# Patient Record
Sex: FEMALE | Race: ASIAN | NOT HISPANIC OR LATINO | Employment: UNEMPLOYED | URBAN - METROPOLITAN AREA
[De-identification: names, ages, dates, MRNs, and addresses within clinical notes are randomized per-mention and may not be internally consistent; named-entity substitution may affect disease eponyms.]

---

## 2017-03-13 ENCOUNTER — TELEPHONE (OUTPATIENT)
Dept: FAMILY MEDICINE | Facility: CLINIC | Age: 52
End: 2017-03-13

## 2017-03-13 DIAGNOSIS — M54.2 CERVICALGIA: Primary | ICD-10-CM

## 2017-03-13 NOTE — TELEPHONE ENCOUNTER
I received a staff Message from Susan Abbott that patient calls requesting a referral for acupuncture. Can we call patient and see why this referral is wanted? I have no diagnosis to pair with the referral.     Thanks,    Mike Carroll, PAC

## 2017-03-15 NOTE — TELEPHONE ENCOUNTER
"Referral for \"Discomfort in head, feeling very tight\".  Not a HA, but tight and stiff.  Pt would like to go get acupuncture today if possible.  Shari Schoenberger, [facility]    "

## 2017-03-15 NOTE — TELEPHONE ENCOUNTER
Referral placed, but will likely not be able to get in today. Patient can call herself to see. They are located in [place]     [phone]    Thanks,    Mike Carroll, PAC

## 2017-03-16 NOTE — TELEPHONE ENCOUNTER
Spoke with patient, she would like the referral to be placed for [facility] in [place]. Please advise

## 2017-04-08 ENCOUNTER — RADIANT APPOINTMENT (OUTPATIENT)
Dept: GENERAL RADIOLOGY | Facility: CLINIC | Age: 52
End: 2017-04-08
Attending: FAMILY MEDICINE
Payer: COMMERCIAL

## 2017-04-08 ENCOUNTER — OFFICE VISIT (OUTPATIENT)
Dept: FAMILY MEDICINE | Facility: CLINIC | Age: 52
End: 2017-04-08
Payer: COMMERCIAL

## 2017-04-08 VITALS
HEIGHT: 65 IN | TEMPERATURE: 98.2 F | HEART RATE: 81 BPM | OXYGEN SATURATION: 97 % | SYSTOLIC BLOOD PRESSURE: 130 MMHG | WEIGHT: 135 LBS | BODY MASS INDEX: 22.49 KG/M2 | RESPIRATION RATE: 16 BRPM | DIASTOLIC BLOOD PRESSURE: 82 MMHG

## 2017-04-08 DIAGNOSIS — M79.671 RIGHT FOOT PAIN: Primary | ICD-10-CM

## 2017-04-08 DIAGNOSIS — M79.671 RIGHT FOOT PAIN: ICD-10-CM

## 2017-04-08 PROCEDURE — 99213 OFFICE O/P EST LOW 20 MIN: CPT | Performed by: FAMILY MEDICINE

## 2017-04-08 PROCEDURE — 73630 X-RAY EXAM OF FOOT: CPT | Mod: RT

## 2017-04-08 RX ORDER — NAPROXEN 500 MG/1
500 TABLET ORAL 2 TIMES DAILY WITH MEALS
Qty: 60 TABLET | Refills: 1 | Status: SHIPPED | OUTPATIENT
Start: 2017-04-08 | End: 2017-04-08

## 2017-04-08 NOTE — PROGRESS NOTES
SUBJECTIVE:                                                    Naa Paredes is a 51 year old female who presents to clinic today for the following health issues:      Musculoskeletal problem/pain      Duration: 3 weeks ago    Description  Location: top foot, middle toe on right foot    Intensity:  moderate    Accompanying signs and symptoms: none    History  Previous similar problem: yes, last year   Previous evaluation:  Last year, fractured toe    Precipitating or alleviating factors:  Trauma or overuse: YES- dances alot  Aggravating factors include: sitting, walking, climbing stairs, exercise and overuse    Therapies tried and outcome: rubbed some oil           Problem list and histories reviewed & adjusted, as indicated.  Additional history: as documented    Patient Active Problem List   Diagnosis     H/O: alcohol abuse     CARDIOVASCULAR SCREENING; LDL GOAL LESS THAN 160     Cervicalgia     Nickel allergy     Anxiety     Perimenopause     Colon adenoma     Seasonal allergic rhinitis     Major depressive disorder, single episode, in partial remission (H)     Major depressive disorder, recurrent, severe without psychotic features (H)     Conversion disorder with mixed symptoms     Migraine with status migrainosus, not intractable, unspecified migraine type     Past Surgical History:   Procedure Laterality Date     COLONOSCOPY N/A 6/9/2015    Procedure: COLONOSCOPY;  Surgeon: Steve Choi MD;  Location:  GI     LITHOTRIPSY         Social History   Substance Use Topics     Smoking status: Never Smoker     Smokeless tobacco: Never Used     Alcohol use Yes      Comment: occ wine     Family History   Problem Relation Age of Onset     HEART DISEASE Mother      Cancer - colorectal Father      at age 65     MENTAL ILLNESS Paternal Uncle      Breast Cancer No family hx of      Ovarian Cancer No family hx of          Current Outpatient Prescriptions   Medication Sig Dispense Refill     naproxen (NAPROSYN) 500 MG  "tablet Take 1 tablet (500 mg) by mouth 2 times daily (with meals) 60 tablet 1     No Known Allergies    Reviewed and updated as needed this visit by clinical staff  Allergies  Meds       Reviewed and updated as needed this visit by Provider         ROS:      OBJECTIVE:                                                    /82 (BP Location: Right arm, Patient Position: Chair, Cuff Size: Adult Regular)  Pulse 81  Temp 98.2  F (36.8  C) (Oral)  Resp 16  Ht 5' 5\" (1.651 m)  Wt 135 lb (61.2 kg)  LMP 08/20/2015  SpO2 97%  BMI 22.47 kg/m2  Body mass index is 22.47 kg/(m^2).  Foot exam : tenderness over the MC joint of the 3rd toe, no swelling, painful ROM of the 3rd toe.          ASSESSMENT/PLAN:                                                            1. Right foot pain  Mostly strain of the MC joint of the 3rd toe, rest, ice  - XR Foot Right G/E 3 Views; Future  - diclofenac (VOLTAREN) 1 % GEL topical gel; Apply  2 grams to toes four times daily using enclosed dosing card.  Dispense: 100 g; Refill: 1        Pearl Charles MD  [facility]    "

## 2017-04-08 NOTE — MR AVS SNAPSHOT
"              After Visit Summary   2017    Naa Paredes    MRN: [id number]           Patient Information     Date Of Birth          1965        Visit Information        Provider Department      2017 9:45 AM Pearl Charles MD; [facility]        Today's Diagnoses     Right foot pain    -  1       Follow-ups after your visit        Who to contact     If you have questions or need follow up information about today's clinic visit or your schedule please contact [facility] directly at [phone].  Normal or non-critical lab and imaging results will be communicated to you by Edxacthart, letter or phone within 4 business days after the clinic has received the results. If you do not hear from us within 7 days, please contact the clinic through Edxacthart or phone. If you have a critical or abnormal lab result, we will notify you by phone as soon as possible.  Submit refill requests through Busportal or call your pharmacy and they will forward the refill request to us. Please allow 3 business days for your refill to be completed.          Additional Information About Your Visit        MyChart Information     Busportal lets you send messages to your doctor, view your test results, renew your prescriptions, schedule appointments and more. To sign up, go to [email or web address] . Click on \"Log in\" on the left side of the screen, which will take you to the Welcome page. Then click on \"Sign up Now\" on the right side of the page.     You will be asked to enter the access code listed below, as well as some personal information. Please follow the directions to create your username and password.     Your access code is: [email or web address]  Expires: 2017 10:18 AM     Your access code will  in 90 days. If you need help or a new code, please call your [facility] or [phone].        Care EveryWhere ID     This is your Care EveryWhere ID. This could be " "used by other organizations to access your [facility] medical records  [id number]        Your Vitals Were     Pulse Temperature Respirations Height Last Period Pulse Oximetry    81 98.2  F (36.8  C) (Oral) 16 5' 5\" (1.651 m) 08/20/2015 97%    BMI (Body Mass Index)                   22.47 kg/m2            Blood Pressure from Last 3 Encounters:   04/08/17 130/82   11/17/16 150/80   11/01/16 136/82    Weight from Last 3 Encounters:   04/08/17 135 lb (61.2 kg)   11/17/16 138 lb (62.6 kg)   11/01/16 139 lb (63 kg)                 Today's Medication Changes          These changes are accurate as of: 4/8/17 10:18 AM.  If you have any questions, ask your nurse or doctor.               Start taking these medicines.        Dose/Directions    diclofenac 1 % Gel topical gel   Commonly known as:  VOLTAREN   Used for:  Right foot pain   Started by:  Pearl Charles MD        Apply  2 grams to toes four times daily using enclosed dosing card.   Quantity:  100 g   Refills:  1            Where to get your medicines      These medications were sent to [facility] [address] [address]     Phone:  [phone]     diclofenac 1 % Gel topical gel                Primary Care Provider Office Phone # Fax #    Sher Duran [phone]       [address] [address]        Thank you!     Thank you for choosing [facility]  for your care. Our goal is always to provide you with excellent care. Hearing back from our patients is one way we can continue to improve our services. Please take a few minutes to complete the written survey that you may receive in the mail after your visit with us. Thank you!             Your Updated Medication List - Protect others around you: Learn how to safely use, store and throw away your medicines at www.disposemymeds.org.          This list is accurate as of: " 4/8/17 10:18 AM.  Always use your most recent med list.                   Brand Name Dispense Instructions for use    diclofenac 1 % Gel topical gel    VOLTAREN    100 g    Apply  2 grams to toes four times daily using enclosed dosing card.

## 2017-04-08 NOTE — NURSING NOTE
"Chief Complaint   Patient presents with     Finger       Initial /82 (BP Location: Right arm, Patient Position: Chair, Cuff Size: Adult Regular)  Pulse 81  Temp 98.2  F (36.8  C) (Oral)  Resp 16  Ht 5' 5\" (1.651 m)  Wt 135 lb (61.2 kg)  LMP 08/20/2015  SpO2 97%  BMI 22.47 kg/m2 Estimated body mass index is 22.47 kg/(m^2) as calculated from the following:    Height as of this encounter: 5' 5\" (1.651 m).    Weight as of this encounter: 135 lb (61.2 kg).  Medication Reconciliation: complete     Albert Reveles CMA      "

## 2017-04-09 ASSESSMENT — PATIENT HEALTH QUESTIONNAIRE - PHQ9: SUM OF ALL RESPONSES TO PHQ QUESTIONS 1-9: 0

## 2017-05-02 ENCOUNTER — OFFICE VISIT (OUTPATIENT)
Dept: FAMILY MEDICINE | Facility: CLINIC | Age: 52
End: 2017-05-02
Payer: COMMERCIAL

## 2017-05-02 VITALS
HEART RATE: 94 BPM | WEIGHT: 136 LBS | SYSTOLIC BLOOD PRESSURE: 136 MMHG | DIASTOLIC BLOOD PRESSURE: 90 MMHG | TEMPERATURE: 98 F | RESPIRATION RATE: 16 BRPM | BODY MASS INDEX: 22.63 KG/M2

## 2017-05-02 DIAGNOSIS — L91.8 SKIN TAG: ICD-10-CM

## 2017-05-02 DIAGNOSIS — L25.9 CONTACT DERMATITIS, UNSPECIFIED CONTACT DERMATITIS TYPE, UNSPECIFIED TRIGGER: Primary | ICD-10-CM

## 2017-05-02 PROCEDURE — 99214 OFFICE O/P EST MOD 30 MIN: CPT | Performed by: FAMILY MEDICINE

## 2017-05-02 RX ORDER — PREDNISONE 20 MG/1
TABLET ORAL
Qty: 20 TABLET | Refills: 0 | Status: SHIPPED | OUTPATIENT
Start: 2017-05-02 | End: 2017-06-05

## 2017-05-02 NOTE — PROGRESS NOTES
SUBJECTIVE:                                                    Naa Paredes is a 51 year old female who presents to clinic today for the following health issues:      Rash     Onset: x2-3 weeks    Description:   Location: from the waist up  Character: blotchy  Itching (Pruritis): YES    Progression of Symptoms:  worsening    Accompanying Signs & Symptoms:  Fever: no   Body aches or joint pain: no   Sore throat symptoms: no   Recent cold symptoms: no    History:   Previous similar rash: no     Precipitating factors:   Exposure to similar rash: no   New exposures: changed lotions but believes they are all natural products   Recent travel: no     Alleviating factors:       Therapies Tried and outcome:     Patient does not really remember what laundry detergent she uses. Is trying some new lotions but believes they are organic - asked about names but did not answer. Tried a chinese cream to help itching last night.     Also has a lot of growths on her neck that she wants a dermatologist to look at.         Problem list and histories reviewed & adjusted, as indicated.  Additional history: as documented    Patient Active Problem List   Diagnosis     H/O: alcohol abuse     CARDIOVASCULAR SCREENING; LDL GOAL LESS THAN 160     Cervicalgia     Nickel allergy     Anxiety     Perimenopause     Colon adenoma     Seasonal allergic rhinitis     Major depressive disorder, single episode, in partial remission (H)     Major depressive disorder, recurrent, severe without psychotic features (H)     Conversion disorder with mixed symptoms     Migraine with status migrainosus, not intractable, unspecified migraine type     Past Surgical History:   Procedure Laterality Date     COLONOSCOPY N/A 6/9/2015    Procedure: COLONOSCOPY;  Surgeon: Steve Choi MD;  Location:  GI     LITHOTRIPSY         Social History   Substance Use Topics     Smoking status: Never Smoker     Smokeless tobacco: Never Used     Alcohol use Yes       Comment: occ wine     Family History   Problem Relation Age of Onset     HEART DISEASE Mother      Cancer - colorectal Father      at age 65     MENTAL ILLNESS Paternal Uncle      Breast Cancer No family hx of      Ovarian Cancer No family hx of            Reviewed and updated as needed this visit by clinical staff  Tobacco  Allergies       Reviewed and updated as needed this visit by Provider         ROS:  Constitutional, skin  systems are negative, except as otherwise noted.    OBJECTIVE:                                                    /90 (BP Location: Right arm, Patient Position: Chair, Cuff Size: Adult Regular)  Pulse 94  Temp 98  F (36.7  C) (Oral)  Resp 16  Wt 136 lb (61.7 kg)  LMP 08/20/2015  Breastfeeding? No  BMI 22.63 kg/m2  Body mass index is 22.63 kg/(m^2).  GENERAL: healthy, alert and no distress  SKIN: pruritic papules on erythematous patchy background bilateral arms, chest; smal skin tags ~0.2 cm diffusely on neck- no irritation noted     Diagnostic Test Results:  none      ASSESSMENT/PLAN:                                                        1. Contact dermatitis, unspecified contact dermatitis type, unspecified trigger  - due to extensive nature will start on oral steroids. Also recommend used of benadryl or calamine lotion as needed for itching   - predniSONE (DELTASONE) 20 MG tablet; Take 3 tabs (60 mg) by mouth daily x 3 days, 2 tabs (40 mg) daily x 3 days, 1 tab (20 mg) daily x 3 days, then 1/2 tab (10 mg) x 3 days.  Dispense: 20 tablet; Refill: 0    2. Skin tag  - discussed with patient these are simple skin tags. She is adamant about a referral to dermatology.   - DERMATOLOGY REFERRAL    See Patient Instructions    Treasure Nelson MD  [facility]

## 2017-05-02 NOTE — MR AVS SNAPSHOT
After Visit Summary   5/2/2017    Naa Paredes    MRN: [id number]           Patient Information     Date Of Birth          1965        Visit Information        Provider Department      5/2/2017 10:45 AM Treasure Nelson MD; [facility]        Today's Diagnoses     Contact dermatitis, unspecified contact dermatitis type, unspecified trigger    -  1    Skin tag          Care Instructions    Recommend benadryl as needed for itching.  Calamine lotion can also help   Advise emollients such as cetaphil, cerave, aveeno, eucerin for lotion  Avoid scented soaps/lotions    Contact Dermatitis  Contact dermatitis is a skin rash caused by something that touches the skin and makes it irritated and inflamed.  Your skin may be red, swollen, dry, and may be cracked. Blisters may form and ooze. The rash will itch.  Contact dermatitis can form on the face and neck, backs of hands, forearms, genitals, and lower legs.  People can get contact dermatitis from lots of sources. These include:    Plants such as poison ivy, oak, or sumac    Chemicals in hair dyes and rinses, soaps, solvents, waxes, fingernail polish, and deodorants     Jewelry or watchbands made of nickel  Contact dermatitis is not passed from person to person.  Talk with your health care provider about what may have caused the rash. You will need to avoid the source of your rash in the future to prevent it from coming back.  Treatment is done to relieve itching and prevent the rash from coming back. The rash should go away in a few days to a few weeks.  Home care  The health care provider may prescribe medications to relieve swelling and itching. Follow all instructions when using these medications.  General care:    Avoid anything that heats up your skin, such as hot showers or baths, or direct sunlight. This can make itching worse.    Apply cold compresses to soothe your sores to help relieve  your symptoms. Do this for 30 minutes 3 to 4 times a day. You can make a cold compress by soaking a cloth in cold water. Squeeze out excess water. You can add colloidal oatmeal to the water to help reduce itching. For severe itching in a small area, apply an ice pack wrapped in a thin towel. Do this for 20 minutes 3 to 4 times a day.    You can also try wet dressings. One way to do this is to wear a wet piece of clothing under a dry one. Wear a damp shirt under a dry shirt if your upper body is affected. This can relieve itching and prevent you from scratching the affected area.    You can also help relieve large areas of itching by taking a lukewarm bath with colloidal oatmeal added to the water.    Use hydrocortisone cream for redness and irritation, unless another medicine was prescribed. You can also use benzocaine anesthetic cream or spray. Calamine lotion can also relieve mild symptoms.    Use oral diphenhydramine to help reduce itching. This is an antihistamine you can buy at drug and grocery stores. It can make you sleepy, so use lower doses during the daytime. Or you can use loratadine. This is an antihistamine that will not make you sleepy. Do not use diphenhydramine if you have glaucoma or have trouble urinating due to an enlarged prostate.    If your rash is caused by a plant, make sure to wash your skin and the clothes you were wearing when you came into contact with the plant. This is to wash away the plant oils that gave you the rash and prevent more or worse symptoms.    Stay away from the substance or object that causes your symptoms. If you can t avoid it, wear gloves or some other type of protection.  Follow-up care  Follow up with your health care provider.  When to seek medical advice  Call your health care provider right away if any of these occur:    Spreading of the rash to other parts of your body    Severe swelling of your face, eyelids, mouth, throat or tongue    Trouble urinating due to  swelling in the genital area    Fever of 100.4 F (38 C) or higher    Redness or swelling that gets worse    Pain that gets worse    Foul-smelling fluid leaking from the skin    Yellow-brown crusts on the open blisters       5937-1950 [employer]. [address] [place] [address]. All rights reserved. This information is not intended as a substitute for professional medical care. Always follow your healthcare professional's instructions.              Follow-ups after your visit        Additional Services     DERMATOLOGY REFERRAL       Your provider has referred you to: [facility] [phone]   [email or web address]    Please be aware that coverage of these services is subject to the terms and limitations of your health insurance plan.  Call member services at your health plan with any benefit or coverage questions.      Please bring the following with you to your appointment:    (1) Any X-Rays, CTs or MRIs which have been performed.  Contact the facility where they were done to arrange for  prior to your scheduled appointment.    (2) List of current medications  (3) This referral request   (4) Any documents/labs given to you for this referral                  Who to contact     If you have questions or need follow up information about today's clinic visit or your schedule please contact [facility] directly at [phone].  Normal or non-critical lab and imaging results will be communicated to you by MyChart, letter or phone within 4 business days after the clinic has received the results. If you do not hear from us within 7 days, please contact the clinic through MyChart or phone. If you have a critical or abnormal lab result, we will notify you by phone as soon as possible.  Submit refill requests through TunePatrol or call your pharmacy and they will forward the refill request to us. Please allow 3 business days for your  "refill to be completed.          Additional Information About Your Visit        [employer] Information     [employer] lets you send messages to your doctor, view your test results, renew your prescriptions, schedule appointments and more. To sign up, go to [email or web address] . Click on \"Log in\" on the left side of the screen, which will take you to the Welcome page. Then click on \"Sign up Now\" on the right side of the page.     You will be asked to enter the access code listed below, as well as some personal information. Please follow the directions to create your username and password.     Your access code is: [email or web address]  Expires: 2017 10:18 AM     Your access code will  in 90 days. If you need help or a new code, please call your [place] clinic or [phone].        Care EveryWhere ID     This is your Care EveryWhere ID. This could be used by other organizations to access your [facility] medical records  [id number]        Your Vitals Were     Pulse Temperature Respirations Last Period Breastfeeding? BMI (Body Mass Index)    94 98  F (36.7  C) (Oral) 16 2015 No 22.63 kg/m2       Blood Pressure from Last 3 Encounters:   17 136/90   17 130/82   16 150/80    Weight from Last 3 Encounters:   17 136 lb (61.7 kg)   17 135 lb (61.2 kg)   16 138 lb (62.6 kg)              We Performed the Following     DERMATOLOGY REFERRAL          Today's Medication Changes          These changes are accurate as of: 17 11:41 AM.  If you have any questions, ask your nurse or doctor.               Start taking these medicines.        Dose/Directions    predniSONE 20 MG tablet   Commonly known as:  DELTASONE   Used for:  Contact dermatitis, unspecified contact dermatitis type, unspecified trigger   Started by:  Treasure Nelson MD        Take 3 tabs (60 mg) by mouth daily x 3 days, 2 tabs (40 mg) daily x 3 days, 1 tab (20 mg) daily x 3 days, then 1/2 tab (10 mg) x 3 days. "   Quantity:  20 tablet   Refills:  0            Where to get your medicines      These medications [place] [facility] [address]  [address] [facility] [address]     Phone:  [phone]     predniSONE 20 MG tablet                Primary Care Provider Office Phone # Fax #    Sher Duran [phone]       [facility] [address] [place] [address]        Thank you!     Thank you for choosing [facility]  for your care. Our goal is always to provide you with excellent care. Hearing back from our patients is one way we can continue to improve our services. Please take a few minutes to complete the written survey that you may receive in the mail after your visit with us. Thank you!             Your Updated Medication List - Protect others around you: Learn how to safely use, store and throw away your medicines at www.disposemymeds.org.          This list is accurate as of: 5/2/17 11:41 AM.  Always use your most recent med list.                   Brand Name Dispense Instructions for use    predniSONE 20 MG tablet    DELTASONE    20 tablet    Take 3 tabs (60 mg) by mouth daily x 3 days, 2 tabs (40 mg) daily x 3 days, 1 tab (20 mg) daily x 3 days, then 1/2 tab (10 mg) x 3 days.

## 2017-05-02 NOTE — PATIENT INSTRUCTIONS
Recommend benadryl as needed for itching.  Calamine lotion can also help   Advise emollients such as cetaphil, cerave, aveeno, eucerin for lotion  Avoid scented soaps/lotions    Contact Dermatitis  Contact dermatitis is a skin rash caused by something that touches the skin and makes it irritated and inflamed.  Your skin may be red, swollen, dry, and may be cracked. Blisters may form and ooze. The rash will itch.  Contact dermatitis can form on the face and neck, backs of hands, forearms, genitals, and lower legs.  People can get contact dermatitis from lots of sources. These include:    Plants such as poison ivy, oak, or sumac    Chemicals in hair dyes and rinses, soaps, solvents, waxes, fingernail polish, and deodorants     Jewelry or watchbands made of nickel  Contact dermatitis is not passed from person to person.  Talk with your health care provider about what may have caused the rash. You will need to avoid the source of your rash in the future to prevent it from coming back.  Treatment is done to relieve itching and prevent the rash from coming back. The rash should go away in a few days to a few weeks.  Home care  The health care provider may prescribe medications to relieve swelling and itching. Follow all instructions when using these medications.  General care:    Avoid anything that heats up your skin, such as hot showers or baths, or direct sunlight. This can make itching worse.    Apply cold compresses to soothe your sores to help relieve your symptoms. Do this for 30 minutes 3 to 4 times a day. You can make a cold compress by soaking a cloth in cold water. Squeeze out excess water. You can add colloidal oatmeal to the water to help reduce itching. For severe itching in a small area, apply an ice pack wrapped in a thin towel. Do this for 20 minutes 3 to 4 times a day.    You can also try wet dressings. One way to do this is to wear a wet piece of clothing under a dry one. Wear a damp shirt under a dry  shirt if your upper body is affected. This can relieve itching and prevent you from scratching the affected area.    You can also help relieve large areas of itching by taking a lukewarm bath with colloidal oatmeal added to the water.    Use hydrocortisone cream for redness and irritation, unless another medicine was prescribed. You can also use benzocaine anesthetic cream or spray. Calamine lotion can also relieve mild symptoms.    Use oral diphenhydramine to help reduce itching. This is an antihistamine you can buy at drug and grocery stores. It can make you sleepy, so use lower doses during the daytime. Or you can use loratadine. This is an antihistamine that will not make you sleepy. Do not use diphenhydramine if you have glaucoma or have trouble urinating due to an enlarged prostate.    If your rash is caused by a plant, make sure to wash your skin and the clothes you were wearing when you came into contact with the plant. This is to wash away the plant oils that gave you the rash and prevent more or worse symptoms.    Stay away from the substance or object that causes your symptoms. If you can t avoid it, wear gloves or some other type of protection.  Follow-up care  Follow up with your health care provider.  When to seek medical advice  Call your health care provider right away if any of these occur:    Spreading of the rash to other parts of your body    Severe swelling of your face, eyelids, mouth, throat or tongue    Trouble urinating due to swelling in the genital area    Fever of 100.4 F (38 C) or higher    Redness or swelling that gets worse    Pain that gets worse    Foul-smelling fluid leaking from the skin    Yellow-brown crusts on the open blisters       7264-0381 [employer]. [address] [place] [address], [place] [address]. All rights reserved. This information is not intended as a substitute for professional medical care. Always follow your healthcare professional's instructions.

## 2017-05-02 NOTE — NURSING NOTE
"Chief Complaint   Patient presents with     Derm Problem     rash and itchy skin x2-3 weeks       Initial /90 (BP Location: Right arm, Patient Position: Chair, Cuff Size: Adult Regular)  Pulse 94  Temp 98  F (36.7  C) (Oral)  Resp 16  Wt 136 lb (61.7 kg)  LMP 08/20/2015  Breastfeeding? No  BMI 22.63 kg/m2 Estimated body mass index is 22.63 kg/(m^2) as calculated from the following:    Height as of 4/8/17: 5' 5\" (1.651 m).    Weight as of this encounter: 136 lb (61.7 kg).  Medication Reconciliation: complete     Raquel Llanos/BART  [place]---[facility]      "

## 2017-05-26 ENCOUNTER — TELEPHONE (OUTPATIENT)
Dept: FAMILY MEDICINE | Facility: CLINIC | Age: 52
End: 2017-05-26

## 2017-05-26 NOTE — TELEPHONE ENCOUNTER
Chart shows patient discontinued 5/2/17 (therapy completed) .  We called pt. Pop. Pharmacy staff informed.   Vinayak Delong RN

## 2017-05-26 NOTE — TELEPHONE ENCOUNTER
Please do not close this encounter until this has been addressed.  (prior auth approved/denied, prescriber refusal to complete prior auth or medication changed/discontinued)    Prior Authorization needed on: Voltaren gel  Drug NDC: [address]-47     Insurance: blue plus  Member ID: [id number]   Insurance phone #: [phone]    Bin [id number], [facility]  Pharmacy NPI: [id number]  Pharmacy Phone #: [phone]  Pharmacy Fax #: [phone]    Please let us know if the PA gets approved or denied or if medication is changed  Thanks!  Kaylie Ramirez CPhT  [facility]  [phone]

## 2017-05-26 NOTE — TELEPHONE ENCOUNTER
I really don't know what to do with these, I recommend voltaren gel because it works!  Pearl Charles MD  [facility]  [phone]

## 2017-05-30 ENCOUNTER — OFFICE VISIT (OUTPATIENT)
Dept: FAMILY MEDICINE | Facility: CLINIC | Age: 52
End: 2017-05-30
Payer: COMMERCIAL

## 2017-05-30 VITALS
WEIGHT: 137.3 LBS | TEMPERATURE: 97.6 F | RESPIRATION RATE: 14 BRPM | OXYGEN SATURATION: 98 % | HEART RATE: 84 BPM | HEIGHT: 65 IN | DIASTOLIC BLOOD PRESSURE: 79 MMHG | SYSTOLIC BLOOD PRESSURE: 136 MMHG | BODY MASS INDEX: 22.88 KG/M2

## 2017-05-30 DIAGNOSIS — S20.222A BACK CONTUSION, LEFT, INITIAL ENCOUNTER: Primary | ICD-10-CM

## 2017-05-30 PROCEDURE — 99213 OFFICE O/P EST LOW 20 MIN: CPT | Performed by: FAMILY MEDICINE

## 2017-05-30 RX ORDER — LIDOCAINE 50 MG/G
PATCH TOPICAL
Qty: 30 PATCH | Refills: 0 | Status: SHIPPED | OUTPATIENT
Start: 2017-05-30 | End: 2017-06-05

## 2017-05-30 RX ORDER — CHOLECALCIFEROL (VITAMIN D3) 25 MCG
TABLET ORAL
Refills: 1 | COMMUNITY
Start: 2017-05-26 | End: 2017-10-10

## 2017-05-30 NOTE — NURSING NOTE
"Chief Complaint   Patient presents with     Fall       Initial /79 (BP Location: Left arm, Patient Position: Chair, Cuff Size: Adult Regular)  Pulse 84  Temp 97.6  F (36.4  C) (Oral)  Resp 14  Ht 5' 5\" (1.651 m)  Wt 137 lb 4.8 oz (62.3 kg)  LMP 08/20/2015  SpO2 98%  BMI 22.85 kg/m2 Estimated body mass index is 22.85 kg/(m^2) as calculated from the following:    Height as of this encounter: 5' 5\" (1.651 m).    Weight as of this encounter: 137 lb 4.8 oz (62.3 kg).  Medication Reconciliation: complete   Murray Núñez CMA       "

## 2017-05-30 NOTE — TELEPHONE ENCOUNTER
Pt calls, after lengthy discussion informs lidoderm patch not covered, needs PA, see message below that insurance does NOT cover voltaren gel, routed to , please advise if PA needed, no fax yet from [facility]    Mali Nicolas RN, BSN  Message handled by Nurse Triage.

## 2017-05-30 NOTE — MR AVS SNAPSHOT
"              After Visit Summary   2017    Naa Paredes    MRN: [id number]           Patient Information     Date Of Birth          1965        Visit Information        Provider Department      2017 10:15 AM Liam, Assignments; Yoni Kebede MD [facility]        Today's Diagnoses     Back contusion, left, initial encounter    -  1       Follow-ups after your visit        Follow-up notes from your care team     Return in about 4 weeks (around 2017).      Who to contact     If you have questions or need follow up information about today's clinic visit or your schedule please contact [place] [facility] directly at [phone].  Normal or non-critical lab and imaging results will be communicated to you by MyChart, letter or phone within 4 business days after the clinic has received the results. If you do not hear from us within 7 days, please contact the clinic through MyChart or phone. If you have a critical or abnormal lab result, we will notify you by phone as soon as possible.  Submit refill requests through [employer] or call your pharmacy and they will forward the refill request to us. Please allow 3 business days for your refill to be completed.          Additional Information About Your Visit        MyChart Information     [employer] lets you send messages to your doctor, view your test results, renew your prescriptions, schedule appointments and more. To sign up, go to [email or web address] . Click on \"Log in\" on the left side of the screen, which will take you to the Welcome page. Then click on \"Sign up Now\" on the right side of the page.     You will be asked to enter the access code listed below, as well as some personal information. Please follow the directions to create your username and password.     Your access code is: [email or web address]  Expires: 2017 10:18 AM     Your access code will  in 90 days. If you need help or a new code, please " "call your [facility] or [phone].        Care EveryWhere ID     This is your Care EveryWhere ID. This could be used by other organizations to access your [place] medical records  [id number]        Your Vitals Were     Pulse Temperature Respirations Height Last Period Pulse Oximetry    84 97.6  F (36.4  C) (Oral) 14 5' 5\" (1.651 m) 08/20/2015 98%    BMI (Body Mass Index)                   22.85 kg/m2            Blood Pressure from Last 3 Encounters:   05/30/17 136/79   05/02/17 136/90   04/08/17 130/82    Weight from Last 3 Encounters:   05/30/17 137 lb 4.8 oz (62.3 kg)   05/02/17 136 lb (61.7 kg)   04/08/17 135 lb (61.2 kg)              Today, you had the following     No orders found for display         Today's Medication Changes          These changes are accurate as of: 5/30/17 11:06 AM.  If you have any questions, ask your nurse or doctor.               Start taking these medicines.        Dose/Directions    lidocaine 5 % Patch   Commonly known as:  LIDODERM   Used for:  Back contusion, left, initial encounter   Started by:  Yoni Kebede MD        Apply up to 3 patches to painful area at once for up to 12 h within a 24 h period.  Remove after 12 hours.   Quantity:  30 patch   Refills:  0            Where to get your medicines      These medications were sent to [facility] #[id number] - [place], [place] - [address]  [address] [facility] [address]     Phone:  [phone]     lidocaine 5 % Patch                Primary Care Provider Office Phone # Fax #    Sher Duran [phone]       [facility] [address] [facility] [address]        Thank you!     Thank you for choosing [facility]  for your care. Our goal is always to provide you with excellent care. Hearing back from our patients is one way we can continue to improve our services. Please take a few minutes to complete the written survey that you may receive in " the mail after your visit with us. Thank you!             Your Updated Medication List - Protect others around you: Learn how to safely use, store and throw away your medicines at www.disposemymeds.org.          This list is accurate as of: 5/30/17 11:06 AM.  Always use your most recent med list.                   Brand Name Dispense Instructions for use    cholecalciferol 1000 UNIT tablet    vitamin D         lidocaine 5 % Patch    LIDODERM    30 patch    Apply up to 3 patches to painful area at once for up to 12 h within a 24 h period.  Remove after 12 hours.       predniSONE 20 MG tablet    DELTASONE    20 tablet    Take 3 tabs (60 mg) by mouth daily x 3 days, 2 tabs (40 mg) daily x 3 days, 1 tab (20 mg) daily x 3 days, then 1/2 tab (10 mg) x 3 days.

## 2017-05-30 NOTE — PROGRESS NOTES
SUBJECTIVE:                                                    Naa Paredes is a 51 year old female who presents to clinic today for the following health issues:        Patient fell on her left butt/hip area last night in a hot tub.  The pain she is experiencing is sharp.It is localized to the gluteus muscles, no bony tenderness or limitation of low back mobility     No dysuria, hematuria or brown urine. She is able to bear weight and walk regularly            REVIEW OF SYSTEMS    Generally has been had been  feeling well until this episode. No problems with vision, hearing, dental or neck pain.Has hph airborne or ingestion allergy  No chest pain, palpitations, dyspnea, change in bowel habits, blood  in stool or dyspepsia.  No rashes, changing moles, weakness, lassitude or back problems.  No chronic issues . No dysuria  Patient not  a smoker. No problems with significant headaches.    On exam the vital signs are stable  Weight is Body mass index is 22.85 kg/(m^2).   Eyes show lula  No neck masses or thyromegaly.Ear nose and throat shows normal   No bruits, murmers, rubs or extrasounds. No cardiomegaly or chest wall tenderness. Lungs clear, no abdominal masses or organomegaly. No CVA tenderness.  Skin eval no rash or bruising     Past Medical History:   Diagnosis Date     Ankle sprain and strain 5/2/2013     Anxiety 3/26/2015     Black stools 11/25/2015     CARDIOVASCULAR SCREENING; LDL GOAL LESS THAN 160 10/31/2010     Cervicalgia 12/7/2011     Colon adenoma 4/23/2015    Patient denies it. Renee Tiwari RN, 6/9/15.     Conversion disorder with mixed symptoms 6/14/2016     Depression with suicidal ideation 6/19/2015     Depressive disorder      Elevated blood pressure reading without diagnosis of hypertension 11/25/2015     Elevated LFT's 6/4/2010     Fatigue due to depression 11/24/2015     H/O: alcohol abuse 6/4/2010     IBS (irritable bowel syndrome)      Knee pain 10/19/2010     Low back pain 10/19/2010      Lumbago 12/7/2011     Major depressive disorder, recurrent, severe without psychotic features (H) 5/13/2016     Major depressive disorder, single episode, in partial remission (H) 11/24/2015     Migraine      Neck pain 10/19/2010     Nephrolithiasis     calcium oxalate     Pain in thoracic spine 1/2/2013     Perimenopause 3/26/2015     Seasonal allergic rhinitis 10/20/2015     Vitamin D deficiency disease 6/8/2010       Past Surgical History:   Procedure Laterality Date     COLONOSCOPY N/A 6/9/2015    Procedure: COLONOSCOPY;  Surgeon: Steve Choi MD;  Location: [facility]     LITHOTRIPSY         Family History   Problem Relation Age of Onset     HEART DISEASE Mother      Cancer - colorectal Father      at age 65     MENTAL ILLNESS Paternal Uncle      Breast Cancer No family hx of      Ovarian Cancer No family hx of        Social History   Substance Use Topics     Smoking status: Never Smoker     Smokeless tobacco: Never Used     Alcohol use Yes      Comment: occ wine       (S20.222A) Back contusion, left, initial encounter  (primary encounter diagnosis)  Comment:   Plan: lidocaine (LIDODERM) 5 % Patch

## 2017-06-05 ENCOUNTER — OFFICE VISIT (OUTPATIENT)
Dept: FAMILY MEDICINE | Facility: CLINIC | Age: 52
End: 2017-06-05
Payer: COMMERCIAL

## 2017-06-05 VITALS
RESPIRATION RATE: 16 BRPM | WEIGHT: 139 LBS | TEMPERATURE: 98.1 F | HEART RATE: 80 BPM | SYSTOLIC BLOOD PRESSURE: 130 MMHG | DIASTOLIC BLOOD PRESSURE: 78 MMHG | BODY MASS INDEX: 23.13 KG/M2

## 2017-06-05 DIAGNOSIS — M79.671 RIGHT FOOT PAIN: ICD-10-CM

## 2017-06-05 DIAGNOSIS — S20.229D BACK CONTUSION, UNSPECIFIED LATERALITY, SUBSEQUENT ENCOUNTER: Primary | ICD-10-CM

## 2017-06-05 PROCEDURE — 99214 OFFICE O/P EST MOD 30 MIN: CPT | Performed by: FAMILY MEDICINE

## 2017-06-05 NOTE — PROGRESS NOTES
SUBJECTIVE:                                                    Naa Paredes is a 51 year old female who presents to clinic today for the following health issues:      Follow up Fall   Patient was seen a week ago for back pain that started after falling in a hot tub. Her pain has been slowly improving since visit. States she had a difficult time flipping around in bed especially yesterday. Denies any pain today. Her pain is usually localized to her left lower back, buttock.  Denies any numbness or tingling.     Patient also reports right foot pain- admits its not really a sharp pain but a stretching kind of feeling.   Worse when she wears tight shoes which she wears often for dancing.   Denies any numbness or tingling. Was seen for similar issue in April with negative foot xray.       Problem list and histories reviewed & adjusted, as indicated.  Additional history: as documented    Patient Active Problem List   Diagnosis     H/O: alcohol abuse     CARDIOVASCULAR SCREENING; LDL GOAL LESS THAN 160     Cervicalgia     Nickel allergy     Anxiety     Perimenopause     Colon adenoma     Seasonal allergic rhinitis     Major depressive disorder, single episode, in partial remission (H)     Major depressive disorder, recurrent, severe without psychotic features (H)     Conversion disorder with mixed symptoms     Migraine with status migrainosus, not intractable, unspecified migraine type     Past Surgical History:   Procedure Laterality Date     COLONOSCOPY N/A 6/9/2015    Procedure: COLONOSCOPY;  Surgeon: Steve Choi MD;  Location:  GI     LITHOTRIPSY         Social History   Substance Use Topics     Smoking status: Never Smoker     Smokeless tobacco: Never Used     Alcohol use Yes      Comment: occ wine     Family History   Problem Relation Age of Onset     HEART DISEASE Mother      Cancer - colorectal Father      at age 65     MENTAL ILLNESS Paternal Uncle      Breast Cancer No family hx of      Ovarian Cancer  No family hx of            Reviewed and updated as needed this visit by clinical staff       Reviewed and updated as needed this visit by Provider         ROS:  Constitutional, MSK, NEURO systems are negative, except as otherwise noted.    OBJECTIVE:                                                    BP (!) 152/95 (BP Location: Right arm, Patient Position: Chair, Cuff Size: Adult Regular)  Pulse 80  Temp 98.1  F (36.7  C) (Oral)  Resp 16  Wt 139 lb (63 kg)  LMP 08/20/2015  BMI 23.13 kg/m2  Body mass index is 23.13 kg/(m^2).  GENERAL: healthy, alert and no distress  MS: left low back- mild TTP buttock otherwise unremarkable. Right foot- no obvious swelling or deformity. Normal gait   NEURO: Normal strength and tone and sensory exam grossly normal    Diagnostic Test Results:  none      ASSESSMENT/PLAN:                                                        1. Back contusion, unspecified laterality, subsequent encounter  - improving. Discussed alternating ice/heat. Also reviewed stretching exercises.   Patient wants lidocaine patches- this was declined by insurance at recent visit. Recommend trying salon pas patches OTC.     2. Right foot pain  - exam unremarkable. Discussed proper orthotics.     See Patient Instructions    Treasure Nelson MD  [facility]

## 2017-06-05 NOTE — PATIENT INSTRUCTIONS
Recommend Salonpas patch over the counter  Follow up as needed  Back Contusion    You have a contusion to your back. A contusion is also called a bruise. There is swelling and some bleeding under the skin. The skin may be purplish. You may have muscle aching and stiffness in the area of the bruise. There are no broken bones.  Contusions heal on their own, without further treatment. However, pain and skin discoloration may take weeks to months to go away.   Home care    Rest. Avoid heavy lifting, strenuous exertion, or any activity that causes pain.    Ice the area to reduce pain and swelling. Put ice cubes in a plastic bag or use a cold pack. (Wrap the cold source in a thin towel. Do not place it directly on your skin.) Ice the injured area for 20 minutes every 1-2 hours the first day. Continue with ice packs 3-4 times a day for the next two days, then as needed for the relief of pain and swelling.    Take any prescribed pain medication. If none was prescribed, take acetaminophen, ibuprofen, or naproxen to control pain.  Follow-up care  Follow up with your healthcare provider, or as directed. Call if you are not better in 1-2 weeks.  When to seek medical advice  Call your healthcare provider for any of the following:    New or worsening pain    Increased swelling around the bruise    Pain spreads to one or both legs    Weakness or numbness in one or both legs     Loss of bowel or bladder control    Numbness in the groin or genital area    Fever of 100.4 F (38 C) or higher, or as directed by your healthcare provider    [id number] [employer]. [address] [place] [address], [place] [address]. All rights reserved. This information is not intended as a substitute for professional medical care. Always follow your healthcare professional's instructions.

## 2017-06-05 NOTE — MR AVS SNAPSHOT
After Visit Summary   6/5/2017    Naa Paredes    MRN: [id number]           Patient Information     Date Of Birth          1965        Visit Information        Provider Department      6/5/2017 11:15 AM Liam, Assignments; Treasure Nelson MD [facility]        Today's Diagnoses     Back contusion, unspecified laterality, subsequent encounter    -  1    Back contusion, left, initial encounter          Care Instructions    Recommend Salonpas patch over the counter  Follow up as needed  Back Contusion    You have a contusion to your back. A contusion is also called a bruise. There is swelling and some bleeding under the skin. The skin may be purplish. You may have muscle aching and stiffness in the area of the bruise. There are no broken bones.  Contusions heal on their own, without further treatment. However, pain and skin discoloration may take weeks to months to go away.   Home care    Rest. Avoid heavy lifting, strenuous exertion, or any activity that causes pain.    Ice the area to reduce pain and swelling. Put ice cubes in a plastic bag or use a cold pack. (Wrap the cold source in a thin towel. Do not place it directly on your skin.) Ice the injured area for 20 minutes every 1-2 hours the first day. Continue with ice packs 3-4 times a day for the next two days, then as needed for the relief of pain and swelling.    Take any prescribed pain medication. If none was prescribed, take acetaminophen, ibuprofen, or naproxen to control pain.  Follow-up care  Follow up with your healthcare provider, or as directed. Call if you are not better in 1-2 weeks.  When to seek medical advice  Call your healthcare provider for any of the following:    New or worsening pain    Increased swelling around the bruise    Pain spreads to one or both legs    Weakness or numbness in one or both legs     Loss of bowel or bladder control    Numbness in the groin or genital area    Fever of  "100.4 F (38 C) or higher, or as directed by your healthcare provider    4946-6338 [employer]. [address] [place] [address], [place] [address]. All rights reserved. This information is not intended as a substitute for professional medical care. Always follow your healthcare professional's instructions.                Follow-ups after your visit        Who to contact     If you have questions or need follow up information about today's clinic visit or your schedule please contact [facility] directly at [phone].  Normal or non-critical lab and imaging results will be communicated to you by Snocaphart, letter or phone within 4 business days after the clinic has received the results. If you do not hear from us within 7 days, please contact the clinic through Synaffixt or phone. If you have a critical or abnormal lab result, we will notify you by phone as soon as possible.  Submit refill requests through ZoomSafer or call your pharmacy and they will forward the refill request to us. Please allow 3 business days for your refill to be completed.          Additional Information About Your Visit        SnocapharTravel [employer] Information     ZoomSafer lets you send messages to your doctor, view your test results, renew your prescriptions, schedule appointments and more. To sign up, go to [email or web address] . Click on \"Log in\" on the left side of the screen, which will take you to the Welcome page. Then click on \"Sign up Now\" on the right side of the page.     You will be asked to enter the access code listed below, as well as some personal information. Please follow the directions to create your username and password.     Your access code is: [email or web address]  Expires: 2017 10:18 AM     Your access code will  in 90 days. If you need help or a new code, please call your [facility] or [phone].        Care EveryWhere ID     This is your Care EveryWhere ID. This could be used by other organizations to " access your [facility] medical records  [id number]        Your Vitals Were     Pulse Temperature Respirations Last Period BMI (Body Mass Index)       80 98.1  F (36.7  C) (Oral) 16 08/20/2015 23.13 kg/m2        Blood Pressure from Last 3 Encounters:   06/05/17 (!) 152/95   05/30/17 136/79   05/02/17 136/90    Weight from Last 3 Encounters:   06/05/17 139 lb (63 kg)   05/30/17 137 lb 4.8 oz (62.3 kg)   05/02/17 136 lb (61.7 kg)              Today, you had the following     No orders found for display       Primary Care Provider Office Phone # Fax #    Sher Duran [phone]       [address] [address]        Thank you!     Thank you for choosing [facility]  for your care. Our goal is always to provide you with excellent care. Hearing back from our patients is one way we can continue to improve our services. Please take a few minutes to complete the written survey that you may receive in the mail after your visit with us. Thank you!             Your Updated Medication List - Protect others around you: Learn how to safely use, store and throw away your medicines at www.disposemymeds.org.          This list is accurate as of: 6/5/17 12:16 PM.  Always use your most recent med list.                   Brand Name Dispense Instructions for use    cholecalciferol 1000 UNIT tablet    vitamin D

## 2017-06-05 NOTE — NURSING NOTE
"Chief Complaint   Patient presents with     Fall       Initial BP (!) 152/95 (BP Location: Right arm, Patient Position: Chair, Cuff Size: Adult Regular)  Pulse 80  Temp 98.1  F (36.7  C) (Oral)  Resp 16  Wt 139 lb (63 kg)  LMP 08/20/2015  BMI 23.13 kg/m2 Estimated body mass index is 23.13 kg/(m^2) as calculated from the following:    Height as of 5/30/17: 5' 5\" (1.651 m).    Weight as of this encounter: 139 lb (63 kg).  Medication Reconciliation: complete    "

## 2017-06-19 ENCOUNTER — OFFICE VISIT (OUTPATIENT)
Dept: FAMILY MEDICINE | Facility: CLINIC | Age: 52
End: 2017-06-19
Payer: COMMERCIAL

## 2017-06-19 VITALS
BODY MASS INDEX: 23.47 KG/M2 | RESPIRATION RATE: 18 BRPM | SYSTOLIC BLOOD PRESSURE: 119 MMHG | TEMPERATURE: 98.3 F | DIASTOLIC BLOOD PRESSURE: 82 MMHG | HEART RATE: 73 BPM | HEIGHT: 65 IN | WEIGHT: 140.9 LBS

## 2017-06-19 DIAGNOSIS — M79.671 RIGHT FOOT PAIN: Primary | ICD-10-CM

## 2017-06-19 DIAGNOSIS — L91.8 ACROCHORDON: ICD-10-CM

## 2017-06-19 DIAGNOSIS — S20.212D CONTUSION OF CHEST WALL, LEFT, SUBSEQUENT ENCOUNTER: ICD-10-CM

## 2017-06-19 PROCEDURE — 11200 RMVL SKIN TAGS UP TO&INC 15: CPT | Performed by: FAMILY MEDICINE

## 2017-06-19 PROCEDURE — 99214 OFFICE O/P EST MOD 30 MIN: CPT | Mod: 25 | Performed by: FAMILY MEDICINE

## 2017-06-19 NOTE — NURSING NOTE
"Chief Complaint   Patient presents with     RECHECK     pt fell 3 weeks ago- pt fell walking from hot tub to pool at hotel and she slipped and fell, and injured left side mid back. Patient has been seen 2 times since then for the fall.        Initial /82 (BP Location: Left arm, Patient Position: Chair, Cuff Size: Adult Regular)  Pulse 73  Temp 98.3  F (36.8  C) (Oral)  Resp 18  Ht 5' 5\" (1.651 m)  Wt 140 lb 14.4 oz (63.9 kg)  LMP 08/20/2015  BMI 23.45 kg/m2 Estimated body mass index is 23.45 kg/(m^2) as calculated from the following:    Height as of this encounter: 5' 5\" (1.651 m).    Weight as of this encounter: 140 lb 14.4 oz (63.9 kg).  Medication Reconciliation: complete     Jasmeet Ness CMA      "

## 2017-06-19 NOTE — MR AVS SNAPSHOT
"              After Visit Summary   6/19/2017    Naa Paredes    MRN: [id number]           Patient Information     Date Of Birth          1965        Visit Information        Provider Department      6/19/2017 2:00 PM Sher Duran MD; [place] [employer]        Today's Diagnoses     Right foot pain    -  1    Contusion of chest wall, left, subsequent encounter        Acrochordon          Care Instructions     3 weeks            Follow-ups after your visit        Future tests that were ordered for you today     Open Future Orders        Priority Expected Expires Ordered    MR Foot Right w/o Contrast Routine  6/19/2018 6/19/2017            Who to contact     If you have questions or need follow up information about today's clinic visit or your schedule please contact [facility] directly at [phone].  Normal or non-critical lab and imaging results will be communicated to you by MyChart, letter or phone within 4 business days after the clinic has received the results. If you do not hear from us within 7 days, please contact the clinic through MyChart or phone. If you have a critical or abnormal lab result, we will notify you by phone as soon as possible.  Submit refill requests through Workana or call your pharmacy and they will forward the refill request to us. Please allow 3 business days for your refill to be completed.          Additional Information About Your Visit        MyChart Information     Workana lets you send messages to your doctor, view your test results, renew your prescriptions, schedule appointments and more. To sign up, go to [email or web address] . Click on \"Log in\" on the left side of the screen, which will take you to the Welcome page. Then click on \"Sign up Now\" on the right side of the page.     You will be asked to enter the access code listed below, as well as some personal information. Please follow the directions to " "create your username and password.     Your access code is: [email or web address]  Expires: 2017 10:18 AM     Your access code will  in 90 days. If you need help or a new code, please call your [place] clinic or [phone].        Care EveryWhere ID     This is your Care EveryWhere ID. This could be used by other organizations to access your [facility] medical records  [id number]        Your Vitals Were     Pulse Temperature Respirations Height Last Period BMI (Body Mass Index)    73 98.3  F (36.8  C) (Oral) 18 5' 5\" (1.651 m) 2015 23.45 kg/m2       Blood Pressure from Last 3 Encounters:   17 119/82   17 130/78   17 136/79    Weight from Last 3 Encounters:   17 140 lb 14.4 oz (63.9 kg)   17 139 lb (63 kg)   17 137 lb 4.8 oz (62.3 kg)              We Performed the Following     DESTRUCT BENIGN LESION, UP TO 14        Primary Care Provider Office Phone # Fax #    Sher Duran [phone]       [address] [address]        Thank you!     Thank you for choosing [facility]  for your care. Our goal is always to provide you with excellent care. Hearing back from our patients is one way we can continue to improve our services. Please take a few minutes to complete the written survey that you may receive in the mail after your visit with us. Thank you!             Your Updated Medication List - Protect others around you: Learn how to safely use, store and throw away your medicines at www.disposemymeds.org.          This list is accurate as of: 17  9:16 PM.  Always use your most recent med list.                   Brand Name Dispense Instructions for use    cholecalciferol 1000 UNIT tablet    vitamin D         lidocaine 2 % topical gel    XYLOCAINE           "

## 2017-06-19 NOTE — PROGRESS NOTES
"  SUBJECTIVE:                                                    Naa Paredes is a 51 year old female who presents to clinic today for the following health issues:      Back Pain      Duration: 3 weeks        Specific cause: fell    Description:   Location of pain: middle of back left ribs  Character of pain: \"hard to say\"  Pain radiation:none  New numbness or weakness in legs, not attributed to pain:  no     Intensity: Currently 5/10    History:   Pain interferes with job: Yes, with certain movements  History of back problems: recurrent self limited episodes of low back pain in the past  Any previous MRI or X-rays: None  Sees a specialist for back pain:  No  Therapies tried without relief: Lidocaine gel, Chinese herbal muscle pain relief    Alleviating factors:   Improved by: none      Precipitating factors:  Worsened by: Lifting, Bending, Lying Flat     Functional and Psychosocial Screen (Yahaira STarT Back):      Not performed today       Accompanying Signs & Symptoms:  Risk of Fracture:  None  Risk of Cauda Equina:  None  Risk of Infection:  None  Risk of Cancer:  None  Risk of Ankylosing Spondylitis:  Onset at age <35, male, AND morning back stiffness. no                  Contusion of chest wall, left, subsequent encounter: Patient describes she fell in a swimming pool area 6/5/17. She was walking on stairs leading into the pool and slipped. She is unsure where she exactly hit, but describes her pain to be present in her left thoracic area.  Axillary lines  Right foot pain  (primary encounter diagnosis) now 2 mos. Blamed on bellydancing. XR a few mos ago neg    Acrochordon itxh, bilat neck      Problem list and histories reviewed & adjusted, as indicated.  Additional history: as documented    Patient Active Problem List   Diagnosis     H/O: alcohol abuse     CARDIOVASCULAR SCREENING; LDL GOAL LESS THAN 160     Cervicalgia     Nickel allergy     Anxiety     Perimenopause     Colon adenoma     Seasonal allergic " rhinitis     Major depressive disorder, single episode, in partial remission (H)     Major depressive disorder, recurrent, severe without psychotic features (H)     Conversion disorder with mixed symptoms     Migraine with status migrainosus, not intractable, unspecified migraine type     Past Surgical History:   Procedure Laterality Date     COLONOSCOPY N/A 6/9/2015    Procedure: COLONOSCOPY;  Surgeon: Steve Choi MD;  Location:  GI     LITHOTRIPSY         Social History   Substance Use Topics     Smoking status: Never Smoker     Smokeless tobacco: Never Used     Alcohol use Yes      Comment: occ wine     Family History   Problem Relation Age of Onset     HEART DISEASE Mother      Cancer - colorectal Father      at age 65     MENTAL ILLNESS Paternal Uncle      Breast Cancer No family hx of      Ovarian Cancer No family hx of          Current Outpatient Prescriptions   Medication Sig Dispense Refill     VITAMIN D3 1000 UNITS tablet   1     lidocaine (XYLOCAINE) 2 % topical gel   1     No Known Allergies  Recent Labs   Lab Test  11/01/16   1219  07/26/16   0946  07/19/16   0954  07/14/16   1056  06/14/16   1520   11/24/15   1636  07/09/15   0835   06/15/15   1713   LDL  158*   --    --   181*   --    --    --   140*   --    --    HDL  62   --    --   64   --    --    --   56   --    --    TRIG  79   --    --   181*   --    --    --   79   --    --    ALT   --   58*   --   79*   --    --    --    --    --   40   CR   --    --    --   0.62  0.71   < >   --    --    < >  0.65   GFRESTIMATED   --    --    --   >90  Non  GFR Calc    87   < >   --    --    < >  >90  Non  GFR Calc     GFRESTBLACK   --    --    --   >90   GFR Calc    >90   GFR Calc     < >   --    --    < >  >90   GFR Calc     POTASSIUM   --    --   3.7  3.3*  3.9   < >   --    --    < >  4.0   TSH   --    --    --    --   1.46   --   2.07   --    < >   --     < > =  values in this interval not displayed.      BP Readings from Last 3 Encounters:   06/19/17 119/82   06/05/17 130/78   05/30/17 136/79    Wt Readings from Last 3 Encounters:   06/19/17 63.9 kg (140 lb 14.4 oz)   06/05/17 63 kg (139 lb)   05/30/17 62.3 kg (137 lb 4.8 oz)                 Labs reviewed in EPIC    Reviewed and updated as needed this visit by clinical staff  Tobacco  Allergies  Med Hx  Surg Hx  Fam Hx  Soc Hx         PAST MEDICAL HISTORY:   Past Medical History:   Diagnosis Date     Ankle sprain and strain 5/2/2013     Anxiety 3/26/2015     Black stools 11/25/2015     CARDIOVASCULAR SCREENING; LDL GOAL LESS THAN 160 10/31/2010     Cervicalgia 12/7/2011     Colon adenoma 4/23/2015    Patient denies it. Renee Tiwari RN, 6/9/15.     Conversion disorder with mixed symptoms 6/14/2016     Depression with suicidal ideation 6/19/2015     Depressive disorder      Elevated blood pressure reading without diagnosis of hypertension 11/25/2015     Elevated LFT's 6/4/2010     Fatigue due to depression 11/24/2015     H/O: alcohol abuse 6/4/2010     IBS (irritable bowel syndrome)      Knee pain 10/19/2010     Low back pain 10/19/2010     Lumbago 12/7/2011     Major depressive disorder, recurrent, severe without psychotic features (H) 5/13/2016     Major depressive disorder, single episode, in partial remission (H) 11/24/2015     Migraine      Neck pain 10/19/2010     Nephrolithiasis     calcium oxalate     Pain in thoracic spine 1/2/2013     Perimenopause 3/26/2015     Seasonal allergic rhinitis 10/20/2015     Vitamin D deficiency disease 6/8/2010       PAST SURGICAL HISTORY:   Past Surgical History:   Procedure Laterality Date     COLONOSCOPY N/A 6/9/2015    Procedure: COLONOSCOPY;  Surgeon: Steve Choi MD;  Location:  GI     LITHOTRIPSY         FAMILY HISTORY:   Family History   Problem Relation Age of Onset     HEART DISEASE Mother      Cancer - colorectal Father      at age 65     MENTAL ILLNESS  "Paternal Uncle      Breast Cancer No family hx of      Ovarian Cancer No family hx of        SOCIAL HISTORY:   Social History   Substance Use Topics     Smoking status: Never Smoker     Smokeless tobacco: Never Used     Alcohol use Yes      Comment: occ wine     Reviewed and updated as needed this visit by Provider         ROS: francesco serna    This document serves as a record of the services and decisions personally performed and made by Sher Duran MD. It was created on his/her behalf by Gaye Starkey, a trained medical scribe. The creation of this document is based the provider's statements to the medical scribe.  Scribarturo Starkey 2:45 PM, June 19, 2017    EXAM: /82 (BP Location: Left arm, Patient Position: Chair, Cuff Size: Adult Regular)  Pulse 73  Temp 98.3  F (36.8  C) (Oral)  Resp 18  Ht 1.651 m (5' 5\")  Wt 63.9 kg (140 lb 14.4 oz)  LMP 08/20/2015  BMI 23.45 kg/m2  Musculoskeletal: ctender ant axillary line inf ribs. Palpation away from here neg.   CHEST: Clear to auscultation, respirations unlabored  Foot non tender, non swollen, no bruise deformity  Integumentary: multiple acrochordons on neck, one left axilla, typical    Cryo applied    ASSESSMENT / PLAN:  (M79.671) Right foot pain  (primary encounter diagnosis)  Comment: stress fx possible  Plan: MR Foot Right w/o Contrast            (S20.212D) Contusion of chest wall, left, subsequent encounter  Comment: fell in pool area 6/5/17  Plan: Discussed anticipated time of resolution, approx 1 mo. Imaging is not definitive    (L91.8) Acrochordon  Comment: itchy, treated with cryotherapy   Plan: DESTRUCT BENIGN LESION, UP TO 14            RTC 3 weeks if skin tags remain    The information in this document, created by the medical scribe for me, accurately reflects the services I personally performed and the decisions made by me. I have reviewed and approved this document for accuracy prior to leaving the patient care area.  Sher Duran, " MD  2:45 PM, 06/19/17  Sher Duran MD

## 2017-06-27 ENCOUNTER — TELEPHONE (OUTPATIENT)
Dept: FAMILY MEDICINE | Facility: CLINIC | Age: 52
End: 2017-06-27

## 2017-06-27 ENCOUNTER — HOSPITAL ENCOUNTER (OUTPATIENT)
Dept: MRI IMAGING | Facility: CLINIC | Age: 52
Discharge: HOME | End: 2017-06-27
Attending: FAMILY MEDICINE | Admitting: FAMILY MEDICINE
Payer: COMMERCIAL

## 2017-06-27 DIAGNOSIS — S92.911G: Primary | ICD-10-CM

## 2017-06-27 DIAGNOSIS — M79.671 RIGHT FOOT PAIN: ICD-10-CM

## 2017-06-27 PROCEDURE — 73718 MRI LOWER EXTREMITY W/O DYE: CPT | Mod: RT

## 2017-06-27 NOTE — TELEPHONE ENCOUNTER
Many fractures in the foot  I need Ms Paredes to see the podiatrists  Gold: Please call her with    Susan:Podiatry ordered  Sher Duran

## 2017-07-03 ENCOUNTER — OFFICE VISIT (OUTPATIENT)
Dept: FAMILY MEDICINE | Facility: CLINIC | Age: 52
End: 2017-07-03
Payer: COMMERCIAL

## 2017-07-03 VITALS
BODY MASS INDEX: 23.46 KG/M2 | RESPIRATION RATE: 16 BRPM | OXYGEN SATURATION: 99 % | SYSTOLIC BLOOD PRESSURE: 118 MMHG | WEIGHT: 141 LBS | TEMPERATURE: 97.8 F | DIASTOLIC BLOOD PRESSURE: 83 MMHG | HEART RATE: 78 BPM

## 2017-07-03 DIAGNOSIS — S20.222D BACK CONTUSION, LEFT, SUBSEQUENT ENCOUNTER: ICD-10-CM

## 2017-07-03 DIAGNOSIS — M79.645 THUMB PAIN, LEFT: Primary | ICD-10-CM

## 2017-07-03 DIAGNOSIS — R07.81 RIB PAIN ON LEFT SIDE: ICD-10-CM

## 2017-07-03 PROCEDURE — 99213 OFFICE O/P EST LOW 20 MIN: CPT | Performed by: FAMILY MEDICINE

## 2017-07-03 RX ORDER — LIDOCAINE 50 MG/G
PATCH TOPICAL
Qty: 30 PATCH | Refills: 0 | Status: SHIPPED | OUTPATIENT
Start: 2017-07-03 | End: 2017-08-02

## 2017-07-03 NOTE — NURSING NOTE
"Chief Complaint   Patient presents with     Musculoskeletal Problem     left arm pain x 2 days, left hip pain x 1 month - fell on it 1 month ago       Initial /83 (BP Location: Left arm, Patient Position: Chair, Cuff Size: Adult Regular)  Pulse 78  Temp 97.8  F (36.6  C) (Oral)  Resp 16  Wt 141 lb (64 kg)  LMP 08/20/2015  SpO2 99%  BMI 23.46 kg/m2 Estimated body mass index is 23.46 kg/(m^2) as calculated from the following:    Height as of 6/23/17: 5' 5\" (1.651 m).    Weight as of this encounter: 141 lb (64 kg).  Medication Reconciliation: complete  Sonam Cobb M.A.  "

## 2017-07-03 NOTE — PATIENT INSTRUCTIONS
Recommend trying salonpas patches for back pain.   We can consider xray if you change your mind  Recommend ibuprofen, aleve, or advil  Follow up as needed

## 2017-07-03 NOTE — MR AVS SNAPSHOT
"              After Visit Summary   7/3/2017    Naa Paredes    MRN: [id number]           Patient Information     Date Of Birth          1965        Visit Information        Provider Department      7/3/2017 8:45 AM Treasure Nelson MD [facility]        Today's Diagnoses     Thumb pain, left    -  1    Rib pain on left side        Back contusion, left, subsequent encounter          Care Instructions    Recommend trying salonpas patches for back pain.   We can consider xray if you change your mind  Recommend ibuprofen, aleve, or advil  Follow up as needed          Follow-ups after your visit        Your next 10 appointments already scheduled     Jul 11, 2017  9:45 AM CDT   New Visit with Casi Marquez DPM, Podiatry/Foot and Ankle Surgery   [facility]    [address] [place] [address] [id number] [phone]              Who to contact     If you have questions or need follow up information about today's clinic visit or your schedule please contact [facility] directly at [phone].  Normal or non-critical lab and imaging results will be communicated to you by JamHubhart, letter or phone within 4 business days after the clinic has received the results. If you do not hear from us within 7 days, please contact the clinic through JamHubhart or phone. If you have a critical or abnormal lab result, we will notify you by phone as soon as possible.  Submit refill requests through TraderTools or call your pharmacy and they will forward the refill request to us. Please allow 3 business days for your refill to be completed.          Additional Information About Your Visit        JamHubhart Information     TraderTools lets you send messages to your doctor, view your test results, renew your prescriptions, schedule appointments and more. To sign up, go to [email or web address] . Click on \"Log in\" on the left side of the " "screen, which will take you to the Welcome page. Then click on \"Sign up Now\" on the right side of the page.     You will be asked to enter the access code listed below, as well as some personal information. Please follow the directions to create your username and password.     Your access code is: [email or web address]  Expires: 2017 10:18 AM     Your access code will  in 90 days. If you need help or a new code, please call your [place] clinic or [phone].        Care EveryWhere ID     This is your Care EveryWhere ID. This could be used by other organizations to access your [place] medical records  [id number]        Your Vitals Were     Pulse Temperature Respirations Last Period Pulse Oximetry BMI (Body Mass Index)    78 97.8  F (36.6  C) (Oral) 16 2015 99% 23.46 kg/m2       Blood Pressure from Last 3 Encounters:   17 118/83   17 138/89   17 119/82    Weight from Last 3 Encounters:   17 141 lb (64 kg)   17 142 lb 12.8 oz (64.8 kg)   17 140 lb 14.4 oz (63.9 kg)              Today, you had the following     No orders found for display       Primary Care Provider Office Phone # Fax #    Sher Duran [phone]       [address] [address]        Equal Access to Services     [facility] AH: Hadii aad ku hadasho Soomaali, waaxda luqadaha, qaybta kaalmada adeegyada, camelia lorenzo . So [facility] [phone].    ATENCIÓN: Si habla español, tiene a choi disposición servicios gratuitos de asistencia lingüística. Llame al [phone].    We comply with applicable federal civil rights laws and [place] laws. We do not discriminate on the basis of race, color, national origin, age, disability sex, sexual orientation or gender identity.            Thank you!     Thank you for choosing [facility]  for your care. Our goal is always to provide you with excellent care. Hearing " back from our patients is one way we can continue to improve our services. Please take a few minutes to complete the written survey that you may receive in the mail after your visit with us. Thank you!             Your Updated Medication List - Protect others around you: Learn how to safely use, store and throw away your medicines at www.disposemymeds.org.          This list is accurate as of: 7/3/17  9:32 AM.  Always use your most recent med list.                   Brand Name Dispense Instructions for use Diagnosis    cholecalciferol 1000 UNIT tablet    vitamin D          lidocaine 2 % topical gel    XYLOCAINE

## 2017-07-03 NOTE — PROGRESS NOTES
"  SUBJECTIVE:                                                    Naa Paredes is a 51 year old female who presents to clinic today for the following health issues:      Joint Pain    Onset: 2 weeks ago    Description:   Location: Left hand/arm pain x 2 weeks, fell 1 month ago and still has left hip pain off and on  Character: constant ache    Intensity: moderate    Progression of Symptoms: same    Accompanying Signs & Symptoms:  Other symptoms: radiation of pain from hand and spreads up the arm    History:   Previous similar pain: no       Precipitating factors:   Trauma or overuse: no trauma to left arm and hand.  Had a fall 1 month ago and landed on left hip.    Alleviating factors:  Improved by: nothing    Therapies Tried and outcome: \"muscle/joint\" cream - no help    Left arm pain- couple of days. Pain goes down into base of thumb.   Denies any swelling, numbness or tingling.     Fell down by pool about a month ago injuring her left hip and back.   Pain today is much better than before.   Left lower rib pain worse with coughing.           Problem list and histories reviewed & adjusted, as indicated.  Additional history: as documented    Patient Active Problem List   Diagnosis     H/O: alcohol abuse     CARDIOVASCULAR SCREENING; LDL GOAL LESS THAN 160     Cervicalgia     Nickel allergy     Anxiety     Perimenopause     Colon adenoma     Seasonal allergic rhinitis     Major depressive disorder, single episode, in partial remission (H)     Major depressive disorder, recurrent, severe without psychotic features (H)     Conversion disorder with mixed symptoms     Migraine with status migrainosus, not intractable, unspecified migraine type     Past Surgical History:   Procedure Laterality Date     COLONOSCOPY N/A 6/9/2015    Procedure: COLONOSCOPY;  Surgeon: Steve Choi MD;  Location:  GI     LITHOTRIPSY         Social History   Substance Use Topics     Smoking status: Never Smoker     Smokeless tobacco: " Never Used     Alcohol use Yes      Comment: occ wine     Family History   Problem Relation Age of Onset     HEART DISEASE Mother      Cancer - colorectal Father      at age 65     MENTAL ILLNESS Paternal Uncle      Breast Cancer No family hx of      Ovarian Cancer No family hx of            Reviewed and updated as needed this visit by clinical staff  Tobacco  Allergies  Meds  Med Hx  Surg Hx  Fam Hx  Soc Hx      Reviewed and updated as needed this visit by Provider         ROS:  Constitutional, msk, neuro, skin  systems are negative, except as otherwise noted.    OBJECTIVE:     /83 (BP Location: Left arm, Patient Position: Chair, Cuff Size: Adult Regular)  Pulse 78  Temp 97.8  F (36.6  C) (Oral)  Resp 16  Wt 141 lb (64 kg)  LMP 08/20/2015  SpO2 99%  BMI 23.46 kg/m2  Body mass index is 23.46 kg/(m^2).  GENERAL: healthy, alert and no distress  MS: mild TTP base of thumb, FROM, low back- left side- mild TTP otherwise unremarkable, left side ribs- mild TTP ribs 10-12                                                                                                                                                                              SKIN: no suspicious lesions or rashes    Diagnostic Test Results:  none     ASSESSMENT/PLAN:         1. Thumb pain, left  - likely a sprain vs. OA. Patient refuses imaging at this time.     2. Rib pain on left side  - s/p fall. Likely rib contusion. Supportive care.   - lidocaine (LIDODERM) 5 % Patch; Apply up to 3 patches to painful area at once for up to 12 h within a 24 h period.  Remove after 12 hours.  Dispense: 30 patch; Refill: 0    3. Back contusion, left, subsequent encounter  - patient wants lidocaine patches as she does not want to take oral meds. Lidocaine Rx was attempted at her previous visit but is not covered by insurance. She wants to try again. If not covered will try lidocaine gel.   - lidocaine (LIDODERM) 5 % Patch; Apply up to 3 patches to painful  area at once for up to 12 h within a 24 h period.  Remove after 12 hours.  Dispense: 30 patch; Refill: 0    See Patient Instructions    Treasure Nelson MD  [facility]

## 2017-07-06 ENCOUNTER — OFFICE VISIT (OUTPATIENT)
Dept: FAMILY MEDICINE | Facility: CLINIC | Age: 52
End: 2017-07-06
Payer: COMMERCIAL

## 2017-07-06 VITALS
SYSTOLIC BLOOD PRESSURE: 128 MMHG | RESPIRATION RATE: 12 BRPM | OXYGEN SATURATION: 94 % | TEMPERATURE: 97.8 F | HEIGHT: 65 IN | DIASTOLIC BLOOD PRESSURE: 85 MMHG | WEIGHT: 140.6 LBS | HEART RATE: 76 BPM | BODY MASS INDEX: 23.43 KG/M2

## 2017-07-06 DIAGNOSIS — S92.911G: Primary | ICD-10-CM

## 2017-07-06 DIAGNOSIS — M79.622 PAIN OF LEFT UPPER ARM: ICD-10-CM

## 2017-07-06 PROCEDURE — 99214 OFFICE O/P EST MOD 30 MIN: CPT | Performed by: FAMILY MEDICINE

## 2017-07-06 NOTE — NURSING NOTE
"Chief Complaint   Patient presents with     Pain     follow up on pain in arm       Initial /85 (BP Location: Right arm, Patient Position: Chair, Cuff Size: Adult Regular)  Pulse 76  Temp 97.8  F (36.6  C) (Oral)  Resp 12  Ht 5' 5\" (1.651 m)  Wt 140 lb 9.6 oz (63.8 kg)  LMP 08/20/2015  SpO2 94%  BMI 23.4 kg/m2 Estimated body mass index is 23.4 kg/(m^2) as calculated from the following:    Height as of this encounter: 5' 5\" (1.651 m).    Weight as of this encounter: 140 lb 9.6 oz (63.8 kg).  Medication Reconciliation: complete   Murray Núñez CMA       "

## 2017-07-06 NOTE — MR AVS SNAPSHOT
After Visit Summary   7/6/2017    Naa Paredes    MRN: [id number]           Patient Information     Date Of Birth          1965        Visit Information        Provider Department      7/6/2017 11:00 AM Sher Duran MD; [place] [facility]        Today's Diagnoses     Closed nondisplaced fracture of phalanx of toe of right foot with delayed healing, unspecified toe, subsequent encounter    -  1    Pain of left upper arm           Follow-ups after your visit        Your next 10 appointments already scheduled     Jul 10, 2017  5:15 PM CDT   MR CERVICAL SPINE W/O CONTRAST with [id number]   [facility] ([facility])    [address]  [facility] [address] [phone]           Take your medicines as usual, unless your doctor tells you not to. Bring a list of your current medicines to your exam (including vitamins, minerals and over-the-counter drugs). Also bring the results of similar scans you may have had.  Please remove any body piercings and hair extensions before you arrive.  Follow your doctor s orders. If you do not, we may have to postpone your exam.  You will not have contrast for this exam. You do not need to do anything special to prepare.  The MRI machine uses a strong magnet. Please wear clothes without metal (snaps, zippers). A sweatsuit works well, or we may give you a hospital gown.   **IMPORTANT** THE INSTRUCTIONS BELOW ARE ONLY FOR THOSE PATIENTS WHO HAVE BEEN TOLD THEY WILL RECEIVE SEDATION OR GENERAL ANESTHESIA DURING THEIR MRI PROCEDURE:  IF YOU WILL RECEIVE SEDATION (take medicine to help you relax during your exam):   You must get the medicine from your doctor before you arrive. Bring the medicine to the exam. Do not take it at home.   Arrive one hour early. Bring someone who can take you home after the test. Your medicine will make you sleepy. After the exam, you may not  drive, take a bus or take a taxi by yourself.   No eating 8 hours before your exam. You may have clear liquids up until 4 hours before your exam. (Clear liquids include water, clear tea, black coffee and fruit juice without pulp.)  IF YOU WILL RECEIVE ANESTHESIA (be asleep for your exam):   Arrive 1 1/2 hours early. Bring someone who can take you home after the test. You may not drive, take a bus or take a taxi by yourself.   No eating 8 hours before your exam. You may have clear liquids up until 4 hours before your exam. (Clear liquids include water, clear tea, black coffee and fruit juice without pulp.)   You will spend four to five hours in the recovery room.  Please call the Imaging Department at your exam site with any questions.            Jul 11, 2017  9:45 AM CDT   New Visit with Casi Marquez DPM, Podiatry/Foot and Ankle Surgery   [facility]    [address] [place] [id number] [phone]              Future tests that were ordered for you today     Open Future Orders        Priority Expected Expires Ordered    MR Cervical Spine w/o Contrast Routine  7/6/2018 7/6/2017            Who to contact     If you have questions or need follow up information about today's clinic visit or your schedule please contact [facility] directly at [phone].  Normal or non-critical lab and imaging results will be communicated to you by MyChart, letter or phone within 4 business days after the clinic has received the results. If you do not hear from us within 7 days, please contact the clinic through MyChart or phone. If you have a critical or abnormal lab result, we will notify you by phone as soon as possible.  Submit refill requests through [employer] or call your pharmacy and they will forward the refill request to us. Please allow 3 business days for your refill to be completed.          Additional Information About Your Visit       "  MyChart Information     [employer] lets you send messages to your doctor, view your test results, renew your prescriptions, schedule appointments and more. To sign up, go to [email or web address] . Click on \"Log in\" on the left side of the screen, which will take you to the Welcome page. Then click on \"Sign up Now\" on the right side of the page.     You will be asked to enter the access code listed below, as well as some personal information. Please follow the directions to create your username and password.     Your access code is: [email or web address]  Expires: 2017 10:18 AM     Your access code will  in 90 days. If you need help or a new code, please call your [facility] clinic or [phone].        Care EveryWhere ID     This is your Care EveryWhere ID. This could be used by other organizations to access your [facility] medical records  [id number]        Your Vitals Were     Pulse Temperature Respirations Height Last Period Pulse Oximetry    76 97.8  F (36.6  C) (Oral) 12 5' 5\" (1.651 m) 2015 94%    BMI (Body Mass Index)                   23.4 kg/m2            Blood Pressure from Last 3 Encounters:   17 128/85   17 118/83   17 138/89    Weight from Last 3 Encounters:   17 140 lb 9.6 oz (63.8 kg)   17 141 lb (64 kg)   17 142 lb 12.8 oz (64.8 kg)                 Today's Medication Changes          These changes are accurate as of: 17  8:04 PM.  If you have any questions, ask your nurse or doctor.               Start taking these medicines.        Dose/Directions    nabumetone 750 MG tablet   Commonly known as:  RELAFEN   Used for:  Closed nondisplaced fracture of phalanx of toe of right foot with delayed healing, unspecified toe, subsequent encounter, Pain of left upper arm   Started by:  Sher Duran MD        Dose:  750 mg   Take 1 tablet (750 mg) by mouth 2 times daily   Quantity:  60 tablet   Refills:  1       order for DME   Used for:  Closed nondisplaced " fracture of phalanx of toe of right foot with delayed healing, unspecified toe, subsequent encounter   Started by:  Sher Duran MD FP walker med   Quantity:  1 Device   Refills:  0            Where to get your medicines      These medications were sent to [facility] [address]  [address] [facility] [address]     Phone:  [phone]     nabumetone 750 MG tablet         Some of these will need a paper prescription and others can be bought over the counter.  Ask your nurse if you have questions.     Bring a paper prescription for each of these medications     order for DME                Primary Care Provider Office Phone # Fax #    Sher Duran [phone]       [facility] [id number] [place] [address]        Equal Access to Services     [facility] : Hadii aad jacqui hadasho Soomaali, waaxda luqadaha, qaybta kaalmada adeegyada, waxay harlanin hayaan arina lorenzo . So [facility] [phone].    ATENCIÓN: Si habla español, tiene a choi disposición servicios gratuitos de asistencia lingüística. Ll[facility] [phone].    We comply with applicable federal civil rights laws and [place] laws. We do not discriminate on the basis of race, color, national origin, age, disability sex, sexual orientation or gender identity.            Thank you!     Thank you for choosing [facility]  for your care. Our goal is always to provide you with excellent care. Hearing back from our patients is one way we can continue to improve our services. Please take a few minutes to complete the written survey that you may receive in the mail after your visit with us. Thank you!             Your Updated Medication List - Protect others around you: Learn how to safely use, store and throw away your medicines at www.disposemymeds.org.          This list is accurate as of: 7/6/17  8:04 PM.  Always use your most recent med  list.                   Brand Name Dispense Instructions for use Diagnosis    cholecalciferol 1000 UNIT tablet    vitamin D          lidocaine 2 % topical gel    XYLOCAINE          lidocaine 5 % Patch    LIDODERM    30 patch    Apply up to 3 patches to painful area at once for up to 12 h within a 24 h period.  Remove after 12 hours.    Rib pain on left side, Back contusion, left, subsequent encounter       nabumetone 750 MG tablet    RELAFEN    60 tablet    Take 1 tablet (750 mg) by mouth 2 times daily    Closed nondisplaced fracture of phalanx of toe of right foot with delayed healing, unspecified toe, subsequent encounter, Pain of left upper arm       order for DME     1 Device    FP walker med    Closed nondisplaced fracture of phalanx of toe of right foot with delayed healing, unspecified toe, subsequent encounter

## 2017-07-06 NOTE — PROGRESS NOTES
"  SUBJECTIVE:                                                    Naa Paredes is a 51 year old female who presents to clinic today for the following health issues:      Musculoskeletal problem/pain      Duration: 5-6 days    Description  Location: left elbow down to the thumb/forefinger    Intensity:  8/10    Accompanying signs and symptoms: numbness and weakness of the left arm    History  Previous similar problem: no   Previous evaluation:  none    Precipitating or alleviating factors:  Trauma or overuse: YES- slipped in hot tub  Aggravating factors include: none    Therapies tried and outcome: otc cream and Advil    Closed nondisplaced fracture of phalanx of toe of right foot with delayed healing, unspecified toe, subsequent encounter  (primary encounter diagnosis):The patient states that she was told that the \"ultrasound\" was fine and was not aware that she had three broken toes. She was hesitant to put on the boot, wondering if she could dance or run with it on. The patient has an appointment with the podiatrist this Tuesday.   1. First metatarsal stress fracture.  2. Fracture of the third metatarsal neck with mild displacement and  impaction.  3. Freiberg's infraction versus subchondral fracture of the second  metatarsal head.  4. Additional bone marrow edema involving the fourth metatarsal, third  metatarsal, first metatarsal, cuboid, lateral and medial cuneiforms.  This could represent stress reaction or contusion.       Pain of left upper arm:The patient complains of numbness and pain in her left forearm down to thumb and forefinger. Palliatives and provocatives not noted      Problem list and histories reviewed & adjusted, as indicated.  Additional history: as documented    She wonders if her skin tags can be treated today, 2 weeks since Rx    Reviewed and updated as needed this visit by clinical staff        Past Medical History:   Diagnosis Date     Ankle sprain and strain 5/2/2013     Anxiety 3/26/2015 "     Black stools 11/25/2015     CARDIOVASCULAR SCREENING; LDL GOAL LESS THAN 160 10/31/2010     Cervicalgia 12/7/2011     Colon adenoma 4/23/2015    Patient denies it. Renee Tiwari RN, 6/9/15.     Conversion disorder with mixed symptoms 6/14/2016     Depression with suicidal ideation 6/19/2015     Depressive disorder      Elevated blood pressure reading without diagnosis of hypertension 11/25/2015     Elevated LFT's 6/4/2010     Fatigue due to depression 11/24/2015     H/O: alcohol abuse 6/4/2010     IBS (irritable bowel syndrome)      Knee pain 10/19/2010     Low back pain 10/19/2010     Lumbago 12/7/2011     Major depressive disorder, recurrent, severe without psychotic features (H) 5/13/2016     Major depressive disorder, single episode, in partial remission (H) 11/24/2015     Migraine      Neck pain 10/19/2010     Nephrolithiasis     calcium oxalate     Pain in thoracic spine 1/2/2013     Perimenopause 3/26/2015     Seasonal allergic rhinitis 10/20/2015     Vitamin D deficiency disease 6/8/2010       Past Surgical History:   Procedure Laterality Date     COLONOSCOPY N/A 6/9/2015    Procedure: COLONOSCOPY;  Surgeon: Steve Choi MD;  Location:  GI     LITHOTRIPSY         Family History   Problem Relation Age of Onset     HEART DISEASE Mother      Cancer - colorectal Father      at age 65     MENTAL ILLNESS Paternal Uncle      Breast Cancer No family hx of      Ovarian Cancer No family hx of        Social History   Substance Use Topics     Smoking status: Never Smoker     Smokeless tobacco: Never Used     Alcohol use Yes      Comment: occ wine       Reviewed and updated as needed this visit by Provider         ROS:  No rash, bruise. Dances 3-4 hours at a time    This document serves as a record of the services and decisions personally performed and made by Sher Duran MD. It was created on his behalf by Nimo Waite, a trained medical scribe.  The creation of this document is based on the scribe's  "personal observations and the provider's statements to the medical scribe.  Nimo Waite, July 6, 2017 11:29 AM      OBJECTIVE:     /85 (BP Location: Right arm, Patient Position: Chair, Cuff Size: Adult Regular)  Pulse 76  Temp 97.8  F (36.6  C) (Oral)  Resp 12  Ht 1.651 m (5' 5\")  Wt 63.8 kg (140 lb 9.6 oz)  LMP 08/20/2015  SpO2 94%  BMI 23.4 kg/m2  Body mass index is 23.4 kg/(m^2).    EXAM:  INTEGUMENT: bruising left shoulder from weekly cupping therapy on back. Some acrochordomata have fallen out, numerous still inflammatory  MS: no tenderness, deformity, synovitis of Left arm  Neg Spurlings    Placed in walking cam boot      ASSESSMENT/PLAN:     ASSESSMENT / PLAN:  (S92.911G) Closed nondisplaced fracture of phalanx of toe of right foot with delayed healing, unspecified toe, subsequent encounter  (primary encounter diagnosis)  Comment: Cam boot. Podiatry. No dancing  Plan: order for DME           (M79.622) Pain of left upper arm Symptoms suggest radiculopathy  Comment: [facility] database. Too early for EMG. Trauma 5 weeks ago. Even so,   Plan: MR Cervical Spine w/o Contrast           RTC 1 week may retreat skin tags then    The information in this document, created by the medical scribe for me, accurately reflects the services I personally performed and the decisions made by me. I have reviewed and approved this document for accuracy prior to leaving the patient care area.  Sher Duran MD July 6, 2017 11:37 AM    Sher Duran MD  [facility]"

## 2017-07-10 ENCOUNTER — HOSPITAL ENCOUNTER (OUTPATIENT)
Dept: MRI IMAGING | Facility: CLINIC | Age: 52
Discharge: HOME | End: 2017-07-10
Attending: FAMILY MEDICINE | Admitting: FAMILY MEDICINE
Payer: COMMERCIAL

## 2017-07-10 DIAGNOSIS — M79.622 PAIN OF LEFT UPPER ARM: ICD-10-CM

## 2017-07-10 PROCEDURE — 72141 MRI NECK SPINE W/O DYE: CPT

## 2017-07-10 NOTE — LETTER
[facility]  [address] [place], [address]  [phone]      July 11, 2017    Naa Paredes  [address] [address]   [facility] [address]          Dear Naa,    The results of your recent tests are back. This is a very good scan! No problems here   Enclosed is a copy of the results.    Results for orders placed or performed during the hospital encounter of 07/10/17   MR Cervical Spine w/o Contrast    Narrative    MRI OF THE CERVICAL SPINE WITHOUT CONTRAST 7/10/2017 6:11 PM    COMPARISON: None.    HISTORY: Evaluate radicular symptoms. Trauma 5 weeks ago. Pain in left  upper arm.    TECHNIQUE: Multiplanar, multisequence MRI images of the cervical spine  were acquired without intravenous contrast.    FINDINGS: There is normal alignment of the cervical vertebrae.  Vertebral body heights of the cervical spine are normal. Marrow signal  throughout the cervical vertebrae is within normal limits. There is no  evidence for fracture or pathologic bony lesion of the cervical spine.      The cervical intervertebral discs are within normal limits in height,  contour and signal intensity.    The cervical spinal cord is normal in contour. There is no abnormal  signal in the cervical spinal cord. There is no evidence for  intrathecal abnormality.    Level by level:    C2-C3: There is no spinal canal or neural foraminal stenosis at this  level.    C3-C4: There is no spinal canal or neural foraminal stenosis at this  level.    C4-C5: There is no spinal canal or neural foraminal stenosis at this  level.    C5-C6: There is mild facet arthropathy bilaterally and mild uncinate  arthropathy on the right. These findings result in minimal right  foraminal narrowing but no spinal canal or left foraminal stenosis.    C6-C7: There is no spinal canal or neural foraminal stenosis at this  level.    C7-T1: There is no spinal canal or neural foraminal stenosis at this  level.      Impression    IMPRESSION:  Essentially normal cervical spine MRI demonstrating only  minimal degenerative changes at the C5-C6 level.     JAKE SANZ MD       If you have any questions or concerns, please call myself or my nurse at [phone].          Sincerely,    Sher Duran MD  [id number]

## 2017-07-11 ENCOUNTER — OFFICE VISIT (OUTPATIENT)
Dept: PODIATRY | Facility: CLINIC | Age: 52
End: 2017-07-11
Payer: COMMERCIAL

## 2017-07-11 VITALS
DIASTOLIC BLOOD PRESSURE: 76 MMHG | WEIGHT: 140 LBS | SYSTOLIC BLOOD PRESSURE: 110 MMHG | HEIGHT: 65 IN | BODY MASS INDEX: 23.32 KG/M2

## 2017-07-11 DIAGNOSIS — M79.671 RIGHT FOOT PAIN: Primary | ICD-10-CM

## 2017-07-11 DIAGNOSIS — M20.42 HAMMER TOES OF BOTH FEET: ICD-10-CM

## 2017-07-11 DIAGNOSIS — M20.41 HAMMER TOES OF BOTH FEET: ICD-10-CM

## 2017-07-11 DIAGNOSIS — M19.071 PRIMARY LOCALIZED OSTEOARTHROSIS, ANKLE AND FOOT, RIGHT: ICD-10-CM

## 2017-07-11 DIAGNOSIS — M92.71 FREIBERG'S INFRACTION, RIGHT: ICD-10-CM

## 2017-07-11 PROCEDURE — 99203 OFFICE O/P NEW LOW 30 MIN: CPT | Performed by: PODIATRIST

## 2017-07-11 NOTE — LETTER
7/11/2017         RE: Naa Paredes  [address]   [facility] [address]        Dear Colleague,    Thank you for referring your patient, Naa Paredes, to the [facility]. Please see a copy of my visit note below.    PATIENT HISTORY:  Naa Paredes is a 51 year old female who presents to clinic for pan to right foot. Notes it used to be at the right 2nd and 3rd toes. Has had xrays and MRI. Here to follow up on the MRI. Notes she used to dance a lot and wear high heels. Since she has stopped, her feet have been improving. Here with . Pain currently is 3/10. She was given a boot but does not wear it. Denies specific injury.     Review of Systems:  Patient denies fever, chills, rash, wound, stiffness, limping, numbness, weakness, heart burn, blood in stool, chest pain with activity, calf pain when walking, shortness of breath with activity, chronic cough, easy bleeding/bruising, swelling of ankles, excessive thirst, fatigue, depression, anxiety.  Patient admits to limping at times. .     PAST MEDICAL HISTORY:   Past Medical History:   Diagnosis Date     Ankle sprain and strain 5/2/2013     Anxiety 3/26/2015     Black stools 11/25/2015     CARDIOVASCULAR SCREENING; LDL GOAL LESS THAN 160 10/31/2010     Cervicalgia 12/7/2011     Colon adenoma 4/23/2015    Patient denies it. Renee Tiwari RN, 6/9/15.     Conversion disorder with mixed symptoms 6/14/2016     Depression with suicidal ideation 6/19/2015     Depressive disorder      Elevated blood pressure reading without diagnosis of hypertension 11/25/2015     Elevated LFT's 6/4/2010     Fatigue due to depression 11/24/2015     H/O: alcohol abuse 6/4/2010     IBS (irritable bowel syndrome)      Knee pain 10/19/2010     Low back pain 10/19/2010     Lumbago 12/7/2011     Major depressive disorder, recurrent, severe without psychotic features (H) 5/13/2016     Major depressive disorder, single episode, in partial  "remission (H) 11/24/2015     Migraine      Neck pain 10/19/2010     Nephrolithiasis     calcium oxalate     Pain in thoracic spine 1/2/2013     Perimenopause 3/26/2015     Seasonal allergic rhinitis 10/20/2015     Vitamin D deficiency disease 6/8/2010        PAST SURGICAL HISTORY:   Past Surgical History:   Procedure Laterality Date     COLONOSCOPY N/A 6/9/2015    Procedure: COLONOSCOPY;  Surgeon: Steve Choi MD;  Location:  GI     LITHOTRIPSY          MEDICATIONS:   Current Outpatient Prescriptions:      nabumetone (RELAFEN) 750 MG tablet, Take 1 tablet (750 mg) by mouth 2 times daily, Disp: 60 tablet, Rfl: 1     lidocaine (XYLOCAINE) 2 % topical gel, , Disp: , Rfl: 2     VITAMIN D3 1000 UNITS tablet, , Disp: , Rfl: 1     order for DME, FP walker med (Patient not taking: Reported on 7/11/2017), Disp: 1 Device, Rfl: 0     lidocaine (LIDODERM) 5 % Patch, Apply up to 3 patches to painful area at once for up to 12 h within a 24 h period.  Remove after 12 hours. (Patient not taking: Reported on 7/11/2017), Disp: 30 patch, Rfl: 0     ALLERGIES:  No Known Allergies     SOCIAL HISTORY:   Social History     Social History     Marital status: Legally      Spouse name: N/A     Number of children: N/A     Years of education: N/A     Occupational History     Not on file.     Social History Main Topics     Smoking status: Never Smoker     Smokeless tobacco: Never Used     Alcohol use Yes      Comment: occ wine     Drug use: No     Sexual activity: Not Currently     Partners: Male     Other Topics Concern     Not on file     Social History Narrative        FAMILY HISTORY:   Family History   Problem Relation Age of Onset     HEART DISEASE Mother      Cancer - colorectal Father      at age 65     MENTAL ILLNESS Paternal Uncle      Breast Cancer No family hx of      Ovarian Cancer No family hx of         EXAM:Vitals: /76  Ht 5' 5\" (1.651 m)  Wt 140 lb (63.5 kg)  LMP 08/20/2015  BMI 23.3 kg/m2  BMI= Body " mass index is 23.3 kg/(m^2).    General appearance: Patient is alert and fully cooperative with history & exam.  No sign of distress is noted during the visit.     Psychiatric: Affect is pleasant & appropriate.  Patient appears motivated to improve health.     Respiratory: Breathing is regular & unlabored while sitting.     HEENT: Hearing is intact to spoken word.  Speech is clear.  No gross evidence of visual impairment that would impact ambulation.     Dermatologic: Skin is intact to both lower extremities without significant lesions, rash or abrasion.  No paronychia or evidence of soft tissue infection is noted.     Vascular: DP & PT pulses are intact & regular bilaterally.  No significant edema or varicosities noted.  CFT and skin temperature is normal to both lower extremities.     Neurologic: Lower extremity sensation is intact to light touch.  No evidence of weakness or contracture in the lower extremities.  No evidence of neuropathy.     Musculoskeletal: Patient is ambulatory without assistive device or brace.  No gross ankle deformity noted.  No foot or ankle joint effusion is noted.    RAdiographs: No fracture or osseous lesion is seen. The previously seen  fracture of the proximal phalanx of the fifth toe has completely  healed. The joint spaces are well preserved. No adjacent soft tissue  pathology is seen. Elongated 2nd metatarsal.    MRI: First metatarsal stress fracture.  2. Fracture of the third metatarsal neck with mild displacement and  impaction.  3. Freiberg's infraction versus subchondral fracture of the second  metatarsal head.  4. Additional bone marrow edema involving the fourth metatarsal, third  metatarsal, first metatarsal, cuboid, lateral and medial cuneiforms.  This could represent stress reaction or contusion.     ASSESSMENT:    Right foot pain  Freiberg's infraction, right  Primary localized osteoarthrosis, ankle and foot, right  Hammer toes of both feet       PLAN:  Reviewed patient's  chart in epic. Reviewed MRI and xrays. Talked about arthritis and treatments including orthotics, injections, icing, NSAIDS, bracing, physical therapy, compounding pain cream, or surgical intervention.    Because patients pain is improving, recommend more supportive shoes and inserts. Discussed that the issues with her feet are most likely due to repetitive stress. This can also be associated with her Vitamin D Deficiency.  Discussed possible injection or PT for feet. She is not interested. If pain continues, recommend this.     Wants paperwork filled out to do seated work. Discussed I can do that for the next month but since pain is getting better can't really do it longer.       Casi Marquez DPM, Podiatry/Foot and Ankle Surgery          Again, thank you for allowing me to participate in the care of your patient.        Sincerely,        Casi Marquez DPM, Podiatry/Foot and Ankle Surgery

## 2017-07-11 NOTE — PATIENT INSTRUCTIONS
DR. FORMAN'S CLINIC LOCATIONS:   MONDAY AM - [place] TUESDAY - [place]   [address] [address] [address] [address] [place] [address]   [phone] / [phone] / [phone]       WEDNESDAY - [place] FRIDAY AM - [facility]   [address]   [place] [address] [place] [address]   [phone] / [phone]       FRIDAY PM - [place] SCHEDULE SURGERY: [phone]   [address] BILLING QUESTIONS: [phone]   [place] [address] AFTER HOURS: [phone]   [phone] / [phone] APPOINTMENTS: [phone]         Body Mass Index (BMI)  Many things can cause foot and ankle problems. Foot structure, activity level, foot mechanics and injuries are common causes of pain.  One very important issue that often goes unmentioned, is body weight. Extra weight can cause increased stress on muscles, ligaments, bones and tendons.  Sometimes just a few extra pounds is all it takes to put one over her/his threshold. Without reducing that stress, it can be difficult to alleviate pain. Some people are uncomfortable addressing this issue, but we feel it is important for you to think about it. As Foot &  Ankle specialists, our job is addressing the lower extremity problem and possible causes. Regarding extra body weight, we encourage patients to discuss diet and weight management plans with their primary care doctors. It is this team approach that gives you the best opportunity for pain relief and getting you back on your feet.        OSTEOARTHRITIS OF THE FOOT & ANKLE  Osteoarthritis is a condition characterized by the breakdown and eventual loss of cartilage in one or more joints. Cartilage (the connective tissue found at the end of the bones in the joints) protects and cushions the bones during movement. When cartilage deteriorates or is lost, symptoms develop that can restrict one s ability to easily perform daily  activities.  Osteoarthritis is also known as degenerative arthritis, reflecting its nature to develop as part of the aging process. As the most common form of arthritis, osteoarthritis affects millions of Americans. Some people refer to osteoarthritis simply as arthritis, even though there are many different types of arthritis.  Osteoarthritis appears at various joints throughout the body, including the hands, feet, spine, hips, and knees. In the foot, the disease most frequently occurs in the big toe, although it is also often found in the midfoot and ankle.  CAUSES  Osteoarthritis is considered a  wear and tear  disease because the cartilage in the joint wears down with repeated stress and use over time. As the cartilage deteriorates and gets thinner, the bones lose their protective covering and eventually may rub together, causing pain and inflammation of the joint.  An injury may also lead to osteoarthritis, although it may take months or years after the injury for the condition to develop. For example, osteoarthritis in the big toe is often caused by kicking or jamming the toe, or by dropping something on the toe. Osteoarthritis in the midfoot is often caused by dropping something on it, or by a sprain or fracture. In the ankle, osteoarthritis is usually caused by a fracture and occasionally by a severe sprain.  Sometimes osteoarthritis develops as a result of abnormal foot mechanics such as flat feet or high arches. A flat foot causes less stability in the ligaments (bands of tissue that connect bones), resulting in excessive strain on the joints, which can cause arthritis. A high arch is rigid and lacks mobility, causing a jamming of joints that creates an increased risk of arthritis.  SYMPTOMS  People with osteoarthritis in the foot or ankle experience, in varying degrees, one or more of the following: Pain and stiffness in the joint, swelling in or near the joint, or difficulty walking or bending the joint.    Some patients with osteoarthritis also develop a bone spur (a bony protrusion) at the affected joint. Shoe pressure may cause pain at the site of a bone spur, and in some cases blisters or calluses may form over its surface. Bone spurs can also limit the movement of the joint.    DIAGNOSIS  In diagnosing osteoarthritis, the foot and ankle surgeon will examine the foot thoroughly, looking for swelling in the joint, limited mobility, and pain with movement. In some cases, deformity and/or enlargement (spur) of the joint may be noted. X-rays may be ordered to evaluate the extent of the disease.  NON-SURGICAL TREATMENT  To help relieve symptoms, the surgeon may begin treating osteoarthritis with one or more of the following non-surgical approaches:  Oral medications. Nonsteroidal anti-inflammatory drugs (NSAIDs), such as ibuprofen, are often helpful in reducing the inflammation and pain. Occasionally a prescription for a steroid medication is needed to adequately reduce symptoms.   Orthotic devices. Custom orthotic devices (shoe inserts) are often prescribed to provide support to improve the foot s mechanics or cushioning to help minimize pain.   Bracing. Bracing, which restricts motion and supports the joint, can reduce pain during walking and help prevent further deformity.   Immobilization. Protecting the foot from movement by wearing a cast or removable cast-boot may be necessary to allow the inflammation to resolve.   Steroid injections. In some cases, steroid injections are applied to the affected joint to deliver anti-inflammatory medication.   Physical therapy. Exercises to strengthen the muscles, especially when the osteoarthritis occurs in the ankle, may give the patient greater stability and help avoid injury that might worsen the condition.   DO I NEED SURGERY?  When osteoarthritis has progressed substantially or failed to improve with non-surgical treatment, surgery may be recommended. In advanced cases,  surgery may be the only option. The goal of surgery is to decrease pain and improve function. The foot and ankle surgeon will consider a number of factors when selecting the procedure best suited to the patient s condition and lifestyle.

## 2017-07-11 NOTE — MR AVS SNAPSHOT
After Visit Summary   7/11/2017    Naa Paredes    MRN: [id number]           Patient Information     Date Of Birth          1965        Visit Information        Provider Department      7/11/2017 9:45 AM Casi Marquez DPM, Podiatry/Foot and Ankle Surgery; [facility]        Care Instructions      DR. MARQUEZ'S CLINIC LOCATIONS:   MONDAY AM - [place] TUESDAY - [place]   [address] [address] [place] [address] [place] [address]   [phone] / [phone] / [phone]       WEDNESDAY - [place] FRIDAY AM - [facility]   [address]   [place] [address] [place] [address]   [phone] / [phone]       FRIDAY PM - [place] SCHEDULE SURGERY: [phone]   [address] BILLING QUESTIONS: [phone]   [place] [address] AFTER HOURS: [phone] / [phone] APPOINTMENTS: [phone]         Body Mass Index (BMI)  Many things can cause foot and ankle problems. Foot structure, activity level, foot mechanics and injuries are common causes of pain.  One very important issue that often goes unmentioned, is body weight. Extra weight can cause increased stress on muscles, ligaments, bones and tendons.  Sometimes just a few extra pounds is all it takes to put one over her/his threshold. Without reducing that stress, it can be difficult to alleviate pain. Some people are uncomfortable addressing this issue, but we feel it is important for you to think about it. As Foot &  Ankle specialists, our job is addressing the lower extremity problem and possible causes. Regarding extra body weight, we encourage patients to discuss diet and weight management plans with their primary care doctors. It is this team approach that gives you the best opportunity for pain relief and getting you back on your feet.        OSTEOARTHRITIS OF THE  FOOT & ANKLE  Osteoarthritis is a condition characterized by the breakdown and eventual loss of cartilage in one or more joints. Cartilage (the connective tissue found at the end of the bones in the joints) protects and cushions the bones during movement. When cartilage deteriorates or is lost, symptoms develop that can restrict one s ability to easily perform daily activities.  Osteoarthritis is also known as degenerative arthritis, reflecting its nature to develop as part of the aging process. As the most common form of arthritis, osteoarthritis affects millions of Americans. Some people refer to osteoarthritis simply as arthritis, even though there are many different types of arthritis.  Osteoarthritis appears at various joints throughout the body, including the hands, feet, spine, hips, and knees. In the foot, the disease most frequently occurs in the big toe, although it is also often found in the midfoot and ankle.  CAUSES  Osteoarthritis is considered a  wear and tear  disease because the cartilage in the joint wears down with repeated stress and use over time. As the cartilage deteriorates and gets thinner, the bones lose their protective covering and eventually may rub together, causing pain and inflammation of the joint.  An injury may also lead to osteoarthritis, although it may take months or years after the injury for the condition to develop. For example, osteoarthritis in the big toe is often caused by kicking or jamming the toe, or by dropping something on the toe. Osteoarthritis in the midfoot is often caused by dropping something on it, or by a sprain or fracture. In the ankle, osteoarthritis is usually caused by a fracture and occasionally by a severe sprain.  Sometimes osteoarthritis develops as a result of abnormal foot mechanics such as flat feet or high arches. A flat foot causes less stability in the ligaments (bands of tissue that connect bones), resulting in excessive strain on the joints,  which can cause arthritis. A high arch is rigid and lacks mobility, causing a jamming of joints that creates an increased risk of arthritis.  SYMPTOMS  People with osteoarthritis in the foot or ankle experience, in varying degrees, one or more of the following: Pain and stiffness in the joint, swelling in or near the joint, or difficulty walking or bending the joint.   Some patients with osteoarthritis also develop a bone spur (a bony protrusion) at the affected joint. Shoe pressure may cause pain at the site of a bone spur, and in some cases blisters or calluses may form over its surface. Bone spurs can also limit the movement of the joint.    DIAGNOSIS  In diagnosing osteoarthritis, the foot and ankle surgeon will examine the foot thoroughly, looking for swelling in the joint, limited mobility, and pain with movement. In some cases, deformity and/or enlargement (spur) of the joint may be noted. X-rays may be ordered to evaluate the extent of the disease.  NON-SURGICAL TREATMENT  To help relieve symptoms, the surgeon may begin treating osteoarthritis with one or more of the following non-surgical approaches:  Oral medications. Nonsteroidal anti-inflammatory drugs (NSAIDs), such as ibuprofen, are often helpful in reducing the inflammation and pain. Occasionally a prescription for a steroid medication is needed to adequately reduce symptoms.   Orthotic devices. Custom orthotic devices (shoe inserts) are often prescribed to provide support to improve the foot s mechanics or cushioning to help minimize pain.   Bracing. Bracing, which restricts motion and supports the joint, can reduce pain during walking and help prevent further deformity.   Immobilization. Protecting the foot from movement by wearing a cast or removable cast-boot may be necessary to allow the inflammation to resolve.   Steroid injections. In some cases, steroid injections are applied to the affected joint to deliver anti-inflammatory medication.  "  Physical therapy. Exercises to strengthen the muscles, especially when the osteoarthritis occurs in the ankle, may give the patient greater stability and help avoid injury that might worsen the condition.   DO I NEED SURGERY?  When osteoarthritis has progressed substantially or failed to improve with non-surgical treatment, surgery may be recommended. In advanced cases, surgery may be the only option. The goal of surgery is to decrease pain and improve function. The foot and ankle surgeon will consider a number of factors when selecting the procedure best suited to the patient s condition and lifestyle.            Follow-ups after your visit        Who to contact     If you have questions or need follow up information about today's clinic visit or your schedule please contact [facility] directly at [phone].  Normal or non-critical lab and imaging results will be communicated to you by [email or web address], letter or phone within 4 business days after the clinic has received the results. If you do not hear from us within 7 days, please contact the clinic through [email or web address] or phone. If you have a critical or abnormal lab result, we will notify you by phone as soon as possible.  Submit refill requests through [employer] or call your pharmacy and they will forward the refill request to us. Please allow 3 business days for your refill to be completed.          Additional Information About Your Visit        [employer] Information     [employer] lets you send messages to your doctor, view your test results, renew your prescriptions, schedule appointments and more. To sign up, go to [email or web address] . Click on \"Log in\" on the left side of the screen, which will take you to the Welcome page. Then click on \"Sign up Now\" on the right side of the page.     You will be asked to enter the access code listed below, as well as some personal information. Please follow the directions to create your username and password.   " "  Your access code is: [email or web address]  Expires: 10/9/2017 10:15 AM     Your access code will  in 90 days. If you need help or a new code, please call your [facility] or [phone].        Care EveryWhere ID     This is your Care EveryWhere ID. This could be used by other organizations to access your [facility] medical records  [id number]        Your Vitals Were     Height Last Period BMI (Body Mass Index)             5' 5\" (1.651 m) 2015 23.3 kg/m2          Blood Pressure from Last 3 Encounters:   17 110/76   17 128/85   17 118/83    Weight from Last 3 Encounters:   17 140 lb (63.5 kg)   17 140 lb 9.6 oz (63.8 kg)   17 141 lb (64 kg)              Today, you had the following     No orders found for display       Primary Care Provider Office Phone # Fax #    Sher Duran [phone]       [facility] [id number] [place] [address]        Equal Access to Services     [facility] : Hadii aad ku hadasho Soomaali, waaxda luqadaha, qaybta kaalmada adeegyada, camelia clarosin hayleslyn arina eagle laenedina ah. So [facility] [phone].    ATENCIÓN: Si habla español, tiene a choi disposición servicios gratuitos de asistencia lingüística. Llame al [phone].    We comply with applicable federal civil rights laws and [place] laws. We do not discriminate on the basis of race, color, national origin, age, disability sex, sexual orientation or gender identity.            Thank you!     Thank you for choosing [facility]  for your care. Our goal is always to provide you with excellent care. Hearing back from our patients is one way we can continue to improve our services. Please take a few minutes to complete the written survey that you may receive in the mail after your visit with us. Thank you!             Your Updated Medication List - Protect others around you: Learn how to safely use, store and throw away your medicines " at www.disposemymeds.org.          This list is accurate as of: 7/11/17 10:15 AM.  Always use your most recent med list.                   Brand Name Dispense Instructions for use Diagnosis    cholecalciferol 1000 UNIT tablet    vitamin D          lidocaine 2 % topical gel    XYLOCAINE          lidocaine 5 % Patch    LIDODERM    30 patch    Apply up to 3 patches to painful area at once for up to 12 h within a 24 h period.  Remove after 12 hours.    Rib pain on left side, Back contusion, left, subsequent encounter       nabumetone 750 MG tablet    RELAFEN    60 tablet    Take 1 tablet (750 mg) by mouth 2 times daily    Closed nondisplaced fracture of phalanx of toe of right foot with delayed healing, unspecified toe, subsequent encounter, Pain of left upper arm       order for DME     1 Device    FP walker med    Closed nondisplaced fracture of phalanx of toe of right foot with delayed healing, unspecified toe, subsequent encounter

## 2017-07-11 NOTE — PROGRESS NOTES
PATIENT HISTORY:  Naa Paredes is a 51 year old female who presents to clinic for pan to right foot. Notes it used to be at the right 2nd and 3rd toes. Has had xrays and MRI. Here to follow up on the MRI. Notes she used to dance a lot and wear high heels. Since she has stopped, her feet have been improving. Here with . Pain currently is 3/10. She was given a boot but does not wear it. Denies specific injury.     Review of Systems:  Patient denies fever, chills, rash, wound, stiffness, limping, numbness, weakness, heart burn, blood in stool, chest pain with activity, calf pain when walking, shortness of breath with activity, chronic cough, easy bleeding/bruising, swelling of ankles, excessive thirst, fatigue, depression, anxiety.  Patient admits to limping at times. .     PAST MEDICAL HISTORY:   Past Medical History:   Diagnosis Date     Ankle sprain and strain 5/2/2013     Anxiety 3/26/2015     Black stools 11/25/2015     CARDIOVASCULAR SCREENING; LDL GOAL LESS THAN 160 10/31/2010     Cervicalgia 12/7/2011     Colon adenoma 4/23/2015    Patient denies it. Renee Tiwari RN, 6/9/15.     Conversion disorder with mixed symptoms 6/14/2016     Depression with suicidal ideation 6/19/2015     Depressive disorder      Elevated blood pressure reading without diagnosis of hypertension 11/25/2015     Elevated LFT's 6/4/2010     Fatigue due to depression 11/24/2015     H/O: alcohol abuse 6/4/2010     IBS (irritable bowel syndrome)      Knee pain 10/19/2010     Low back pain 10/19/2010     Lumbago 12/7/2011     Major depressive disorder, recurrent, severe without psychotic features (H) 5/13/2016     Major depressive disorder, single episode, in partial remission (H) 11/24/2015     Migraine      Neck pain 10/19/2010     Nephrolithiasis     calcium oxalate     Pain in thoracic spine 1/2/2013     Perimenopause 3/26/2015     Seasonal allergic rhinitis 10/20/2015     Vitamin D deficiency disease 6/8/2010        PAST  "SURGICAL HISTORY:   Past Surgical History:   Procedure Laterality Date     COLONOSCOPY N/A 6/9/2015    Procedure: COLONOSCOPY;  Surgeon: Steve Choi MD;  Location:  GI     LITHOTRIPSY          MEDICATIONS:   Current Outpatient Prescriptions:      nabumetone (RELAFEN) 750 MG tablet, Take 1 tablet (750 mg) by mouth 2 times daily, Disp: 60 tablet, Rfl: 1     lidocaine (XYLOCAINE) 2 % topical gel, , Disp: , Rfl: 2     VITAMIN D3 1000 UNITS tablet, , Disp: , Rfl: 1     order for DME, FP walker med (Patient not taking: Reported on 7/11/2017), Disp: 1 Device, Rfl: 0     lidocaine (LIDODERM) 5 % Patch, Apply up to 3 patches to painful area at once for up to 12 h within a 24 h period.  Remove after 12 hours. (Patient not taking: Reported on 7/11/2017), Disp: 30 patch, Rfl: 0     ALLERGIES:  No Known Allergies     SOCIAL HISTORY:   Social History     Social History     Marital status: Legally      Spouse name: N/A     Number of children: N/A     Years of education: N/A     Occupational History     Not on file.     Social History Main Topics     Smoking status: Never Smoker     Smokeless tobacco: Never Used     Alcohol use Yes      Comment: occ wine     Drug use: No     Sexual activity: Not Currently     Partners: Male     Other Topics Concern     Not on file     Social History Narrative        FAMILY HISTORY:   Family History   Problem Relation Age of Onset     HEART DISEASE Mother      Cancer - colorectal Father      at age 65     MENTAL ILLNESS Paternal Uncle      Breast Cancer No family hx of      Ovarian Cancer No family hx of         EXAM:Vitals: /76  Ht 5' 5\" (1.651 m)  Wt 140 lb (63.5 kg)  LMP 08/20/2015  BMI 23.3 kg/m2  BMI= Body mass index is 23.3 kg/(m^2).    General appearance: Patient is alert and fully cooperative with history & exam.  No sign of distress is noted during the visit.     Psychiatric: Affect is pleasant & appropriate.  Patient appears motivated to improve health.   "   Respiratory: Breathing is regular & unlabored while sitting.     HEENT: Hearing is intact to spoken word.  Speech is clear.  No gross evidence of visual impairment that would impact ambulation.     Dermatologic: Skin is intact to both lower extremities without significant lesions, rash or abrasion.  No paronychia or evidence of soft tissue infection is noted.     Vascular: DP & PT pulses are intact & regular bilaterally.  No significant edema or varicosities noted.  CFT and skin temperature is normal to both lower extremities.     Neurologic: Lower extremity sensation is intact to light touch.  No evidence of weakness or contracture in the lower extremities.  No evidence of neuropathy.     Musculoskeletal: Patient is ambulatory without assistive device or brace.  No gross ankle deformity noted.  No foot or ankle joint effusion is noted.    RAdiographs: No fracture or osseous lesion is seen. The previously seen  fracture of the proximal phalanx of the fifth toe has completely  healed. The joint spaces are well preserved. No adjacent soft tissue  pathology is seen. Elongated 2nd metatarsal.    MRI: First metatarsal stress fracture.  2. Fracture of the third metatarsal neck with mild displacement and  impaction.  3. Freiberg's infraction versus subchondral fracture of the second  metatarsal head.  4. Additional bone marrow edema involving the fourth metatarsal, third  metatarsal, first metatarsal, cuboid, lateral and medial cuneiforms.  This could represent stress reaction or contusion.     ASSESSMENT:    Right foot pain  Freiberg's infraction, right  Primary localized osteoarthrosis, ankle and foot, right  Hammer toes of both feet       PLAN:  Reviewed patient[facility] chart in [facility]. Reviewed MRI and xrays. Talked about arthritis and treatments including orthotics, injections, icing, NSAIDS, bracing, physical therapy, compounding pain cream, or surgical intervention.    Because patients pain is improving, recommend more  supportive shoes and inserts. Discussed that the issues with her feet are most likely due to repetitive stress. This can also be associated with her Vitamin D Deficiency.  Discussed possible injection or PT for feet. She is not interested. If pain continues, recommend this.     Wants paperwork filled out to do seated work. Discussed I can do that for the next month but since pain is getting better can't really do it longer.       Casi Marquez DPM, Podiatry/Foot and Ankle Surgery

## 2017-07-14 ENCOUNTER — OFFICE VISIT (OUTPATIENT)
Dept: FAMILY MEDICINE | Facility: CLINIC | Age: 52
End: 2017-07-14
Payer: COMMERCIAL

## 2017-07-14 VITALS
HEIGHT: 65 IN | DIASTOLIC BLOOD PRESSURE: 78 MMHG | TEMPERATURE: 97.9 F | HEART RATE: 68 BPM | BODY MASS INDEX: 23.28 KG/M2 | RESPIRATION RATE: 18 BRPM | SYSTOLIC BLOOD PRESSURE: 124 MMHG | WEIGHT: 139.7 LBS

## 2017-07-14 DIAGNOSIS — G54.0 THORACIC OUTLET SYNDROME: ICD-10-CM

## 2017-07-14 DIAGNOSIS — L91.8 ACROCHORDON: ICD-10-CM

## 2017-07-14 DIAGNOSIS — M54.2 CERVICALGIA: Primary | ICD-10-CM

## 2017-07-14 PROCEDURE — 11200 RMVL SKIN TAGS UP TO&INC 15: CPT | Performed by: FAMILY MEDICINE

## 2017-07-14 PROCEDURE — 99213 OFFICE O/P EST LOW 20 MIN: CPT | Mod: 25 | Performed by: FAMILY MEDICINE

## 2017-07-14 NOTE — NURSING NOTE
"Chief Complaint   Patient presents with     RECHECK     neck  and left hand        Initial /78 (BP Location: Right arm, Patient Position: Chair, Cuff Size: Adult Regular)  Pulse 68  Temp 97.9  F (36.6  C) (Oral)  Resp 18  Ht 5' 5\" (1.651 m)  Wt 139 lb 11.2 oz (63.4 kg)  LMP 08/20/2015  Breastfeeding? No  BMI 23.25 kg/m2 Estimated body mass index is 23.25 kg/(m^2) as calculated from the following:    Height as of this encounter: 5' 5\" (1.651 m).    Weight as of this encounter: 139 lb 11.2 oz (63.4 kg).  Medication Reconciliation: complete rt arm Sandrine Guo MAm      "

## 2017-07-14 NOTE — PROGRESS NOTES
SUBJECTIVE:                                                    Naa Paredes is a 51 year old female who presents to clinic today for the following health issues:      Follow up on neck pain and left hand pain and numbness     Cervicalgia  (primary encounter diagnosis): The patient complains of neck pain that has been going on for awhile and says she has been to a chiropractor. She expressed interest in physical therapy, bringing in a business card from a PT clinic.     Thoracic outlet syndrome: The patient says her left arm is better but she still experiences numbness in her left fingers.  II and II. Scan showed no cervical impingement, trivial facet OA    Acrochordon: The patient asked to have additional skin tags frozen off.     The patient went to podiatry a few days ago (7/11/17) and seems to better understand that her right foot is broken In walking boot    Problem list and histories reviewed & adjusted, as indicated.  Additional history: none      Reviewed and updated as needed this visit by clinical staff  Tobacco  Allergies  Meds  Med Hx  Surg Hx  Fam Hx  Soc Hx       Past Medical History:   Diagnosis Date     Ankle sprain and strain 5/2/2013     Anxiety 3/26/2015     Black stools 11/25/2015     CARDIOVASCULAR SCREENING; LDL GOAL LESS THAN 160 10/31/2010     Cervicalgia 12/7/2011     Colon adenoma 4/23/2015    Patient denies it. Renee Tiwari RN, 6/9/15.     Conversion disorder with mixed symptoms 6/14/2016     Depression with suicidal ideation 6/19/2015     Depressive disorder      Elevated blood pressure reading without diagnosis of hypertension 11/25/2015     Elevated LFT's 6/4/2010     Fatigue due to depression 11/24/2015     H/O: alcohol abuse 6/4/2010     IBS (irritable bowel syndrome)      Knee pain 10/19/2010     Low back pain 10/19/2010     Lumbago 12/7/2011     Major depressive disorder, recurrent, severe without psychotic features (H) 5/13/2016     Major depressive disorder, single  "episode, in partial remission (H) 11/24/2015     Migraine      Neck pain 10/19/2010     Nephrolithiasis     calcium oxalate     Pain in thoracic spine 1/2/2013     Perimenopause 3/26/2015     Seasonal allergic rhinitis 10/20/2015     Vitamin D deficiency disease 6/8/2010       Past Surgical History:   Procedure Laterality Date     COLONOSCOPY N/A 6/9/2015    Procedure: COLONOSCOPY;  Surgeon: Steve Choi MD;  Location: [facility]     LITHOTRIPSY         Family History   Problem Relation Age of Onset     HEART DISEASE Mother      Cancer - colorectal Father      at age 65     MENTAL ILLNESS Paternal Uncle      Breast Cancer No family hx of      Ovarian Cancer No family hx of        Social History   Substance Use Topics     Smoking status: Never Smoker     Smokeless tobacco: Never Used     Alcohol use Yes      Comment: occ wine       Reviewed and updated as needed this visit by Provider         ROS:  fot pain down    This document serves as a record of the services and decisions personally performed and made by Sher Duran MD. It was created on his behalf by Nimo Waite, a trained medical scribe.  The creation of this document is based on the scribe's personal observations and the provider's statements to the medical scribe.  Nimo Waite, July 14, 2017 10:24 AM      OBJECTIVE:     /78 (BP Location: Right arm, Patient Position: Chair, Cuff Size: Adult Regular)  Pulse 68  Temp 97.9  F (36.6  C) (Oral)  Resp 18  Ht 1.651 m (5' 5\")  Wt 63.4 kg (139 lb 11.2 oz)  LMP 08/20/2015  Breastfeeding? No  BMI 23.25 kg/m2  Body mass index is 23.25 kg/(m^2).      EXAM:  Integument: Skin tags on neck All those previously treated have not returned. Post inflammatory papules remain  Foot:w/o bruise      PROCEDURE  Cryotherapy applied to acrochordomata  - Neck.  Care discussed      ASSESSMENT/PLAN:       ASSESSMENT / PLAN:  (M54.2) Cervicalgia  (primary encounter diagnosis)  Comment: Pt interested in PT  Plan: [facility] PT, " HAND, AND CHIROPRACTIC REFERRAL            (G54.0) Thoracic outlet syndrome  Comment: Discussed. Current distribution c/w carpal tunnel: still too early for EMG  Plan: [facility] PT, HAND, AND CHIROPRACTIC REFERRAL            (L91.8) Acrochordon  Comment: Cryotherapy applied  Plan: DESTRUCT BENIGN LESION, UP TO 14              RTC 3 weeks if acrochordon returns, after PT for neck      The information in this document, created by the medical scribe for me, accurately reflects the services I personally performed and the decisions made by me. I have reviewed and approved this document for accuracy prior to leaving the patient care area.  Sher Duran MD July 14, 2017 10:24 AM      Sher Duran MD  [facility]

## 2017-07-14 NOTE — MR AVS SNAPSHOT
After Visit Summary   7/14/2017    Naa Paredes    MRN: [id number]           Patient Information     Date Of Birth          1965        Visit Information        Provider Department      7/14/2017 9:15 AM Sher Duran MD; [place] [facility]        Today's Diagnoses     Cervicalgia    -  1    Thoracic outlet syndrome        Acrochordon           Follow-ups after your visit        Additional Services     [facility], HAND, AND CHIROPRACTIC REFERRAL       **This order will print in the [facility] Scheduling Office**    Physical Therapy, Hand Therapy and Chiropractic Care are available through:    *[facility]  *[facility]  *[facility]    Call one number to schedule at any of the above locations: [phone].    Your provider has referred you to: As Indicated:     Indication/Reason for Referral: Neck Pain  Onset of Illness: weeks  Therapy Orders: Evaluate and Treat  Special Programs: None  Special Request: None    Yahaira Syed      Additional Comments for the Therapist or Chiropractor:     Please be aware that coverage of these services is subject to the terms and limitations of your health insurance plan.  Call member services at your health plan with any benefit or coverage questions.      Please bring the following to your appointment:    *Your personal calendar for scheduling future appointments  *Comfortable clothing                  Your next 10 appointments already scheduled     Jul 21, 2017  9:00 AM CDT   [facility] with Joshua Wilkerson PT   [place] [facility] ([facility])    [address]  [facility] [id number] [phone]              Who to contact     If you have questions or need follow up information about today's clinic visit or your schedule please contact [facility] directly at [phone].  Normal or  "non-critical lab and imaging results will be communicated to you by MyChart, letter or phone within 4 business days after the clinic has received the results. If you do not hear from us within 7 days, please contact the clinic through [employer] or phone. If you have a critical or abnormal lab result, we will notify you by phone as soon as possible.  Submit refill requests through [employer] or call your pharmacy and they will forward the refill request to us. Please allow 3 business days for your refill to be completed.          Additional Information About Your Visit        Gigzolo Information     [employer] lets you send messages to your doctor, view your test results, renew your prescriptions, schedule appointments and more. To sign up, go to [email or web address] . Click on \"Log in\" on the left side of the screen, which will take you to the Welcome page. Then click on \"Sign up Now\" on the right side of the page.     You will be asked to enter the access code listed below, as well as some personal information. Please follow the directions to create your username and password.     Your access code is: [email or web address]  Expires: 10/9/2017 10:15 AM     Your access code will  in 90 days. If you need help or a new code, please call your [facility] clinic or [phone].        Care EveryWhere ID     This is your Care EveryWhere ID. This could be used by other organizations to access your [facility] medical records  [id number]        Your Vitals Were     Pulse Temperature Respirations Height Last Period Breastfeeding?    68 97.9  F (36.6  C) (Oral) 18 5' 5\" (1.651 m) 2015 No    BMI (Body Mass Index)                   23.25 kg/m2            Blood Pressure from Last 3 Encounters:   17 124/78   17 110/76   17 128/85    Weight from Last 3 Encounters:   17 139 lb 11.2 oz (63.4 kg)   17 140 lb (63.5 kg)   17 140 lb 9.6 oz (63.8 kg)              We Performed the Following     DESTRUCT " BENIGN LESION, UP TO 14     [facility], HAND, AND CHIROPRACTIC REFERRAL        Primary Care Provider Office Phone # Fax #    Sher Duran [phone]       [facility] [address]  [facility] [address]        Equal Access to Services     GARIMA MCCORD : Hadii aad ku hadasho Soomaali, waaxda luqadaha, qaybta kaalmada adeegyada, waxay harlanin hayleslyn arina lawlerkellycristobal laenedina mcmahan. So [facility] [phone].    ATENCIÓN: Si habla español, tiene a choi disposición servicios gratuitos de asistencia lingüística. Rupertoame al [phone].    We comply with applicable federal civil rights laws and [place] laws. We do not discriminate on the basis of race, color, national origin, age, disability sex, sexual orientation or gender identity.            Thank you!     Thank you for choosing [facility]  for your care. Our goal is always to provide you with excellent care. Hearing back from our patients is one way we can continue to improve our services. Please take a few minutes to complete the written survey that you may receive in the mail after your visit with us. Thank you!             Your Updated Medication List - Protect others around you: Learn how to safely use, store and throw away your medicines at www.disposemymeds.org.          This list is accurate as of: 7/14/17  1:15 PM.  Always use your most recent med list.                   Brand Name Dispense Instructions for use Diagnosis    cholecalciferol 1000 UNIT tablet    vitamin D          lidocaine 2 % topical gel    XYLOCAINE          lidocaine 5 % Patch    LIDODERM    30 patch    Apply up to 3 patches to painful area at once for up to 12 h within a 24 h period.  Remove after 12 hours.    Rib pain on left side, Back contusion, left, subsequent encounter       nabumetone 750 MG tablet    RELAFEN    60 tablet    Take 1 tablet (750 mg) by mouth 2 times daily    Closed nondisplaced fracture of phalanx of toe of right foot with delayed  healing, unspecified toe, subsequent encounter, Pain of left upper arm       order for DME     1 Device    FP walker med    Closed nondisplaced fracture of phalanx of toe of right foot with delayed healing, unspecified toe, subsequent encounter

## 2017-07-20 ENCOUNTER — OFFICE VISIT (OUTPATIENT)
Dept: FAMILY MEDICINE | Facility: CLINIC | Age: 52
End: 2017-07-20
Payer: COMMERCIAL

## 2017-07-20 VITALS
HEIGHT: 65 IN | SYSTOLIC BLOOD PRESSURE: 117 MMHG | BODY MASS INDEX: 23.41 KG/M2 | DIASTOLIC BLOOD PRESSURE: 78 MMHG | TEMPERATURE: 98.2 F | RESPIRATION RATE: 14 BRPM | HEART RATE: 74 BPM | WEIGHT: 140.5 LBS

## 2017-07-20 DIAGNOSIS — G54.0 THORACIC OUTLET SYNDROME: ICD-10-CM

## 2017-07-20 DIAGNOSIS — R21 RASH: Primary | ICD-10-CM

## 2017-07-20 DIAGNOSIS — Z91.199 NON-COMPLIANCE WITH TREATMENT: ICD-10-CM

## 2017-07-20 LAB
KOH PREP SPEC: NORMAL
MICRO REPORT STATUS: NORMAL
SPECIMEN SOURCE: NORMAL

## 2017-07-20 PROCEDURE — 87220 TISSUE EXAM FOR FUNGI: CPT | Performed by: FAMILY MEDICINE

## 2017-07-20 PROCEDURE — 99214 OFFICE O/P EST MOD 30 MIN: CPT | Performed by: FAMILY MEDICINE

## 2017-07-20 RX ORDER — TRIAMCINOLONE ACETONIDE 1 MG/G
CREAM TOPICAL
Qty: 15 G | Refills: 0 | Status: SHIPPED | OUTPATIENT
Start: 2017-07-20 | End: 2018-10-02

## 2017-07-20 NOTE — MR AVS SNAPSHOT
"              After Visit Summary   7/20/2017    Naa Paredes    MRN: [id number]           Patient Information     Date Of Birth          1965        Visit Information        Provider Department      7/20/2017 2:30 PM Sher Duran MD; [place] [facility]        Today's Diagnoses     Rash    -  1    Thoracic outlet syndrome        Non-compliance with treatment           Follow-ups after your visit        Your next 10 appointments already scheduled     Jul 21, 2017  9:00 AM CDT   [facility] Spine with Joshua Wilkerson PT   [place] [facility] Physical Therapy ([facility])    [address]  [facility] [id number] [phone]              Who to contact     If you have questions or need follow up information about today's clinic visit or your schedule please contact [facility] directly at [phone].  Normal or non-critical lab and imaging results will be communicated to you by MyChart, letter or phone within 4 business days after the clinic has received the results. If you do not hear from us within 7 days, please contact the clinic through MyChart or phone. If you have a critical or abnormal lab result, we will notify you by phone as soon as possible.  Submit refill requests through [employer] or call your pharmacy and they will forward the refill request to us. Please allow 3 business days for your refill to be completed.          Additional Information About Your Visit        MyChart Information     [employer] lets you send messages to your doctor, view your test results, renew your prescriptions, schedule appointments and more. To sign up, go to [email or web address] . Click on \"Log in\" on the left side of the screen, which will take you to the Welcome page. Then click on \"Sign up Now\" on the right side of the page.     You will be asked to enter the access code listed below, as well as some personal " "information. Please follow the directions to create your username and password.     Your access code is: [email or web address]  Expires: 10/9/2017 10:15 AM     Your access code will  in 90 days. If you need help or a new code, please call your [facility] clinic or [phone].        Care EveryWhere ID     This is your Care EveryWhere ID. This could be used by other organizations to access your [facility] medical records  [id number]        Your Vitals Were     Pulse Temperature Respirations Height Last Period Breastfeeding?    74 98.2  F (36.8  C) (Oral) 14 5' 5\" (1.651 m) 2015 No    BMI (Body Mass Index)                   23.38 kg/m2            Blood Pressure from Last 3 Encounters:   17 117/78   17 124/78   17 110/76    Weight from Last 3 Encounters:   17 140 lb 8 oz (63.7 kg)   17 139 lb 11.2 oz (63.4 kg)   17 140 lb (63.5 kg)              We Performed the Following     KOH prep (skin, hair or nails only)          Today's Medication Changes          These changes are accurate as of: 17 11:59 PM.  If you have any questions, ask your nurse or doctor.               Start taking these medicines.        Dose/Directions    triamcinolone 0.1 % cream   Commonly known as:  KENALOG   Used for:  Rash   Started by:  Sher Duran MD        Apply sparingly to affected area three times daily for 14 days.   Quantity:  15 g   Refills:  0            Where to get your medicines      These medications were sent to [facility] [address] [address]     Phone:  [phone]     triamcinolone 0.1 % cream                Primary Care Provider Office Phone # Fax #    Sher Duran [phone]       [address] [address]        Equal Access to Services     GARIMA MCCORD AH: Hadii job osman hadasho Soomaali, waaxda luqadaha, qaybta kaalmada adeegyada, camelia idiin " eris lawlerkellycristobal minerBeccaethan ah. So [facility] [phone].    ATENCIÓN: Si orlandola alessandro, tiene a choi disposición servicios gratuitos de asistencia lingüística. Luc al [phone].    We comply with applicable federal civil rights laws and [place] laws. We do not discriminate on the basis of race, color, national origin, age, disability sex, sexual orientation or gender identity.            Thank you!     Thank you for choosing [facility]  for your care. Our goal is always to provide you with excellent care. Hearing back from our patients is one way we can continue to improve our services. Please take a few minutes to complete the written survey that you may receive in the mail after your visit with us. Thank you!             Your Updated Medication List - Protect others around you: Learn how to safely use, store and throw away your medicines at www.disposemymeds.org.          This list is accurate as of: 7/20/17 11:59 PM.  Always use your most recent med list.                   Brand Name Dispense Instructions for use Diagnosis    cholecalciferol 1000 UNIT tablet    vitamin D          lidocaine 2 % topical gel    XYLOCAINE          lidocaine 5 % Patch    LIDODERM    30 patch    Apply up to 3 patches to painful area at once for up to 12 h within a 24 h period.  Remove after 12 hours.    Rib pain on left side, Back contusion, left, subsequent encounter       nabumetone 750 MG tablet    RELAFEN    60 tablet    Take 1 tablet (750 mg) by mouth 2 times daily    Closed nondisplaced fracture of phalanx of toe of right foot with delayed healing, unspecified toe, subsequent encounter, Pain of left upper arm       order for DME     1 Device    FP walker med    Closed nondisplaced fracture of phalanx of toe of right foot with delayed healing, unspecified toe, subsequent encounter       triamcinolone 0.1 % cream    KENALOG    15 g    Apply sparingly to affected area three times daily for 14 days.    Rash

## 2017-07-20 NOTE — NURSING NOTE
"  Chief Complaint   Patient presents with     Pain     right hand rash and left neck rash        Initial /78 (BP Location: Right arm, Patient Position: Chair, Cuff Size: Adult Regular)  Pulse 74  Temp 98.2  F (36.8  C) (Oral)  Resp 14  Ht 5' 5\" (1.651 m)  Wt 140 lb 8 oz (63.7 kg)  LMP 08/20/2015  Breastfeeding? No  BMI 23.38 kg/m2 Estimated body mass index is 23.38 kg/(m^2) as calculated from the following:    Height as of this encounter: 5' 5\" (1.651 m).    Weight as of this encounter: 140 lb 8 oz (63.7 kg).  Medication Reconciliation: complete rt arm Sandrine Guo MA      "

## 2017-07-20 NOTE — PROGRESS NOTES
"  SUBJECTIVE:                                                    Naa Paredes is a 51 year old female who presents to clinic today for the following health issues:      Rt hand rash itchy and left neck rash  Treated with OTC hydrocortisone, ineffective        Non-compliance with treatment: The patient has >1 fractures in her right foot. She removed her boot because it was \"inconvenient\". Claims her foot does not hurt anymore.     Thoracic outlet Syndrome. Pt with dysesthesia left index finger, now a few weeks. Seeing PT, reassuring Scan neck. Initially stocking glove, now more radicular  Problem list and histories reviewed & adjusted, as indicated.  Additional history: none        Reviewed and updated as needed this visit by clinical staff  Tobacco        Past Medical History:   Diagnosis Date     Ankle sprain and strain 5/2/2013     Anxiety 3/26/2015     Black stools 11/25/2015     CARDIOVASCULAR SCREENING; LDL GOAL LESS THAN 160 10/31/2010     Cervicalgia 12/7/2011     Colon adenoma 4/23/2015    Patient denies it. Renee Tiwari RN, 6/9/15.     Conversion disorder with mixed symptoms 6/14/2016     Depression with suicidal ideation 6/19/2015     Depressive disorder      Elevated blood pressure reading without diagnosis of hypertension 11/25/2015     Elevated LFT's 6/4/2010     Fatigue due to depression 11/24/2015     H/O: alcohol abuse 6/4/2010     IBS (irritable bowel syndrome)      Knee pain 10/19/2010     Low back pain 10/19/2010     Lumbago 12/7/2011     Major depressive disorder, recurrent, severe without psychotic features (H) 5/13/2016     Major depressive disorder, single episode, in partial remission (H) 11/24/2015     Migraine      Neck pain 10/19/2010     Nephrolithiasis     calcium oxalate     Pain in thoracic spine 1/2/2013     Perimenopause 3/26/2015     Seasonal allergic rhinitis 10/20/2015     Vitamin D deficiency disease 6/8/2010       Past Surgical History:   Procedure Laterality Date     " "COLONOSCOPY N/A 6/9/2015    Procedure: COLONOSCOPY;  Surgeon: Steve Choi MD;  Location:  GI     LITHOTRIPSY         Family History   Problem Relation Age of Onset     HEART DISEASE Mother      Cancer - colorectal Father      at age 65     MENTAL ILLNESS Paternal Uncle      Breast Cancer No family hx of      Ovarian Cancer No family hx of        Social History   Substance Use Topics     Smoking status: Never Smoker     Smokeless tobacco: Never Used     Alcohol use Yes      Comment: occ wine       Reviewed and updated as needed this visit by Provider     MR foot  1. First metatarsal stress fracture.  2. Fracture of the third metatarsal neck with mild displacement and  impaction.  3. Freiberg's infraction versus subchondral fracture of the second  metatarsal head.  4. Additional bone marrow edema involving the fourth metatarsal, third  metatarsal, first metatarsal, cuboid, lateral and medial cuneiforms.  This could represent stress reaction or contusion.  MR neck  IMPRESSION: Essentially normal cervical spine MRI demonstrating only  minimal degenerative changes at the C5-C6 level.         ROS: no systemic symptoms, weakness, bruise        This document serves as a record of the services and decisions personally performed and made by Sher Duran MD. It was created on his behalf by Nimo Waite, a trained medical scribe.  The creation of this document is based on the scribe's personal observations and the provider's statements to the medical scribe.  Nimo Waite, July 20, 2017 3:20 PM      OBJECTIVE:     /78 (BP Location: Right arm, Patient Position: Chair, Cuff Size: Adult Regular)  Pulse 74  Temp 98.2  F (36.8  C) (Oral)  Resp 14  Ht 1.651 m (5' 5\")  Wt 63.7 kg (140 lb 8 oz)  LMP 08/20/2015  Breastfeeding? No  BMI 23.38 kg/m2  Body mass index is 23.38 kg/(m^2).    EXAM:rash is 2 homogenous papules with discrete serpintiginous edges covered in grease    Results for orders placed or performed " in visit on 07/20/17   KOH prep (skin, hair or nails only)   Result Value Ref Range    Specimen Description Skin     KOH Skin Hair Nails Test No fungal elements seen     Micro Report Status FINAL 07/20/2017            ASSESSMENT/PLAN:       ASSESSMENT / PLAN:        ASSESSMENT / PLAN:  (R21) Rash  (primary encounter diagnosis)  Comment:stronger steroid  Plan: KOH prep (skin, hair or nails only),         triamcinolone (KENALOG) 0.1 % cream            (G54.0) Thoracic outlet syndrome  Comment: Symptoms now c/w Carpal Tunnel, but still too short for EMG confirmation. Changing distribution may reflect response to PT  Plan: continue    (Z91.19) Non-compliance with treatment  Comment: urged compliance. Discussed Brandee history, consequences of malunion  Plan: she is resistent          Sher Duran MD      The information in this document, created by the medical scribe for me, accurately reflects the services I personally performed and the decisions made by me. I have reviewed and approved this document for accuracy prior to leaving the patient care area.  Sher Duran MD July 20, 2017 3:20 PM    Sher Duran MD  [facility]

## 2017-07-21 ENCOUNTER — THERAPY VISIT (OUTPATIENT)
Dept: PHYSICAL THERAPY | Facility: CLINIC | Age: 52
End: 2017-07-21
Payer: COMMERCIAL

## 2017-07-21 DIAGNOSIS — M54.12 CERVICAL RADICULOPATHY: Primary | ICD-10-CM

## 2017-07-21 PROCEDURE — 97110 THERAPEUTIC EXERCISES: CPT | Mod: GP | Performed by: PHYSICAL THERAPIST

## 2017-07-21 PROCEDURE — 97112 NEUROMUSCULAR REEDUCATION: CPT | Mod: GP | Performed by: PHYSICAL THERAPIST

## 2017-07-21 PROCEDURE — 97161 PT EVAL LOW COMPLEX 20 MIN: CPT | Mod: GP | Performed by: PHYSICAL THERAPIST

## 2017-07-21 NOTE — MR AVS SNAPSHOT
"              After Visit Summary   7/21/2017    Naa Paredes    MRN: [id number]           Patient Information     Date Of Birth          1965        Visit Information        Provider Department      7/21/2017 9:00 AM Joshua Wilkerson, PT [facility] Physical [facility]        Today's Diagnoses     Cervical radiculopathy    -  1       Follow-ups after your visit        Your next 10 appointments already scheduled     Aug 04, 2017  3:00 PM CDT   [facility] Spine with Joshua Wilkerson PT   [facility] ([facility])    [address]  [facility] [id number] [phone]            Aug 11, 2017  3:00 PM CDT   [facility] with [facility]   [facility] ([facility])    [address]  [facility] [id number] [phone]              Who to contact     If you have questions or need follow up information about today's clinic visit or your schedule please contact [facility] PH[facility] THERAP[facility] directly at [phone].  Normal or non-critical lab and imaging results will be communicated to you by MyChart, letter or phone within 4 business days after the clinic has received the results. If you do not hear from us within 7 days, please contact the clinic through [facility] or phone. If you have a critical or abnormal lab result, we will notify you by phone as soon as possible.  Submit refill requests through [employer] or call your pharmacy and they will forward the refill request to us. Please allow 3 business days for your refill to be completed.          Additional Information About Your Visit        MyChart Information     [facility] lets you send messages to your doctor, view your test results, renew your prescriptions, schedule appointments and more. To sign up, go to [email or web address] . Click on \"Log in\" " "on the left side of the screen, which will take you to the Welcome page. Then click on \"Sign up Now\" on the right side of the page.     You will be asked to enter the access code listed below, as well as some personal information. Please follow the directions to create your username and password.     Your access code is: [email or web address]  Expires: 10/9/2017 10:15 AM     Your access code will  in 90 days. If you need help or a new code, please call your [place] clinic or [phone].        Care EveryWhere ID     This is your Care EveryWhere ID. This could be used by other organizations to access your [place] medical records  [id number]        Your Vitals Were     Last Period                   2015            Blood Pressure from Last 3 Encounters:   17 117/78   17 124/78   17 110/76    Weight from Last 3 Encounters:   17 63.7 kg (140 lb 8 oz)   17 63.4 kg (139 lb 11.2 oz)   17 63.5 kg (140 lb)              We Performed the Following     [facility] PT EVAL, LOW COMPLEXITY     [facility] INITIAL EVAL REPORT     NEUROMUSCULAR RE-EDUCATION     THERAPEUTIC EXERCISES        Primary Care Provider Office Phone # Fax #    Sher Duran [phone]       [address] [address]        Equal Access to Services     GARIMA MCCORD AH: Hadii aad ku hadasho Soomaali, waaxda luqadaha, qaybta kaalmada adeegyada, camelia segura hayethan lorenzo . So [facility] [place] [phone].    ATENCIÓN: Si habla español, tiene a choi disposición servicios gratuitos de asistencia lingüística. Luc al [phone].    We comply with applicable federal civil rights laws and [place] laws. We do not discriminate on the basis of race, color, national origin, age, disability sex, sexual orientation or gender identity.            Thank you!     Thank you for choosing [facility] PH[facility]ICAL THERAP[facility]  for your care. Our goal is always " to provide you with excellent care. Hearing back from our patients is one way we can continue to improve our services. Please take a few minutes to complete the written survey that you may receive in the mail after your visit with us. Thank you!             Your Updated Medication List - Protect others around you: Learn how to safely use, store and throw away your medicines at www.disposemymeds.org.          This list is accurate as of: 7/21/17 10:17 AM.  Always use your most recent med list.                   Brand Name Dispense Instructions for use Diagnosis    cholecalciferol 1000 UNIT tablet    vitamin D          lidocaine 2 % topical gel    XYLOCAINE          lidocaine 5 % Patch    LIDODERM    30 patch    Apply up to 3 patches to painful area at once for up to 12 h within a 24 h period.  Remove after 12 hours.    Rib pain on left side, Back contusion, left, subsequent encounter       nabumetone 750 MG tablet    RELAFEN    60 tablet    Take 1 tablet (750 mg) by mouth 2 times daily    Closed nondisplaced fracture of phalanx of toe of right foot with delayed healing, unspecified toe, subsequent encounter, Pain of left upper arm       order for DME     1 Device    FP walker med    Closed nondisplaced fracture of phalanx of toe of right foot with delayed healing, unspecified toe, subsequent encounter       triamcinolone 0.1 % cream    KENALOG    15 g    Apply sparingly to affected area three times daily for 14 days.    Rash

## 2017-07-21 NOTE — PROGRESS NOTES
Subjective:    Patient is a 51 year old female presenting with rehab cervical spine hpi. The history is provided by the patient. The history is limited by a language barrier. A  was used.   Naa Paredes is a 51 year old female with a cervical spine condition.  Condition occurred with:  Insidious onset.  Condition occurred: for unknown reasons.  This is a new condition  Pt c/o UB/N pain with radiation into L U/E to hand.  Pain started aprox 3-4 weeks, denies injury.  Did fall aprox 1 month prior to start of pain noting LBP, but this resolved on its own.  Pt notes history of intermittent UB/N and LBP. MD visit date 7/14/17..    Patient reports pain:  Cervical left side, lower cervical spine, upper thoracic and mid thoracic.  Radiates to:  Lower arm left, elbow left and hand left (U/E symptoms > UB/N).  Pain is described as sharp, stabbing and aching and is constant and reported as 7/10.  Associated symptoms:  Loss of motion/stiffness, loss of strength, numbness and tingling.   Symptoms are exacerbated by lifting, carrying, lying down and driving and relieved by nothing.  Since onset symptoms are unchanged.  Special tests:  MRI (-).      General health as reported by patient is good.    Medical allergies: no.  Other surgeries include:  No.  Current medications:  None as reported by the patient.  Current occupation is   .    Primary job tasks include:  Prolonged sitting, prolonged standing, lifting and repetitive tasks.                                Objective:    System              Cervical/Thoracic Evaluation  Arom wnl cervical: retraction: ERT, no change with rep.     AROM:  AROM Cervical:    Flexion:            ERT  Extension:       ERT  Rotation:         Left: ERT     Right: ERT  Side Bend:      Left: ERT     Right:  ERT    Strength: fair scap stab UT overuse noted  Headaches: none  Cervical Myotomes:  Cervical myotomes: R hand 34, L hand 10.      C4 (shrug):  Left: 5-    Right:  5  C5 (Deltoid):  Left: 4+    Right: 5  C6 (Biceps):  Left: 4+    Right: 5  C7 (Triceps):  Left: 4+    Right: 5  C8 (Thumb Ext): Left: 3+    Right: 5  T1 (Intrinsics): Left: 3+    Right: 5    Neural Tension:    Left side:  Radial; Median and Wiliam Cervical positive.    Right side:  Median and Wiliam Cervical  negative.                   Shoulder Evaluation:  ROM:  AROM:  normal                                               ROM:  AROM:  normal (ERP all testing L wrist elbow)                                                                General     ROS    Assessment/Plan:      Patient is a 51 year old female with cervical complaints.    Patient has the following significant findings with corresponding treatment plan.                Diagnosis 1:  Cx radiculopathy  Pain -  hot/cold therapy, US, manual therapy, directional preference exercise and home program  Decreased ROM/flexibility - manual therapy and therapeutic exercise  Decreased strength - therapeutic exercise and therapeutic activities  Impaired muscle performance - neuro re-education  Decreased function - therapeutic activities  Impaired posture - neuro re-education    Therapy Evaluation Codes:   1) History comprised of:   Personal factors that impact the plan of care:      Language.    Comorbidity factors that impact the plan of care are:      Numbness/tingling, Pain at night/rest and Weakness.     Medications impacting care: None.  2) Examination of Body Systems comprised of:   Body structures and functions that impact the plan of care:      Cervical spine, Elbow and Hand.   Activity limitations that impact the plan of care are:      Bathing, Cooking, Driving, Dressing, Grasping, Lifting and Working.  3) Clinical presentation characteristics are:   Stable/Uncomplicated.  4) Decision-Making    Low complexity using standardized patient assessment instrument and/or measureable assessment of functional outcome.  Cumulative Therapy Evaluation is: Low  complexity.    Previous and current functional limitations:  (See Goal Flow Sheet for this information)    Short term and Long term goals: (See Goal Flow Sheet for this information)     Communication ability:  Patient has an  for communication clarity.  Treatment Explanation - The following has been discussed with the patient:   RX ordered/plan of care  Anticipated outcomes  Possible risks and side effects  This patient would benefit from PT intervention to resume normal activities.   Rehab potential is good.    Frequency:  1 X week, once daily  Duration:  for 8 weeks  Discharge Plan:  Achieve all LTG.  Independent in home treatment program.  Reach maximal therapeutic benefit.    Please refer to the daily flowsheet for treatment today, total treatment time and time spent performing 1:1 timed codes.

## 2017-08-02 ENCOUNTER — RADIANT APPOINTMENT (OUTPATIENT)
Dept: GENERAL RADIOLOGY | Facility: CLINIC | Age: 52
End: 2017-08-02
Attending: FAMILY MEDICINE
Payer: COMMERCIAL

## 2017-08-02 ENCOUNTER — OFFICE VISIT (OUTPATIENT)
Dept: FAMILY MEDICINE | Facility: CLINIC | Age: 52
End: 2017-08-02
Payer: COMMERCIAL

## 2017-08-02 VITALS
HEART RATE: 82 BPM | SYSTOLIC BLOOD PRESSURE: 128 MMHG | DIASTOLIC BLOOD PRESSURE: 88 MMHG | WEIGHT: 143.8 LBS | BODY MASS INDEX: 23.96 KG/M2 | TEMPERATURE: 98.3 F | HEIGHT: 65 IN | OXYGEN SATURATION: 97 %

## 2017-08-02 DIAGNOSIS — M79.674 PAIN OF TOE OF RIGHT FOOT: Primary | ICD-10-CM

## 2017-08-02 DIAGNOSIS — M79.674 PAIN OF TOE OF RIGHT FOOT: ICD-10-CM

## 2017-08-02 DIAGNOSIS — G56.02 CARPAL TUNNEL SYNDROME OF LEFT WRIST: ICD-10-CM

## 2017-08-02 PROCEDURE — 99214 OFFICE O/P EST MOD 30 MIN: CPT | Performed by: FAMILY MEDICINE

## 2017-08-02 PROCEDURE — 73660 X-RAY EXAM OF TOE(S): CPT | Mod: RT

## 2017-08-02 NOTE — NURSING NOTE
"Chief Complaint   Patient presents with     Musculoskeletal Problem     foot and hand pain       Initial /88 (BP Location: Right arm, Patient Position: Chair, Cuff Size: Adult Large)  Pulse 82  Temp 98.3  F (36.8  C) (Oral)  Ht 5' 5\" (1.651 m)  Wt 143 lb 12.8 oz (65.2 kg)  LMP 08/20/2015  SpO2 97%  Breastfeeding? No  BMI 23.93 kg/m2 Estimated body mass index is 23.93 kg/(m^2) as calculated from the following:    Height as of this encounter: 5' 5\" (1.651 m).    Weight as of this encounter: 143 lb 12.8 oz (65.2 kg).  Medication Reconciliation: complete Alka Alatorre MA  Health Maintenance has been reviewed.       "

## 2017-08-02 NOTE — PROGRESS NOTES
SUBJECTIVE:                                                    Naa Paredes is a 51 year old female who presents to clinic today for the following health issues:    Of note, history was difficult to obtain.  Patient came with  but spent much of the visit speaking in broken English, so she was very difficult to understand.      Joint Pain    Onset:  4 months    Description:   Location: right foot  Character: patient unable to describe     Intensity: no pain today    Progression of Symptoms: intermittent    Accompanying Signs & Symptoms:  Other symptoms: discoloration of second to last toe    History:   Previous similar pain: no       Precipitating factors:   Trauma or overuse: no     Alleviating factors:  Improved by: nothing    Patient has a first metatarsal stress fracture, fracture of the third metatarsal neck, fracture of the 2nd metatarsal head, and possible stress reaction to multiple areas of her right foot.  She has been wearing a boot for months for this and is seeing Dr. Marquez.  Most recently she was told she could go without the boot if she was wearing supportive shoes.  She had an accident where she stubbed her toe a couple weeks ago when she had the boot off and it's been hurting her since.  It is bruised and she wants to make sure she didn't re-fracture anything.      2) Patient fell over a month ago.  Initially did not have pain, but now having pain in her left hand.  She reports numbness, tingling, and pain in her thumb and pointer finger.  She notes the symptoms get worse when she presses on her palmar wrist.            Problem list and histories reviewed & adjusted, as indicated.  Additional history: as documented    Patient Active Problem List   Diagnosis     H/O: alcohol abuse     CARDIOVASCULAR SCREENING; LDL GOAL LESS THAN 160     Cervicalgia     Nickel allergy     Anxiety     Perimenopause     Colon adenoma     Seasonal allergic rhinitis     Major depressive disorder, single  "episode, in partial remission (H)     Major depressive disorder, recurrent, severe without psychotic features (H)     Conversion disorder with mixed symptoms     Migraine with status migrainosus, not intractable, unspecified migraine type     Thoracic outlet syndrome     Cervical radiculopathy     Past Surgical History:   Procedure Laterality Date     COLONOSCOPY N/A 6/9/2015    Procedure: COLONOSCOPY;  Surgeon: Steve Choi MD;  Location:  GI     LITHOTRIPSY         Social History   Substance Use Topics     Smoking status: Never Smoker     Smokeless tobacco: Never Used     Alcohol use Yes      Comment: occ wine     Family History   Problem Relation Age of Onset     HEART DISEASE Mother      Cancer - colorectal Father      at age 65     MENTAL ILLNESS Paternal Uncle      Breast Cancer No family hx of      Ovarian Cancer No family hx of              Reviewed and updated as needed this visit by clinical staffTobacco  Allergies  Med Hx  Surg Hx  Fam Hx  Soc Hx      Reviewed and updated as needed this visit by Provider         ROS:  Constitutional, HEENT, cardiovascular, pulmonary, gi and gu systems are negative, except as otherwise noted.      OBJECTIVE:   /88 (BP Location: Right arm, Patient Position: Chair, Cuff Size: Adult Large)  Pulse 82  Temp 98.3  F (36.8  C) (Oral)  Ht 5' 5\" (1.651 m)  Wt 143 lb 12.8 oz (65.2 kg)  LMP 08/20/2015  SpO2 97%  Breastfeeding? No  BMI 23.93 kg/m2  Body mass index is 23.93 kg/(m^2).  GENERAL: healthy, alert and no distress  MS: Right foot with no swelling or deformities.  Right 4th toe with contusion covering almost the entire toe and has TTP along the distal phalanx.  Left wrist with normal strength and no bony TTP.    NEURO: +Tinels at the left wrist    Diagnostic Test Results:  Xray - Left 4th toe does not appear to be fractured.      ASSESSMENT/PLAN:     1. Pain of toe of right foot  - Reassured pt that there is no fracture that I can see  - Will review " final radiology read   - Continue to wear boot PRN   - XR Toe Right G/E 2 Views; Future    2. Carpal tunnel syndrome of left wrist  - Given a wrist brace to wear as much as possible for her symptoms   - order for DME; [employer] wrist brace, left  Dispense: 1 each; Refill: 0    F/U in 4-6 weeks if not improving     Clementina Devlin,   [facility]

## 2017-08-02 NOTE — MR AVS SNAPSHOT
"              After Visit Summary   8/2/2017    Naa Paredes    MRN: [id number]           Patient Information     Date Of Birth          1965        Visit Information        Provider Department      8/2/2017 2:15 PM Clementina Devlin DO; [facility]        Today's Diagnoses     Pain of toe of right foot    -  1    Carpal tunnel syndrome of left wrist           Follow-ups after your visit        Your next 10 appointments already scheduled     Aug 04, 2017  3:00 PM CDT   [facility] Spine with Joshua Wilkerson PT   [place] [facility] Physical [facility] ([facility])    [address]  [place] [address] [phone]            Aug 11, 2017  3:00 PM CDT   [facility] Spine with Joshua Wilkerson PT   [facility] Physical Therapy ([facility])    [address]  [facility] [id number] [phone]              Who to contact     If you have questions or need follow up information about today's clinic visit or your schedule please contact [facility] directly at [phone].  Normal or non-critical lab and imaging results will be communicated to you by MyChart, letter or phone within 4 business days after the clinic has received the results. If you do not hear from us within 7 days, please contact the clinic through MyChart or phone. If you have a critical or abnormal lab result, we will notify you by phone as soon as possible.  Submit refill requests through [employer] or call your pharmacy and they will forward the refill request to us. Please allow 3 business days for your refill to be completed.          Additional Information About Your Visit        MyChart Information     [employer] lets you send messages to your doctor, view your test results, renew your prescriptions, schedule appointments and more. To sign up, go to [email or web address] . Click on \"Log in\" on " "the left side of the screen, which will take you to the Welcome page. Then click on \"Sign up Now\" on the right side of the page.     You will be asked to enter the access code listed below, as well as some personal information. Please follow the directions to create your username and password.     Your access code is: [email or web address]  Expires: 10/9/2017 10:15 AM     Your access code will  in 90 days. If you need help or a new code, please call your [facility] or [phone].        Care EveryWhere ID     This is your Care EveryWhere ID. This could be used by other organizations to access your [place] medical records  [id number]        Your Vitals Were     Pulse Temperature Height Last Period Pulse Oximetry Breastfeeding?    82 98.3  F (36.8  C) (Oral) 5' 5\" (1.651 m) 2015 97% No    BMI (Body Mass Index)                   23.93 kg/m2            Blood Pressure from Last 3 Encounters:   17 128/88   17 117/78   17 124/78    Weight from Last 3 Encounters:   17 143 lb 12.8 oz (65.2 kg)   17 140 lb 8 oz (63.7 kg)   17 139 lb 11.2 oz (63.4 kg)                 Today's Medication Changes          These changes are accurate as of: 17  4:08 PM.  If you have any questions, ask your nurse or doctor.               These medicines have changed or have updated prescriptions.        Dose/Directions    * order for DME   This may have changed:  Another medication with the same name was added. Make sure you understand how and when to take each.   Used for:  Closed nondisplaced fracture of phalanx of toe of right foot with delayed healing, unspecified toe, subsequent encounter   Changed by:  Sher Duran MD FP walker med   Quantity:  1 Device   Refills:  0       * order for DME   This may have changed:  You were already taking a medication with the same name, and this prescription was added. Make sure you understand how and when to take each.   Used for:  Carpal tunnel " syndrome of left wrist   Changed by:  Clementina Devlin DO        Quickfit wrist brace, left   Quantity:  1 each   Refills:  0       * Notice:  This list has 2 medication(s) that are the same as other medications prescribed for you. Read the directions carefully, and ask your doctor or other care provider to review them with you.      Stop taking these medicines if you haven't already. Please contact your care team if you have questions.     lidocaine 2 % topical gel   Commonly known as:  XYLOCAINE   Stopped by:  Clementina Devlin DO           lidocaine 5 % Patch   Commonly known as:  LIDODERM   Stopped by:  Clementina Devlin DO           nabumetone 750 MG tablet   Commonly known as:  RELAFEN   Stopped by:  Clementina Devlin DO                Where to get your medicines      Some of these will need a paper prescription and others can be bought over the counter.  Ask your nurse if you have questions.     Bring a paper prescription for each of these medications     order for DME                Primary Care Provider Office Phone # Fax #    Sher Duran [phone]       [address] [address]        Equal Access to Services     [facility] : Hadii aad ku hadasho Soemery, waaxda luqadaha, qaybta kaalmada mona, camelia lorenzo . So [facility] [phone].    ATENCIÓN: Si habla español, tiene a choi disposición servicios gratuitos de asistencia lingüística. [facility] [phone].    We comply with applicable federal civil rights laws and [place] laws. We do not discriminate on the basis of race, color, national origin, age, disability sex, sexual orientation or gender identity.            Thank you!     Thank you for choosing [facility]  for your care. Our goal is always to provide you with excellent care. Hearing back from our patients is one way we can continue to improve our services. Please take a few  minutes to complete the written survey that you may receive in the mail after your visit with us. Thank you!             Your Updated Medication List - Protect others around you: Learn how to safely use, store and throw away your medicines at www.disposemymeds.org.          This list is accurate as of: 8/2/17  4:08 PM.  Always use your most recent med list.                   Brand Name Dispense Instructions for use Diagnosis    cholecalciferol 1000 UNIT tablet    vitamin D          * order for DME     1 Device    FP walker med    Closed nondisplaced fracture of phalanx of toe of right foot with delayed healing, unspecified toe, subsequent encounter       * order for DME     1 each    Quickfit wrist brace, left    Carpal tunnel syndrome of left wrist       triamcinolone 0.1 % cream    KENALOG    15 g    Apply sparingly to affected area three times daily for 14 days.    Rash       * Notice:  This list has 2 medication(s) that are the same as other medications prescribed for you. Read the directions carefully, and ask your doctor or other care provider to review them with you.

## 2017-08-04 ENCOUNTER — THERAPY VISIT (OUTPATIENT)
Dept: PHYSICAL THERAPY | Facility: CLINIC | Age: 52
End: 2017-08-04
Payer: COMMERCIAL

## 2017-08-04 DIAGNOSIS — M54.12 CERVICAL RADICULOPATHY: ICD-10-CM

## 2017-08-04 PROCEDURE — 97112 NEUROMUSCULAR REEDUCATION: CPT | Mod: GP | Performed by: PHYSICAL THERAPIST

## 2017-08-04 PROCEDURE — 97110 THERAPEUTIC EXERCISES: CPT | Mod: GP | Performed by: PHYSICAL THERAPIST

## 2017-08-04 PROCEDURE — 97140 MANUAL THERAPY 1/> REGIONS: CPT | Mod: GP | Performed by: PHYSICAL THERAPIST

## 2017-08-11 ENCOUNTER — THERAPY VISIT (OUTPATIENT)
Dept: PHYSICAL THERAPY | Facility: CLINIC | Age: 52
End: 2017-08-11
Payer: COMMERCIAL

## 2017-08-11 ENCOUNTER — TELEPHONE (OUTPATIENT)
Dept: FAMILY MEDICINE | Facility: CLINIC | Age: 52
End: 2017-08-11

## 2017-08-11 DIAGNOSIS — M54.12 CERVICAL RADICULOPATHY: ICD-10-CM

## 2017-08-11 DIAGNOSIS — G56.02 CARPAL TUNNEL SYNDROME OF LEFT WRIST: ICD-10-CM

## 2017-08-11 PROCEDURE — 97112 NEUROMUSCULAR REEDUCATION: CPT | Mod: GP | Performed by: PHYSICAL THERAPIST

## 2017-08-11 PROCEDURE — 97110 THERAPEUTIC EXERCISES: CPT | Mod: GP | Performed by: PHYSICAL THERAPIST

## 2017-08-11 PROCEDURE — 97140 MANUAL THERAPY 1/> REGIONS: CPT | Mod: GP | Performed by: PHYSICAL THERAPIST

## 2017-08-11 NOTE — TELEPHONE ENCOUNTER
PT stopped in clinic asking for a replacement brace, she lost her original one.  Please call her at [phone].

## 2017-08-18 ENCOUNTER — THERAPY VISIT (OUTPATIENT)
Dept: PHYSICAL THERAPY | Facility: CLINIC | Age: 52
End: 2017-08-18
Payer: COMMERCIAL

## 2017-08-18 DIAGNOSIS — M54.12 CERVICAL RADICULOPATHY: ICD-10-CM

## 2017-08-18 PROCEDURE — 97140 MANUAL THERAPY 1/> REGIONS: CPT | Mod: GP | Performed by: PHYSICAL THERAPIST

## 2017-08-18 PROCEDURE — 97110 THERAPEUTIC EXERCISES: CPT | Mod: GP | Performed by: PHYSICAL THERAPIST

## 2017-08-18 PROCEDURE — 97112 NEUROMUSCULAR REEDUCATION: CPT | Mod: GP | Performed by: PHYSICAL THERAPIST

## 2017-08-22 ENCOUNTER — OFFICE VISIT (OUTPATIENT)
Dept: FAMILY MEDICINE | Facility: CLINIC | Age: 52
End: 2017-08-22
Payer: COMMERCIAL

## 2017-08-22 VITALS
BODY MASS INDEX: 23.46 KG/M2 | TEMPERATURE: 98.2 F | RESPIRATION RATE: 14 BRPM | WEIGHT: 141 LBS | HEART RATE: 86 BPM | SYSTOLIC BLOOD PRESSURE: 121 MMHG | DIASTOLIC BLOOD PRESSURE: 82 MMHG

## 2017-08-22 DIAGNOSIS — L20.9 ATOPIC DERMATITIS, UNSPECIFIED TYPE: ICD-10-CM

## 2017-08-22 DIAGNOSIS — L91.8 ACROCHORDON: Primary | ICD-10-CM

## 2017-08-22 DIAGNOSIS — M54.12 CERVICAL RADICULOPATHY: ICD-10-CM

## 2017-08-22 PROCEDURE — 99213 OFFICE O/P EST LOW 20 MIN: CPT | Mod: 25 | Performed by: FAMILY MEDICINE

## 2017-08-22 PROCEDURE — 11200 RMVL SKIN TAGS UP TO&INC 15: CPT | Performed by: FAMILY MEDICINE

## 2017-08-22 NOTE — PROGRESS NOTES
SUBJECTIVE:   Naa Paredes is a 51 year old female who presents to clinic today for the following health issues:    Acrochordon  (primary encounter diagnosis) she has had satisfying response to cryotherapy and is found a few more about her anterior chest  Atopic dermatitis, unspecified type . With steroid and the nape of her neck, this has resolved  Concern - Follow up Rash on neck  Onset: unknown    Description:   Itchy Rash    Intensity: mild    Progression of Symptoms:  improving    Accompanying Signs & Symptoms:  none    Previous history of similar problem:   none    Precipitating factors:   Worsened by: none    Alleviating factors:  Improved by: none    Therapies Tried and outcome: Kenalog cream, helped      Cervical radiculopathy: The patient requests a referral to an acupuncturist.    Problem list and histories reviewed & adjusted, as indicated.  Additional history: as documented    Reviewed and updated as needed this visit by clinical staffTobacco  Allergies  Med Hx  Surg Hx  Fam Hx  Soc Hx       Past Medical History:   Diagnosis Date     Ankle sprain and strain 5/2/2013     Anxiety 3/26/2015     Black stools 11/25/2015     CARDIOVASCULAR SCREENING; LDL GOAL LESS THAN 160 10/31/2010     Cervicalgia 12/7/2011     Colon adenoma 4/23/2015    Patient denies it. Renee Tiwari RN, 6/9/15.     Conversion disorder with mixed symptoms 6/14/2016     Depression with suicidal ideation 6/19/2015     Depressive disorder      Elevated blood pressure reading without diagnosis of hypertension 11/25/2015     Elevated LFT's 6/4/2010     Fatigue due to depression 11/24/2015     H/O: alcohol abuse 6/4/2010     IBS (irritable bowel syndrome)      Knee pain 10/19/2010     Low back pain 10/19/2010     Lumbago 12/7/2011     Major depressive disorder, recurrent, severe without psychotic features (H) 5/13/2016     Major depressive disorder, single episode, in partial remission (H) 11/24/2015     Migraine      Neck pain  10/19/2010     Nephrolithiasis     calcium oxalate     Pain in thoracic spine 1/2/2013     Perimenopause 3/26/2015     Seasonal allergic rhinitis 10/20/2015     Thoracic outlet syndrome 7/20/2017     Vitamin D deficiency disease 6/8/2010       Past Surgical History:   Procedure Laterality Date     COLONOSCOPY N/A 6/9/2015    Procedure: COLONOSCOPY;  Surgeon: Steve Choi MD;  Location: [facility]     LITHOTRIPSY         Family History   Problem Relation Age of Onset     HEART DISEASE Mother      Cancer - colorectal Father      at age 65     MENTAL ILLNESS Paternal Uncle      Breast Cancer No family hx of      Ovarian Cancer No family hx of        Social History   Substance Use Topics     Smoking status: Never Smoker     Smokeless tobacco: Never Used     Alcohol use Yes      Comment: occ wine       Reviewed and updated as needed this visit by Provider         ROS: Great mood    This document serves as a record of the services and decisions personally performed and made by Sher Duran MD. It was created on his behalf by Nimo Waite, a trained medical scribe.  The creation of this document is based on the scribe's personal observations and the provider's statements to the medical scribe.  Nimo Waite, August 22, 2017 2:32 PM    OBJECTIVE:     /82 (BP Location: Left arm, Patient Position: Chair, Cuff Size: Adult Regular)  Pulse 86  Temp 98.2  F (36.8  C) (Oral)  Resp 14  Wt 64 kg (141 lb)  LMP 08/20/2015  BMI 23.46 kg/m2  Body mass index is 23.46 kg/(m^2).    Integument she has a few small typical acrochordomata on her anterior chest. Numerous previous areas are hyperpigmented and fading    Cryotherapy applied  -to typical skin tags  Area of dermatitis below the nuchal ridge left is now normal    ASSESSMENT/PLAN:     ASSESSMENT / PLAN:  (L91.8) Acrochordon  (primary encounter diagnosis)  Comment: cryotherapy applied  Plan: DESTRUCT BENIGN LESION, UP TO 14            (M54.12) Cervical  radiculopathy  Comment: discussed referral  Plan: order for DME is used to generate a referral to her acupuncturist of choice            (L20.9) Atopic dermatitis, unspecified type  Comment: Resolved. I instruct patient to stop her steroid, although I am unable to determine if she is still using it    RTC flu shot in the fall        The information in this document, created by the medical scribe for me, accurately reflects the services I personally performed and the decisions made by me. I have reviewed and approved this document for accuracy prior to leaving the patient care area.  Sher Duran MD August 22, 2017 2:32 PM    Sher Duran MD  [facility]

## 2017-08-22 NOTE — NURSING NOTE
"Chief Complaint   Patient presents with     Derm Problem     follow up rash on neck, last visit 7/20/2017       Initial /82 (BP Location: Left arm, Patient Position: Chair, Cuff Size: Adult Regular)  Pulse 86  Temp 98.2  F (36.8  C) (Oral)  Resp 14  Wt 141 lb (64 kg)  LMP 08/20/2015  BMI 23.46 kg/m2 Estimated body mass index is 23.46 kg/(m^2) as calculated from the following:    Height as of 8/2/17: 5' 5\" (1.651 m).    Weight as of this encounter: 141 lb (64 kg).  Medication Reconciliation: complete   Antoni Chappell MA      "

## 2017-08-22 NOTE — MR AVS SNAPSHOT
"              After Visit Summary   8/22/2017    Naa Paredes    MRN: [id number]           Patient Information     Date Of Birth          1965        Visit Information        Provider Department      8/22/2017 1:45 PM Sher Duran MD; [place] [facility]        Today's Diagnoses     Acrochordon    -  1    Atopic dermatitis, unspecified type        Cervical radiculopathy           Follow-ups after your visit        Your next 10 appointments already scheduled     Aug 25, 2017  9:00 AM CDT   [facility] Spine with Joshua Wilkerson, PT   [place] [facility] ([facility])    [address]  [facility] [address] [phone]              Who to contact     If you have questions or need follow up information about today's clinic visit or your schedule please contact [facility] directly at [phone].  Normal or non-critical lab and imaging results will be communicated to you by MyChart, letter or phone within 4 business days after the clinic has received the results. If you do not hear from us within 7 days, please contact the clinic through MyChart or phone. If you have a critical or abnormal lab result, we will notify you by phone as soon as possible.  Submit refill requests through [employer] or call your pharmacy and they will forward the refill request to us. Please allow 3 business days for your refill to be completed.          Additional Information About Your Visit        [facility] Information     [employer] lets you send messages to your doctor, view your test results, renew your prescriptions, schedule appointments and more. To sign up, go to [email or web address] . Click on \"Log in\" on the left side of the screen, which will take you to the Welcome page. Then click on \"Sign up Now\" on the right side of the page.     You will be asked to enter the access code listed below, as well as some " personal information. Please follow the directions to create your username and password.     Your access code is: [email or web address]  Expires: 10/9/2017 10:15 AM     Your access code will  in 90 days. If you need help or a new code, please call your [place] clinic or [phone].        Care EveryWhere ID     This is your Care EveryWhere ID. This could be used by other organizations to access your [place] medical records  [id number]        Your Vitals Were     Pulse Temperature Respirations Last Period BMI (Body Mass Index)       86 98.2  F (36.8  C) (Oral) 14 2015 23.46 kg/m2        Blood Pressure from Last 3 Encounters:   17 121/82   17 128/88   17 117/78    Weight from Last 3 Encounters:   17 141 lb (64 kg)   17 143 lb 12.8 oz (65.2 kg)   17 140 lb 8 oz (63.7 kg)              We Performed the Following     DESTRUCT BENIGN LESION, UP TO 14          Today's Medication Changes          These changes are accurate as of: 17  3:12 PM.  If you have any questions, ask your nurse or doctor.               These medicines have changed or have updated prescriptions.        Dose/Directions    * order for DME   This may have changed:  Another medication with the same name was added. Make sure you understand how and when to take each.   Used for:  Closed nondisplaced fracture of phalanx of toe of right foot with delayed healing, unspecified toe, subsequent encounter   Changed by:  Sher Duran MD        FP walker med   Quantity:  1 Device   Refills:  0       * order for DME   This may have changed:  Another medication with the same name was added. Make sure you understand how and when to take each.   Used for:  Carpal tunnel syndrome of left wrist   Changed by:  Clementina Devlin, DO        Quickfit wrist brace, left   Quantity:  1 each   Refills:  0       * order for DME   This may have changed:  You were already taking a medication with the same name, and this  prescription was added. Make sure you understand how and when to take each.   Used for:  Cervical radiculopathy   Changed by:  Sher Duran MD        Referral [facility] [phone]   Quantity:  1 each   Refills:  0       * Notice:  This list has 3 medication(s) that are the same as other medications prescribed for you. Read the directions carefully, and ask your doctor or other care provider to review them with you.         Where to get your medicines      Some of these will need a paper prescription and others can be bought over the counter.  Ask your nurse if you have questions.     Bring a paper prescription for each of these medications     order for DME                Primary Care Provider Office Phone # Fax #    Sher Duran [phone] [address] [facility] [address]        Equal Access to Services     [facility]: Hadii job osman hadasho Soomaali, waaxda luqadaha, qaybta kaalmada adeegyada, waxbill lorenzo . So [facility] [phone].    ATENCIÓN: Si habla español, tiene a choi disposición servicios gratuitos de asistencia lingüística. Llame al [phone].    We comply with applicable federal civil rights laws and [place] laws. We do not discriminate on the basis of race, color, national origin, age, disability sex, sexual orientation or gender identity.            Thank you!     Thank you for choosing [facility]  for your care. Our goal is always to provide you with excellent care. Hearing back from our patients is one way we can continue to improve our services. Please take a few minutes to complete the written survey that you may receive in the mail after your visit with us. Thank you!             Your Updated Medication List - Protect others around you: Learn how to safely use, store and throw away your medicines at www.disposemymeds.org.          This list is accurate as of: 8/22/17  3:12 PM.  Always use your  most recent med list.                   Brand Name Dispense Instructions for use Diagnosis    cholecalciferol 1000 UNIT tablet    vitamin D          * order for DME     1 Device    FP walker med    Closed nondisplaced fracture of phalanx of toe of right foot with delayed healing, unspecified toe, subsequent encounter       * order for DME     1 each    Quickfit wrist brace, left    Carpal tunnel syndrome of left wrist       * order for DME     1 each    Referral [facility] [phone]    Cervical radiculopathy       triamcinolone 0.1 % cream    KENALOG    15 g    Apply sparingly to affected area three times daily for 14 days.    Rash       * Notice:  This list has 3 medication(s) that are the same as other medications prescribed for you. Read the directions carefully, and ask your doctor or other care provider to review them with you.

## 2017-08-28 ENCOUNTER — THERAPY VISIT (OUTPATIENT)
Dept: PHYSICAL THERAPY | Facility: CLINIC | Age: 52
End: 2017-08-28
Payer: COMMERCIAL

## 2017-08-28 DIAGNOSIS — M54.12 CERVICAL RADICULOPATHY: ICD-10-CM

## 2017-08-28 PROCEDURE — 97140 MANUAL THERAPY 1/> REGIONS: CPT | Mod: GP | Performed by: PHYSICAL THERAPIST

## 2017-08-28 PROCEDURE — 97112 NEUROMUSCULAR REEDUCATION: CPT | Mod: GP | Performed by: PHYSICAL THERAPIST

## 2017-08-28 PROCEDURE — 97110 THERAPEUTIC EXERCISES: CPT | Mod: GP | Performed by: PHYSICAL THERAPIST

## 2017-08-28 NOTE — MR AVS SNAPSHOT
"              After Visit Summary   8/28/2017    Naa Paredes    MRN: [id number]           Patient Information     Date Of Birth          1965        Visit Information        Provider Department      8/28/2017 1:00 PM Yoni Campbell, PT [facility] Physical Therapy        Today's Diagnoses     Cervical radiculopathy           Follow-ups after your visit        Your next 10 appointments already scheduled     Sep 08, 2017 10:50 AM CDT   [facility] Spine with Yoni Campbell PT   [facility] ([facility])    [address]  [facility] [id number] [phone]              Who to contact     If you have questions or need follow up information about today's clinic visit or your schedule please contact [facility] PHYSICAL THERAPY directly at [phone].  Normal or non-critical lab and imaging results will be communicated to you by MyChart, letter or phone within 4 business days after the clinic has received the results. If you do not hear from us within 7 days, please contact the clinic through MyChart or phone. If you have a critical or abnormal lab result, we will notify you by phone as soon as possible.  Submit refill requests through Birdi or call your pharmacy and they will forward the refill request to us. Please allow 3 business days for your refill to be completed.          Additional Information About Your Visit        MyChart Information     Birdi lets you send messages to your doctor, view your test results, renew your prescriptions, schedule appointments and more. To sign up, go to [email or web address] . Click on \"Log in\" on the left side of the screen, which will take you to the Welcome page. Then click on \"Sign up Now\" on the right side of the page.     You will be asked to enter the access code listed below, as well as some personal information. Please " follow the directions to create your username and password.     Your access code is: [email or web address]  Expires: 10/9/2017 10:15 AM     Your access code will  in 90 days. If you need help or a new code, please call your [place] clinic or [phone].        Care EveryWhere ID     This is your Care EveryWhere ID. This could be used by other organizations to access your [place] medical records  [id number]        Your Vitals Were     Last Period                   2015            Blood Pressure from Last 3 Encounters:   17 121/82   17 128/88   17 117/78    Weight from Last 3 Encounters:   17 64 kg (141 lb)   17 65.2 kg (143 lb 12.8 oz)   17 63.7 kg (140 lb 8 oz)              Today, you had the following     No orders found for display       Primary Care Provider Office Phone # Fax #    Sher Duran [phone] [address] [facility] [address]        Equal Access to Services     [facility]: Hadii aad ku hadasho Soemery, waaxda luqadaha, qaybta kaalmada mona, camelia lorenzo . So [facility] [phone].    ATENCIÓN: Si habla español, tiene a choi disposición servicios gratuitos de asistencia lingüística. [facility] [phone].    We comply with applicable federal civil rights laws and [place] laws. We do not discriminate on the basis of race, color, national origin, age, disability sex, sexual orientation or gender identity.            Thank you!     Thank you for choosing [facility] PHYSICAL THERAP[facility]  for your care. Our goal is always to provide you with excellent care. Hearing back from our patients is one way we can continue to improve our services. Please take a few minutes to complete the written survey that you may receive in the mail after your visit with us. Thank you!             Your Updated Medication List - Protect others around you: Learn how to safely use, store and  throw away your medicines at www.disposemymeds.org.          This list is accurate as of: 8/28/17  1:32 PM.  Always use your most recent med list.                   Brand Name Dispense Instructions for use Diagnosis    cholecalciferol 1000 UNIT tablet    vitamin D          * order for DME     1 Device    FP walker med    Closed nondisplaced fracture of phalanx of toe of right foot with delayed healing, unspecified toe, subsequent encounter       * order for DME     1 each    Quickfit wrist brace, left    Carpal tunnel syndrome of left wrist       * order for DME     1 each    Referral [facility] [phone]    Cervical radiculopathy       triamcinolone 0.1 % cream    KENALOG    15 g    Apply sparingly to affected area three times daily for 14 days.    Rash       * Notice:  This list has 3 medication(s) that are the same as other medications prescribed for you. Read the directions carefully, and ask your doctor or other care provider to review them with you.

## 2017-09-08 ENCOUNTER — THERAPY VISIT (OUTPATIENT)
Dept: PHYSICAL THERAPY | Facility: CLINIC | Age: 52
End: 2017-09-08
Payer: COMMERCIAL

## 2017-09-08 DIAGNOSIS — M54.12 CERVICAL RADICULOPATHY: ICD-10-CM

## 2017-09-08 PROCEDURE — 97140 MANUAL THERAPY 1/> REGIONS: CPT | Mod: GP | Performed by: PHYSICAL THERAPIST

## 2017-09-08 PROCEDURE — 97112 NEUROMUSCULAR REEDUCATION: CPT | Mod: GP | Performed by: PHYSICAL THERAPIST

## 2017-09-08 PROCEDURE — 97110 THERAPEUTIC EXERCISES: CPT | Mod: GP | Performed by: PHYSICAL THERAPIST

## 2017-09-22 ENCOUNTER — THERAPY VISIT (OUTPATIENT)
Dept: PHYSICAL THERAPY | Facility: CLINIC | Age: 52
End: 2017-09-22
Payer: COMMERCIAL

## 2017-09-22 DIAGNOSIS — M54.12 CERVICAL RADICULOPATHY: ICD-10-CM

## 2017-09-22 PROCEDURE — 97110 THERAPEUTIC EXERCISES: CPT | Mod: GP | Performed by: PHYSICAL THERAPIST

## 2017-09-22 PROCEDURE — 97140 MANUAL THERAPY 1/> REGIONS: CPT | Mod: GP | Performed by: PHYSICAL THERAPIST

## 2017-09-22 PROCEDURE — 97112 NEUROMUSCULAR REEDUCATION: CPT | Mod: GP | Performed by: PHYSICAL THERAPIST

## 2017-10-06 ENCOUNTER — THERAPY VISIT (OUTPATIENT)
Dept: PHYSICAL THERAPY | Facility: CLINIC | Age: 52
End: 2017-10-06
Payer: COMMERCIAL

## 2017-10-06 DIAGNOSIS — M54.12 CERVICAL RADICULOPATHY: ICD-10-CM

## 2017-10-06 PROCEDURE — 97112 NEUROMUSCULAR REEDUCATION: CPT | Mod: GP | Performed by: PHYSICAL THERAPIST

## 2017-10-06 PROCEDURE — 97140 MANUAL THERAPY 1/> REGIONS: CPT | Mod: GP | Performed by: PHYSICAL THERAPIST

## 2017-10-06 PROCEDURE — 97110 THERAPEUTIC EXERCISES: CPT | Mod: GP | Performed by: PHYSICAL THERAPIST

## 2017-10-06 NOTE — MR AVS SNAPSHOT
"              After Visit Summary   10/6/2017    Naa Paredes    MRN: [id number]           Patient Information     Date Of Birth          1965        Visit Information        Provider Department      10/6/2017 10:50 AM Yoni Campbell PT [facility] Physical Therapy        Today's Diagnoses     Cervical radiculopathy           Follow-ups after your visit        Your next 10 appointments already scheduled     Oct 10, 2017 10:45 AM CDT   SHORT with Sher Duran MD   [facility]    [address] [place] [id number] [phone]              Who to contact     If you have questions or need follow up information about today's clinic visit or your schedule please contact [facility] ATHLET[facility] PHYSICAL THERAPY directly at [phone].  Normal or non-critical lab and imaging results will be communicated to you by MyChart, letter or phone within 4 business days after the clinic has received the results. If you do not hear from us within 7 days, please contact the clinic through MyChart or phone. If you have a critical or abnormal lab result, we will notify you by phone as soon as possible.  Submit refill requests through Snaapiq or call your pharmacy and they will forward the refill request to us. Please allow 3 business days for your refill to be completed.          Additional Information About Your Visit        MyChart Information     Snaapiq lets you send messages to your doctor, view your test results, renew your prescriptions, schedule appointments and more. To sign up, go to [email or web address] . Click on \"Log in\" on the left side of the screen, which will take you to the Welcome page. Then click on \"Sign up Now\" on the right side of the page.     You will be asked to enter the access code listed below, as well as some personal information. Please follow the directions to " create your username and password.     Your access code is: [email or web address]  Expires: 10/9/2017 10:15 AM     Your access code will  in 90 days. If you need help or a new code, please call your [place] clinic or [phone].        Care EveryWhere ID     This is your Care EveryWhere ID. This could be used by other organizations to access your [place] medical records  [id number]        Your Vitals Were     Last Period                   2015            Blood Pressure from Last 3 Encounters:   17 121/82   17 128/88   17 117/78    Weight from Last 3 Encounters:   17 64 kg (141 lb)   17 65.2 kg (143 lb 12.8 oz)   17 63.7 kg (140 lb 8 oz)              We Performed the Following     MANUAL THER TECH,1+REGIONS,EA 15 MIN     NEUROMUSCULAR RE-EDUCATION     THERAPEUTIC EXERCISES        Primary Care Provider Office Phone # Fax #    Sher Duran [phone] [address] [facility] [address]        Equal Access to Services     [facility] : Hadii aad ku hadasho Soomaali, waaxda luqadaha, qaybta kaalmada adeegyada, camelia lorenzo . So [facility] [phone].    ATENCIÓN: Si habla español, tiene a choi disposición servicios gratuitos de asistencia lingüística. [facility] [phone].    We comply with applicable federal civil rights laws and [place] laws. We do not discriminate on the basis of race, color, national origin, age, disability, sex, sexual orientation, or gender identity.            Thank you!     Thank you for choosing [facility] PHYSICAL THERAPY  for your care. Our goal is always to provide you with excellent care. Hearing back from our patients is one way we can continue to improve our services. Please take a few minutes to complete the written survey that you may receive in the mail after your visit with us. Thank you!             Your Updated Medication List - Protect others around  you: Learn how to safely use, store and throw away your medicines at www.disposemymeds.org.          This list is accurate as of: 10/6/17 11:32 AM.  Always use your most recent med list.                   Brand Name Dispense Instructions for use Diagnosis    cholecalciferol 1000 UNIT tablet    vitamin D          * order for DME     1 Device    FP walker med    Closed nondisplaced fracture of phalanx of toe of right foot with delayed healing, unspecified toe, subsequent encounter       * order for DME     1 each    Quickfit wrist brace, left    Carpal tunnel syndrome of left wrist       * order for DME     1 each    Referral [facility] [phone]    Cervical radiculopathy       triamcinolone 0.1 % cream    KENALOG    15 g    Apply sparingly to affected area three times daily for 14 days.    Rash       * Notice:  This list has 3 medication(s) that are the same as other medications prescribed for you. Read the directions carefully, and ask your doctor or other care provider to review them with you.

## 2017-10-10 ENCOUNTER — TELEPHONE (OUTPATIENT)
Dept: NURSING | Facility: CLINIC | Age: 52
End: 2017-10-10

## 2017-10-10 ENCOUNTER — OFFICE VISIT (OUTPATIENT)
Dept: FAMILY MEDICINE | Facility: CLINIC | Age: 52
End: 2017-10-10
Payer: COMMERCIAL

## 2017-10-10 VITALS
BODY MASS INDEX: 23.3 KG/M2 | TEMPERATURE: 97.7 F | SYSTOLIC BLOOD PRESSURE: 130 MMHG | WEIGHT: 140 LBS | RESPIRATION RATE: 16 BRPM | DIASTOLIC BLOOD PRESSURE: 84 MMHG | HEART RATE: 80 BPM

## 2017-10-10 DIAGNOSIS — R20.1 HYPOESTHESIA: Primary | ICD-10-CM

## 2017-10-10 PROCEDURE — 99213 OFFICE O/P EST LOW 20 MIN: CPT | Performed by: FAMILY MEDICINE

## 2017-10-10 NOTE — MR AVS SNAPSHOT
After Visit Summary   10/10/2017    Naa Paredes    MRN: [id number]           Patient Information     Date Of Birth          1965        Visit Information        Provider Department      10/10/2017 10:30 AM Sher Duran MD; [place] [employer]        Today's Diagnoses     Hypoesthesia    -  1       Follow-ups after your visit        Additional Services     NEUROLOGY ADULT REFERRAL       Your provider has referred you for the following:   EMG at [facility]: [facility] Neurology [facility] [phone]   [email or web address]    Please be aware that coverage of these services is subject to the terms and limitations of your health insurance plan.  Call member services at your health plan with any benefit or coverage questions.      Please bring the following with you to your appointment:    (1) Any X-Rays, CTs or MRIs which have been performed.  Contact the facility where they were done to arrange for  prior to your scheduled appointment.    (2) List of current medications  (3) This referral request   (4) Any documents/labs given to you for this referral                  Who to contact     If you have questions or need follow up information about today's clinic visit or your schedule please contact [facility] directly at [phone].  Normal or non-critical lab and imaging results will be communicated to you by MyChart, letter or phone within 4 business days after the clinic has received the results. If you do not hear from us within 7 days, please contact the clinic through MyChart or phone. If you have a critical or abnormal lab result, we will notify you by phone as soon as possible.  Submit refill requests through Pure Storage or call your pharmacy and they will forward the refill request to us. Please allow 3 business days for your refill to be completed.          Additional Information About  "Your Visit        EduSourcedhart Information     MindOps lets you send messages to your doctor, view your test results, renew your prescriptions, schedule appointments and more. To sign up, go to [email or web address] . Click on \"Log in\" on the left side of the screen, which will take you to the Welcome page. Then click on \"Sign up Now\" on the right side of the page.     You will be asked to enter the access code listed below, as well as some personal information. Please follow the directions to create your username and password.     Your access code is: [email or web address]  Expires: 2018 11:48 AM     Your access code will  in 90 days. If you need help or a new code, please call your [place] clinic or [phone].        Care EveryWhere ID     This is your Care EveryWhere ID. This could be used by other organizations to access your [place] medical records  [id number]        Your Vitals Were     Pulse Temperature Respirations Last Period BMI (Body Mass Index)       80 97.7  F (36.5  C) (Oral) 16 2015 23.3 kg/m2        Blood Pressure from Last 3 Encounters:   10/10/17 130/84   17 121/82   17 128/88    Weight from Last 3 Encounters:   10/10/17 140 lb (63.5 kg)   17 141 lb (64 kg)   17 143 lb 12.8 oz (65.2 kg)              We Performed the Following     NEUROLOGY ADULT REFERRAL        Primary Care Provider Office Phone # Fax #    Sher Duran [phone] [address] [facility] [address]        Equal Access to Services     XENIA MCCORD : Hadmonica Stearns, cory chan, camelia dougherty. So [facility] [phone].    ATENCIÓN: Si habla español, tiene a choi disposición servicios gratuitos de asistencia lingüística. Llame al [phone].    We comply with applicable federal civil rights laws and [place] laws. We do not discriminate on the basis of race, color, national origin, age, disability, " sex, sexual orientation, or gender identity.            Thank you!     Thank you for choosing [facility]  for your care. Our goal is always to provide you with excellent care. Hearing back from our patients is one way we can continue to improve our services. Please take a few minutes to complete the written survey that you may receive in the mail after your visit with us. Thank you!             Your Updated Medication List - Protect others around you: Learn how to safely use, store and throw away your medicines at www.disposemymeds.org.          This list is accurate as of: 10/10/17  1:54 PM.  Always use your most recent med list.                   Brand Name Dispense Instructions for use Diagnosis    nabumetone 750 MG tablet    RELAFEN          * order for DME     1 each    Quickfit wrist brace, left    Carpal tunnel syndrome of left wrist       * order for DME     1 each    Referral [facility] [phone]    Cervical radiculopathy       triamcinolone 0.1 % cream    KENALOG    15 g    Apply sparingly to affected area three times daily for 14 days.    Rash       * Notice:  This list has 2 medication(s) that are the same as other medications prescribed for you. Read the directions carefully, and ask your doctor or other care provider to review them with you.

## 2017-10-10 NOTE — TELEPHONE ENCOUNTER
[place] [facility] calls, received referral, does not indicate which arm to do EMG, reviewed notes, informs pt had issues with both, routed to , can you redo referral? They will watch for new referral, if ?'s call [phone] ex [phone]    Mali Nicolas RN, BSN  Message handled by Nurse Triage.

## 2017-10-10 NOTE — NURSING NOTE
"Chief Complaint   Patient presents with     Numbness       Initial /84 (BP Location: Right arm, Patient Position: Chair, Cuff Size: Adult Regular)  Pulse 80  Temp 97.7  F (36.5  C) (Oral)  Resp 16  Wt 140 lb (63.5 kg)  LMP 08/20/2015  BMI 23.3 kg/m2 Estimated body mass index is 23.3 kg/(m^2) as calculated from the following:    Height as of 8/2/17: 5' 5\" (1.651 m).    Weight as of this encounter: 140 lb (63.5 kg).  Medication Reconciliation: complete    "

## 2017-10-10 NOTE — PROGRESS NOTES
SUBJECTIVE:   Naa Paredes is a 51 year old female who presents to clinic today for the following health issues:  Hypoesthesia  (primary encounter diagnosis)  Numbness    Onset: 2 wks    Description:   Location: bilateral hands - Thumb and index finger of left hand , and entire right hand    Intensity: moderate    Progression of Symptoms: intermittent, somewhat improved    Accompanying Signs & Symptoms:  Other symptoms: none    History:   Previous similar pain: no       Precipitating factors:   Trauma or overuse: YES- over use    Alleviating factors:  Improved by: nothing    Therapies Tried and outcome: none    Problem list and histories reviewed & adjusted, as indicated.  Additional history:     Reviewed and updated as needed this visit by clinical staff  Tobacco  Allergies  Med Hx  Surg Hx  Fam Hx  Soc Hx       Past Medical History:   Diagnosis Date     Ankle sprain and strain 5/2/2013     Anxiety 3/26/2015     Black stools 11/25/2015     CARDIOVASCULAR SCREENING; LDL GOAL LESS THAN 160 10/31/2010     Cervicalgia 12/7/2011     Colon adenoma 4/23/2015    Patient denies it. Renee Tiwari RN, 6/9/15.     Conversion disorder with mixed symptoms 6/14/2016     Depression with suicidal ideation 6/19/2015     Depressive disorder      Elevated blood pressure reading without diagnosis of hypertension 11/25/2015     Elevated LFT's 6/4/2010     Fatigue due to depression 11/24/2015     H/O: alcohol abuse 6/4/2010     IBS (irritable bowel syndrome)      Knee pain 10/19/2010     Low back pain 10/19/2010     Lumbago 12/7/2011     Major depressive disorder, recurrent, severe without psychotic features (H) 5/13/2016     Major depressive disorder, single episode, in partial remission (H) 11/24/2015     Migraine      Neck pain 10/19/2010     Nephrolithiasis     calcium oxalate     Pain in thoracic spine 1/2/2013     Perimenopause 3/26/2015     Seasonal allergic rhinitis 10/20/2015     Thoracic outlet syndrome 7/20/2017      Vitamin D deficiency disease 6/8/2010       Past Surgical History:   Procedure Laterality Date     COLONOSCOPY N/A 6/9/2015    Procedure: COLONOSCOPY;  Surgeon: Steve Choi MD;  Location:  GI     LITHOTRIPSY         Family History   Problem Relation Age of Onset     HEART DISEASE Mother      Cancer - colorectal Father      at age 65     MENTAL ILLNESS Paternal Uncle      Breast Cancer No family hx of      Ovarian Cancer No family hx of        Social History   Substance Use Topics     Smoking status: Never Smoker     Smokeless tobacco: Never Used     Alcohol use Yes      Comment: occ wine       Reviewed and updated as needed this visit by Provider     ROS no upper arm symptoms, symptoms intermittent, no systemic symptoms      This document serves as a record of the services and decisions personally performed and made by Sher Duran MD. It was created on his behalf by Nimo Waite, a trained medical scribe.  The creation of this document is based on the scribe's personal observations and the provider's statements to the medical scribe.  Nimo Waite, October 10, 2017 11:30 AM    OBJECTIVE:     /84 (BP Location: Right arm, Patient Position: Chair, Cuff Size: Adult Regular)  Pulse 80  Temp 97.7  F (36.5  C) (Oral)  Resp 16  Wt 63.5 kg (140 lb)  LMP 08/20/2015  BMI 23.3 kg/m2  Body mass index is 23.3 kg/(m^2).          ASSESSMENT/PLAN:   ASSESSMENT / PLAN:  (R20.1) Hypoesthesia  (primary encounter diagnosis)  Comment: Referred  Plan: NEUROLOGY ADULT REFERRAL    RTC after nerve conduction study     The information in this document, created by the medical scribe for me, accurately reflects the services I personally performed and the decisions made by me. I have reviewed and approved this document for accuracy prior to leaving the patient care area.  Sher Duran MD October 10, 2017 11:30 AM    Sher Duran MD  [facility]

## 2017-10-30 ENCOUNTER — RADIANT APPOINTMENT (OUTPATIENT)
Dept: MAMMOGRAPHY | Facility: CLINIC | Age: 52
End: 2017-10-30
Attending: FAMILY MEDICINE
Payer: COMMERCIAL

## 2017-10-30 DIAGNOSIS — Z12.31 VISIT FOR SCREENING MAMMOGRAM: ICD-10-CM

## 2017-10-30 PROCEDURE — G0202 SCR MAMMO BI INCL CAD: HCPCS | Mod: TC

## 2018-01-03 ENCOUNTER — OFFICE VISIT (OUTPATIENT)
Dept: FAMILY MEDICINE | Facility: CLINIC | Age: 53
End: 2018-01-03
Payer: MEDICAID

## 2018-01-03 VITALS
TEMPERATURE: 97.8 F | SYSTOLIC BLOOD PRESSURE: 117 MMHG | HEIGHT: 64 IN | WEIGHT: 145.1 LBS | HEART RATE: 91 BPM | OXYGEN SATURATION: 95 % | BODY MASS INDEX: 24.77 KG/M2 | RESPIRATION RATE: 12 BRPM | DIASTOLIC BLOOD PRESSURE: 75 MMHG

## 2018-01-03 DIAGNOSIS — S63.259D FINGER DISLOCATION, SUBSEQUENT ENCOUNTER: Primary | ICD-10-CM

## 2018-01-03 PROCEDURE — 99213 OFFICE O/P EST LOW 20 MIN: CPT | Performed by: FAMILY MEDICINE

## 2018-01-03 PROCEDURE — T1013 SIGN LANG/ORAL INTERPRETER: HCPCS | Mod: U3 | Performed by: FAMILY MEDICINE

## 2018-01-03 NOTE — MR AVS SNAPSHOT
"              After Visit Summary   1/3/2018    Naa Paredes    MRN: [id number]           Patient Information     Date Of Birth          1965        Visit Information        Provider Department      1/3/2018 1:45 PM Yoni Kebede MD; [place] [employer]        Today's Diagnoses     Finger dislocation, subsequent encounter    -  1       Follow-ups after your visit        Who to contact     If you have questions or need follow up information about today's clinic visit or your schedule please contact [facility] directly at [phone].  Normal or non-critical lab and imaging results will be communicated to you by Vidatronichart, letter or phone within 4 business days after the clinic has received the results. If you do not hear from us within 7 days, please contact the clinic through Vidatronichart or phone. If you have a critical or abnormal lab result, we will notify you by phone as soon as possible.  Submit refill requests through Aspire or call your pharmacy and they will forward the refill request to us. Please allow 3 business days for your refill to be completed.          Additional Information About Your Visit        MyChart Information     Aspire lets you send messages to your doctor, view your test results, renew your prescriptions, schedule appointments and more. To sign up, go to [email or web address] . Click on \"Log in\" on the left side of the screen, which will take you to the Welcome page. Then click on \"Sign up Now\" on the right side of the page.     You will be asked to enter the access code listed below, as well as some personal information. Please follow the directions to create your username and password.     Your access code is: [email or web address]  Expires: 2018 10:48 AM     Your access code will  in 90 days. If you need help or a new code, please call your [facility] or [phone].        Care EveryWhere ID     This is " "your Care EveryWhere ID. This could be used by other organizations to access your [place] medical records  [id number]        Your Vitals Were     Pulse Temperature Respirations Height Last Period Pulse Oximetry    91 97.8  F (36.6  C) (Oral) 12 5' 4.25\" (1.632 m) 08/20/2015 95%    BMI (Body Mass Index)                   24.71 kg/m2            Blood Pressure from Last 3 Encounters:   01/03/18 117/75   10/10/17 130/84   08/22/17 121/82    Weight from Last 3 Encounters:   01/03/18 145 lb 1.6 oz (65.8 kg)   10/10/17 140 lb (63.5 kg)   08/22/17 141 lb (64 kg)              Today, you had the following     No orders found for display       Primary Care Provider Office Phone # Fax #    Sher Duran [phone] [address] [facility] [address]        Equal Access to Services     [facility]: Hadii aad ku hadasho Soomaali, waaxda luqadaha, qaybta kaalmada adeegyada, waxay idiin hayaan arina lawleraracristobal la'ethan . So [facility] [phone].    ATENCIÓN: Si habla español, tiene a choi disposición servicios gratuitos de asistencia lingüística. Llame al [phone].    We comply with applicable federal civil rights laws and [place] laws. We do not discriminate on the basis of race, color, national origin, age, disability, sex, sexual orientation, or gender identity.            Thank you!     Thank you for choosing [facility]  for your care. Our goal is always to provide you with excellent care. Hearing back from our patients is one way we can continue to improve our services. Please take a few minutes to complete the written survey that you may receive in the mail after your visit with us. Thank you!             Your Updated Medication List - Protect others around you: Learn how to safely use, store and throw away your medicines at www.disposemymeds.org.          This list is accurate as of: 1/3/18 11:59 PM.  Always use your most recent med list.                   Brand Name Dispense " Instructions for use Diagnosis    nabumetone 750 MG tablet    RELAFEN          * order for DME     1 each    Quickfit wrist brace, left    Carpal tunnel syndrome of left wrist       * order for DME     1 each    Referral [facility] Kay [phone]    Cervical radiculopathy       triamcinolone 0.1 % cream    KENALOG    15 g    Apply sparingly to affected area three times daily for 14 days.    Rash       * Notice:  This list has 2 medication(s) that are the same as other medications prescribed for you. Read the directions carefully, and ask your doctor or other care provider to review them with you.

## 2018-01-12 ENCOUNTER — TRANSFERRED RECORDS (OUTPATIENT)
Dept: HEALTH INFORMATION MANAGEMENT | Facility: CLINIC | Age: 53
End: 2018-01-12

## 2018-01-29 ENCOUNTER — RADIANT APPOINTMENT (OUTPATIENT)
Dept: GENERAL RADIOLOGY | Facility: CLINIC | Age: 53
End: 2018-01-29
Attending: FAMILY MEDICINE
Payer: MEDICAID

## 2018-01-29 ENCOUNTER — OFFICE VISIT (OUTPATIENT)
Dept: FAMILY MEDICINE | Facility: CLINIC | Age: 53
End: 2018-01-29
Payer: MEDICAID

## 2018-01-29 VITALS
BODY MASS INDEX: 25.1 KG/M2 | RESPIRATION RATE: 16 BRPM | HEIGHT: 64 IN | DIASTOLIC BLOOD PRESSURE: 79 MMHG | WEIGHT: 147 LBS | SYSTOLIC BLOOD PRESSURE: 129 MMHG | TEMPERATURE: 97.6 F | HEART RATE: 84 BPM

## 2018-01-29 DIAGNOSIS — R20.9 DISTURBANCE OF SKIN SENSATION: ICD-10-CM

## 2018-01-29 DIAGNOSIS — R07.89 CHEST WALL PAIN: ICD-10-CM

## 2018-01-29 DIAGNOSIS — F44.7 CONVERSION DISORDER WITH MIXED SYMPTOMS: ICD-10-CM

## 2018-01-29 DIAGNOSIS — G56.03 BILATERAL CARPAL TUNNEL SYNDROME: ICD-10-CM

## 2018-01-29 DIAGNOSIS — R07.89 CHEST WALL PAIN: Primary | ICD-10-CM

## 2018-01-29 DIAGNOSIS — S63.259D CLOSED DISLOCATION OF FINGER, SUBSEQUENT ENCOUNTER: ICD-10-CM

## 2018-01-29 PROCEDURE — T1013 SIGN LANG/ORAL INTERPRETER: HCPCS | Mod: U3 | Performed by: FAMILY MEDICINE

## 2018-01-29 PROCEDURE — 99214 OFFICE O/P EST MOD 30 MIN: CPT | Performed by: FAMILY MEDICINE

## 2018-01-29 PROCEDURE — 71046 X-RAY EXAM CHEST 2 VIEWS: CPT

## 2018-01-29 ASSESSMENT — ANXIETY QUESTIONNAIRES
7. FEELING AFRAID AS IF SOMETHING AWFUL MIGHT HAPPEN: NOT AT ALL
3. WORRYING TOO MUCH ABOUT DIFFERENT THINGS: NOT AT ALL
GAD7 TOTAL SCORE: 0
5. BEING SO RESTLESS THAT IT IS HARD TO SIT STILL: NOT AT ALL
2. NOT BEING ABLE TO STOP OR CONTROL WORRYING: NOT AT ALL
1. FEELING NERVOUS, ANXIOUS, OR ON EDGE: NOT AT ALL
IF YOU CHECKED OFF ANY PROBLEMS ON THIS QUESTIONNAIRE, HOW DIFFICULT HAVE THESE PROBLEMS MADE IT FOR YOU TO DO YOUR WORK, TAKE CARE OF THINGS AT HOME, OR GET ALONG WITH OTHER PEOPLE: NOT DIFFICULT AT ALL
6. BECOMING EASILY ANNOYED OR IRRITABLE: NOT AT ALL

## 2018-01-29 ASSESSMENT — PATIENT HEALTH QUESTIONNAIRE - PHQ9: 5. POOR APPETITE OR OVEREATING: NOT AT ALL

## 2018-01-29 NOTE — MR AVS SNAPSHOT
"              After Visit Summary   1/29/2018    Naa Paredes    MRN: [id number]           Patient Information     Date Of Birth          1965        Visit Information        Provider Department      1/29/2018 3:30 PM Sher Duran MD; [facility]        Today's Diagnoses     Chest wall pain    -  1    Closed dislocation of finger, subsequent encounter        Disturbance of skin sensation        Bilateral carpal tunnel syndrome        Conversion disorder with mixed symptoms           Follow-ups after your visit        Follow-up notes from your care team     Return in about 6 weeks (around 3/12/2018).      Who to contact     If you have questions or need follow up information about today's clinic visit or your schedule please contact [facility] directly at [phone].  Normal or non-critical lab and imaging results will be communicated to you by MyChart, letter or phone within 4 business days after the clinic has received the results. If you do not hear from us within 7 days, please contact the clinic through MyChart or phone. If you have a critical or abnormal lab result, we will notify you by phone as soon as possible.  Submit refill requests through Tellybean or call your pharmacy and they will forward the refill request to us. Please allow 3 business days for your refill to be completed.          Additional Information About Your Visit        MyChart Information     Tellybean lets you send messages to your doctor, view your test results, renew your prescriptions, schedule appointments and more. To sign up, go to [email or web address] . Click on \"Log in\" on the left side of the screen, which will take you to the Welcome page. Then click on \"Sign up Now\" on the right side of the page.     You will be asked to enter the access code listed below, as well as some personal information. Please follow the directions to create your username and password.   " "  Your access code is: [email or web address]  Expires: 2018  4:34 PM     Your access code will  in 90 days. If you need help or a new code, please call your [facility] or [phone].        Care EveryWhere ID     This is your Care EveryWhere ID. This could be used by other organizations to access your [place] medical records  [id number]        Your Vitals Were     Pulse Temperature Respirations Height Last Period Breastfeeding?    84 97.6  F (36.4  C) (Oral) 16 5' 4.25\" (1.632 m) 2015 No    BMI (Body Mass Index)                   25.04 kg/m2            Blood Pressure from Last 3 Encounters:   18 129/79   18 117/75   10/10/17 130/84    Weight from Last 3 Encounters:   18 147 lb (66.7 kg)   18 145 lb 1.6 oz (65.8 kg)   10/10/17 140 lb (63.5 kg)                 Today's Medication Changes          These changes are accurate as of 18  5:01 PM.  If you have any questions, ask your nurse or doctor.               These medicines have changed or have updated prescriptions.        Dose/Directions    nabumetone 750 MG tablet   Commonly known as:  RELAFEN   This may have changed:    - how much to take  - how to take this  - when to take this   Used for:  Chest wall pain   Changed by:  Sher Duran MD        Dose:  750 mg   Take 1 tablet (750 mg) by mouth 2 times daily   Quantity:  60 tablet   Refills:  0       * order for DME   This may have changed:  Another medication with the same name was added. Make sure you understand how and when to take each.   Used for:  Carpal tunnel syndrome of left wrist   Changed by:  Sher Duran MD        Quickfit wrist brace, left   Quantity:  1 each   Refills:  0       * order for DME   This may have changed:  Another medication with the same name was added. Make sure you understand how and when to take each.   Used for:  Cervical radiculopathy   Changed by:  Sher Duran MD        [facility] [phone] "   Quantity:  1 each   Refills:  0       * order for DME   This may have changed:  You were already taking a medication with the same name, and this prescription was added. Make sure you understand how and when to take each.   Used for:  Bilateral carpal tunnel syndrome   Changed by:  Sher Duran MD        Equipment being ordered: Quick Fit wrist Lt   Quantity:  1 each   Refills:  0       * Notice:  This list has 3 medication(s) that are the same as other medications prescribed for you. Read the directions carefully, and ask your doctor or other care provider to review them with you.         Where to get your medicines      These medications were [place] [facility] [address]  [id number] [place] [address]     Phone:  [phone]     nabumetone 750 MG tablet         Some of these will need a paper prescription and others can be bought over the counter.  Ask your nurse if you have questions.     Bring a paper prescription for each of these medications     order for DME                Primary Care Provider Office Phone # Fax #    Sher Duran [phone] [address] [place], [place] [id number]        Equal Access to Services     [facility] : Hadii job osman hadasho Soomaali, waaxda luqadaha, qaybta kaalmada adeegyada, camelia lorenzo . So [facility] [phone].    ATENCIÓN: Si habla español, tiene a choi disposición servicios gratuitos de asistencia lingüística. Llame al [phone].    We comply with applicable federal civil rights laws and [place] laws. We do not discriminate on the basis of race, color, national origin, age, disability, sex, sexual orientation, or gender identity.            Thank you!     Thank you for choosing [facility]  for your care. Our goal is always to provide you with excellent care. Hearing back from our patients is one way we can continue to improve our services.  Please take a few minutes to complete the written survey that you may receive in the mail after your visit with us. Thank you!             Your Updated Medication List - Protect others around you: Learn how to safely use, store and throw away your medicines at www.disposemymeds.org.          This list is accurate as of 1/29/18  5:01 PM.  Always use your most recent med list.                   Brand Name Dispense Instructions for use Diagnosis    nabumetone 750 MG tablet    RELAFEN    60 tablet    Take 1 tablet (750 mg) by mouth 2 times daily    Chest wall pain       * order for DME     1 each    Quickfit wrist brace, left    Carpal tunnel syndrome of left wrist       * order for DME     1 each    Referral [facility] [phone]    Cervical radiculopathy       * order for DME     1 each    Equipment being ordered: Quick Fit wrist Lt    Bilateral carpal tunnel syndrome       triamcinolone 0.1 % cream    KENALOG    15 g    Apply sparingly to affected area three times daily for 14 days.    Rash       * Notice:  This list has 3 medication(s) that are the same as other medications prescribed for you. Read the directions carefully, and ask your doctor or other care provider to review them with you.

## 2018-01-29 NOTE — NURSING NOTE
"Chief Complaint   Patient presents with     Pain     pain in RT  rib CAGE X 5 DAYS        Initial /79 (BP Location: Right arm, Patient Position: Chair, Cuff Size: Adult Large)  Pulse 84  Temp 97.6  F (36.4  C) (Oral)  Resp 16  Ht 5' 4.25\" (1.632 m)  Wt 147 lb (66.7 kg)  LMP 08/20/2015  Breastfeeding? No  BMI 25.04 kg/m2 Estimated body mass index is 25.04 kg/(m^2) as calculated from the following:    Height as of this encounter: 5' 4.25\" (1.632 m).    Weight as of this encounter: 147 lb (66.7 kg).  Medication Reconciliation: complete rt arm Sandrine Guo MA      "

## 2018-01-29 NOTE — PROGRESS NOTES
SUBJECTIVE:   Naa Paredes is a 52 year old female who presents to clinic today for the following health issues:    Chest wall pain  (primary encounter diagnosis): The patient states 5 days ago she was leaning over a bench to  a box but fell hard on the bench. She says the pain in her right ribs has been increasing in intensity since. Also complains of rare painful cough.  Cough exacerbates the pain she has been treating this with topical creams    Closed dislocation of finger, subsequent encounter: The patient states that it has been a month since she injured her right little finger but it still won't straighten, has not been wearing splint provided at office visit with another provider 1/3/18.  This was relocated at the urgent care    Disturbance of skin sensation: Patient complains of paresthesias in her left thumb and forefinger beginning in May 2017. She claims to have had an EMG a few weeks ago but doesn't recall where.  We are able to get results which document bilateral carpal tunnel right greater than left.  She has no right-sided complaints    Conversion disorder with mixed symptoms patient reports it for a long time.  Her right fourth toe is occasionally shorter than it is now.  She would like us to inspect it        Problem list and histories reviewed & adjusted, as indicated.  Additional history: as documented    Reviewed and updated as needed this visit by clinical staff  Tobacco  Med Hx  Surg Hx  Fam Hx  Soc Hx       Past Medical History:   Diagnosis Date     Ankle sprain and strain 5/2/2013     Anxiety 3/26/2015     Black stools 11/25/2015     CARDIOVASCULAR SCREENING; LDL GOAL LESS THAN 160 10/31/2010     Cervicalgia 12/7/2011     Colon adenoma 4/23/2015    Patient denies it. Renee Tiwari RN, 6/9/15.     Conversion disorder with mixed symptoms 6/14/2016     Depression with suicidal ideation 6/19/2015     Depressive disorder      Elevated blood pressure reading without diagnosis of  "hypertension 11/25/2015     Elevated LFT's 6/4/2010     Fatigue due to depression 11/24/2015     H/O: alcohol abuse 6/4/2010     IBS (irritable bowel syndrome)      Knee pain 10/19/2010     Low back pain 10/19/2010     Lumbago 12/7/2011     Major depressive disorder, recurrent, severe without psychotic features (H) 5/13/2016     Major depressive disorder, single episode, in partial remission (H) 11/24/2015     Migraine      Neck pain 10/19/2010     Nephrolithiasis     calcium oxalate     Pain in thoracic spine 1/2/2013     Perimenopause 3/26/2015     Seasonal allergic rhinitis 10/20/2015     Thoracic outlet syndrome 7/20/2017     Vitamin D deficiency disease 6/8/2010       Past Surgical History:   Procedure Laterality Date     COLONOSCOPY N/A 6/9/2015    Procedure: COLONOSCOPY;  Surgeon: Steve Choi MD;  Location:  GI     LITHOTRIPSY         Family History   Problem Relation Age of Onset     HEART DISEASE Mother      Cancer - colorectal Father      at age 65     MENTAL ILLNESS Paternal Uncle      Breast Cancer No family hx of      Ovarian Cancer No family hx of        Social History   Substance Use Topics     Smoking status: Never Smoker     Smokeless tobacco: Never Used     Alcohol use Yes      Comment: occ wine       Reviewed and updated as needed this visit by Provider         ROS:  No dysuria no change in bowel movement.    This document serves as a record of the services and decisions personally performed and made by Sher Duran MD. It was created on his behalf by Nimo Waite, a trained medical scribe.  The creation of this document is based on the scribe's personal observations and the provider's statements to the medical scribe.  Nimo Waite, January 29, 2018 3:49 PM    OBJECTIVE:     /79 (BP Location: Right arm, Patient Position: Chair, Cuff Size: Adult Large)  Pulse 84  Temp 97.6  F (36.4  C) (Oral)  Resp 16  Ht 1.632 m (5' 4.25\")  Wt 66.7 kg (147 lb)  LMP 08/20/2015  " Breastfeeding? No  BMI 25.04 kg/m2  Body mass index is 25.04 kg/(m^2).        CHEST: Clear to auscultation, respirations unlabored  Chest wall nontender  ABDOMEN without organomegaly nor tenderness to palpation  Skin:Erythematous patches on R back non-dermatomal no vesicles  Right fifth distal phalanx with flexion deformity some swelling about the joint skin intact    Chest x-ray negative      ASSESSMENT/PLAN:   ASSESSMENT / PLAN:  (R07.89) Chest wall pain  (primary encounter diagnosis)  Comment: Recent trauma reassuring evaluation  Plan: XR Chest 2 Views, nabumetone (RELAFEN) 750 MG         tablet        1 month    (S63.259D) Closed dislocation of finger, subsequent encounter  Comment: Clinically, this is an avulsion fracture untreated  Plan: Stax splint.  2 sizes are given 1 to fit the on swollen finger 1 to fit now    (R20.9) Disturbance of skin sensation  Comment: This is already been evaluated  Plan:     (G56.03) Bilateral carpal tunnel syndrome  Comment: She only has left-sided symptoms.  Plan: order for DME        Left-sided neutral splint for wrist    (F44.7) Conversion disorder with mixed symptoms  Comment: Her toe may be developing periodic spasm, but her exam is otherwise reassuring  Plan: Reassurance      RTC 6 weeks    Sher Duran MD        The information in this document, created by the medical scribe for me, accurately reflects the services I personally performed and the decisions made by me. I have reviewed and approved this document for accuracy prior to leaving the patient care area.  Sher Duran MD January 29, 2018 3:49 PM    Sher Duran MD  [facility]

## 2018-01-30 ASSESSMENT — PATIENT HEALTH QUESTIONNAIRE - PHQ9: SUM OF ALL RESPONSES TO PHQ QUESTIONS 1-9: 0

## 2018-01-30 ASSESSMENT — ANXIETY QUESTIONNAIRES: GAD7 TOTAL SCORE: 0

## 2018-02-06 ENCOUNTER — OFFICE VISIT (OUTPATIENT)
Dept: FAMILY MEDICINE | Facility: CLINIC | Age: 53
End: 2018-02-06
Payer: MEDICAID

## 2018-02-06 VITALS
SYSTOLIC BLOOD PRESSURE: 128 MMHG | HEART RATE: 82 BPM | BODY MASS INDEX: 25.44 KG/M2 | WEIGHT: 149 LBS | RESPIRATION RATE: 16 BRPM | DIASTOLIC BLOOD PRESSURE: 89 MMHG | TEMPERATURE: 97.5 F | HEIGHT: 64 IN

## 2018-02-06 DIAGNOSIS — S63.259D: Primary | ICD-10-CM

## 2018-02-06 PROCEDURE — 99213 OFFICE O/P EST LOW 20 MIN: CPT | Performed by: FAMILY MEDICINE

## 2018-02-06 PROCEDURE — T1013 SIGN LANG/ORAL INTERPRETER: HCPCS | Mod: U3 | Performed by: FAMILY MEDICINE

## 2018-02-06 NOTE — MR AVS SNAPSHOT
"              After Visit Summary   2018    Naa Paredes    MRN: [id number]           Patient Information     Date Of Birth          1965        Visit Information        Provider Department      2018 4:30 PM Sher Duran MD; MULTILINGUAL [facility]        Today's Diagnoses     Closed dislocation of finger of right hand, subsequent encounter    -  1       Follow-ups after your visit        Who to contact     If you have questions or need follow up information about today's clinic visit or your schedule please contact [facility] directly at [phone].  Normal or non-critical lab and imaging results will be communicated to you by [email or web address].comhart, letter or phone within 4 business days after the clinic has received the results. If you do not hear from us within 7 days, please contact the clinic through [email or web address].comhart or phone. If you have a critical or abnormal lab result, we will notify you by phone as soon as possible.  Submit refill requests through Power[employer] or call your pharmacy and they will forward the refill request to us. Please allow 3 business days for your refill to be completed.          Additional Information About Your Visit        MyChart Information     Power[employer] lets you send messages to your doctor, view your test results, renew your prescriptions, schedule appointments and more. To sign up, go to [email or web address] . Click on \"Log in\" on the left side of the screen, which will take you to the Welcome page. Then click on \"Sign up Now\" on the right side of the page.     You will be asked to enter the access code listed below, as well as some personal information. Please follow the directions to create your username and password.     Your access code is: [email or web address]  Expires: 2018  4:34 PM     Your access code will  in 90 days. If you need help or a new code, please call your [facility] or [phone].        Care EveryWhere ID     This " "is your Care EveryWhere ID. This could be used by other organizations to access your [place] medical records  [id number]        Your Vitals Were     Pulse Temperature Respirations Height Last Period Breastfeeding?    82 97.5  F (36.4  C) (Oral) 16 5' 4.25\" (1.632 m) 08/20/2015 No    BMI (Body Mass Index)                   25.38 kg/m2            Blood Pressure from Last 3 Encounters:   02/06/18 128/89   01/29/18 129/79   01/03/18 117/75    Weight from Last 3 Encounters:   02/06/18 149 lb (67.6 kg)   01/29/18 147 lb (66.7 kg)   01/03/18 145 lb 1.6 oz (65.8 kg)              Today, you had the following     No orders found for display       Primary Care Provider Office Phone # Fax #    Sher Duran [phone] [address] [facility] [address]        Equal Access to Services     [facility]: Hadii aad ku hadasho Soomaali, waaxda luqadaha, qaybta kaalmada adeegyada, waxay idiin hayleslyn arina lawleraracristobal la'leslyn . So [facility] [phone].    ATENCIÓN: Si habla español, tiene a choi disposición servicios gratuitos de asistencia lingüística. Llame al [phone].    We comply with applicable federal civil rights laws and [place] laws. We do not discriminate on the basis of race, color, national origin, age, disability, sex, sexual orientation, or gender identity.            Thank you!     Thank you for choosing [facility]  for your care. Our goal is always to provide you with excellent care. Hearing back from our patients is one way we can continue to improve our services. Please take a few minutes to complete the written survey that you may receive in the mail after your visit with us. Thank you!             Your Updated Medication List - Protect others around you: Learn how to safely use, store and throw away your medicines at www.disposemymeds.org.          This list is accurate as of 2/6/18  9:11 PM.  Always use your most recent med list.                   Brand Name " Dispense Instructions for use Diagnosis    nabumetone 750 MG tablet    RELAFEN    60 tablet    Take 1 tablet (750 mg) by mouth 2 times daily    Chest wall pain       * order for DME     1 each    Quickfit wrist brace, left    Carpal tunnel syndrome of left wrist       * order for DME     1 each    Referral [facility] Kay ESCAMILLA [phone]    Cervical radiculopathy       * order for DME     1 each    Equipment being ordered: Quick Fit wrist Lt    Bilateral carpal tunnel syndrome       triamcinolone 0.1 % cream    KENALOG    15 g    Apply sparingly to affected area three times daily for 14 days.    Rash       * Notice:  This list has 3 medication(s) that are the same as other medications prescribed for you. Read the directions carefully, and ask your doctor or other care provider to review them with you.

## 2018-02-06 NOTE — NURSING NOTE
"Chief Complaint   Patient presents with     Finger     rt pinky finger        Initial /89 (BP Location: Right arm, Patient Position: Chair, Cuff Size: Adult Regular)  Pulse 82  Temp 97.5  F (36.4  C) (Oral)  Resp 16  Ht 5' 4.25\" (1.632 m)  Wt 149 lb (67.6 kg)  LMP 08/20/2015  Breastfeeding? No  BMI 25.38 kg/m2 Estimated body mass index is 25.38 kg/(m^2) as calculated from the following:    Height as of this encounter: 5' 4.25\" (1.632 m).    Weight as of this encounter: 149 lb (67.6 kg).  Medication Reconciliation: complete rt arm Sandrine Guo MA        "

## 2018-03-16 ENCOUNTER — OFFICE VISIT (OUTPATIENT)
Dept: FAMILY MEDICINE | Facility: CLINIC | Age: 53
End: 2018-03-16
Payer: COMMERCIAL

## 2018-03-16 VITALS
BODY MASS INDEX: 24.87 KG/M2 | SYSTOLIC BLOOD PRESSURE: 122 MMHG | RESPIRATION RATE: 12 BRPM | DIASTOLIC BLOOD PRESSURE: 72 MMHG | TEMPERATURE: 98 F | HEART RATE: 72 BPM | WEIGHT: 146 LBS

## 2018-03-16 DIAGNOSIS — M20.011 MALLET FINGER OF RIGHT HAND: Primary | ICD-10-CM

## 2018-03-16 PROCEDURE — 99213 OFFICE O/P EST LOW 20 MIN: CPT | Performed by: PHYSICIAN ASSISTANT

## 2018-03-16 NOTE — PATIENT INSTRUCTIONS
Mallet Finger  You have an injury to your finger causing the tip of your finger to droop down. This makes your finger look like a small hammer or mallet. This is why it s given this name. It is also called baseball finger. This injury happens when the tendon that holds the finger straight at the last joint tears. Sometimes a small break happens where this tendon attaches to the bone.  This causes local pain, swelling, and bruising. This injury usually takes about 4 to 6 weeks to heal. This injury is often treated with a special finger splint called a stack splint. It holds the tendon in the correct position. But even with right treatment, it may not be possible to fully straighten that joint after the injury heals. After healing, the joint will be stiff but usually recovers flexibility over time.  Home care    Keep your arm elevated to reduce pain and swelling. When sitting or lying down elevate your arm above the level of your heart. You can do this by placing your arm on a pillow that rests on your chest or on a pillow at your side. This is most important during the first 48 hours after the injury.    Put an ice pack over the injured area for 15 to 20 minutes every 3 to 6 hours. You should do this for the first 24 to 48 hours. You can make an ice pack by filling a plastic bag that seals at the top with ice cubes and then wrapping it with a thin towel. Be careful not to injure your skin with the ice treatments. Ice should never be applied directly to skin. Continue the use of ice packs for relief of pain and swelling as needed. After 48 hours, apply heat (warm shower or warm bath) for 15 to 20 minutes several times a day, or alternate ice and heat.    If a splint was applied, keep it in place for the time advised. If you remove it too soon, it will cause the position of the tendon to change and the finger joint will heal in a bent position.    You may use over-the-counter pain medicine to control pain, unless  another pain medicine was prescribed.Talk with your healthcare provider before using these medicines if you have chronic liver or kidney disease or ever had a stomach ulcer or GI bleeding.    Most patients can return to normal activity while wearing the splint. Discuss any limitations with your healthcare provider.  Follow-up care  Follow up with your healthcare provider, or as advised.  If X-rays were taken, you will be told of any new findings that may affect your care.   When to seek medical advice  Call your healthcare provider right away if any of the following occur:    Pain or swelling in the injured finger increases    The finger becomes red or warm     Injured finger becomes cold, blue, numb, or tingly  Date Last Reviewed: 11/23/2015 2000-2017 [employer] [address] [place] [address], [place] [address]. All rights reserved. This information is not intended as a substitute for professional medical care. Always follow your healthcare professional's instructions.

## 2018-03-16 NOTE — PROGRESS NOTES
SUBJECTIVE:   Naa Paredes is a 52 year old female who presents to clinic today for the following health issues:      Joint Pain    Onset: 3 months    Description:   Location: right pinkie finger  Character: no pain-cannot extend tip of finger    Intensity: mild    Progression of Symptoms: same    Accompanying Signs & Symptoms:  Other symptoms: none    History:   Previous similar pain: no       Precipitating factors:   Trauma or overuse: YES- fell on ice in dec    Alleviating factors:  Improved by: nothing    Therapies Tried and outcome: splinting, but has not been consistent in wearing this.         Problem list and histories reviewed & adjusted, as indicated.  Additional history: as documented    Patient Active Problem List   Diagnosis     H/O: alcohol abuse     CARDIOVASCULAR SCREENING; LDL GOAL LESS THAN 160     Cervicalgia     Nickel allergy     Anxiety     Perimenopause     Colon adenoma     Seasonal allergic rhinitis     Major depressive disorder, single episode, in partial remission (H)     Major depressive disorder, recurrent, severe without psychotic features (H)     Conversion disorder with mixed symptoms     Migraine with status migrainosus, not intractable, unspecified migraine type     Thoracic outlet syndrome     Closed dislocation of finger of right hand, subsequent encounter     Past Surgical History:   Procedure Laterality Date     COLONOSCOPY N/A 6/9/2015    Procedure: COLONOSCOPY;  Surgeon: Steve Choi MD;  Location:  GI     LITHOTRIPSY         Social History   Substance Use Topics     Smoking status: Never Smoker     Smokeless tobacco: Never Used     Alcohol use Yes      Comment: occ wine     Family History   Problem Relation Age of Onset     HEART DISEASE Mother      Cancer - colorectal Father      at age 65     MENTAL ILLNESS Paternal Uncle      Breast Cancer No family hx of      Ovarian Cancer No family hx of          Current Outpatient Prescriptions   Medication Sig Dispense  Refill     nabumetone (RELAFEN) 750 MG tablet Take 1 tablet (750 mg) by mouth 2 times daily 60 tablet 0     order for DME Equipment being ordered: Quick Fit wrist Lt 1 each 0     order for DME Referral [facility] [place]  [phone] 1 each 0     order for DME Quickfit wrist brace, left 1 each 0     triamcinolone (KENALOG) 0.1 % cream Apply sparingly to affected area three times daily for 14 days. 15 g 0     No Known Allergies  BP Readings from Last 3 Encounters:   03/16/18 122/72   02/06/18 128/89   01/29/18 129/79    Wt Readings from Last 3 Encounters:   03/16/18 146 lb (66.2 kg)   02/06/18 149 lb (67.6 kg)   01/29/18 147 lb (66.7 kg)                    Reviewed and updated as needed this visit by clinical staff  Allergies  Meds  Problems       Reviewed and updated as needed this visit by Provider  Allergies  Meds  Problems         ROS:  Constitutional, msk, skin, neuro systems are negative, except as otherwise noted.    OBJECTIVE:     /72 (BP Location: Right arm, Patient Position: Chair, Cuff Size: Adult Regular)  Pulse 72  Temp 98  F (36.7  C) (Oral)  Resp 12  Wt 146 lb (66.2 kg)  LMP 08/20/2015  BMI 24.87 kg/m2  Body mass index is 24.87 kg/(m^2).  GENERAL: healthy, alert and no distress  MS: unable to extend pinkie on right finger at DIP. No pain. Full flexion.   PSYCH: mentation appears normal, affect normal/bright    Diagnostic Test Results:  none     ASSESSMENT/PLAN:     (M20.011) Mallet finger of right hand  (primary encounter diagnosis)  Comment: given course length without splinting, concerned surgery will be needed. Will have see ortho. New splint given.   Plan: ORTHO  REFERRAL              Follow up: as above     Matthew Carroll PA-C  [facility]

## 2018-03-16 NOTE — MR AVS SNAPSHOT
After Visit Summary   3/16/2018    Naa Paredes    MRN: [id number]           Patient Information     Date Of Birth          1965        Visit Information        Provider Department      3/16/2018 3:15 PM Matthew Carroll PA-C [facility]        Today's Diagnoses     Mallet finger of left hand    -  1      Care Instructions      Mallet Finger  You have an injury to your finger causing the tip of your finger to droop down. This makes your finger look like a small hammer or mallet. This is why it s given this name. It is also called baseball finger. This injury happens when the tendon that holds the finger straight at the last joint tears. Sometimes a small break happens where this tendon attaches to the bone.  This causes local pain, swelling, and bruising. This injury usually takes about 4 to 6 weeks to heal. This injury is often treated with a special finger splint called a stack splint. It holds the tendon in the correct position. But even with right treatment, it may not be possible to fully straighten that joint after the injury heals. After healing, the joint will be stiff but usually recovers flexibility over time.  Home care    Keep your arm elevated to reduce pain and swelling. When sitting or lying down elevate your arm above the level of your heart. You can do this by placing your arm on a pillow that rests on your chest or on a pillow at your side. This is most important during the first 48 hours after the injury.    Put an ice pack over the injured area for 15 to 20 minutes every 3 to 6 hours. You should do this for the first 24 to 48 hours. You can make an ice pack by filling a plastic bag that seals at the top with ice cubes and then wrapping it with a thin towel. Be careful not to injure your skin with the ice treatments. Ice should never be applied directly to skin. Continue the use of ice packs for relief of pain and swelling as needed. After 48 hours,  apply heat (warm shower or warm bath) for 15 to 20 minutes several times a day, or alternate ice and heat.    If a splint was applied, keep it in place for the time advised. If you remove it too soon, it will cause the position of the tendon to change and the finger joint will heal in a bent position.    You may use over-the-counter pain medicine to control pain, unless another pain medicine was prescribed.Talk with your healthcare provider before using these medicines if you have chronic liver or kidney disease or ever had a stomach ulcer or GI bleeding.    Most patients can return to normal activity while wearing the splint. Discuss any limitations with your healthcare provider.  Follow-up care  Follow up with your healthcare provider, or as advised.  If X-rays were taken, you will be told of any new findings that may affect your care.   When to seek medical advice  Call your healthcare provider right away if any of the following occur:    Pain or swelling in the injured finger increases    The finger becomes red or warm     Injured finger becomes cold, blue, numb, or tingly  Date Last Reviewed: 11/23/2015 2000-2017 [employer] [address] [place] [address]. All rights reserved. This information is not intended as a substitute for professional medical care. Always follow your healthcare professional's instructions.                Follow-ups after your visit        Additional Services     ORTHO  REFERRAL       [facility] is referring you to the Orthopedic  Services at [facility].       The  Representative will assist you in the coordination of your Orthopedic and Musculoskeletal Care as prescribed by your physician.    The  Representative will call you within 1 business day to help schedule your appointment, or you may contact the  Representative at:    All areas ~ [phone]     Type of Referral :  Surgical / Specialist       Timeframe requested: Routine    Coverage of these services is subject to the terms and limitations of your health insurance plan.  Please call member services at your health plan with any benefit or coverage questions.      If X-rays, CT or MRI's have been performed, please contact the facility where they were done to arrange for , prior to your scheduled appointment.  Please bring this referral request to your appointment and present it to your specialist.                  Your next 10 appointments already scheduled     Mar 27, 2018  2:00 PM CDT   Office Visit with Sher Duran MD   [facility] ([facility]    [address] [id number] [phone]           Bring a current list of meds and any records pertaining to this visit. For Physicals, please bring immunization records and any forms needing to be filled out. Please arrive 10 minutes early to complete paperwork.              Who to contact     If you have questions or need follow up information about today's clinic visit or your schedule please contact [facility] directly at [phone].  Normal or non-critical lab and imaging results will be communicated to you by Loglyhart, letter or phone within 4 business days after the clinic has received the results. If you do not hear from us within 7 days, please contact the clinic through Loglyhart or phone. If you have a critical or abnormal lab result, we will notify you by phone as soon as possible.  Submit refill requests through [employer] or call your pharmacy and they will forward the refill request to us. Please allow 3 business days for your refill to be completed.          Additional Information About Your Visit        [employer] Information     [employer] lets you send messages to your doctor, view your test results, renew your prescriptions, schedule appointments and more. To sign up, go to  [email or web address] . Click on \"Log in\" on the left side of the screen, which will take you to the Welcome page. Then click on \"Sign up Now\" on the right side of the page.     You will be asked to enter the access code listed below, as well as some personal information. Please follow the directions to create your username and password.     Your access code is: [email or web address]  Expires: 2018  5:34 PM     Your access code will  in 90 days. If you need help or a new code, please call your [facility] clinic or [phone].        Care EveryWhere ID     This is your Care EveryWhere ID. This could be used by other organizations to access your [facility] medical records  [id number]        Your Vitals Were     Last Period                   2015            Blood Pressure from Last 3 Encounters:   18 128/89   18 129/79   18 117/75    Weight from Last 3 Encounters:   18 149 lb (67.6 kg)   18 147 lb (66.7 kg)   18 145 lb 1.6 oz (65.8 kg)              We Performed the Following     ORTHO  REFERRAL        Primary Care Provider Office Phone # Fax #    Sher Duran [phone] [address] [facility] [address]        Equal Access to Services     [facility]: Hadii aad ku hadasho Soomaali, waaxda luqadaha, qaybta kaalmada adeegyada, camelia lorenzo . So [facility] [phone].    ATENCIÓN: Si habla español, tiene a choi disposición servicios gratuitos de asistencia lingüística. Llame al [phone].    We comply with applicable federal civil rights laws and [place] laws. We do not discriminate on the basis of race, color, national origin, age, disability, sex, sexual orientation, or gender identity.            Thank you!     Thank you for choosing [facility]  for your care. Our goal is always to provide you with excellent care. Hearing back from our patients is one way we can continue to improve our " services. Please take a few minutes to complete the written survey that you may receive in the mail after your visit with us. Thank you!             Your Updated Medication List - Protect others around you: Learn how to safely use, store and throw away your medicines at www.disposemymeds.org.          This list is accurate as of 3/16/18  3:45 PM.  Always use your most recent med list.                   Brand Name Dispense Instructions for use Diagnosis    nabumetone 750 MG tablet    RELAFEN    60 tablet    Take 1 tablet (750 mg) by mouth 2 times daily    Chest wall pain       * order for DME     1 each    Quickfit wrist brace, left    Carpal tunnel syndrome of left wrist       * order for DME     1 each    Referral [facility] [phone]    Cervical radiculopathy       * order for DME     1 each    Equipment being ordered: Quick Fit wrist Lt    Bilateral carpal tunnel syndrome       triamcinolone 0.1 % cream    KENALOG    15 g    Apply sparingly to affected area three times daily for 14 days.    Rash       * Notice:  This list has 3 medication(s) that are the same as other medications prescribed for you. Read the directions carefully, and ask your doctor or other care provider to review them with you.

## 2018-03-19 ENCOUNTER — RADIANT APPOINTMENT (OUTPATIENT)
Dept: GENERAL RADIOLOGY | Facility: CLINIC | Age: 53
End: 2018-03-19
Attending: ORTHOPAEDIC SURGERY
Payer: COMMERCIAL

## 2018-03-19 ENCOUNTER — OFFICE VISIT (OUTPATIENT)
Dept: ORTHOPEDICS | Facility: CLINIC | Age: 53
End: 2018-03-19
Payer: COMMERCIAL

## 2018-03-19 VITALS
HEIGHT: 65 IN | WEIGHT: 145 LBS | BODY MASS INDEX: 24.16 KG/M2 | DIASTOLIC BLOOD PRESSURE: 74 MMHG | SYSTOLIC BLOOD PRESSURE: 108 MMHG

## 2018-03-19 DIAGNOSIS — M79.644 PAIN OF FINGER OF RIGHT HAND: Primary | ICD-10-CM

## 2018-03-19 DIAGNOSIS — M79.644 PAIN OF FINGER OF RIGHT HAND: ICD-10-CM

## 2018-03-19 PROCEDURE — 73140 X-RAY EXAM OF FINGER(S): CPT | Mod: RT

## 2018-03-19 PROCEDURE — 99203 OFFICE O/P NEW LOW 30 MIN: CPT | Performed by: ORTHOPAEDIC SURGERY

## 2018-03-19 NOTE — LETTER
3/19/2018         RE: Naa Paredes  [id number]   [place] [id number]        Dear Colleague,    Thank you for referring your patient, Naa Paredes, to the [facility] ORTHOPEDIC SURGERY. Please see a copy of my visit note below.    HISTORY OF PRESENT ILLNESS:    Naa Paredes is a 52 year old female who is seen in consultation at the request of LOKESH Gilbert found for right 5th finger.  Patient had a fall on 12/20/2017 where she dislocated her DIP on the 5th finger. Patient reports being seen at an Urgent Care and having x-rays taken at the time. She did not bring those to the visit today. This patient is accompanied in the office by her .      Present symptoms: limited AROM,  deformity  Treatments tried to this point: splint  Orthopedic PMH: left hand issues after a fall    Past Medical History:   Diagnosis Date     Ankle sprain and strain 5/2/2013     Anxiety 3/26/2015     Black stools 11/25/2015     CARDIOVASCULAR SCREENING; LDL GOAL LESS THAN 160 10/31/2010     Cervicalgia 12/7/2011     Closed dislocation of finger of right hand, subsequent encounter 2/6/2018     Colon adenoma 4/23/2015    Patient denies it. Renee Tiwari RN, 6/9/15.     Conversion disorder with mixed symptoms 6/14/2016     Depression with suicidal ideation 6/19/2015     Depressive disorder      Elevated blood pressure reading without diagnosis of hypertension 11/25/2015     Elevated LFT's 6/4/2010     Fatigue due to depression 11/24/2015     H/O: alcohol abuse 6/4/2010     IBS (irritable bowel syndrome)      Knee pain 10/19/2010     Low back pain 10/19/2010     Lumbago 12/7/2011     Major depressive disorder, recurrent, severe without psychotic features (H) 5/13/2016     Major depressive disorder, single episode, in partial remission (H) 11/24/2015     Migraine      Neck pain 10/19/2010     Nephrolithiasis     calcium oxalate     Pain in thoracic spine 1/2/2013     Perimenopause 3/26/2015      Seasonal allergic rhinitis 10/20/2015     Thoracic outlet syndrome 7/20/2017     Vitamin D deficiency disease 6/8/2010       Past Surgical History:   Procedure Laterality Date     COLONOSCOPY N/A 6/9/2015    Procedure: COLONOSCOPY;  Surgeon: Steve Choi MD;  Location:  GI     LITHOTRIPSY         Family History   Problem Relation Age of Onset     HEART DISEASE Mother      Cancer - colorectal Father      at age 65     MENTAL ILLNESS Paternal Uncle      Breast Cancer No family hx of      Ovarian Cancer No family hx of        Social History     Social History     Marital status: Legally      Spouse name: N/A     Number of children: N/A     Years of education: N/A     Occupational History     Not on file.     Social History Main Topics     Smoking status: Never Smoker     Smokeless tobacco: Never Used     Alcohol use Yes      Comment: occ wine     Drug use: No     Sexual activity: Not Currently     Partners: Male     Other Topics Concern     Not on file     Social History Narrative       Current Outpatient Prescriptions   Medication Sig Dispense Refill     nabumetone (RELAFEN) 750 MG tablet Take 1 tablet (750 mg) by mouth 2 times daily 60 tablet 0     order for DME Equipment being ordered: Quick Fit wrist Lt 1 each 0     order for DME Referral [facility]  [phone] 1 each 0     order for DME Quickfit wrist brace, left 1 each 0     triamcinolone (KENALOG) 0.1 % cream Apply sparingly to affected area three times daily for 14 days. 15 g 0       No Known Allergies    REVIEW OF SYSTEMS:  CONSTITUTIONAL:  NEGATIVE for fever, chills, change in weight  INTEGUMENTARY/SKIN:  NEGATIVE for worrisome rashes, moles or lesions  EYES:  NEGATIVE for vision changes or irritation  ENT/MOUTH:  NEGATIVE for ear, mouth and throat problems  RESP:  NEGATIVE for significant cough or SOB  BREAST:  NEGATIVE for masses, tenderness or discharge  CV:  NEGATIVE for chest pain, palpitations or peripheral edema  GI:   NEGATIVE for nausea, abdominal pain, heartburn, or change in bowel habits  :  Negative   MUSCULOSKELETAL:  See HPI above  NEURO:  NEGATIVE for weakness, dizziness or paresthesias  ENDOCRINE:  NEGATIVE for temperature intolerance, skin/hair changes  HEME/ALLERGY/IMMUNE:  NEGATIVE for bleeding problems  PSYCHIATRIC:  NEGATIVE for changes in mood or affect      PHYSICAL EXAM:  LMP 08/20/2015  There is no height or weight on file to calculate BMI.   GENERAL APPEARANCE: healthy, alert and no distress   SKIN: no suspicious lesions or rashes  NEURO: Normal strength and tone, mentation intact and speech normal  VASCULAR: Good pulses, and capillary refill   LYMPH: no lymphadenopathy   PSYCH:  mentation appears normal and affect normal/bright    MSK:  Examination of her right hand reveals no erythema ecchymosis nor edema.  She is tender to palpation about the DIP joint of the fifth finger.  There is no angular or rotational deformity.  She is able to flex and extend but with pain with these motions.     ASSESSMENT / PLAN: Nondisplaced intra-articular distal phalanx fracture of the right fifth finger.  Treatment for this will be splinting and pain management.  I encouraged her to take the splint off several times during the day and flex and extend the finger.      Imaging Interpretation:         Cooper Regan MD  Department of Orthopedic Surgery          Again, thank you for allowing me to participate in the care of your patient.        Sincerely,        Jun Regan MD

## 2018-03-19 NOTE — PROGRESS NOTES
HISTORY OF PRESENT ILLNESS:    Naa Paredes is a 52 year old female who is seen in consultation at the request of LOKSEH Gilbert found for right 5th finger.  Patient had a fall on 12/20/2017 where she dislocated her DIP on the 5th finger. Patient reports being seen at an Urgent Care and having x-rays taken at the time. She did not bring those to the visit today. This patient is accompanied in the office by her .      Present symptoms: limited AROM,  deformity  Treatments tried to this point: splint  Orthopedic PMH: left hand issues after a fall    Past Medical History:   Diagnosis Date     Ankle sprain and strain 5/2/2013     Anxiety 3/26/2015     Black stools 11/25/2015     CARDIOVASCULAR SCREENING; LDL GOAL LESS THAN 160 10/31/2010     Cervicalgia 12/7/2011     Closed dislocation of finger of right hand, subsequent encounter 2/6/2018     Colon adenoma 4/23/2015    Patient denies it. Renee Tiwari RN, 6/9/15.     Conversion disorder with mixed symptoms 6/14/2016     Depression with suicidal ideation 6/19/2015     Depressive disorder      Elevated blood pressure reading without diagnosis of hypertension 11/25/2015     Elevated LFT's 6/4/2010     Fatigue due to depression 11/24/2015     H/O: alcohol abuse 6/4/2010     IBS (irritable bowel syndrome)      Knee pain 10/19/2010     Low back pain 10/19/2010     Lumbago 12/7/2011     Major depressive disorder, recurrent, severe without psychotic features (H) 5/13/2016     Major depressive disorder, single episode, in partial remission (H) 11/24/2015     Migraine      Neck pain 10/19/2010     Nephrolithiasis     calcium oxalate     Pain in thoracic spine 1/2/2013     Perimenopause 3/26/2015     Seasonal allergic rhinitis 10/20/2015     Thoracic outlet syndrome 7/20/2017     Vitamin D deficiency disease 6/8/2010       Past Surgical History:   Procedure Laterality Date     COLONOSCOPY N/A 6/9/2015    Procedure: COLONOSCOPY;  Surgeon: Steve Choi MD;   Location: [facility]     LITHOTRIPSY         Family History   Problem Relation Age of Onset     HEART DISEASE Mother      Cancer - colorectal Father      at age 65     MENTAL ILLNESS Paternal Uncle      Breast Cancer No family hx of      Ovarian Cancer No family hx of        Social History     Social History     Marital status: Legally      Spouse name: N/A     Number of children: N/A     Years of education: N/A     Occupational History     Not on file.     Social History Main Topics     Smoking status: Never Smoker     Smokeless tobacco: Never Used     Alcohol use Yes      Comment: occ wine     Drug use: No     Sexual activity: Not Currently     Partners: Male     Other Topics Concern     Not on file     Social History Narrative       Current Outpatient Prescriptions   Medication Sig Dispense Refill     nabumetone (RELAFEN) 750 MG tablet Take 1 tablet (750 mg) by mouth 2 times daily 60 tablet 0     order for DME Equipment being ordered: Quick Fit wrist Lt 1 each 0     order for DME Referral [facility]  [phone] 1 each 0     order for DME Quickfit wrist brace, left 1 each 0     triamcinolone (KENALOG) 0.1 % cream Apply sparingly to affected area three times daily for 14 days. 15 g 0       No Known Allergies    REVIEW OF SYSTEMS:  CONSTITUTIONAL:  NEGATIVE for fever, chills, change in weight  INTEGUMENTARY/SKIN:  NEGATIVE for worrisome rashes, moles or lesions  EYES:  NEGATIVE for vision changes or irritation  ENT/MOUTH:  NEGATIVE for ear, mouth and throat problems  RESP:  NEGATIVE for significant cough or SOB  BREAST:  NEGATIVE for masses, tenderness or discharge  CV:  NEGATIVE for chest pain, palpitations or peripheral edema  GI:  NEGATIVE for nausea, abdominal pain, heartburn, or change in bowel habits  :  Negative   MUSCULOSKELETAL:  See HPI above  NEURO:  NEGATIVE for weakness, dizziness or paresthesias  ENDOCRINE:  NEGATIVE for temperature intolerance, skin/hair  changes  HEME/ALLERGY/IMMUNE:  NEGATIVE for bleeding problems  PSYCHIATRIC:  NEGATIVE for changes in mood or affect      PHYSICAL EXAM:  [facility] 08/20/2015  There is no height or weight on file to calculate BMI.   GENERAL APPEARANCE: healthy, alert and no distress   SKIN: no suspicious lesions or rashes  NEURO: Normal strength and tone, mentation intact and speech normal  VASCULAR: Good pulses, and capillary refill   LYMPH: no lymphadenopathy   PSYCH:  mentation appears normal and affect normal/bright    MSK:  Examination of her right hand reveals no erythema ecchymosis nor edema.  She is tender to palpation about the DIP joint of the fifth finger.  There is no angular or rotational deformity.  She is able to flex and extend but with pain with these motions.     ASSESSMENT / PLAN: Nondisplaced intra-articular distal phalanx fracture of the right fifth finger.  Treatment for this will be splinting and pain management.  I encouraged her to take the splint off several times during the day and flex and extend the finger.      Imaging Interpretation:         Cooper Regan MD  Department of Orthopedic Surgery

## 2018-03-19 NOTE — PATIENT INSTRUCTIONS
We addressed the following today:    1. Pain of finger of right hand        Other specific instructions: wear Staxx splint at all times  Follow up referral to Dr Candida Whitman to discuss surgery. Scheduling number:  [phone]   (sooner if needed; call direct clinic number [[phone]] at any time with questions or concerns)

## 2018-03-19 NOTE — MR AVS SNAPSHOT
After Visit Summary   3/19/2018    Naa Paredes    MRN: [id number]           Patient Information     Date Of Birth          1965        Visit Information        Provider Department      3/19/2018 1:15 PM Jun Regan MD; [facility] ORTHOPEDIC SURGERY        Today's Diagnoses     Pain of finger of right hand    -  1      Care Instructions    We addressed the following today:    1. Pain of finger of right hand        Other specific instructions: wear Staxx splint at all times  Follow up referral to Dr Candida Whitman to discuss surgery. Scheduling number:  [phone]   (sooner if needed; call direct clinic number [[phone]] at any time with questions or concerns)              Follow-ups after your visit        Your next 10 appointments already scheduled     Mar 27, 2018  2:00 PM CDT   Office Visit with Sher Duran MD   [facility]    [address] [place] [id number] [phone]           Bring a current list of meds and any records pertaining to this visit. For Physicals, please bring immunization records and any forms needing to be filled out. Please arrive 10 minutes early to complete paperwork.              Who to contact     If you have questions or need follow up information about today's clinic visit or your schedule please contact [facility] ORTHOPEDIC SURGERY directly at [phone].  Normal or non-critical lab and imaging results will be communicated to you by MyChart, letter or phone within 4 business days after the clinic has received the results. If you do not hear from us within 7 days, please contact the clinic through MyChart or phone. If you have a critical or abnormal lab result, we will notify you by phone as soon as possible.  Submit refill requests through Tok3n or call your pharmacy and they will forward the refill request to us. Please allow 3 business days  "for your refill to be completed.          Additional Information About Your Visit        Greenlight Planethart Information     Tagwhat lets you send messages to your doctor, view your test results, renew your prescriptions, schedule appointments and more. To sign up, go to [email or web address] . Click on \"Log in\" on the left side of the screen, which will take you to the Welcome page. Then click on \"Sign up Now\" on the right side of the page.     You will be asked to enter the access code listed below, as well as some personal information. Please follow the directions to create your username and password.     Your access code is: [email or web address]  Expires: 2018  5:34 PM     Your access code will  in 90 days. If you need help or a new code, please call your [facility] clinic or [phone].        Care EveryWhere ID     This is your Care EveryWhere ID. This could be used by other organizations to access your [facility] medical records  [id number]        Your Vitals Were     Height Last Period BMI (Body Mass Index)             5' 5\" (1.651 m) 2015 24.13 kg/m2          Blood Pressure from Last 3 Encounters:   18 108/74   18 122/72   18 128/89    Weight from Last 3 Encounters:   18 145 lb (65.8 kg)   18 146 lb (66.2 kg)   18 149 lb (67.6 kg)               Primary Care Provider Office Phone # Fax #    Sher Duran [phone] [address] [facility] [address]        Equal Access to Services     [facility] FLEX : Hadmonica Stearns, cory chan, camelia dougherty. So [facility] [phone].    ATENCIÓN: Si habla español, tiene a choi disposición servicios gratuitos de asistencia lingüística. Luc al [phone].    We comply with applicable federal civil rights laws and [place] laws. We do not discriminate on the basis of race, color, national origin, age, disability, sex, sexual orientation, or " gender identity.            Thank you!     Thank you for choosing [facility] ORTHOPEDIC SURGERY  for your care. Our goal is always to provide you with excellent care. Hearing back from our patients is one way we can continue to improve our services. Please take a few minutes to complete the written survey that you may receive in the mail after your visit with us. Thank you!             Your Updated Medication List - Protect others around you: Learn how to safely use, store and throw away your medicines at www.disposemymeds.org.          This list is accurate as of 3/19/18  2:15 PM.  Always use your most recent med list.                   Brand Name Dispense Instructions for use Diagnosis    nabumetone 750 MG tablet    RELAFEN    60 tablet    Take 1 tablet (750 mg) by mouth 2 times daily    Chest wall pain       * order for DME     1 each    Quickfit wrist brace, left    Carpal tunnel syndrome of left wrist       * order for DME     1 each    Referral [facility] [phone]    Cervical radiculopathy       * order for DME     1 each    Equipment being ordered: Quick Fit wrist Lt    Bilateral carpal tunnel syndrome       triamcinolone 0.1 % cream    KENALOG    15 g    Apply sparingly to affected area three times daily for 14 days.    Rash       * Notice:  This list has 3 medication(s) that are the same as other medications prescribed for you. Read the directions carefully, and ask your doctor or other care provider to review them with you.

## 2018-03-27 ENCOUNTER — OFFICE VISIT (OUTPATIENT)
Dept: FAMILY MEDICINE | Facility: CLINIC | Age: 53
End: 2018-03-27
Payer: COMMERCIAL

## 2018-03-27 VITALS
WEIGHT: 148 LBS | SYSTOLIC BLOOD PRESSURE: 118 MMHG | HEART RATE: 85 BPM | BODY MASS INDEX: 24.66 KG/M2 | TEMPERATURE: 97.8 F | HEIGHT: 65 IN | DIASTOLIC BLOOD PRESSURE: 76 MMHG | RESPIRATION RATE: 16 BRPM

## 2018-03-27 DIAGNOSIS — M54.2 CERVICALGIA: Primary | ICD-10-CM

## 2018-03-27 DIAGNOSIS — S63.259D: ICD-10-CM

## 2018-03-27 PROCEDURE — 99214 OFFICE O/P EST MOD 30 MIN: CPT | Performed by: FAMILY MEDICINE

## 2018-03-27 NOTE — NURSING NOTE
"Chief Complaint   Patient presents with     RECHECK     dislocation of finger on rt hand 2/6/18       Initial /76 (BP Location: Right arm, Patient Position: Chair, Cuff Size: Adult Regular)  Pulse 85  Temp 97.8  F (36.6  C) (Oral)  Resp 16  Ht 5' 5\" (1.651 m)  Wt 148 lb (67.1 kg)  LMP 08/20/2015  Breastfeeding? No  BMI 24.63 kg/m2 Estimated body mass index is 24.63 kg/(m^2) as calculated from the following:    Height as of this encounter: 5' 5\" (1.651 m).    Weight as of this encounter: 148 lb (67.1 kg).  Medication Reconciliation: complete rt arm Sandrine Guo MA      "

## 2018-03-27 NOTE — MR AVS SNAPSHOT
"              After Visit Summary   3/27/2018    Naa Paredes    MRN: [id number]           Patient Information     Date Of Birth          1965        Visit Information        Provider Department      3/27/2018 1:45 PM Sher Duran MD; [place] [facility]        Today's Diagnoses     Cervicalgia    -  1    Closed dislocation of finger of right hand, subsequent encounter           Follow-ups after your visit        Follow-up notes from your care team     Return in about 6 weeks (around 2018).      Who to contact     If you have questions or need follow up information about today's clinic visit or your schedule please contact [facility] directly at [phone].  Normal or non-critical lab and imaging results will be communicated to you by MyChart, letter or phone within 4 business days after the clinic has received the results. If you do not hear from us within 7 days, please contact the clinic through MyChart or phone. If you have a critical or abnormal lab result, we will notify you by phone as soon as possible.  Submit refill requests through XMOS or call your pharmacy and they will forward the refill request to us. Please allow 3 business days for your refill to be completed.          Additional Information About Your Visit        MyChart Information     XMOS lets you send messages to your doctor, view your test results, renew your prescriptions, schedule appointments and more. To sign up, go to [email or web address] . Click on \"Log in\" on the left side of the screen, which will take you to the Welcome page. Then click on \"Sign up Now\" on the right side of the page.     You will be asked to enter the access code listed below, as well as some personal information. Please follow the directions to create your username and password.     Your access code is: [email or web address]  Expires: 2018  5:34 PM     Your access code will  in 90 " "days. If you need help or a new code, please call your [place] clinic or [phone].        Care EveryWhere ID     This is your Care EveryWhere ID. This could be used by other organizations to access your [place] medical records  [id number]        Your Vitals Were     Pulse Temperature Respirations Height Last Period Breastfeeding?    85 97.8  F (36.6  C) (Oral) 16 5' 5\" (1.651 m) 08/20/2015 No    BMI (Body Mass Index)                   24.63 kg/m2            Blood Pressure from Last 3 Encounters:   03/27/18 118/76   03/19/18 108/74   03/16/18 122/72    Weight from Last 3 Encounters:   03/27/18 148 lb (67.1 kg)   03/19/18 145 lb (65.8 kg)   03/16/18 146 lb (66.2 kg)              Today, you had the following     No orders found for display       Primary Care Provider Office Phone # Fax #    Sher Duran [phone] [address] [facility] [address]        Equal Access to Services     [facility]: Hadii aad ku hadasho Soomaali, waaxda luqadaha, qaybta kaalmada adeomkar, camelia lorenzo . So [facility] [phone].    ATENCIÓN: Si habla español, tiene a choi disposición servicios gratuitos de asistencia lingüística. Luc al [phone].    We comply with applicable federal civil rights laws and [place] laws. We do not discriminate on the basis of race, color, national origin, age, disability, sex, sexual orientation, or gender identity.            Thank you!     Thank you for choosing [facility]  for your care. Our goal is always to provide you with excellent care. Hearing back from our patients is one way we can continue to improve our services. Please take a few minutes to complete the written survey that you may receive in the mail after your visit with us. Thank you!             Your Updated Medication List - Protect others around you: Learn how to safely use, store and throw away your medicines at www.disposemymeds.org.          This " list is accurate as of 3/27/18 11:59 PM.  Always use your most recent med list.                   Brand Name Dispense Instructions for use Diagnosis    nabumetone 750 MG tablet    RELAFEN    60 tablet    Take 1 tablet (750 mg) by mouth 2 times daily    Chest wall pain       * order for DME     1 each    Quickfit wrist brace, left    Carpal tunnel syndrome of left wrist       * order for DME     1 each    Referral [facility] [phone]    Cervical radiculopathy       * order for DME     1 each    Equipment being ordered: Quick Fit wrist Lt    Bilateral carpal tunnel syndrome       triamcinolone 0.1 % cream    KENALOG    15 g    Apply sparingly to affected area three times daily for 14 days.    Rash       * Notice:  This list has 3 medication(s) that are the same as other medications prescribed for you. Read the directions carefully, and ask your doctor or other care provider to review them with you.

## 2018-03-27 NOTE — LETTER
[facility]  [id number] [place] [id number] [phone]          March 27, 2018    Naa Paredes                                                                                                                     [id number]   [facility] [address]            Re Naa,  This serves as a referral for acupuncture, valid 1 year        Sincerely,         Sher Duran  [place] [facility] MD

## 2018-03-27 NOTE — PROGRESS NOTES
SUBJECTIVE:   Naa Paredes is a 52 year old female who presents to clinic today for the following health issues:      Follow up from 2/6/18 dislocation of rt finger     Cervicalgia  (primary encounter diagnosis): Patient asks whether there is anything that can be done for her neck pain.   It is responsive to nabumetone Her left thumb is numb    Closed dislocation of finger of right hand, subsequent encounter: Patient insists she wears brace on her right pinky all of the time. She says the orthopedist suggested surgery to straighten out her finger.  Orthopedic note is incomplete, the patient is describing a fusion.      Problem list and histories reviewed & adjusted, as indicated.  Additional history: none        Reviewed and updated as needed this visit by clinical staff  Tobacco  Allergies  Meds  Med Hx  Surg Hx  Fam Hx  Soc Hx       Past Medical History:   Diagnosis Date     Ankle sprain and strain 5/2/2013     Anxiety 3/26/2015     Black stools 11/25/2015     CARDIOVASCULAR SCREENING; LDL GOAL LESS THAN 160 10/31/2010     Cervicalgia 12/7/2011     Closed dislocation of finger of right hand, subsequent encounter 2/6/2018     Colon adenoma 4/23/2015    Patient denies it. Renee Tiwari RN, 6/9/15.     Conversion disorder with mixed symptoms 6/14/2016     Depression with suicidal ideation 6/19/2015     Depressive disorder      Elevated blood pressure reading without diagnosis of hypertension 11/25/2015     Elevated LFT's 6/4/2010     Fatigue due to depression 11/24/2015     H/O: alcohol abuse 6/4/2010     IBS (irritable bowel syndrome)      Knee pain 10/19/2010     Low back pain 10/19/2010     Lumbago 12/7/2011     Major depressive disorder, recurrent, severe without psychotic features (H) 5/13/2016     Major depressive disorder, single episode, in partial remission (H) 11/24/2015     Migraine      Neck pain 10/19/2010     Nephrolithiasis     calcium oxalate     Pain in thoracic spine 1/2/2013      "Perimenopause 3/26/2015     Seasonal allergic rhinitis 10/20/2015     Thoracic outlet syndrome 7/20/2017     Vitamin D deficiency disease 6/8/2010       Past Surgical History:   Procedure Laterality Date     COLONOSCOPY N/A 6/9/2015    Procedure: COLONOSCOPY;  Surgeon: Steve Choi MD;  Location: [facility]     LITHOTRIPSY         Family History   Problem Relation Age of Onset     HEART DISEASE Mother      Cancer - colorectal Father      at age 65     MENTAL ILLNESS Paternal Uncle      Breast Cancer No family hx of      Ovarian Cancer No family hx of        Social History   Substance Use Topics     Smoking status: Never Smoker     Smokeless tobacco: Never Used     Alcohol use Yes      Comment: occ wine       Reviewed and updated as needed this visit by Provider         ROS:    This document serves as a record of the services and decisions personally performed and made by Sher Duran MD. It was created on his behalf by Nimo Waite, a trained medical scribe.  The creation of this document is based on the scribe's personal observations and the provider's statements to the medical scribe.  Nimo Waite, March 27, 2018 2:12 PM    OBJECTIVE:     /76 (BP Location: Right arm, Patient Position: Chair, Cuff Size: Adult Regular)  Pulse 85  Temp 97.8  F (36.6  C) (Oral)  Resp 16  Ht 1.651 m (5' 5\")  Wt 67.1 kg (148 lb)  LMP 08/20/2015  Breastfeeding? No  BMI 24.63 kg/m2  Body mass index is 24.63 kg/(m^2).  She is wearing a stack splint on her right fifth finger.  Its position is hampered by her long fingernails.  It is rotated.  The dorsal aspect of her distal phalanx is developing blisters.  She is using Coban to hold a stack splint on      ASSESSMENT/PLAN:   ASSESSMENT / PLAN:  (M54.2) Cervicalgia  (primary encounter diagnosis)  Comment: Responsive to nabumetone  Plan: Continue  Hypoesthesia in her thumb can be investigated.  She is not interested in nerve conduction study, afraid of needles    (B08.123Y) " Closed dislocation of finger of right hand, subsequent encounter  Comment: This is now remote.  Her use of the Stax splint has been persistently improper or absent.  She is now developing friction blisters in her fingernails defeat the position of the splint  Plan: I recommend using tape instead of Coban she declines our trimming of her nail reports she will go home and do so.    RTC 6 weeks   Wearing a Stax splint.  She reports that she is more interested in her finger being straight than functioning, but wants to avoid surgery   The information in this document, created by the medical scribe for me, accurately reflects the services I personally performed and the decisions made by me. I have reviewed and approved this document for accuracy prior to leaving the patient care area.  Sher Duran MD March 27, 2018 2:12 PM    Sher Duran MD  [facility]

## 2018-03-28 ASSESSMENT — PATIENT HEALTH QUESTIONNAIRE - PHQ9: SUM OF ALL RESPONSES TO PHQ QUESTIONS 1-9: 1

## 2018-05-31 ENCOUNTER — OFFICE VISIT (OUTPATIENT)
Dept: ORTHOPEDICS | Facility: CLINIC | Age: 53
End: 2018-05-31
Payer: COMMERCIAL

## 2018-05-31 VITALS — BODY MASS INDEX: 24.63 KG/M2 | SYSTOLIC BLOOD PRESSURE: 100 MMHG | DIASTOLIC BLOOD PRESSURE: 62 MMHG | WEIGHT: 148 LBS

## 2018-05-31 DIAGNOSIS — M20.011 ACQUIRED MALLET FINGER, RIGHT: Primary | ICD-10-CM

## 2018-05-31 PROCEDURE — 99203 OFFICE O/P NEW LOW 30 MIN: CPT | Performed by: ORTHOPAEDIC SURGERY

## 2018-05-31 NOTE — LETTER
5/31/2018         RE: Naa Paredes  [address]  [facility] [address]        Dear Colleague,    Thank you for referring your patient, Naa Paredes, to the [facility] ORTHOPEDIC SURGERY. Please see a copy of my visit note below.    HISTORY OF PRESENT ILLNESS:    Naa Paredes is a 52 year old female who is seen in follow up  for right distal phalanx fracture of the right fifth finger DOI: 12/20/17.  Present symptoms: tightness across DIP joint of 5th phalanx, denies pain, weakness, numbness in digit  Treatments tried to this point: splint    Past Medical History:   Diagnosis Date     Ankle sprain and strain 5/2/2013     Anxiety 3/26/2015     Black stools 11/25/2015     CARDIOVASCULAR SCREENING; LDL GOAL LESS THAN 160 10/31/2010     Cervicalgia 12/7/2011     Closed dislocation of finger of right hand, subsequent encounter 2/6/2018     Colon adenoma 4/23/2015    Patient denies it. Renee Tiwari RN, 6/9/15.     Conversion disorder with mixed symptoms 6/14/2016     Depression with suicidal ideation 6/19/2015     Depressive disorder      Elevated blood pressure reading without diagnosis of hypertension 11/25/2015     Elevated LFT's 6/4/2010     Fatigue due to depression 11/24/2015     H/O: alcohol abuse 6/4/2010     IBS (irritable bowel syndrome)      Knee pain 10/19/2010     Low back pain 10/19/2010     Lumbago 12/7/2011     Major depressive disorder, recurrent, severe without psychotic features (H) 5/13/2016     Major depressive disorder, single episode, in partial remission (H) 11/24/2015     Migraine      Neck pain 10/19/2010     Nephrolithiasis     calcium oxalate     Pain in thoracic spine 1/2/2013     Perimenopause 3/26/2015     Seasonal allergic rhinitis 10/20/2015     Thoracic outlet syndrome 7/20/2017     Vitamin D deficiency disease 6/8/2010       Past Surgical History:   Procedure Laterality Date     COLONOSCOPY N/A 6/9/2015    Procedure: COLONOSCOPY;  Surgeon: Jimbo  Steve RODRIGUES MD;  Location: [facility]     LITHOTRIPSY         Family History   Problem Relation Age of Onset     HEART DISEASE Mother      Cancer - colorectal Father      at age 65     MENTAL ILLNESS Paternal Uncle      Breast Cancer No family hx of      Ovarian Cancer No family hx of        Social History     Social History     Marital status: Legally      Spouse name: N/A     Number of children: N/A     Years of education: N/A     Occupational History     Not on file.     Social History Main Topics     Smoking status: Never Smoker     Smokeless tobacco: Never Used     Alcohol use Yes      Comment: occ wine     Drug use: No     Sexual activity: Not Currently     Partners: Male     Other Topics Concern     Not on file     Social History Narrative       Current Outpatient Prescriptions   Medication Sig Dispense Refill     order for DME Quickfit wrist brace, left 1 each 0     triamcinolone (KENALOG) 0.1 % cream Apply sparingly to affected area three times daily for 14 days. 15 g 0     nabumetone (RELAFEN) 750 MG tablet Take 1 tablet (750 mg) by mouth 2 times daily 60 tablet 0     order for DME Equipment being ordered: Quick Fit wrist Lt 1 each 0     order for DME Referral [facility]  [phone] 1 each 0       No Known Allergies    REVIEW OF SYSTEMS:  CONSTITUTIONAL:  NEGATIVE for fever, chills, change in weight  INTEGUMENTARY/SKIN:  NEGATIVE for worrisome rashes, moles or lesions  EYES:  NEGATIVE for vision changes or irritation  ENT/MOUTH:  NEGATIVE for ear, mouth and throat problems  RESP:  NEGATIVE for significant cough or SOB  BREAST:  NEGATIVE for masses, tenderness or discharge  CV:  NEGATIVE for chest pain, palpitations or peripheral edema  GI:  NEGATIVE for nausea, abdominal pain, heartburn, or change in bowel habits  :  Negative   MUSCULOSKELETAL:  See HPI above  NEURO:  NEGATIVE for weakness, dizziness or paresthesias  ENDOCRINE:  NEGATIVE for temperature intolerance, skin/hair  changes  HEME/ALLERGY/IMMUNE:  NEGATIVE for bleeding problems  PSYCHIATRIC:  NEGATIVE for changes in mood or affect      PHYSICAL EXAM:  [facility] 08/20/2015  There is no height or weight on file to calculate BMI.   GENERAL APPEARANCE: healthy, alert and no distress   SKIN: no suspicious lesions or rashes  NEURO: Normal strength and tone, mentation intact and speech normal  VASCULAR: Good pulses, and capillary refill   LYMPH: no lymphadenopathy   PSYCH:  mentation appears normal and affect normal/bright    MSK:  Examination of her right fifth finger reveals a mallet finger with approximately 40  of flexion through the DIP joint.     ASSESSMENT / PLAN: Fixed fifth finger mallet deformity.  I told her that I would not consider surgical correction for this.  I gave her the name of hand surgeons for their considerations also.      Imaging Interpretation:         Cooper Regan MD  Department of Orthopedic Surgery        Again, thank you for allowing me to participate in the care of your patient.        Sincerely,        Jun Regan MD

## 2018-05-31 NOTE — PROGRESS NOTES
HISTORY OF PRESENT ILLNESS:    Naa Paredes is a 52 year old female who is seen in follow up  for right distal phalanx fracture of the right fifth finger DOI: 12/20/17.  Present symptoms: tightness across DIP joint of 5th phalanx, denies pain, weakness, numbness in digit  Treatments tried to this point: splint    Past Medical History:   Diagnosis Date     Ankle sprain and strain 5/2/2013     Anxiety 3/26/2015     Black stools 11/25/2015     CARDIOVASCULAR SCREENING; LDL GOAL LESS THAN 160 10/31/2010     Cervicalgia 12/7/2011     Closed dislocation of finger of right hand, subsequent encounter 2/6/2018     Colon adenoma 4/23/2015    Patient denies it. Renee Tiwari RN, 6/9/15.     Conversion disorder with mixed symptoms 6/14/2016     Depression with suicidal ideation 6/19/2015     Depressive disorder      Elevated blood pressure reading without diagnosis of hypertension 11/25/2015     Elevated LFT's 6/4/2010     Fatigue due to depression 11/24/2015     H/O: alcohol abuse 6/4/2010     IBS (irritable bowel syndrome)      Knee pain 10/19/2010     Low back pain 10/19/2010     Lumbago 12/7/2011     Major depressive disorder, recurrent, severe without psychotic features (H) 5/13/2016     Major depressive disorder, single episode, in partial remission (H) 11/24/2015     Migraine      Neck pain 10/19/2010     Nephrolithiasis     calcium oxalate     Pain in thoracic spine 1/2/2013     Perimenopause 3/26/2015     Seasonal allergic rhinitis 10/20/2015     Thoracic outlet syndrome 7/20/2017     Vitamin D deficiency disease 6/8/2010       Past Surgical History:   Procedure Laterality Date     COLONOSCOPY N/A 6/9/2015    Procedure: COLONOSCOPY;  Surgeon: Steve Choi MD;  Location:  GI     LITHOTRIPSY         Family History   Problem Relation Age of Onset     HEART DISEASE Mother      Cancer - colorectal Father      at age 65     MENTAL ILLNESS Paternal Uncle      Breast Cancer No family hx of      Ovarian Cancer No  family hx of        Social History     Social History     Marital status: Legally      Spouse name: N/A     Number of children: N/A     Years of education: N/A     Occupational History     Not on file.     Social History Main Topics     Smoking status: Never Smoker     Smokeless tobacco: Never Used     Alcohol use Yes      Comment: occ wine     Drug use: No     Sexual activity: Not Currently     Partners: Male     Other Topics Concern     Not on file     Social History Narrative       Current Outpatient Prescriptions   Medication Sig Dispense Refill     order for DME Quickfit wrist brace, left 1 each 0     triamcinolone (KENALOG) 0.1 % cream Apply sparingly to affected area three times daily for 14 days. 15 g 0     nabumetone (RELAFEN) 750 MG tablet Take 1 tablet (750 mg) by mouth 2 times daily 60 tablet 0     order for DME Equipment being ordered: Quick Fit wrist Lt 1 each 0     order for DME Referral [facility]  [phone] 1 each 0       No Known Allergies    REVIEW OF SYSTEMS:  CONSTITUTIONAL:  NEGATIVE for fever, chills, change in weight  INTEGUMENTARY/SKIN:  NEGATIVE for worrisome rashes, moles or lesions  EYES:  NEGATIVE for vision changes or irritation  ENT/MOUTH:  NEGATIVE for ear, mouth and throat problems  RESP:  NEGATIVE for significant cough or SOB  BREAST:  NEGATIVE for masses, tenderness or discharge  CV:  NEGATIVE for chest pain, palpitations or peripheral edema  GI:  NEGATIVE for nausea, abdominal pain, heartburn, or change in bowel habits  :  Negative   MUSCULOSKELETAL:  See HPI above  NEURO:  NEGATIVE for weakness, dizziness or paresthesias  ENDOCRINE:  NEGATIVE for temperature intolerance, skin/hair changes  HEME/ALLERGY/IMMUNE:  NEGATIVE for bleeding problems  PSYCHIATRIC:  NEGATIVE for changes in mood or affect      PHYSICAL EXAM:  LMP 08/20/2015  There is no height or weight on file to calculate BMI.   GENERAL APPEARANCE: healthy, alert and no distress   SKIN: no  suspicious lesions or rashes  NEURO: Normal strength and tone, mentation intact and speech normal  VASCULAR: Good pulses, and capillary refill   LYMPH: no lymphadenopathy   PSYCH:  mentation appears normal and affect normal/bright    MSK:  Examination of her right fifth finger reveals a mallet finger with approximately 40  of flexion through the DIP joint.     ASSESSMENT / PLAN: Fixed fifth finger mallet deformity.  I told her that I would not consider surgical correction for this.  I gave her the name of hand surgeons for their considerations also.      Imaging Interpretation:         Cooper Regan MD  Department of Orthopedic Surgery

## 2018-05-31 NOTE — MR AVS SNAPSHOT
After Visit Summary   5/31/2018    Naa Paredes    MRN: [id number]           Patient Information     Date Of Birth          1965        Visit Information        Provider Department      5/31/2018 10:45 AM Jun Regan MD; LANGUAGE [facility] ORTHOPEDIC SURGERY        Today's Diagnoses     Acquired mallet finger, right    -  1       Follow-ups after your visit        Additional Services     ORTHO  REFERRAL       [facility] is referring you to the Orthopedic  Services at [facility].       The  Representative will assist you in the coordination of your Orthopedic and Musculoskeletal Care as prescribed by your physician.    The  Representative will call you within 24 hours to help schedule your appointment, or you may contact the  Representative at:    [facility] ~ [phone]  [facility] ~ [phone]  [facility] ~ [phone]    Type of Referral : Surgical / Specialist: [facility]      Timeframe requested: Routine     Coverage of these services is subject to the terms and limitations of your health insurance plan.  Please call member services at your health plan with any benefit or coverage questions.      If X-rays, CT or MRI's have been performed, please contact the facility where they were done to arrange for , prior to your scheduled appointment.  Please bring this referral request to your appointment and present it to your specialist.                  Who to contact     If you have questions or need follow up information about today's clinic visit or your schedule please contact [facility] ORTHOPEDIC SURGERY directly at [phone].  Normal or non-critical lab and imaging results will be communicated to you by MyChart, letter or phone within 4 business days after the clinic has received the results. If you do not hear from  us within 7 days, please contact the clinic through [employer]ag[employer] or phone. If you have a critical or abnormal lab result, we will notify you by phone as soon as possible.  Submit refill requests through [employer] or call your pharmacy and they will forward the refill request to us. Please allow 3 business days for your refill to be completed.          Additional Information About Your Visit        Care EveryWhere ID     This is your Care EveryWhere ID. This could be used by other organizations to access your [facility] medical records  [id number]        Your Vitals Were     Last Period BMI (Body Mass Index)                08/20/2015 24.63 kg/m2           Blood Pressure from Last 3 Encounters:   05/31/18 100/62   03/27/18 118/76   03/19/18 108/74    Weight from Last 3 Encounters:   05/31/18 148 lb (67.1 kg)   03/27/18 148 lb (67.1 kg)   03/19/18 145 lb (65.8 kg)              We Performed the Following     ORTHO  REFERRAL     SPLINT FINGER STAXX SIZE 2        Primary Care Provider Office Phone # Fax #    Sher Duran [phone] [address] [address]        Equal Access to Services     [facility] : Hadii aad ku hadasho Sokelleeali, waaxda luqadaha, qaybta kaalmada adeegyada, waxay colton pachecon arina lorenzo . So [facility] [phone].    ATENCIÓN: Si habla español, tiene a choi disposición servicios gratuitos de asistencia lingüística. [facility] [phone].    We comply with applicable federal civil rights laws and [place] laws. We do not discriminate on the basis of race, color, national origin, age, disability, sex, sexual orientation, or gender identity.            Thank you!     Thank you for choosing [facility] ORTHOPEDIC SURGERY  for your care. Our goal is always to provide you with excellent care. Hearing back from our patients is one way we can continue to improve our services. Please take a few minutes to complete the written survey that you may receive in  the mail after your visit with us. Thank you!             Your Updated Medication List - Protect others around you: Learn how to safely use, store and throw away your medicines at www.disposemymeds.org.          This list is accurate as of 5/31/18 11:59 PM.  Always use your most recent med list.                   Brand Name Dispense Instructions for use Diagnosis    nabumetone 750 MG tablet    RELAFEN    60 tablet    Take 1 tablet (750 mg) by mouth 2 times daily    Chest wall pain       * order for DME     1 each    Quickfit wrist brace, left    Carpal tunnel syndrome of left wrist       * order for DME     1 each    Referral [facility] [phone]    Cervical radiculopathy       * order for DME     1 each    Equipment being ordered: Quick Fit wrist Lt    Bilateral carpal tunnel syndrome       triamcinolone 0.1 % cream    KENALOG    15 g    Apply sparingly to affected area three times daily for 14 days.    Rash       * Notice:  This list has 3 medication(s) that are the same as other medications prescribed for you. Read the directions carefully, and ask your doctor or other care provider to review them with you.

## 2018-08-06 ENCOUNTER — OFFICE VISIT (OUTPATIENT)
Dept: FAMILY MEDICINE | Facility: CLINIC | Age: 53
End: 2018-08-06
Payer: COMMERCIAL

## 2018-08-06 VITALS
HEART RATE: 67 BPM | SYSTOLIC BLOOD PRESSURE: 109 MMHG | DIASTOLIC BLOOD PRESSURE: 76 MMHG | WEIGHT: 143 LBS | RESPIRATION RATE: 16 BRPM | BODY MASS INDEX: 23.82 KG/M2 | HEIGHT: 65 IN | TEMPERATURE: 98.3 F

## 2018-08-06 DIAGNOSIS — F33.2 MAJOR DEPRESSIVE DISORDER, RECURRENT, SEVERE WITHOUT PSYCHOTIC FEATURES (H): Primary | ICD-10-CM

## 2018-08-06 PROCEDURE — 99214 OFFICE O/P EST MOD 30 MIN: CPT | Performed by: FAMILY MEDICINE

## 2018-08-06 NOTE — MR AVS SNAPSHOT
"              After Visit Summary   8/6/2018    Naa Paredes    MRN: [id number]           Patient Information     Date Of Birth          1965        Visit Information        Provider Department      8/6/2018 9:15 AM Sher Duran MD; [place] [facility]        Today's Diagnoses     Major depressive disorder, recurrent, severe without psychotic features (H)    -  1       Follow-ups after your visit        Follow-up notes from your care team     Return in about 1 week (around 8/13/2018).      Your next 10 appointments already scheduled     Aug 13, 2018 10:00 AM CDT   [facility]    [address] [place] [address] [phone]              Who to contact     If you have questions or need follow up information about today's clinic visit or your schedule please contact [facility] directly at [phone].  Normal or non-critical lab and imaging results will be communicated to you by MyChart, letter or phone within 4 business days after the clinic has received the results. If you do not hear from us within 7 days, please contact the clinic through MyChart or phone. If you have a critical or abnormal lab result, we will notify you by phone as soon as possible.  Submit refill requests through [employer] or call your pharmacy and they will forward the refill request to us. Please allow 3 business days for your refill to be completed.          Additional Information About Your Visit        Care EveryWhere ID     This is your Care EveryWhere ID. This could be used by other organizations to access your [place] medical records  [id number]        Your Vitals Were     Pulse Temperature Respirations Height Last Period Breastfeeding?    67 98.3  F (36.8  C) (Oral) 16 5' 5\" (1.651 m) 08/20/2015 No    BMI (Body Mass Index)                   23.8 kg/m2            " Blood Pressure from Last 3 Encounters:   08/06/18 109/76   05/31/18 100/62   03/27/18 118/76    Weight from Last 3 Encounters:   08/06/18 143 lb (64.9 kg)   05/31/18 148 lb (67.1 kg)   03/27/18 148 lb (67.1 kg)              Today, you had the following     No orders found for display       Primary Care Provider Office Phone # Fax #    Sher Duran [phone] [address]  [facility] [address]        Equal Access to Services     [facility]: Hadii aad ku hadasho Soomaali, waaxda luqadaha, qaybta kaalmada adeegyada, camelia lorenzo . So [facility] [phone].    ATENCIÓN: Si habla español, tiene a choi disposición servicios gratuitos de asistencia lingüística. [facility] [phone].    We comply with applicable federal civil rights laws and [place] laws. We do not discriminate on the basis of race, color, national origin, age, disability, sex, sexual orientation, or gender identity.            Thank you!     Thank you for choosing [facility]  for your care. Our goal is always to provide you with excellent care. Hearing back from our patients is one way we can continue to improve our services. Please take a few minutes to complete the written survey that you may receive in the mail after your visit with us. Thank you!             Your Updated Medication List - Protect others around you: Learn how to safely use, store and throw away your medicines at www.disposemymeds.org.          This list is accurate as of 8/6/18 11:43 AM.  Always use your most recent med list.                   Brand Name Dispense Instructions for use Diagnosis    nabumetone 750 MG tablet    RELAFEN    60 tablet    Take 1 tablet (750 mg) by mouth 2 times daily    Chest wall pain       * order for DME     1 each    Quickfit wrist brace, left    Carpal tunnel syndrome of left wrist       * order for DME     1 each    Referral [facility] [phone]    Cervical  radiculopathy       * order for DME     1 each    Equipment being ordered: Quick Fit wrist Lt    Bilateral carpal tunnel syndrome       triamcinolone 0.1 % cream    KENALOG    15 g    Apply sparingly to affected area three times daily for 14 days.    Rash       * Notice:  This list has 3 medication(s) that are the same as other medications prescribed for you. Read the directions carefully, and ask your doctor or other care provider to review them with you.

## 2018-08-06 NOTE — PROGRESS NOTES
"  SUBJECTIVE:   Naa Paredes is a 52 year old female who presents to clinic today for the following health issues:        Naa states that there is a \"worker\" who asked her to see her PCP to \"see how she was\" perhaps in regard to her depression.Previously treated by Dr KARINA Elmore [facility]iat[facility], she has not seen him in a year and 1/2.  Last communication from Dr Elmore was that pt was \"cured\". Pt has phone messages, one from  Sage Flores, the other the name \"[employer]\". No contact #s address etc.          Problem list and histories reviewed & adjusted, as indicated.  Additional history: as documented    Past Medical History:   Diagnosis Date     Ankle sprain and strain 5/2/2013     Anxiety 3/26/2015     Black stools 11/25/2015     CARDIOVASCULAR SCREENING; LDL GOAL LESS THAN 160 10/31/2010     Cervicalgia 12/7/2011     Closed dislocation of finger of right hand, subsequent encounter 2/6/2018     Colon adenoma 4/23/2015    Patient denies it. Renee Tiwari RN, 6/9/15.     Conversion disorder with mixed symptoms 6/14/2016     Depression with suicidal ideation 6/19/2015     Depressive disorder      Elevated blood pressure reading without diagnosis of hypertension 11/25/2015     Elevated LFT's 6/4/2010     Fatigue due to depression 11/24/2015     H/O: alcohol abuse 6/4/2010     IBS (irritable bowel syndrome)      Knee pain 10/19/2010     Low back pain 10/19/2010     Lumbago 12/7/2011     Major depressive disorder, recurrent, severe without psychotic features (H) 5/13/2016     Major depressive disorder, single episode, in partial remission (H) 11/24/2015     Migraine      Neck pain 10/19/2010     Nephrolithiasis     calcium oxalate     Pain in thoracic spine 1/2/2013     Perimenopause 3/26/2015     Seasonal allergic rhinitis 10/20/2015     Thoracic outlet syndrome 7/20/2017     Vitamin D deficiency disease 6/8/2010     She is undergoing 8 weeks casting to address her Mallet Finger through [facility]  Past Surgical History: " "  Procedure Laterality Date     COLONOSCOPY N/A 6/9/2015    Procedure: COLONOSCOPY;  Surgeon: Steve Choi MD;  Location: [facility]     LITHOTRIPSY         Family History   Problem Relation Age of Onset     HEART DISEASE Mother      Cancer - colorectal Father      at age 65     Mental Illness Paternal Uncle      Breast Cancer No family hx of      Ovarian Cancer No family hx of        Social History   Substance Use Topics     Smoking status: Never Smoker     Smokeless tobacco: Never Used     Alcohol use Yes      Comment: occ wine     Reviewed and updated as needed this visit by clinical staff  Tobacco  Allergies       Reviewed and updated as needed this visit by Provider     Care everywhere shows active venlafaxine and oxycodone prescriptions ([facility] records, prescriber unknown)        Topic Date Due     DEPRESSION ACTION PLAN Q1 YR  11/17/2017           This document serves as a record of the services and decisions personally performed and made by Sher Duran MD. It was created on his behalf by Bruce Corrales, a trained medical scribe.  The creation of this document is based on the scribe's personal observations and the provider's statements to the medical scribe.  Bruce Corrales, August 6, 2018 10:02 AM    OBJECTIVE:     /76 (BP Location: Right arm, Patient Position: Chair)  Pulse 67  Temp 98.3  F (36.8  C) (Oral)  Resp 16  Ht 1.651 m (5' 5\")  Wt 64.9 kg (143 lb)  LMP 08/20/2015  Breastfeeding? No  BMI 23.8 kg/m2  Body mass index is 23.8 kg/(m^2).      ASSESSMENT/PLAN:   ASSESSMENT / PLAN:  (F33.2) Major depressive disorder, recurrent, severe without psychotic features (H)  (primary encounter diagnosis)  Comment: status unknown, under care of unknown provider  Plan:     Over 50 % of this visit 30 minute visit  spent in face to face counseling and coordination of care.Records release signed to communicate with Social Work Sage Flores at [facility]. Phone call made, message left. Will find out SW " interest. If this is a status report on depression, that would be better obtained from her Psychiatrist or current prescriber.  We shall schedule a PE for next week. If the question is general health, we can address that        Sher Duran MD          The information in this document, created by the medical scribe for me, accurately reflects the services I personally performed and the decisions made by me. I have reviewed and approved this document for accuracy prior to leaving the patient care area.  Sher Duran MD August 6, 2018 10:02 AM    Sher Duran MD  [facility]

## 2018-08-13 ENCOUNTER — OFFICE VISIT (OUTPATIENT)
Dept: FAMILY MEDICINE | Facility: CLINIC | Age: 53
End: 2018-08-13
Payer: COMMERCIAL

## 2018-08-13 VITALS
TEMPERATURE: 98 F | HEART RATE: 69 BPM | DIASTOLIC BLOOD PRESSURE: 77 MMHG | SYSTOLIC BLOOD PRESSURE: 114 MMHG | WEIGHT: 142 LBS | HEIGHT: 65 IN | RESPIRATION RATE: 16 BRPM | BODY MASS INDEX: 23.66 KG/M2

## 2018-08-13 DIAGNOSIS — F32.5 MAJOR DEPRESSION IN COMPLETE REMISSION (H): ICD-10-CM

## 2018-08-13 DIAGNOSIS — Z13.6 CARDIOVASCULAR SCREENING; LDL GOAL LESS THAN 160: ICD-10-CM

## 2018-08-13 DIAGNOSIS — Z00.00 ROUTINE GENERAL MEDICAL EXAMINATION AT A HEALTH CARE FACILITY: Primary | ICD-10-CM

## 2018-08-13 LAB
ALBUMIN SERPL-MCNC: 4.6 G/DL (ref 3.4–5)
ALP SERPL-CCNC: 83 U/L (ref 40–150)
ALT SERPL W P-5'-P-CCNC: 51 U/L (ref 0–50)
ANION GAP SERPL CALCULATED.3IONS-SCNC: 9 MMOL/L (ref 3–14)
AST SERPL W P-5'-P-CCNC: 33 U/L (ref 0–45)
BILIRUB SERPL-MCNC: 0.6 MG/DL (ref 0.2–1.3)
BUN SERPL-MCNC: 24 MG/DL (ref 7–30)
CALCIUM SERPL-MCNC: 8.9 MG/DL (ref 8.5–10.1)
CHLORIDE SERPL-SCNC: 105 MMOL/L (ref 94–109)
CHOLEST SERPL-MCNC: 277 MG/DL
CO2 SERPL-SCNC: 29 MMOL/L (ref 20–32)
CREAT SERPL-MCNC: 0.68 MG/DL (ref 0.52–1.04)
GFR SERPL CREATININE-BSD FRML MDRD: >90 ML/MIN/1.7M2
GLUCOSE SERPL-MCNC: 85 MG/DL (ref 70–99)
HDLC SERPL-MCNC: 68 MG/DL
LDLC SERPL CALC-MCNC: 169 MG/DL
NONHDLC SERPL-MCNC: 209 MG/DL
POTASSIUM SERPL-SCNC: 3.8 MMOL/L (ref 3.4–5.3)
PROT SERPL-MCNC: 7.7 G/DL (ref 6.8–8.8)
SODIUM SERPL-SCNC: 143 MMOL/L (ref 133–144)
TRIGL SERPL-MCNC: 202 MG/DL
TSH SERPL DL<=0.005 MIU/L-ACNC: 1.58 MU/L (ref 0.4–4)

## 2018-08-13 PROCEDURE — 99396 PREV VISIT EST AGE 40-64: CPT | Performed by: FAMILY MEDICINE

## 2018-08-13 PROCEDURE — 80053 COMPREHEN METABOLIC PANEL: CPT | Performed by: FAMILY MEDICINE

## 2018-08-13 PROCEDURE — 36415 COLL VENOUS BLD VENIPUNCTURE: CPT | Performed by: FAMILY MEDICINE

## 2018-08-13 PROCEDURE — 80061 LIPID PANEL: CPT | Performed by: FAMILY MEDICINE

## 2018-08-13 PROCEDURE — 84443 ASSAY THYROID STIM HORMONE: CPT | Performed by: FAMILY MEDICINE

## 2018-08-13 NOTE — MR AVS SNAPSHOT
"              After Visit Summary   8/13/2018    Naa Paredes    MRN: [id number]           Patient Information     Date Of Birth          1965        Visit Information        Provider Department      8/13/2018 9:45 AM Sher Duran MD; [place] [employer] [facility]        Today's Diagnoses     Routine general medical examination at a health care facility    -  1    CARDIOVASCULAR SCREENING; LDL GOAL LESS THAN 160        Major depression in complete remission (H)           Follow-ups after your visit        Who to contact     If you have questions or need follow up information about today's clinic visit or your schedule please contact [facility] directly at [phone].  Normal or non-critical lab and imaging results will be communicated to you by MyChart, letter or phone within 4 business days after the clinic has received the results. If you do not hear from us within 7 days, please contact the clinic through MyChart or phone. If you have a critical or abnormal lab result, we will notify you by phone as soon as possible.  Submit refill requests through [employer] or call your pharmacy and they will forward the refill request to us. Please allow 3 business days for your refill to be completed.          Additional Information About Your Visit        Care EveryWhere ID     This is your Care EveryWhere ID. This could be used by other organizations to access your [place] medical records  [id number]        Your Vitals Were     Pulse Temperature Respirations Height Last Period Breastfeeding?    69 98  F (36.7  C) (Oral) 16 5' 5\" (1.651 m) 08/20/2015 No    BMI (Body Mass Index)                   23.63 kg/m2            Blood Pressure from Last 3 Encounters:   08/13/18 114/77   08/06/18 109/76   05/31/18 100/62    Weight from Last 3 Encounters:   08/13/18 142 lb (64.4 kg)   08/06/18 143 lb (64.9 kg)   05/31/18 148 lb (67.1 kg)              We Performed the Following  "    Comprehensive metabolic panel     Lipid panel reflex to direct LDL Non-fasting     TSH with free T4 reflex        Primary Care Provider Office Phone # Fax #    Sher Duran [phone] [id number]  [facility] [address]        Equal Access to Services     ALEXYXENIA FLEX : Hadii aad ku hadasho Soomaali, waaxda luqadaha, qaybta kaalmada adeegyada, waxay idiin hayaan adeeg khabad ladorisn ah. So [facility] [phone].    ATENCIÓN: Si habla español, tiene a choi disposición servicios gratuitos de asistencia lingüística. Llame al [phone].    We comply with applicable federal civil rights laws and [place] laws. We do not discriminate on the basis of race, color, national origin, age, disability, sex, sexual orientation, or gender identity.            Thank you!     Thank you for choosing [facility]  for your care. Our goal is always to provide you with excellent care. Hearing back from our patients is one way we can continue to improve our services. Please take a few minutes to complete the written survey that you may receive in the mail after your visit with us. Thank you!             Your Updated Medication List - Protect others around you: Learn how to safely use, store and throw away your medicines at www.disposemymeds.org.          This list is accurate as of 8/13/18  1:24 PM.  Always use your most recent med list.                   Brand Name Dispense Instructions for use Diagnosis    nabumetone 750 MG tablet    RELAFEN    60 tablet    Take 1 tablet (750 mg) by mouth 2 times daily    Chest wall pain       * order for DME     1 each    Quickfit wrist brace, left    Carpal tunnel syndrome of left wrist       * order for DME     1 each    Referral [facility] [phone]    Cervical radiculopathy       * order for DME     1 each    Equipment being ordered: Quick Fit wrist Lt    Bilateral carpal tunnel syndrome       triamcinolone 0.1 % cream    KENALOG     15 g    Apply sparingly to affected area three times daily for 14 days.    Rash       * Notice:  This list has 3 medication(s) that are the same as other medications prescribed for you. Read the directions carefully, and ask your doctor or other care provider to review them with you.

## 2018-08-13 NOTE — PROGRESS NOTES
SUBJECTIVE:   CC: Naa Paredes is an 52 year old woman who presents for preventive health visit.     Today's PHQ-2 Score:   PHQ-2 ( 1999 [employer]) 4/8/2017 7/19/2016   Q1: Little interest or pleasure in doing things 0 2   Q2: Feeling down, depressed or hopeless 0 2   PHQ-2 Score 0 4         CARDIOVASCULAR SCREENING; LDL GOAL LESS THAN 160 .  Lab Results   Component Value Date    CHOL 236 11/01/2016     Lab Results   Component Value Date    HDL 62 11/01/2016     Lab Results   Component Value Date     11/01/2016     Lab Results   Component Value Date    TRIG 79 11/01/2016     Lab Results   Component Value Date    CHOLHDLRATIO 3.8 07/09/2015     Major depression in complete remission (H) - Currently looking for new employment.   PHQ-9 SCORE 6/23/2017 1/29/2018 3/27/2018   Total Score - - -   Total Score 1 0 1           Abuse: Current or Past(Physical, Sexual or Emotional)- No  Do you feel safe in your environment - Yes    Social History   Substance Use Topics     Smoking status: Never Smoker     Smokeless tobacco: Never Used     Alcohol use Yes      Comment: occ wine     If you drink alcohol do you typically have >3 drinks per day or >7 drinks per week? No                     Reviewed orders with patient.  Reviewed health maintenance and updated orders accordingly - Yes  Patient over age 50, mutual decision to screen reflected in health maintenance.    Pertinent mammograms are reviewed under the imaging tab.  History of abnormal Pap smear: NO - age 30-65 PAP every 5 years with negative HPV co-testing recommended  PAP / HPV Latest Ref Rng & Units 11/17/2016 6/30/2014 8/19/2011   PAP - NIL NIL NIL   HPV 16 DNA NEG Negative - -   HPV 18 DNA NEG Negative - -   OTHER HR HPV NEG Negative - -     Reviewed and updated as needed this visit by clinical staff  Tobacco  Allergies  Meds  Med Hx  Surg Hx  Fam Hx  Soc Hx      Reviewed and updated as needed this visit by Provider        Past Medical History:    Diagnosis Date     Ankle sprain and strain 5/2/2013     Anxiety 3/26/2015     Black stools 11/25/2015     CARDIOVASCULAR SCREENING; LDL GOAL LESS THAN 160 10/31/2010     Cervicalgia 12/7/2011     Closed dislocation of finger of right hand, subsequent encounter 2/6/2018     Colon adenoma 4/23/2015    Patient denies it. Renee Tiwari RN, 6/9/15.     Conversion disorder with mixed symptoms 6/14/2016     Depression with suicidal ideation 6/19/2015     Depressive disorder      Elevated blood pressure reading without diagnosis of hypertension 11/25/2015     Elevated LFT's 6/4/2010     Fatigue due to depression 11/24/2015     H/O: alcohol abuse 6/4/2010     IBS (irritable bowel syndrome)      Knee pain 10/19/2010     Low back pain 10/19/2010     Lumbago 12/7/2011     Major depressive disorder, recurrent, severe without psychotic features (H) 5/13/2016     Major depressive disorder, single episode, in partial remission (H) 11/24/2015     Migraine      Neck pain 10/19/2010     Nephrolithiasis     calcium oxalate     Pain in thoracic spine 1/2/2013     Perimenopause 3/26/2015     Seasonal allergic rhinitis 10/20/2015     Thoracic outlet syndrome 7/20/2017     Vitamin D deficiency disease 6/8/2010     Past Surgical History:   Procedure Laterality Date     COLONOSCOPY N/A 6/9/2015    Procedure: COLONOSCOPY;  Surgeon: Steve Choi MD;  Location:  GI     LITHOTRIPSY       Family History   Problem Relation Age of Onset     HEART DISEASE Mother      Cancer - colorectal Father      at age 65     Mental Illness Paternal Uncle      Breast Cancer No family hx of      Ovarian Cancer No family hx of      Social History   Substance Use Topics     Smoking status: Never Smoker     Smokeless tobacco: Never Used     Alcohol use Yes      Comment: occ wine       ROS:  CONSTITUTIONAL: POSITIVE for sleep trouble due to stress, denies taking naps during the day.  EYES: She does not like her new glasses  HEENT: Has not followed up  "with her dentist in some time. Appointment scheduled for sometime in 2019.  RESP: NEGATIVE for significant cough or SOB  CV: NEGATIVE for chest pain, palpitations or peripheral edema  GI: NEGATIVE for nausea, abdominal pain, heartburn, or change in bowel habits  : NEGATIVE for unusual urinary or vaginal symptoms. No vaginal bleeding.  MUSCULOSKELETAL: POSITIVE for foot pain when dancing excessively, not currently. Previous stress fractures. Current lateral hand hypoesthesia, known CTS  PSYCHIATRIC: POSITIVE for stress, currently looking for new job.    This document serves as a record of the services and decisions personally performed and made by Sher Duran MD. It was created on his behalf by Bruce Corrales, a trained medical scribe.  The creation of this document is based on the scribe's personal observations and the provider's statements to the medical scribe.  Bruce Corrales, August 13, 2018 10:08 AM    OBJECTIVE:   /77 (BP Location: Right arm, Patient Position: Chair, Cuff Size: Adult Regular)  Pulse 69  Temp 98  F (36.7  C) (Oral)  Resp 16  Ht 1.651 m (5' 5\")  Wt 64.4 kg (142 lb)  LMP 08/20/2015  Breastfeeding? No  BMI 23.63 kg/m2  EXAM:  GENERAL APPEARANCE: healthy, alert and no distress,  present due to language barrier, did not translate very much during visit.   EYES: Eyes grossly normal to inspection, PERRL and conjunctivae and sclerae normal  HENT: ear canals and TM's normal, nose and mouth without ulcers or lesions, oropharynx clear and oral mucous membranes moist  RESP: lungs clear to auscultation - no rales, rhonchi or wheezes  BREAST: normal without masses, tenderness or nipple discharge and no palpable axillary masses or adenopathy  CV: regular rate and rhythm, normal S1 S2, no S3 or S4, no murmur, click or rub, no peripheral edema and peripheral pulses strong  ABDOMEN: soft, nontender, no hepatosplenomegaly, no masses and bowel sounds normal  MS: R V finger in splint  SKIN: " "no rashes   PSYCH: mentation appears normal and affect normal/bright    Non fasting labs today.   ASSESSMENT/PLAN:   ASSESSMENT / PLAN:  (Z00.00) Routine general medical examination at a health care facility  (primary encounter diagnosis)  Comment: completed   Plan: Comprehensive metabolic panel, Lipid panel         reflex to direct LDL Non-fasting, TSH with free        T4 reflex          (Z13.6) CARDIOVASCULAR SCREENING; LDL GOAL LESS THAN 160  Comment: The 10-year ASCVD risk score (Yamila VILLAGRAN Jr, et al., 2013) is: 1.4%    Values used to calculate the score:      Age: 52 years      Sex: Female      Is Non- : No      Diabetic: No      Tobacco smoker: No      Systolic Blood Pressure: 114 mmHg      Is BP treated: No      HDL Cholesterol: 62 mg/dL      Total Cholesterol: 236 mg/dL      Plan: Continue to monitor.     (F32.5) Major depression in complete remission (H)  Comment: Controlled. Currently experiencing some employee stress  Plan: Continue to monitor        COUNSELING:   Reviewed preventive health counseling, as reflected in patient instructions       Regular exercise       Healthy diet/nutrition       Vision screening       Hearing screening       Immunizations       Dental      BP Readings from Last 1 Encounters:   08/06/18 109/76     Estimated body mass index is 23.8 kg/(m^2) as calculated from the following:    Height as of 8/6/18: 5' 5\" (1.651 m).    Weight as of 8/6/18: 143 lb (64.9 kg).     reports that she has never smoked. She has never used smokeless tobacco.    Counseling Resources:  ATP IV Guidelines  Pooled Cohorts Equation Calculator  Breast Cancer Risk Calculator  FRAX Risk Assessment  ICSI Preventive Guidelines  Dietary Guidelines for Americans, 2010  USDA's MyPlate  ASA Prophylaxis  Lung CA Screening    The information in this document, created by the medical scribe for me, accurately reflects the services I personally performed and the decisions made by me. I have reviewed " and approved this document for accuracy prior to leaving the patient care area.  Sher Duran MD August 13, 2018 10:08 AM    Sher Duran MD  [facility] for HPI/ROS submitted by the patient on 8/13/2018   Annual Exam:  Getting at least 3 servings of Calcium per day:: Yes  Bi-annual eye exam:: Yes  Dental care twice a year:: NO  Sleep apnea or symptoms of sleep apnea:: Daytime drowsiness  Diet:: Regular (no restrictions)  Frequency of exercise:: None  Taking medications regularly:: Yes  Medication side effects:: Not applicable  Additional concerns today:: No  PHQ-2 Score: 1

## 2018-08-14 ENCOUNTER — TELEPHONE (OUTPATIENT)
Dept: FAMILY MEDICINE | Facility: CLINIC | Age: 53
End: 2018-08-14

## 2018-08-14 DIAGNOSIS — N18.30 CKD (CHRONIC KIDNEY DISEASE) STAGE 3, GFR 30-59 ML/MIN (H): Primary | ICD-10-CM

## 2018-08-14 RX ORDER — LISINOPRIL 2.5 MG/1
2.5 TABLET ORAL DAILY
Qty: 90 TABLET | Refills: 1 | Status: SHIPPED | OUTPATIENT
Start: 2018-08-14 | End: 2018-08-17

## 2018-08-15 ENCOUNTER — TELEPHONE (OUTPATIENT)
Dept: FAMILY MEDICINE | Facility: CLINIC | Age: 53
End: 2018-08-15

## 2018-08-15 NOTE — TELEPHONE ENCOUNTER
Called Patient with  services and left message for Patient to call back the clinic.     Angelica Lee

## 2018-08-15 NOTE — TELEPHONE ENCOUNTER
Kidneys are a bit weak, other Tests arel stable, cholesterol is good. We should prescribe a medicine to protect your kidneys. I have sent it  To the pharmacy. You should take it every day   VEE ALVARADO     Call pt with Chinese    Vee Alvarado

## 2018-08-16 NOTE — TELEPHONE ENCOUNTER
Pt returned call (not with interpretor)  Message given, states she understood,  Encouraged to see PCP in 6 month    Lorelei Esteves RN, BS  Clinical Nurse Triage.

## 2018-08-17 ENCOUNTER — OFFICE VISIT (OUTPATIENT)
Dept: FAMILY MEDICINE | Facility: CLINIC | Age: 53
End: 2018-08-17
Payer: COMMERCIAL

## 2018-08-17 ENCOUNTER — TELEPHONE (OUTPATIENT)
Dept: FAMILY MEDICINE | Facility: CLINIC | Age: 53
End: 2018-08-17

## 2018-08-17 VITALS
BODY MASS INDEX: 23.66 KG/M2 | TEMPERATURE: 98.3 F | SYSTOLIC BLOOD PRESSURE: 115 MMHG | RESPIRATION RATE: 16 BRPM | HEART RATE: 63 BPM | HEIGHT: 65 IN | DIASTOLIC BLOOD PRESSURE: 77 MMHG | WEIGHT: 142 LBS

## 2018-08-17 DIAGNOSIS — Z13.6 CARDIOVASCULAR SCREENING; LDL GOAL LESS THAN 160: ICD-10-CM

## 2018-08-17 DIAGNOSIS — S63.259D: ICD-10-CM

## 2018-08-17 DIAGNOSIS — F41.9 ANXIETY: Primary | ICD-10-CM

## 2018-08-17 PROBLEM — N18.30 CKD (CHRONIC KIDNEY DISEASE) STAGE 3, GFR 30-59 ML/MIN (H): Status: RESOLVED | Noted: 2018-08-14 | Resolved: 2018-08-17

## 2018-08-17 PROCEDURE — 99214 OFFICE O/P EST MOD 30 MIN: CPT | Performed by: FAMILY MEDICINE

## 2018-08-17 RX ORDER — LISINOPRIL 2.5 MG/1
TABLET ORAL
Refills: 0 | COMMUNITY
Start: 2018-08-15 | End: 2018-08-17

## 2018-08-17 NOTE — TELEPHONE ENCOUNTER
Called and Left message on the Pt's phone with Chinese .   We need to get a hold of this Pt as soon as possible as a medication error has occurred.   Huddled with Dr. Duran, wrong result note and subsequently wrong medication prescribed to the Pt.   The Pt does not have CKD, and should not be on Lisinopril.     I called the  AV pharmacy and cancelled script, however the Pt has already picked up the medication. Unknown if she has started it.   If we do not hear from the Pt by later today, will recall. If the Pt calls back without an , please advise her we need to have an  on the line to ensure she understands plan.   Call the Pt back with Chinese .     Chanda Muñiz RN -- [facility]

## 2018-08-17 NOTE — PROGRESS NOTES
SUBJECTIVE:   Naa Paredes is a 52 year old female who presents to clinic today for the following health issues:  Patient received lab notification intended for another patient.  She was informed that she had renal dysfunction, but she did not.  GFR Estimate   Date Value Ref Range Status   08/13/2018 >90 >60 mL/min/1.7m2 Final     Comment:     Non  GFR Calc   07/14/2016 >90  Non  GFR Calc   >60 mL/min/1.7m2 Final   06/14/2016 87 >60 mL/min/1.7m2 Final     Comment:     Non  GFR Calc     GFR Estimate If Black   Date Value Ref Range Status   08/13/2018 >90 >60 mL/min/1.7m2 Final     Comment:      GFR Calc   07/14/2016 >90   GFR Calc   >60 mL/min/1.7m2 Final   06/14/2016 >90   GFR Calc   >60 mL/min/1.7m2 Final   She was placed on homeopathic lisinopril.  She reported that she felt a headache and circumoral tingling that she blamed on this medication.  She was instructed to be evaluated   Anxiety  (primary encounter diagnosis)  patient has a long history of anxiety    Closed dislocation of finger of right hand, subsequent encounter her cast has come off.  She is put on her Stax splint and reports that her orthopedist have instructed her to drop in to their office to replace her cast.  She was amazed at the desquamation under her cast  CARDIOVASCULAR SCREENING; LDL GOAL LESS THAN 160 she has been looking at her laboratory studies.  Her HDL cholesterol is high but she is concerned about high numbers    Past Medical History:   Diagnosis Date     Ankle sprain and strain 5/2/2013     Anxiety 3/26/2015     Black stools 11/25/2015     CARDIOVASCULAR SCREENING; LDL GOAL LESS THAN 160 10/31/2010     Cervicalgia 12/7/2011     Closed dislocation of finger of right hand, subsequent encounter 2/6/2018     Colon adenoma 4/23/2015    Patient denies it. Renee Tiwari RN, 6/9/15.     Conversion disorder with mixed symptoms 6/14/2016      "Depression with suicidal ideation 6/19/2015     Depressive disorder      Elevated blood pressure reading without diagnosis of hypertension 11/25/2015     Elevated LFT's 6/4/2010     Fatigue due to depression 11/24/2015     H/O: alcohol abuse 6/4/2010     IBS (irritable bowel syndrome)      Knee pain 10/19/2010     Low back pain 10/19/2010     Lumbago 12/7/2011     Major depression in complete remission (H) 8/13/2018     Major depressive disorder, recurrent, severe without psychotic features (H) 5/13/2016     Major depressive disorder, single episode, in partial remission (H) 11/24/2015     Migraine      Neck pain 10/19/2010     Nephrolithiasis     calcium oxalate     Pain in thoracic spine 1/2/2013     Perimenopause 3/26/2015     Seasonal allergic rhinitis 10/20/2015     Thoracic outlet syndrome 7/20/2017     Vitamin D deficiency disease 6/8/2010     ROS no dizziness falls dyspnea angioedema  /  /77 (BP Location: Left arm, Patient Position: Chair, Cuff Size: Adult Regular)  Pulse 63  Temp 98.3  F (36.8  C) (Oral)  Resp 16  Ht 5' 5\" (1.651 m)  Wt 142 lb (64.4 kg)  LMP 08/20/2015  Breastfeeding? No  BMI 23.63 kg/m2  No angioedema  Chest clear without stridor    ASSESSMENT / PLAN:  (F41.9) Anxiety  (primary encounter diagnosis)  Comment: *Her symptoms are not those of adverse lisinopril effect  Plan: Even so, she will stop lisinopril.  Reassurance regarding her symptoms    (S63.259D) Closed dislocation of finger of right hand, subsequent encounter  Comment: I suggest she wear her stack splint until she sees her orthopedist  Plan: I suggest she see them this afternoon    (Z13.6) CARDIOVASCULAR SCREENING; LDL GOAL LESS THAN 160  Comment: Her cardiovascular risk is low  Plan: Much reassurance          Sher Duran MD        The information in this document, created by the medical scribe for me, accurately reflects the services I personally performed and the decisions made by me. I have reviewed and " approved this document for accuracy prior to leaving the patient care area.  Sher Duran MD August 17, 2018 1:39 PM    Sher Duran MD  [facility]

## 2018-08-17 NOTE — TELEPHONE ENCOUNTER
Naa came in for her lab results.  She does not understand them.    She has questions on them.    She left before speaking with a nurse in person.  Naa stated she had another appointment this morning.    Naa would like a call today. May need an  through the phone to discuss results.

## 2018-08-17 NOTE — PROGRESS NOTES
The 10-year ASCVD risk score (Yamila VILLAGRAN Jr et al., 2013) is: 1.6%    Values used to calculate the score:      Age: 52 years      Sex: Female      Is Non- : No      Diabetic: No      Tobacco smoker: No      Systolic Blood Pressure: 115 mmHg      Is BP treated: No      HDL Cholesterol: 68 mg/dL      Total Cholesterol: 277 mg/dL

## 2018-08-17 NOTE — MR AVS SNAPSHOT
"              After Visit Summary   8/17/2018    Naa Paredes    MRN: [id number]           Patient Information     Date Of Birth          1965        Visit Information        Provider Department      8/17/2018 1:15 PM Sher Duran MD; PHONE,  [facility]        Today's Diagnoses     Anxiety    -  1    Closed dislocation of finger of right hand, subsequent encounter        CARDIOVASCULAR SCREENING; LDL GOAL LESS THAN 160           Follow-ups after your visit        Follow-up notes from your care team     Return in about 11 weeks (around 11/2/2018) for flu shot.      Who to contact     If you have questions or need follow up information about today's clinic visit or your schedule please contact [facility] directly at [phone].  Normal or non-critical lab and imaging results will be communicated to you by MyChart, letter or phone within 4 business days after the clinic has received the results. If you do not hear from us within 7 days, please contact the clinic through MyChart or phone. If you have a critical or abnormal lab result, we will notify you by phone as soon as possible.  Submit refill requests through [employer] or call your pharmacy and they will forward the refill request to us. Please allow 3 business days for your refill to be completed.          Additional Information About Your Visit        Care EveryWhere ID     This is your Care EveryWhere ID. This could be used by other organizations to access your [place] medical records  [id number]        Your Vitals Were     Pulse Temperature Respirations Height Last Period Breastfeeding?    63 98.3  F (36.8  C) (Oral) 16 5' 5\" (1.651 m) 08/20/2015 No    BMI (Body Mass Index)                   23.63 kg/m2            Blood Pressure from Last 3 Encounters:   08/17/18 115/77   08/13/18 114/77   08/06/18 109/76    Weight from Last 3 Encounters:   08/17/18 142 lb (64.4 kg)   08/13/18 142 lb (64.4 kg) "   08/06/18 143 lb (64.9 kg)              Today, you had the following     No orders found for display         Today's Medication Changes          These changes are accurate as of 8/17/18  1:59 PM.  If you have any questions, ask your nurse or doctor.               Stop taking these medicines if you haven't already. Please contact your care team if you have questions.     lisinopril 2.5 MG tablet   Commonly known as:  PRINIVIL/Zestril   Stopped by:  Sher Duran MD                    Primary Care Provider Office Phone # Fax #    Sher Duran [phone] [address] [facility] [address]        Equal Access to Services     [facility]: Hadii job Stearns, waaxda rocio, qaybta kaalmada mona, camelia lorenzo . So [facility] [phone].    ATENCIÓN: Si habla español, tiene a choi disposición servicios gratuitos de asistencia lingüística. Llame al [phone].    We comply with applicable federal civil rights laws and [place] laws. We do not discriminate on the basis of race, color, national origin, age, disability, sex, sexual orientation, or gender identity.            Thank you!     Thank you for choosing [facility]  for your care. Our goal is always to provide you with excellent care. Hearing back from our patients is one way we can continue to improve our services. Please take a few minutes to complete the written survey that you may receive in the mail after your visit with us. Thank you!             Your Updated Medication List - Protect others around you: Learn how to safely use, store and throw away your medicines at www.disposemymeds.org.          This list is accurate as of 8/17/18  1:59 PM.  Always use your most recent med list.                   Brand Name Dispense Instructions for use Diagnosis    nabumetone 750 MG tablet    RELAFEN    60 tablet    Take 1 tablet (750 mg) by mouth 2 times daily    Chest wall pain        * order for DME     1 each    Quickfit wrist brace, left    Carpal tunnel syndrome of left wrist       * order for DME     1 each    Referral [facility] [phone]    Cervical radiculopathy       * order for DME     1 each    Equipment being ordered: Quick Fit wrist Lt    Bilateral carpal tunnel syndrome       triamcinolone 0.1 % cream    KENALOG    15 g    Apply sparingly to affected area three times daily for 14 days.    Rash       * Notice:  This list has 3 medication(s) that are the same as other medications prescribed for you. Read the directions carefully, and ask your doctor or other care provider to review them with you.

## 2018-08-17 NOTE — TELEPHONE ENCOUNTER
"Spoke with the Pt via Chinese . The Pt reports starting the Lisinopril medication yesterday, and since then has been not feeling well.   She reports feeling lightheaded, \"tightness\" around her head and a numb tongue.     I did add her on to Dr. Duran schedule today to be assessed due to symptoms possibly being related to the Lisinopril. I called  services to see if we can get a Chinese  present at appointment, they will try their best. If no  present, please call into  services during appointment.     Chanda Muñiz, RN -- [facility]         "

## 2018-08-27 ENCOUNTER — TELEPHONE (OUTPATIENT)
Dept: FAMILY MEDICINE | Facility: CLINIC | Age: 53
End: 2018-08-27

## 2018-10-02 ENCOUNTER — RADIANT APPOINTMENT (OUTPATIENT)
Dept: GENERAL RADIOLOGY | Facility: CLINIC | Age: 53
End: 2018-10-02
Attending: FAMILY MEDICINE
Payer: COMMERCIAL

## 2018-10-02 ENCOUNTER — TELEPHONE (OUTPATIENT)
Dept: FAMILY MEDICINE | Facility: CLINIC | Age: 53
End: 2018-10-02

## 2018-10-02 ENCOUNTER — OFFICE VISIT (OUTPATIENT)
Dept: FAMILY MEDICINE | Facility: CLINIC | Age: 53
End: 2018-10-02
Payer: COMMERCIAL

## 2018-10-02 VITALS
WEIGHT: 142 LBS | SYSTOLIC BLOOD PRESSURE: 113 MMHG | DIASTOLIC BLOOD PRESSURE: 79 MMHG | BODY MASS INDEX: 23.63 KG/M2 | HEART RATE: 65 BPM | RESPIRATION RATE: 16 BRPM | TEMPERATURE: 97.6 F

## 2018-10-02 DIAGNOSIS — S69.92XA INJURY OF FINGER OF LEFT HAND, INITIAL ENCOUNTER: ICD-10-CM

## 2018-10-02 DIAGNOSIS — S69.91XA INJURY OF FINGER OF RIGHT HAND, INITIAL ENCOUNTER: Primary | ICD-10-CM

## 2018-10-02 PROCEDURE — 99213 OFFICE O/P EST LOW 20 MIN: CPT | Performed by: FAMILY MEDICINE

## 2018-10-02 PROCEDURE — 73140 X-RAY EXAM OF FINGER(S): CPT | Mod: RT

## 2018-10-02 NOTE — TELEPHONE ENCOUNTER
Called with official xray results from today's visit- possible avulsion fracture at site of pain.   I recommend follow up with orthopedic doctor as discussed in clinic today.   Continue with splint provided for now.   Can stop by for copy of xray on CD to take to ortho.    BARB

## 2018-10-02 NOTE — PROGRESS NOTES
SUBJECTIVE:   Naa Paredes is a 52 year old female who presents to clinic today for the following health issues:    Joint Pain    Onset: yesterday     Description:   Location: right little finger   Character: Sharp and Dull ache    Intensity: moderate    Progression of Symptoms: worse    Accompanying Signs & Symptoms:  Other symptoms: swelling and bruising     History:   Previous similar pain: YES      Precipitating factors:   Trauma or overuse: YES    Alleviating factors:  Improved by: nothing    Therapies Tried and outcome: none       Per patient, she initially injured finger in December and was seen by Tria- currently has a splint which she has had on for about 2 months. She had the splint of yesterday when she slipped and fell on a wet floor jamming the injured finger.   Noticed some swelling and a lot of pain with slight movement.           Problem list and histories reviewed & adjusted, as indicated.  Additional history: as documented    Patient Active Problem List   Diagnosis     H/O: alcohol abuse     CARDIOVASCULAR SCREENING; LDL GOAL LESS THAN 160     Cervicalgia     Nickel allergy     Anxiety     Perimenopause     Colon adenoma     Seasonal allergic rhinitis     Major depressive disorder, recurrent, severe without psychotic features (H)     Conversion disorder with mixed symptoms     Migraine with status migrainosus, not intractable, unspecified migraine type     Thoracic outlet syndrome     Closed dislocation of finger of right hand, subsequent encounter     Major depression in complete remission (H)     Past Surgical History:   Procedure Laterality Date     COLONOSCOPY N/A 6/9/2015    Procedure: COLONOSCOPY;  Surgeon: Steve Choi MD;  Location:  GI     LITHOTRIPSY         Social History   Substance Use Topics     Smoking status: Never Smoker     Smokeless tobacco: Never Used     Alcohol use Yes      Comment: occ wine     Family History   Problem Relation Age of Onset     HEART DISEASE Mother       Cancer - colorectal Father      at age 65     Mental Illness Paternal Uncle      Breast Cancer No family hx of      Ovarian Cancer No family hx of            Reviewed and updated as needed this visit by clinical staff  Tobacco  Allergies  Meds       Reviewed and updated as needed this visit by Provider         ROS:  Constitutional, msk, neuro systems are negative, except as otherwise noted.    OBJECTIVE:     /79 (BP Location: Left arm, Patient Position: Chair, Cuff Size: Adult Regular)  Pulse 65  Temp 97.6  F (36.4  C) (Oral)  Resp 16  Wt 142 lb (64.4 kg)  LMP 08/20/2015  Breastfeeding? No  BMI 23.63 kg/m2  Body mass index is 23.63 kg/(m^2).  GENERAL: healthy, alert and no distress  MS: right little finger- moderate swelling and bruising noted, diffuse TTP from base of finger to tip, limited ROM     Diagnostic Test Results:  XR right finger: I independently visualized the xray: possible fracture at distal end of 5th phalanx     ASSESSMENT/PLAN:       1. Injury of finger of right hand, initial encounter  - questionable xray but will await official read. Patient is following up with her ortho for left hand surgery today. I recommend she discussed pain with them for further evaluation. .  Supportive care discussed. Well fitting splint given today to keep finger in extension     See Patient Instructions    Treasure Nelson MD  [facility]

## 2018-10-02 NOTE — PATIENT INSTRUCTIONS
Finger Contusion  You have a contusion. This is also called a bruise. There is swelling and some bleeding under the skin, but no broken bones. This injury generally takes a few days to a few weeks to heal. During that time, the bruise will typically change in color from reddish, to purple-blue, to greenish-yellow, then to yellow-brown.  A finger contusion may be treated with a splint or kwan tape (taping the injured finger to the one next to it for support). Minor contusions likely will need no other treatment.  Home care    Elevate the hand to reduce pain and swelling. As much as possible, sit or lie down with the hand raised about the level of your heart. This is especially important during the first 48 hours.    Ice the finger to help reduce pain and swelling. Wrap a cold source (ice pack or ice cubes in a plastic bag) in a thin towel. Apply to the bruised finger for 20 minutes every 1 to 2 hours the first day. Continue this 3 to 4 times a day until the pain and swelling goes away.    If kwan tape was applied and it becomes wet or dirty, change it. You may replace it with paper, plastic, or cloth tape. Before taping, put a thin strip of cotton or gauze between the fingers to absorb sweat. This will help prevent any breakdown of skin or fungal infections.     Unless another medicine was prescribed, you can take acetaminophen, ibuprofen, or naproxen to control pain. (If you have chronic liver or kidney disease or ever had a stomach ulcer or gastrointestinal bleeding, talk with your doctor before using these medicines.)  Follow up  Follow up with your healthcare provider, or as advised. Call if you are not improving within 1 to 2 weeks.  When to seek medical advice   Call your healthcare provider right away if you have any of the following:    Increased pain or swelling    Hand or arm becomes cold, blue, numb or tingly    Signs of infection: Warmth, drainage, or increased redness or pain around the  bruise    Inability to move the injured finger or hand     Frequent bruising for unknown reasons  Date Last Reviewed: 5/1/2017 2000-2017 [employer]. [address] [place] [address], [place] [address]. All rights reserved. This information is not intended as a substitute for professional medical care. Always follow your healthcare professional's instructions.

## 2018-10-02 NOTE — MR AVS SNAPSHOT
After Visit Summary   10/2/2018    Naa Paredes    MRN: [id number]           Patient Information     Date Of Birth          1965        Visit Information        Provider Department      10/2/2018 8:15 AM Treasure Nelson MD; [facility]        Today's Diagnoses     Injury of finger of left hand, initial encounter    -  1      Care Instructions      Finger Contusion  You have a contusion. This is also called a bruise. There is swelling and some bleeding under the skin, but no broken bones. This injury generally takes a few days to a few weeks to heal. During that time, the bruise will typically change in color from reddish, to purple-blue, to greenish-yellow, then to yellow-brown.  A finger contusion may be treated with a splint or kwan tape (taping the injured finger to the one next to it for support). Minor contusions likely will need no other treatment.  Home care    Elevate the hand to reduce pain and swelling. As much as possible, sit or lie down with the hand raised about the level of your heart. This is especially important during the first 48 hours.    Ice the finger to help reduce pain and swelling. Wrap a cold source (ice pack or ice cubes in a plastic bag) in a thin towel. Apply to the bruised finger for 20 minutes every 1 to 2 hours the first day. Continue this 3 to 4 times a day until the pain and swelling goes away.    If kwan tape was applied and it becomes wet or dirty, change it. You may replace it with paper, plastic, or cloth tape. Before taping, put a thin strip of cotton or gauze between the fingers to absorb sweat. This will help prevent any breakdown of skin or fungal infections.     Unless another medicine was prescribed, you can take acetaminophen, ibuprofen, or naproxen to control pain. (If you have chronic liver or kidney disease or ever had a stomach ulcer or gastrointestinal bleeding, talk with your doctor  before using these medicines.)  Follow up  Follow up with your healthcare provider, or as advised. Call if you are not improving within 1 to 2 weeks.  When to seek medical advice   Call your healthcare provider right away if you have any of the following:    Increased pain or swelling    Hand or arm becomes cold, blue, numb or tingly    Signs of infection: Warmth, drainage, or increased redness or pain around the bruise    Inability to move the injured finger or hand     Frequent bruising for unknown reasons  Date Last Reviewed: 5/1/2017 2000-2017 [employer]. [address] [place] [id number]. All rights reserved. This information is not intended as a substitute for professional medical care. Always follow your healthcare professional's instructions.                Follow-ups after your visit        Follow-up notes from your care team     Return in about 2 weeks (around 10/16/2018).      Who to contact     If you have questions or need follow up information about today's clinic visit or your schedule please contact [facility] directly at [phone].  Normal or non-critical lab and imaging results will be communicated to you by MyChart, letter or phone within 4 business days after the clinic has received the results. If you do not hear from us within 7 days, please contact the clinic through MyChart or phone. If you have a critical or abnormal lab result, we will notify you by phone as soon as possible.  Submit refill requests through [employer] or call your pharmacy and they will forward the refill request to us. Please allow 3 business days for your refill to be completed.          Additional Information About Your Visit        Care EveryWhere ID     This is your Care EveryWhere ID. This could be used by other organizations to access your [facility] medical records  [id number]        Your Vitals Were     Pulse Temperature Respirations Last Period Breastfeeding? BMI (Body  Mass Index)    65 97.6  F (36.4  C) (Oral) 16 08/20/2015 No 23.63 kg/m2       Blood Pressure from Last 3 Encounters:   10/02/18 113/79   08/17/18 115/77   08/13/18 114/77    Weight from Last 3 Encounters:   10/02/18 142 lb (64.4 kg)   08/17/18 142 lb (64.4 kg)   08/13/18 142 lb (64.4 kg)              Today, you had the following     No orders found for display         Today's Medication Changes          These changes are accurate as of 10/2/18  9:23 AM.  If you have any questions, ask your nurse or doctor.               These medicines have changed or have updated prescriptions.        Dose/Directions    order for DME   This may have changed:  Another medication with the same name was removed. Continue taking this medication, and follow the directions you see here.   Used for:  Cervical radiculopathy   Changed by:  Treasure Nelson MD        [facility] [phone]   Quantity:  1 each   Refills:  0         Stop taking these medicines if you haven't already. Please contact your care team if you have questions.     triamcinolone 0.1 % cream   Commonly known as:  KENALOG   Stopped by:  Treasure Nelson MD                    Primary Care Provider Office Phone # Fax #    Sher Duran [phone] [address] [facility] [address]        Equal Access to Services     [facility]: Hadii aad ku hadasho Soomaali, waaxda luqadaha, qaybta kaalmada adeegyada, camelia schulte ademode mcmahan. So [facility] [phone].    ATENCIÓN: Si habla español, tiene a choi disposición servicios gratuitos de asistencia lingüística. Llame al [phone].    We comply with applicable federal civil rights laws and [place] laws. We do not discriminate on the basis of race, color, national origin, age, disability, sex, sexual orientation, or gender identity.            Thank you!     Thank you for choosing [facility]  for your care. Our goal  is always to provide you with excellent care. Hearing back from our patients is one way we can continue to improve our services. Please take a few minutes to complete the written survey that you may receive in the mail after your visit with us. Thank you!             Your Updated Medication List - Protect others around you: Learn how to safely use, store and throw away your medicines at www.disposemymeds.org.          This list is accurate as of 10/2/18  9:23 AM.  Always use your most recent med list.                   Brand Name Dispense Instructions for use Diagnosis    nabumetone 750 MG tablet    RELAFEN    60 tablet    Take 1 tablet (750 mg) by mouth 2 times daily    Chest wall pain       order for DME     1 each    Referral [facility] Kay ESCAMILLA [phone]    Cervical radiculopathy

## 2018-10-03 ENCOUNTER — TELEPHONE (OUTPATIENT)
Dept: FAMILY MEDICINE | Facility: CLINIC | Age: 53
End: 2018-10-03

## 2018-10-03 NOTE — TELEPHONE ENCOUNTER
Pt called again, had our xray was able to push images over to [facility] for [facility] to view. Patient notified.    Abdi De Guzman   10/03/18 11:35 AM

## 2018-10-03 NOTE — TELEPHONE ENCOUNTER
Pt calling in, wants results of xray  Has appt today with Tria at 10:30am       IMPRESSION: Tiny calcific densities adjacent the base of the distal  phalanx of the fifth finger which could represent small avulsion  fragments. Clinical correlation recommended. Moderate degenerative  change involving the DIP joint with mild degenerative change involving  the PIP joint of the fifth finger. No other findings.     GABRIEL CHAIREZ MD    # [phone]    Route to provider to review and advise    Lorelei Esteves RN Nurse Triage

## 2018-11-12 ENCOUNTER — RADIANT APPOINTMENT (OUTPATIENT)
Dept: MAMMOGRAPHY | Facility: CLINIC | Age: 53
End: 2018-11-12
Attending: FAMILY MEDICINE
Payer: COMMERCIAL

## 2018-11-12 DIAGNOSIS — Z12.31 VISIT FOR SCREENING MAMMOGRAM: ICD-10-CM

## 2018-11-12 PROCEDURE — 77067 SCR MAMMO BI INCL CAD: CPT | Mod: TC

## 2019-01-08 ENCOUNTER — OFFICE VISIT (OUTPATIENT)
Dept: FAMILY MEDICINE | Facility: CLINIC | Age: 54
End: 2019-01-08
Payer: COMMERCIAL

## 2019-01-08 VITALS
HEART RATE: 82 BPM | DIASTOLIC BLOOD PRESSURE: 81 MMHG | TEMPERATURE: 98.3 F | HEIGHT: 65 IN | RESPIRATION RATE: 18 BRPM | WEIGHT: 145 LBS | BODY MASS INDEX: 24.16 KG/M2 | SYSTOLIC BLOOD PRESSURE: 124 MMHG

## 2019-01-08 DIAGNOSIS — S76.012A MUSCLE STRAIN OF LEFT GLUTEAL REGION, INITIAL ENCOUNTER: Primary | ICD-10-CM

## 2019-01-08 DIAGNOSIS — G60.9 IDIOPATHIC PERIPHERAL NEUROPATHY: ICD-10-CM

## 2019-01-08 DIAGNOSIS — F32.5 MAJOR DEPRESSION IN COMPLETE REMISSION (H): ICD-10-CM

## 2019-01-08 PROCEDURE — 99214 OFFICE O/P EST MOD 30 MIN: CPT | Performed by: PHYSICIAN ASSISTANT

## 2019-01-08 RX ORDER — NAPROXEN 500 MG/1
500 TABLET ORAL 2 TIMES DAILY WITH MEALS
Qty: 60 TABLET | Refills: 1 | Status: SHIPPED | OUTPATIENT
Start: 2019-01-08 | End: 2019-02-15

## 2019-01-08 ASSESSMENT — ANXIETY QUESTIONNAIRES

## 2019-01-08 ASSESSMENT — MIFFLIN-ST. JEOR: SCORE: 1258.6

## 2019-01-08 ASSESSMENT — PATIENT HEALTH QUESTIONNAIRE - PHQ9
SUM OF ALL RESPONSES TO PHQ QUESTIONS 1-9: 1
5. POOR APPETITE OR OVEREATING: NOT AT ALL

## 2019-01-08 NOTE — PROGRESS NOTES
SUBJECTIVE:   Naa Paredes is a 53 year old female who presents to clinic today for the following health issues:    -numbness in toes on right foot has been going on for years. No pain. No change in symptoms. States wears tight fitting and high heeled shoes often.      Pain    Onset: x 1 week    Description:   Location: tailbone  Character: Dull ache    Intensity: severe when walking    Progression of Symptoms: same    Accompanying Signs & Symptoms:  Other symptoms: none    History:   Previous similar pain: YES- from injury about 2 months ago      Precipitating factors:   Trauma or overuse: YES- no known injury concerns for overuse from dancing    Alleviating factors:  Improved by: nothing    Therapies Tried and outcome: ibuprofen/tylenol, heat, OTC pain patches -no relief      PHQ-9 SCORE 6/23/2017 1/29/2018 3/27/2018   PHQ-9 Total Score - - -   PHQ-9 Total Score 1 0 1         Problem list and histories reviewed & adjusted, as indicated.  Additional history: as documented    Patient Active Problem List   Diagnosis     H/O: alcohol abuse     CARDIOVASCULAR SCREENING; LDL GOAL LESS THAN 160     Cervicalgia     Nickel allergy     Anxiety     Perimenopause     Colon adenoma     Seasonal allergic rhinitis     Major depressive disorder, recurrent, severe without psychotic features (H)     Conversion disorder with mixed symptoms     Migraine with status migrainosus, not intractable, unspecified migraine type     Thoracic outlet syndrome     Closed dislocation of finger of right hand, subsequent encounter     Major depression in complete remission (H)     Past Surgical History:   Procedure Laterality Date     COLONOSCOPY N/A 6/9/2015    Procedure: COLONOSCOPY;  Surgeon: Steve Choi MD;  Location:  GI     LITHOTRIPSY         Social History     Tobacco Use     Smoking status: Never Smoker     Smokeless tobacco: Never Used   Substance Use Topics     Alcohol use: Yes     Comment: occ wine     Family History  "  Problem Relation Age of Onset     Heart Disease Mother      Cancer - colorectal Father         at age 65     Mental Illness Paternal Uncle      Breast Cancer No family hx of      Ovarian Cancer No family hx of          Current Outpatient Medications   Medication Sig Dispense Refill     lidocaine (XYLOCAINE) 2 % external gel Apply topically as needed for moderate pain 85 mL 0     naproxen (NAPROSYN) 500 MG tablet Take 1 tablet (500 mg) by mouth 2 times daily (with meals) 60 tablet 1     Allergies   Allergen Reactions     No Clinical Screening - See Comments      PN: LW CM1: Contrast Adverse Reaction - None Reaction :  PN: LW FI1: nka     BP Readings from Last 3 Encounters:   01/08/19 124/81   10/02/18 113/79   08/17/18 115/77    Wt Readings from Last 3 Encounters:   01/08/19 65.8 kg (145 lb)   10/02/18 64.4 kg (142 lb)   08/17/18 64.4 kg (142 lb)                    Reviewed and updated as needed this visit by clinical staff  Tobacco  Allergies  Meds  Problems  Med Hx  Surg Hx  Fam Hx       Reviewed and updated as needed this visit by Provider  Tobacco  Allergies  Meds  Problems  Med Hx  Surg Hx  Fam Hx         ROS:  Constitutional, msk, neuro, psych, cardiovascular, pulmonary, gi and gu systems are negative, except as otherwise noted.    OBJECTIVE:     /81 (BP Location: Right arm, Patient Position: Chair, Cuff Size: Adult Large)   Pulse 82   Temp 98.3  F (36.8  C) (Oral)   Resp 18   Ht 1.651 m (5' 5\")   Wt 65.8 kg (145 lb)   LMP 08/20/2015   BMI 24.13 kg/m    Body mass index is 24.13 kg/m .  GENERAL APPEARANCE: healthy, alert and no distress  MS: right 3rd toe with decreased sensation. Full rom and strength. Normal cap refill. No tenderness to palpation.   ORTHO: L Hip Exam: Palpation: Tender:   Left superior gluteal region.   Non-tender:  left greater trochanter, left ASIS, left iliac crest, left proximal hamstring attachment  Range of Motion:  Full ROM, both hips  Strength:  full " strength with pain in gluteal region with resisted left hip extension.     Lumber/Thoracic Spine Exam: Tender:  None   Non-tender:  lumbar spinous processes, lumbar facet joints, left para lumbar muscles, right para lumbar muscles, left SI joint, right SI joint, left sciatic notch, right sciatic notch  Special tests:  2+ reflexes bilateral patellar and achilles.     PSYCH: mentation appears normal and affect normal/bright    Diagnostic Test Results:  none     ASSESSMENT/PLAN:     (S76.012A) Muscle strain of left gluteal region, initial encounter  (primary encounter diagnosis)  Comment: evident on history and exam. Nsaids, rest, heat and ice discussed. If no improvement in 1 month, patient should RTC. Patient requesting referral to outside acupuncture to aid. This may also help. Referral given. She also requests topical medication. Lidocaine prescribed.   Plan: [facility] REFERRAL,         lidocaine (XYLOCAINE) 2 % external gel,         naproxen (NAPROSYN) 500 MG tablet        -Medication use and side effects discussed with the patient. Patient is in complete understanding and agreement with plan.       (G60.9) Idiopathic peripheral neuropathy  Comment: possible pierce's neuroma given history of ill fitting shoes. Will have consult with podiatry. No evidence suggesting vascular etiology   Plan: ORTHO  REFERRAL            (F32.5) Major depression in complete remission (H)  Comment: stable. phq 9 normal.   Plan:     Follow up: as above     Matthew Carroll PA-C  [facility]

## 2019-01-09 ASSESSMENT — ANXIETY QUESTIONNAIRES: GAD7 TOTAL SCORE: 0

## 2019-01-15 ENCOUNTER — OFFICE VISIT (OUTPATIENT)
Dept: PODIATRY | Facility: CLINIC | Age: 54
End: 2019-01-15
Payer: COMMERCIAL

## 2019-01-15 VITALS
SYSTOLIC BLOOD PRESSURE: 112 MMHG | HEIGHT: 65 IN | BODY MASS INDEX: 24.16 KG/M2 | WEIGHT: 145 LBS | DIASTOLIC BLOOD PRESSURE: 74 MMHG

## 2019-01-15 DIAGNOSIS — M77.41 METATARSALGIA, RIGHT FOOT: ICD-10-CM

## 2019-01-15 DIAGNOSIS — M19.071 ARTHRITIS OF RIGHT FOOT: ICD-10-CM

## 2019-01-15 DIAGNOSIS — R20.0 NUMBNESS OF TOES: Primary | ICD-10-CM

## 2019-01-15 DIAGNOSIS — M20.41 HAMMERTOE OF RIGHT FOOT: ICD-10-CM

## 2019-01-15 PROCEDURE — 99243 OFF/OP CNSLTJ NEW/EST LOW 30: CPT | Performed by: PODIATRIST

## 2019-01-15 ASSESSMENT — MIFFLIN-ST. JEOR: SCORE: 1258.6

## 2019-01-15 NOTE — LETTER
1/15/2019         RE: Naa Paredes  [address]  [facility] [address]        Dear Colleague,    Thank you for referring your patient, Naa Paredes, to the [facility]. Please see a copy of my visit note below.    PATIENT HISTORY:  Matthew Carroll PA-C requested I see this patient for their foot issue.  Naa Paredes is a 53 year old female who presents to clinic for numbness to right toes 2-4. Here with . No pain just feels like it is not attached to her body. Denies injury. 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 Wondering what can be done to get the feeling back.     Review of Systems:  Patient denies fever, chills, rash, wound, stiffness, limping,  weakness, heart burn, blood in stool, chest pain with activity, calf pain when walking, shortness of breath with activity, chronic cough, easy bleeding/bruising, swelling of ankles, excessive thirst, fatigue, depression, anxiety.  Patient admits to numbness.     PAST MEDICAL HISTORY:   Past Medical History:   Diagnosis Date     Ankle sprain and strain 5/2/2013     Anxiety 3/26/2015     Black stools 11/25/2015     CARDIOVASCULAR SCREENING; LDL GOAL LESS THAN 160 10/31/2010     Cervicalgia 12/7/2011     Closed dislocation of finger of right hand, subsequent encounter 2/6/2018     Colon adenoma 4/23/2015    Patient denies it. Renee Tiwari RN, 6/9/15.     Conversion disorder with mixed symptoms 6/14/2016     Depression with suicidal ideation 6/19/2015     Depressive disorder      Elevated blood pressure reading without diagnosis of hypertension 11/25/2015     Elevated LFT's 6/4/2010     Fatigue due to depression 11/24/2015     H/O: alcohol abuse 6/4/2010     IBS (irritable bowel syndrome)      Knee pain 10/19/2010     Low back pain 10/19/2010     Lumbago 12/7/2011     Major depression in complete remission (H) 8/13/2018     Major  depressive disorder, recurrent, severe without psychotic features (H) 5/13/2016     Major depressive disorder, single episode, in partial remission (H) 11/24/2015     Migraine      Neck pain 10/19/2010     Nephrolithiasis     calcium oxalate     Pain in thoracic spine 1/2/2013     Perimenopause 3/26/2015     Seasonal allergic rhinitis 10/20/2015     Thoracic outlet syndrome 7/20/2017     Vitamin D deficiency disease 6/8/2010        PAST SURGICAL HISTORY:   Past Surgical History:   Procedure Laterality Date     COLONOSCOPY N/A 6/9/2015    Procedure: COLONOSCOPY;  Surgeon: Steve Choi MD;  Location:  GI     LITHOTRIPSY          MEDICATIONS:   Current Outpatient Medications:      lidocaine (XYLOCAINE) 2 % external gel, Apply topically as needed for moderate pain, Disp: 85 mL, Rfl: 0     naproxen (NAPROSYN) 500 MG tablet, Take 1 tablet (500 mg) by mouth 2 times daily (with meals), Disp: 60 tablet, Rfl: 1     ALLERGIES:    Allergies   Allergen Reactions     No Clinical Screening - See Comments      PN: LW CM1: Contrast Adverse Reaction - None Reaction :  PN: LW FI1: nka        SOCIAL HISTORY:   Social History     Socioeconomic History     Marital status: Legally      Spouse name: Not on file     Number of children: Not on file     Years of education: Not on file     Highest education level: Not on file   Social Needs     Financial resource strain: Not on file     Food insecurity - worry: Not on file     Food insecurity - inability: Not on file     Transportation needs - medical: Not on file     Transportation needs - non-medical: Not on file   Occupational History     Not on file   Tobacco Use     Smoking status: Never Smoker     Smokeless tobacco: Never Used   Substance and Sexual Activity     Alcohol use: Yes     Comment: occ wine     Drug use: No     Sexual activity: Not Currently     Partners: Male   Other Topics Concern     Parent/sibling w/ CABG, MI or angioplasty before 65F 55M? Not Asked  "  Social History Narrative     Not on file        FAMILY HISTORY:   Family History   Problem Relation Age of Onset     Heart Disease Mother      Cancer - colorectal Father         at age 65     Mental Illness Paternal Uncle      Breast Cancer No family hx of      Ovarian Cancer No family hx of         EXAM:Vitals: /74   Ht 1.651 m (5' 5\")   Wt 65.8 kg (145 lb)   LMP 08/20/2015   BMI 24.13 kg/m     BMI= Body mass index is 24.13 kg/m .    General appearance: Patient is alert and fully cooperative with history & exam.  No sign of distress is noted during the visit.     Psychiatric: Affect is pleasant & appropriate.  Patient appears motivated to improve health.     Respiratory: Breathing is regular & unlabored while sitting.     HEENT: Hearing is intact to spoken word.  Speech is clear.  No gross evidence of visual impairment that would impact ambulation.     Dermatologic: Skin is intact to both lower extremities without significant lesions, rash or abrasion.  No paronychia or evidence of soft tissue infection is noted.     Vascular: DP & PT pulses are intact & regular bilaterally.  No significant edema or varicosities noted.  CFT and skin temperature is normal to both lower extremities.     Neurologic: Lower extremity sensation is intact to light touch.  No evidence of weakness or contracture in the lower extremities.  No evidence of neuropathy.     Musculoskeletal: Patient is ambulatory without assistive device or brace. Semi flexible contracture of toes 2-3 on right foot.      RAdiographs: No fracture or osseous lesion is seen. The previously seen  fracture of the proximal phalanx of the fifth toe has completely  healed. The joint spaces are well preserved. No adjacent soft tissue  pathology is seen. Elongated 2nd metatarsal.     MRI: First metatarsal stress fracture.  2. Fracture of the third metatarsal neck with mild displacement and  impaction.  3. Freiberg's infraction versus subchondral fracture of the " second  metatarsal head.  4. Additional bone marrow edema involving the fourth metatarsal, third  metatarsal, first metatarsal, cuboid, lateral and medial cuneiforms.  This could represent stress reaction or contusion.     ASSESSMENT:    Right foot pain  Freiberg's infraction, right  Primary localized osteoarthrosis, ankle and foot, right  Hammer toes of both feet        PLAN:  Reviewed patient's chart in epic.Discussed metatarsalgia.  Discussed treatments such as orthotics, padding, physical therapy, injections, immobilization and further imaging such as MRI.    Reviewed and discussed causes of hammertoes with patient.  Explained that this can be caused by an overpowering of muscles or by the way we walk.  Discussed conservative treatments such as orthotics, pads, shoe gear.  Explained that sometimes the flexor tendons can be cut to try and straighten the toe and reduce rubbing. This is normally done in office and patient is weight bearing in postop she for 1-2 weeks.  We also discussed surgical intervention to remove the joint and possibly fuse the toe.  Normally patient has a pin sticking out of the toe for about 6 weeks and can not get the foot wet. Patient would have to be minimal weight bearing in cam boot.      Discussed that some of the numbness could be explained by her previous arthritis. At this time, recommend orthotics with metatarsal pad or toe splints, topical pain cream. No not really have much as treatment for numbness. These above try to get the pressure off to see if it will help.     Casi Marquez DPM, Podiatry/Foot and Ankle Surgery            Again, thank you for allowing me to participate in the care of your patient.        Sincerely,        Casi Marquez DPM, Podiatry/Foot and Ankle Surgery

## 2019-01-15 NOTE — PATIENT INSTRUCTIONS
Thank you for choosing [facility]!    DR. [facility]S CLIN[facility] SCHEDULE  MONDAY AM - [place] TUESDAY - [place]   [address] [address] [place] [address] [place] [address]   [phone] / [phone] / [phone]       WEDNESDAY - [place] FRIDAY AM - [facility]   [address]   [place] [address] [place] [address]   [phone] / [phone]       FRIDAY PM - [place] SCHEDULE SURGERY: [phone]   [address] BILLING QUESTIONS: [phone]   [place] [address] AFTER HOURS: [phone]   [phone] / [phone] APPOINTMENTS: [phone]     [employer] (CPL) [phone]           Body Mass Index (BMI)  Many things can cause foot and ankle problems. Foot structure, activity level, foot mechanics and injuries are common causes of pain.  One very important issue that often goes unmentioned, is body weight. Extra weight can cause increased stress on muscles, ligaments, bones and tendons.  Sometimes just a few extra pounds is all it takes to put one over her/his threshold. Without reducing that stress, it can be difficult to alleviate pain. Some people are uncomfortable addressing this issue, but we feel it is important for you to think about it. As Foot &  Ankle specialists, our job is addressing the lower extremity problem and possible causes. Regarding extra body weight, we encourage patients to discuss diet and weight management plans with their primary care doctors. It is this team approach that gives you the best opportunity for pain relief and getting you back on your feet.            Use Biofreeze for pain and discomfort    CAPSULITIS / METATARSALGIA  All joints in the body are surrounded by a capsule, or a covering of soft tissue and ligaments. The capsule holds bones together and secretes joint fluid to help lubricate the joint. If a joint capsule is  exposed to excessive force, it can develop microscopic tears and become inflamed. This commonly occurs in the foot due to mild variation in anatomy. Hammertoes, bunions, irregular bone length, joint immobility, etc. can all lead to excessive force on the joint. Capsule injury can also occur due to repetitive stress from exercise, insufficient support from shoes, excessive bare foot walking and excessive weight.      Conservative treatments include ice, rest from the aggravating activity, weight loss, orthotic inserts, improving shoes and shoe modifications. Appropriate shoes will protect the inflamed tissue improving the chances of healing. Avoidance of standing or walking barefoot, including around the house, is necessary to allow healing. Casts are sometimes used for more aggressive protection.  NSAIDs such as Advil are also used to help with pain and decreasing inflammation. If pain continues over a period of weeks with continuous rest and icing, Corticosteroid injections can be a treatment option to try and help decrease inflammation.    Surgery is often necessary to correct the underlying structural problem. Surgery might include shortening an excessively long bone, repairing bunion or hammertoe, lengthening a tight Achilles  tendon, etc. These are same day surgeries that might be pursued if more conservative measures fail to provide relief.      The inflamed joint capsule has the potential to completely tear. This will allow the toe to drift off the ground, curving toward the other toes. The involved toe may under or overlap the adjacent toes as drift continues. The pain may improve after the joint tears or this new position will be permanent. Surgery can address the toe alignment. Your goal of treating capsulitis is to avoid this scenario.          HAMMERTOES  Hammertoe is a contracture (bending) of one or both joints of the second, third, fourth, or fifth (little) toes. This abnormal bending can put pressure  on the toe when wearing shoes, causing problems to develop.  Hammertoes usually start out as mild deformities and get progressively worse over time. In the earlier stages, hammertoes are flexible and the symptoms can often be managed with noninvasive measures. But if left untreated, hammertoes can become more rigid and will not respond to non-surgical treatment.  Because of the progressive nature of hammertoes, they should receive early attention. Hammertoes never get better without some kind of intervention.  CAUSES  The most common cause of hammertoe is a muscle/tendon imbalance. This imbalance, which leads to a bending of the toe, results from mechanical (structural) changes in the foot that occur over time in some people.  Hammertoes may be aggravated by shoes that don t fit properly. A hammertoe may result if a toe is too long and is forced into a cramped position when a tight shoe is worn.  Occasionally, hammertoe is the result of an earlier trauma to the toe. In some people, hammertoes are inherited.  SYMPTOMS  Pain or irritation of the affected toe when wearing shoes.   Corns and calluses (a buildup of skin) on the toe, between two toes, or on the ball of the foot. Corns are caused by constant friction against the shoe. They may be soft or hard, depending upon their location.   Inflammation, redness, or a burning sensation   Contracture of the toe   In more severe cases of hammertoe, open sores may form.   DIAGNOSIS  Although hammertoes are readily apparent, to arrive at a diagnosis the foot and ankle surgeon will obtain a thorough history of your symptoms and examine your foot. During the physical examination, the doctor may attempt to reproduce your symptoms by manipulating your foot and will study the contractures of the toes. In addition, the foot and ankle surgeon may take x-rays to determine the degree of the deformities and assess any changes that may have occurred.   Hammertoes are progressive   they  don t go away by themselves and usually they will get worse over time. However, not all cases are alike   some hammertoes progress more rapidly than others. Once your foot and ankle surgeon has evaluated your hammertoes, a treatment plan can be developed that is suited to your needs.  NON-SURGICAL TREATMENT  There is a variety of treatment options for hammertoe. The treatment your foot and ankle surgeon selects will depend upon the severity of your hammertoe and other factors.  A number of non-surgical measures can be undertaken:  Padding corns and calluses. Your foot and ankle surgeon can provide or prescribe pads designed to shield corns from irritation. If you want to try over-the-counter pads, avoid the medicated types. Medicated pads are generally not recommended because they may contain a small amount of acid that can be harmful. Consult your surgeon about this option.   Changes in shoewear. Avoid shoes with pointed toes, shoes that are too short, or shoes with high heels   conditions that can force your toe against the front of the shoe. Instead, choose comfortable shoes with a deep, roomy toe box and heels no higher than two inches.   Orthotic devices. A custom orthotic device placed in your shoe may help control the muscle/tendon imbalance.   Injection therapy. Corticosteroid injections are sometimes used to ease pain and inflammation caused by hammertoe.   Medications. Oral nonsteroidal anti-inflammatory drugs (NSAIDs), such as ibuprofen, may be recommended to reduce pain and inflammation.   Splinting/strapping. Splints or small straps may be applied by the surgeon to realign the bent toe.   Exercises:   1. The Toe Tap  Stand flat on the ground in your bare feet. Raise all of your toes up off the ground as high as you can. Then starting with the little toes, slowly press them down to the ground. After the big toes are on the ground, start over by raising all of them up off the ground again. This motion is  similar to tapping your fingers on a desk. Repeat this ten times.     2. Interlocking your Fingers and Toes  Cross your right foot over your knee and place the fingers of your left hand between your toes. Squeeze your toes together, pinching your fingers between them. Spread the toes apart and squeeze them together again. Repeat this ten times then do the other foot. Like most exercises, this will get easier the more you do it. If you are having a lot of difficulty with this exercise, start with just your index finger between your first and second toe, then later add your middle finger between your second and third toes, and so on until you can fit all your fingers between your toes. Do this ten times on each foot. Eventually you will be able to spread your toes apart without using your fingers.    3. Gripping the Floor   the floor by pressing the pads of your toes (not the tips) into the floor without curling your toes. Relax and repeat this ten times. If your shoes have the proper amount of depth, you should be able to do this with shoes on.    HAMMERTOE TOE SURGERY   Hammertoe surgery is complex. The surgical procedure is an attempt to help the toe lay in a better position. Nearly every structure in the toe will be cut including the tendons, ligaments, skin and bone. Hammertoes are a complex deformity and final toe position is difficult to predict. The only sure way to position a toe is to fuse all three digital joints. That will not happen as some degree of toe motion is needed for walking. The toe may not be completely reduced as the surrounding skin and other structures may not allow the toe to return to a normal position. The tendons on adjacent toes may need to be cut at the time of the original or subsequent surgeries, as interconnections exist between the toes. The toe may drift after surgery. Stiffness may develop leading to new areas of pressure.   Future shoe choices will be critical in allowing the  surgery to provide comfort. The toes will still hurt if shoes rub. The original pain may also persist as other foot problems may be contributing to the current pain. The toe may or may not be pinned in place. External pins would require complete avoidance of water on the foot for six weeks. The pin would be removed about six weeks after the surgery. Strict attention to protection is critical. The pin could get bumped or loosen resulting in early removal. Removal might be necessary before the bone heals which would negatively affect the final surgical outcome and toe allignment.       HAMMERTOE PADS / TOE SPLINTS      OSTEOARTHRITIS OF THE FOOT & ANKLE  Osteoarthritis is a condition characterized by the breakdown and eventual loss of cartilage in one or more joints. Cartilage (the connective tissue found at the end of the bones in the joints) protects and cushions the bones during movement. When cartilage deteriorates or is lost, symptoms develop that can restrict one s ability to easily perform daily activities.  Osteoarthritis is also known as degenerative arthritis, reflecting its nature to develop as part of the aging process. As the most common form of arthritis, osteoarthritis affects millions of Americans. Some people refer to osteoarthritis simply as arthritis, even though there are many different types of arthritis.  Osteoarthritis appears at various joints throughout the body, including the hands, feet, spine, hips, and knees. In the foot, the disease most frequently occurs in the big toe, although it is also often found in the midfoot and ankle.  CAUSES  Osteoarthritis is considered a  wear and tear  disease because the cartilage in the joint wears down with repeated stress and use over time. As the cartilage deteriorates and gets thinner, the bones lose their protective covering and eventually may rub together, causing pain and inflammation of the joint.  An injury may also lead to osteoarthritis,  although it may take months or years after the injury for the condition to develop. For example, osteoarthritis in the big toe is often caused by kicking or jamming the toe, or by dropping something on the toe. Osteoarthritis in the midfoot is often caused by dropping something on it, or by a sprain or fracture. In the ankle, osteoarthritis is usually caused by a fracture and occasionally by a severe sprain.  Sometimes osteoarthritis develops as a result of abnormal foot mechanics such as flat feet or high arches. A flat foot causes less stability in the ligaments (bands of tissue that connect bones), resulting in excessive strain on the joints, which can cause arthritis. A high arch is rigid and lacks mobility, causing a jamming of joints that creates an increased risk of arthritis.  SYMPTOMS  People with osteoarthritis in the foot or ankle experience, in varying degrees, one or more of the following: Pain and stiffness in the joint, swelling in or near the joint, or difficulty walking or bending the joint.   Some patients with osteoarthritis also develop a bone spur (a bony protrusion) at the affected joint. Shoe pressure may cause pain at the site of a bone spur, and in some cases blisters or calluses may form over its surface. Bone spurs can also limit the movement of the joint.    DIAGNOSIS  In diagnosing osteoarthritis, the foot and ankle surgeon will examine the foot thoroughly, looking for swelling in the joint, limited mobility, and pain with movement. In some cases, deformity and/or enlargement (spur) of the joint may be noted. X-rays may be ordered to evaluate the extent of the disease.  NON-SURGICAL TREATMENT  To help relieve symptoms, the surgeon may begin treating osteoarthritis with one or more of the following non-surgical approaches:  Oral medications. Nonsteroidal anti-inflammatory drugs (NSAIDs), such as ibuprofen, are often helpful in reducing the inflammation and pain. Occasionally a  prescription for a steroid medication is needed to adequately reduce symptoms.   Orthotic devices. Custom orthotic devices (shoe inserts) are often prescribed to provide support to improve the foot s mechanics or cushioning to help minimize pain.   Bracing. Bracing, which restricts motion and supports the joint, can reduce pain during walking and help prevent further deformity.   Immobilization. Protecting the foot from movement by wearing a cast or removable cast-boot may be necessary to allow the inflammation to resolve.   Steroid injections. In some cases, steroid injections are applied to the affected joint to deliver anti-inflammatory medication.   Physical therapy. Exercises to strengthen the muscles, especially when the osteoarthritis occurs in the ankle, may give the patient greater stability and help avoid injury that might worsen the condition.   DO I NEED SURGERY?  When osteoarthritis has progressed substantially or failed to improve with non-surgical treatment, surgery may be recommended. In advanced cases, surgery may be the only option. The goal of surgery is to decrease pain and improve function. The foot and ankle surgeon will consider a number of factors when selecting the procedure best suited to the patient s condition and lifestyle.

## 2019-01-15 NOTE — PROGRESS NOTES
PATIENT HISTORY:  Matthew Carroll PA-C requested I see this patient for their foot issue.  Naa Paredes is a 53 year old female who presents to clinic for numbness to right toes 2-4. Here with . No pain just feels like it is not attached to her body. Denies injury. 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 Wondering what can be done to get the feeling back.     Review of Systems:  Patient denies fever, chills, rash, wound, stiffness, limping,  weakness, heart burn, blood in stool, chest pain with activity, calf pain when walking, shortness of breath with activity, chronic cough, easy bleeding/bruising, swelling of ankles, excessive thirst, fatigue, depression, anxiety.  Patient admits to numbness.     PAST MEDICAL HISTORY:   Past Medical History:   Diagnosis Date     Ankle sprain and strain 5/2/2013     Anxiety 3/26/2015     Black stools 11/25/2015     CARDIOVASCULAR SCREENING; LDL GOAL LESS THAN 160 10/31/2010     Cervicalgia 12/7/2011     Closed dislocation of finger of right hand, subsequent encounter 2/6/2018     Colon adenoma 4/23/2015    Patient denies it. Renee Tiwari RN, 6/9/15.     Conversion disorder with mixed symptoms 6/14/2016     Depression with suicidal ideation 6/19/2015     Depressive disorder      Elevated blood pressure reading without diagnosis of hypertension 11/25/2015     Elevated LFT's 6/4/2010     Fatigue due to depression 11/24/2015     H/O: alcohol abuse 6/4/2010     IBS (irritable bowel syndrome)      Knee pain 10/19/2010     Low back pain 10/19/2010     Lumbago 12/7/2011     Major depression in complete remission (H) 8/13/2018     Major depressive disorder, recurrent, severe without psychotic features (H) 5/13/2016     Major depressive disorder, single episode, in partial remission (H) 11/24/2015     Migraine      Neck pain 10/19/2010     Nephrolithiasis     calcium oxalate     Pain in  thoracic spine 1/2/2013     Perimenopause 3/26/2015     Seasonal allergic rhinitis 10/20/2015     Thoracic outlet syndrome 7/20/2017     Vitamin D deficiency disease 6/8/2010        PAST SURGICAL HISTORY:   Past Surgical History:   Procedure Laterality Date     COLONOSCOPY N/A 6/9/2015    Procedure: COLONOSCOPY;  Surgeon: Steve Choi MD;  Location:  GI     LITHOTRIPSY          MEDICATIONS:   Current Outpatient Medications:      lidocaine (XYLOCAINE) 2 % external gel, Apply topically as needed for moderate pain, Disp: 85 mL, Rfl: 0     naproxen (NAPROSYN) 500 MG tablet, Take 1 tablet (500 mg) by mouth 2 times daily (with meals), Disp: 60 tablet, Rfl: 1     ALLERGIES:    Allergies   Allergen Reactions     No Clinical Screening - See Comments      PN: LW CM1: Contrast Adverse Reaction - None Reaction :  PN: LW FI1: nka        SOCIAL HISTORY:   Social History     Socioeconomic History     Marital status: Legally      Spouse name: Not on file     Number of children: Not on file     Years of education: Not on file     Highest education level: Not on file   Social Needs     Financial resource strain: Not on file     Food insecurity - worry: Not on file     Food insecurity - inability: Not on file     Transportation needs - medical: Not on file     Transportation needs - non-medical: Not on file   Occupational History     Not on file   Tobacco Use     Smoking status: Never Smoker     Smokeless tobacco: Never Used   Substance and Sexual Activity     Alcohol use: Yes     Comment: occ wine     Drug use: No     Sexual activity: Not Currently     Partners: Male   Other Topics Concern     Parent/sibling w/ CABG, MI or angioplasty before 65F 55M? Not Asked   Social History Narrative     Not on file        FAMILY HISTORY:   Family History   Problem Relation Age of Onset     Heart Disease Mother      Cancer - colorectal Father         at age 65     Mental Illness Paternal Uncle      Breast Cancer No family hx of   "    Ovarian Cancer No family hx of         EXAM:Vitals: /74   Ht 1.651 m (5' 5\")   Wt 65.8 kg (145 lb)   LMP 08/20/2015   BMI 24.13 kg/m    BMI= Body mass index is 24.13 kg/m .    General appearance: Patient is alert and fully cooperative with history & exam.  No sign of distress is noted during the visit.     Psychiatric: Affect is pleasant & appropriate.  Patient appears motivated to improve health.     Respiratory: Breathing is regular & unlabored while sitting.     HEENT: Hearing is intact to spoken word.  Speech is clear.  No gross evidence of visual impairment that would impact ambulation.     Dermatologic: Skin is intact to both lower extremities without significant lesions, rash or abrasion.  No paronychia or evidence of soft tissue infection is noted.     Vascular: DP & PT pulses are intact & regular bilaterally.  No significant edema or varicosities noted.  CFT and skin temperature is normal to both lower extremities.     Neurologic: Lower extremity sensation is intact to light touch.  No evidence of weakness or contracture in the lower extremities.  No evidence of neuropathy.     Musculoskeletal: Patient is ambulatory without assistive device or brace. Semi flexible contracture of toes 2-3 on right foot.      RAdiographs: No fracture or osseous lesion is seen. The previously seen  fracture of the proximal phalanx of the fifth toe has completely  healed. The joint spaces are well preserved. No adjacent soft tissue  pathology is seen. Elongated 2nd metatarsal.     MRI: First metatarsal stress fracture.  2. Fracture of the third metatarsal neck with mild displacement and  impaction.  3. Freiberg's infraction versus subchondral fracture of the second  metatarsal head.  4. Additional bone marrow edema involving the fourth metatarsal, third  metatarsal, first metatarsal, cuboid, lateral and medial cuneiforms.  This could represent stress reaction or contusion.     ASSESSMENT:    Right foot " pain  Freiberg's infraction, right  Primary localized osteoarthrosis, ankle and foot, right  Hammer toes of both feet        PLAN:  Reviewed patient's chart in epic.Discussed metatarsalgia.  Discussed treatments such as orthotics, padding, physical therapy, injections, immobilization and further imaging such as MRI.    Reviewed and discussed causes of hammertoes with patient.  Explained that this can be caused by an overpowering of muscles or by the way we walk.  Discussed conservative treatments such as orthotics, pads, shoe gear.  Explained that sometimes the flexor tendons can be cut to try and straighten the toe and reduce rubbing. This is normally done in office and patient is weight bearing in postop she for 1-2 weeks.  We also discussed surgical intervention to remove the joint and possibly fuse the toe.  Normally patient has a pin sticking out of the toe for about 6 weeks and can not get the foot wet. Patient would have to be minimal weight bearing in cam boot.      Discussed that some of the numbness could be explained by her previous arthritis. At this time, recommend orthotics with metatarsal pad or toe splints, topical pain cream. No not really have much as treatment for numbness. These above try to get the pressure off to see if it will help.     Casi Marquez DPM, Podiatry/Foot and Ankle Surgery

## 2019-02-15 ENCOUNTER — OFFICE VISIT (OUTPATIENT)
Dept: FAMILY MEDICINE | Facility: CLINIC | Age: 54
End: 2019-02-15
Payer: COMMERCIAL

## 2019-02-15 VITALS
BODY MASS INDEX: 24.13 KG/M2 | OXYGEN SATURATION: 95 % | RESPIRATION RATE: 14 BRPM | SYSTOLIC BLOOD PRESSURE: 126 MMHG | TEMPERATURE: 97.5 F | DIASTOLIC BLOOD PRESSURE: 83 MMHG | HEART RATE: 93 BPM | WEIGHT: 145 LBS

## 2019-02-15 DIAGNOSIS — J01.90 ACUTE SINUSITIS WITH SYMPTOMS > 10 DAYS: Primary | ICD-10-CM

## 2019-02-15 PROCEDURE — 99213 OFFICE O/P EST LOW 20 MIN: CPT | Performed by: PHYSICIAN ASSISTANT

## 2019-02-15 RX ORDER — FLUTICASONE PROPIONATE 50 MCG
1-2 SPRAY, SUSPENSION (ML) NASAL DAILY
Qty: 16 G | Refills: 3 | Status: SHIPPED | OUTPATIENT
Start: 2019-02-15 | End: 2019-03-28

## 2019-02-15 NOTE — PATIENT INSTRUCTIONS
Patient Education     Acute Bacterial Rhinosinusitis (ABRS)    Acute bacterial rhinosinusitis (ABRS) is an infection of your nasal cavity and sinuses. It s caused by bacteria. Acute means that you ve had symptoms for less than 4 weeks, but possibly up to 12 weeks.  Understanding your sinuses  The nasal cavity is the large air-filled space behind your nose. The sinuses are a group of spaces formed by the bones of your face. They connect with your nasal cavity. ABRS causes the tissue lining these spaces to become inflamed. Mucus may not drain normally. This leads to facial pain and other symptoms.  What causes ABRS?  ABRS most often follows an upper respiratory infection caused by a virus. Bacteria then infect the lining of your nasal cavity and sinuses. But you can also get ABRS if you have:    Nasal allergies    Long-term nasal swelling and congestion not caused by allergies    Blockage in the nose  Symptoms of ABRS  The symptoms of ABRS may be different for each person and include:    Nasal congestion or blockage    Pain or pressure in the face    Thick, colored drainage from the nose  Other symptoms may include:    Runny nose    Fluid draining from the nose down the throat (postnasal drip)    Headache    Cough    Pain    Fever  Diagnosing ABRS  ABRS may be diagnosed if you ve had an upper respiratory infection like a cold and cough for 10 or more days without improvement or with worsening symptoms. Your healthcare provider will ask about your symptoms and your medical history. The provider will check your vital signs, including your temperature. You ll have a physical exam. The healthcare provider will check your ears, nose, and throat. You likely won t need any tests. If ABRS comes back, you may have a culture or other tests.  Treatment for ABRS  Treatment may include:    Antibiotic medicine. This is for symptoms that last for at least 10 to 14 days.    Nasal corticosteroid medicine. Drops or spray used in the  nose can lessen swelling and congestion.    Over-the-counter pain medicine. This is to lessen sinus pain and pressure.    Nasal decongestant medicine. Spray or drops may help to lessen congestion. Do not use them for more than a few days.    Salt wash (saline irrigation). This can help to loosen mucus.  Possible complications of ABRS  ABRS may come back or become long-term (chronic). In rare cases, ABRS may cause complications such as:     Inflamed tissue around the brain and spinal cord (meningitis)    Inflamed tissue around the eyes (orbital cellulitis)    Inflamed bones around the sinuses (osteitis)  These problems may need to be treated in a hospital with intravenous (IV) antibiotic medicine or surgery.  When to call the healthcare provider  Call your healthcare provider if you have any of the following:    Symptoms that don t get better, or get worse    Symptoms that don t get better after 3 to 5 days on antibiotics    Trouble seeing    Swelling around your eyes    Confusion or trouble staying awake   Date Last Reviewed: 5/1/2017 2000-2018 [employer]. [address] [place] [address], [place] [address]. All rights reserved. This information is not intended as a substitute for professional medical care. Always follow your healthcare professional's instructions.

## 2019-02-15 NOTE — PROGRESS NOTES
SUBJECTIVE:   Naa Paredes is a 53 year old female who presents to clinic today for the following health issues:    Acute Illness   Acute illness concerns: URI  Onset: 2 weeks    Fever: no    Chills/Sweats: no    Headache (location?): no    Sinus Pressure:YES    Conjunctivitis:  no    Ear Pain: no    Rhinorrhea: YES    Congestion: YES    Sore Throat: no     Cough: YES-productive of yellow sputum    Wheeze: no    Decreased Appetite: YES    Nausea: no    Vomiting: no    Diarrhea:  no    Dysuria/Freq.: no    Fatigue/Achiness: YES    Sick/Strep Exposure: not sure     Therapies Tried and outcome: Chineese meds, lemon tea,           Problem list and histories reviewed & adjusted, as indicated.  Additional history: as documented    Patient Active Problem List   Diagnosis     H/O: alcohol abuse     CARDIOVASCULAR SCREENING; LDL GOAL LESS THAN 160     Cervicalgia     Nickel allergy     Anxiety     Perimenopause     Colon adenoma     Seasonal allergic rhinitis     Major depressive disorder, recurrent, severe without psychotic features (H)     Conversion disorder with mixed symptoms     Migraine with status migrainosus, not intractable, unspecified migraine type     Thoracic outlet syndrome     Closed dislocation of finger of right hand, subsequent encounter     Major depression in complete remission (H)     Past Surgical History:   Procedure Laterality Date     COLONOSCOPY N/A 6/9/2015    Procedure: COLONOSCOPY;  Surgeon: Steve Choi MD;  Location:  GI     LITHOTRIPSY         Social History     Tobacco Use     Smoking status: Never Smoker     Smokeless tobacco: Never Used   Substance Use Topics     Alcohol use: Yes     Comment: occ wine     Family History   Problem Relation Age of Onset     Heart Disease Mother      Cancer - colorectal Father         at age 65     Mental Illness Paternal Uncle      Breast Cancer No family hx of      Ovarian Cancer No family hx of          Current Outpatient Medications    Medication Sig Dispense Refill     amoxicillin-clavulanate (AUGMENTIN) 875-125 MG tablet Take 1 tablet by mouth 2 times daily for 10 days 20 tablet 0     fluticasone (FLONASE) 50 MCG/ACT nasal spray Spray 1-2 sprays into both nostrils daily 16 g 3     Allergies   Allergen Reactions     No Clinical Screening - See Comments      PN: LW CM1: Contrast Adverse Reaction - None Reaction :  PN: LW FI1: nka     BP Readings from Last 3 Encounters:   02/15/19 126/83   01/15/19 112/74   01/08/19 124/81    Wt Readings from Last 3 Encounters:   02/15/19 65.8 kg (145 lb)   01/15/19 65.8 kg (145 lb)   01/08/19 65.8 kg (145 lb)                    Reviewed and updated as needed this visit by clinical staff  Tobacco  Allergies  Meds  Problems  Med Hx  Surg Hx  Fam Hx  Soc Hx        Reviewed and updated as needed this visit by Provider  Tobacco  Allergies  Meds  Problems  Med Hx  Surg Hx  Fam Hx         ROS:  Constitutional, HEENT, cardiovascular, pulmonary, gi and gu systems are negative, except as otherwise noted.    OBJECTIVE:     /83 (BP Location: Right arm, Patient Position: Chair, Cuff Size: Adult Regular)   Pulse 93   Temp 97.5  F (36.4  C) (Oral)   Resp 14   Wt 65.8 kg (145 lb)   LMP 08/20/2015   SpO2 95%   BMI 24.13 kg/m    Body mass index is 24.13 kg/m .  GENERAL: healthy, alert and no distress  EYES: Eyes grossly normal to inspection, PERRL and conjunctivae and sclerae normal  HENT: normal cephalic/atraumatic, both ears: clear effusion, nasal mucosa edematous , oropharynx clear and oral mucous membranes moist  NECK: no adenopathy, no asymmetry, masses, or scars and thyroid normal to palpation  RESP: lungs clear to auscultation - no rales, rhonchi or wheezes  CV: regular rates and rhythm, normal S1 S2, no S3 or S4 and no murmur, click or rub  PSYCH: mentation appears normal, affect normal/bright    Diagnostic Test Results:  none     ASSESSMENT/PLAN:     (J01.90) Acute sinusitis with symptoms > 10  days  (primary encounter diagnosis)  Comment: evident on exam and history. Given course length without improvement, will add abx and flonase. If symptoms fail to improve in 1 week, patient should RTC. Sooner if worsening.   Plan: amoxicillin-clavulanate (AUGMENTIN) 875-125 MG         tablet, fluticasone (FLONASE) 50 MCG/ACT nasal         spray        -Medication use and side effects discussed with the patient. Patient is in complete understanding and agreement with plan.         Follow up: as above     Matthew Carroll PA-C  [facility]

## 2019-03-24 ENCOUNTER — TRANSFERRED RECORDS (OUTPATIENT)
Dept: HEALTH INFORMATION MANAGEMENT | Facility: CLINIC | Age: 54
End: 2019-03-24

## 2019-03-28 ENCOUNTER — OFFICE VISIT (OUTPATIENT)
Dept: FAMILY MEDICINE | Facility: CLINIC | Age: 54
End: 2019-03-28
Payer: COMMERCIAL

## 2019-03-28 VITALS
DIASTOLIC BLOOD PRESSURE: 78 MMHG | SYSTOLIC BLOOD PRESSURE: 112 MMHG | TEMPERATURE: 98.7 F | BODY MASS INDEX: 24.13 KG/M2 | HEART RATE: 85 BPM | WEIGHT: 145 LBS | RESPIRATION RATE: 20 BRPM

## 2019-03-28 DIAGNOSIS — G56.02 CARPAL TUNNEL SYNDROME OF LEFT WRIST: ICD-10-CM

## 2019-03-28 DIAGNOSIS — L50.9 URTICARIA: Primary | ICD-10-CM

## 2019-03-28 PROCEDURE — 99213 OFFICE O/P EST LOW 20 MIN: CPT | Performed by: PHYSICIAN ASSISTANT

## 2019-03-28 RX ORDER — FLUTICASONE PROPIONATE 50 MCG
SPRAY, SUSPENSION (ML) NASAL
Refills: 0 | COMMUNITY
Start: 2019-02-15 | End: 2019-08-08

## 2019-03-28 RX ORDER — ACETAMINOPHEN 160 MG/1
TABLET, CHEWABLE ORAL
Refills: 0 | COMMUNITY
Start: 2019-03-25 | End: 2019-07-02

## 2019-03-28 NOTE — PATIENT INSTRUCTIONS
Patient Education     Understanding Hives (Urticaria)    Urticaria (hives) are red, itchy, and swollen areas on the skin. They are most often an allergic reaction from eating a food or taking a medicine. Sometimes the cause may be unknown. A single hive can vary in size from a half inch to several inches. Hives can appear all over the body. Or they may appear on only one part of the body.  What causes hives?  Hives can be caused by food and beverages such as:    Tree nuts (almonds, walnuts, hazelnuts)    Peanuts    Eggs    Shellfish    Milk  Hives can also be caused by medicines such as:    Antibiotics, especially penicillin and sulfa-based medicines     Anticonvulsant or antiseizure medicines     Chemotherapy medicines   Other causes of hives include:    Infection or virus    Heat    Cold air or cold water    Exercise    Scratching or rubbing your skin, or wearing tight-fitting clothes that rub your skin    Being exposed to sunlight or light from a light bulb, in rare cases    Inhaled-chemicals in the environment from foods and drugs, insects, plants, or other sources  In some cases, hives may occur again and again with no specific cause.  If you have hives    Stay away from the food, drink, medicine, or other thing that may be causing the hives.    Ask your healthcare provider how to control itchy or irritated skin.    Talk with your healthcare provider right away if you think a medicine gave you hives.  Watch for anaphylaxis  If you have hives, watch for symptoms of a severe reaction that can affect your entire body. This is called anaphylaxis. Symptoms can include swollen areas of the body, wheezing, trouble breathing or swallowing, and a hoarse voice. This reaction may happen right away. Or it may happen in an hour or more. In extreme cases, the airways from mouth to lungs may swell and make breathing difficult. This is a medical emergency. Use epinephrine medicine if you have it, and call 911 or go to the  emergency room.     When to call your healthcare provider  Call your healthcare provider if:    Your hives feel uncomfortable    You have never had hives before    Your symptoms don't go away or come back    Your symptoms get worse or new symptoms develop such as:   ? Sneezing, coughing, runny or stuffy nose  ? Itching of the eyes, nose, or roof of the mouth  ? Itching, burning, stinging, or pain  ? Dry, flaky, cracking, or scaly skin  ? Red or purple spots  Call 911  Call 911 right away if you have:    Swelling in your lips, tongue, or throat, called angioedema    Drooling    Trouble breathing, talking, or swallowing    Cool, moist or pale (blue in color) skin    Fast and weak heartbeat    Wheezing or short of breath    Feeling lightheaded or confused    Diarrhea    Severe nausea or vomiting    Seizure    Feeling dizzy or weak, or a sudden drop in blood pressure   Date Last Reviewed: 4/1/2017 2000-2018 [employer]. [address] [place] [address], [place] [address]. All rights reserved. This information is not intended as a substitute for professional medical care. Always follow your healthcare professional's instructions.

## 2019-03-28 NOTE — PROGRESS NOTES
SUBJECTIVE:   Naa Paredes is a 53 year old female who presents to clinic today for the following health issues:      ED/UC Followup:    Facility:  [facility]-Note personally reviewed.   Date of visit: 3/24/2019  Reason for visit: rash, redness, itching all over  Current Status: taking allergy relief med-no current symptoms for 36 hours. Has not taken medication for 1 day.     In summary, patient presented to [facility] on 3/24 for acute urticaria. She states she was wearing a new jacket from [employer] and by 4-5 PM itchy rash developed. While at [facility] 50 mg of benadryl IM was given as well as prescription for q6 hours prn 25-50 mg of bendaryl tablets. Patient returned jacket and symptoms have improved over course. No symptoms since yesterday morning.     No current or history of symptoms of swelling of lips, eyes, throat, shortness of breath, or trouble swallowing. No known allergies.          Of note, ongoing symptoms of pain and numbness in left hand after carpal tunnel surgery in sept of 2018. No improvement with occupational therapy.     Problem list and histories reviewed & adjusted, as indicated.  Additional history: as documented    Patient Active Problem List   Diagnosis     H/O: alcohol abuse     CARDIOVASCULAR SCREENING; LDL GOAL LESS THAN 160     Cervicalgia     Nickel allergy     Anxiety     Perimenopause     Colon adenoma     Seasonal allergic rhinitis     Major depressive disorder, recurrent, severe without psychotic features (H)     Conversion disorder with mixed symptoms     Migraine with status migrainosus, not intractable, unspecified migraine type     Thoracic outlet syndrome     Closed dislocation of finger of right hand, subsequent encounter     Major depression in complete remission (H)     Past Surgical History:   Procedure Laterality Date     COLONOSCOPY N/A 6/9/2015    Procedure: COLONOSCOPY;  Surgeon: Steve Choi MD;  Location:  GI     LITHOTRIPSY         Social History     Tobacco Use      Smoking status: Never Smoker     Smokeless tobacco: Never Used   Substance Use Topics     Alcohol use: Yes     Comment: occ wine     Family History   Problem Relation Age of Onset     Heart Disease Mother      Cancer - colorectal Father         at age 65     Mental Illness Paternal Uncle      Breast Cancer No family hx of      Ovarian Cancer No family hx of          Current Outpatient Medications   Medication Sig Dispense Refill     ALLERGY RELIEF 25 MG capsule   0     fluticasone (FLONASE) 50 MCG/ACT nasal spray   0     Allergies   Allergen Reactions     No Clinical Screening - See Comments      PN: LW CM1: Contrast Adverse Reaction - None Reaction :  PN: LW FI1: nka     BP Readings from Last 3 Encounters:   03/28/19 112/78   02/15/19 126/83   01/15/19 112/74    Wt Readings from Last 3 Encounters:   03/28/19 65.8 kg (145 lb)   02/15/19 65.8 kg (145 lb)   01/15/19 65.8 kg (145 lb)                    Reviewed and updated as needed this visit by clinical staff  Tobacco  Allergies  Meds  Problems  Med Hx  Surg Hx  Fam Hx  Soc Hx        Reviewed and updated as needed this visit by Provider  Tobacco  Allergies  Meds  Problems  Med Hx  Surg Hx  Fam Hx         ROS:  Constitutional, HEENT, cardiovascular, pulmonary, gi and gu systems are negative, except as otherwise noted.    OBJECTIVE:     /78 (BP Location: Right arm, Patient Position: Chair, Cuff Size: Adult Regular)   Pulse 85   Temp 98.7  F (37.1  C) (Oral)   Resp 20   Wt 65.8 kg (145 lb)   LMP 08/20/2015   BMI 24.13 kg/m    Body mass index is 24.13 kg/m .  GENERAL: healthy, alert and no distress  RESP: no visual respiratory distress  SKIN: no suspicious lesions or rashes  PSYCH: mentation appears normal, affect normal/bright    Diagnostic Test Results:  none     ASSESSMENT/PLAN:     (L50.9) Urticaria  (primary encounter diagnosis)  Comment: symptoms have resolved. No recent antihistamine use. Given exposure, likely acute urticaria from  allergy or contact irritation. Recommend monitoring for returning symptoms. If occurs, will send zantac and zyrtec to pharmacy and will have see allergist for testing. Otherwise, follow up prn. If troubles swallowing or swelling around mouth and eyes develop patient to go to ER.   Plan:     (G56.02) Carpal tunnel syndrome of left wrist  Comment: ongoing symptoms after surgery. Surgery was 6 months ago. Will have obtain second opinion.   Plan: ORTHOPEDICS ADULT REFERRAL              Follow up: as above     Matthew Carroll PA-C  [facility]

## 2019-06-28 ENCOUNTER — TELEPHONE (OUTPATIENT)
Dept: FAMILY MEDICINE | Facility: CLINIC | Age: 54
End: 2019-06-28

## 2019-06-28 NOTE — TELEPHONE ENCOUNTER
Reason for Call:  Same Day Appointment, Requested Provider:  Sher Duran MD     PCP: Sher Duran    Reason for visit: PreOp - DOS: 07/11/2019 / Ear Surgery (patient unsure of surgeon or hospital location)    Duration of symptoms: not asked    Have you been treated for this in the past? Yes    Additional comments: Patient wants to seen by her PCP sometime next week.  She was also okay with seeing Mike Carroll, but we could not find any options with him for next week either.  Patient also mentioned she may need some type of referral for her insurance -  was used for call but still was difficult to understand and she had a difficult time articulating exactly what was needed.    When you return call, please use .    Can we leave a detailed message on this number? YES    Phone number patient can be reached at: Cell number on file:    Telephone Information:   Mobile [phone]       Best Time: not asked    Call taken on 6/28/2019 at 9:46 AM by Kathy Nunn

## 2019-07-02 ENCOUNTER — OFFICE VISIT (OUTPATIENT)
Dept: FAMILY MEDICINE | Facility: CLINIC | Age: 54
End: 2019-07-02
Payer: COMMERCIAL

## 2019-07-02 VITALS
RESPIRATION RATE: 18 BRPM | SYSTOLIC BLOOD PRESSURE: 123 MMHG | OXYGEN SATURATION: 94 % | DIASTOLIC BLOOD PRESSURE: 82 MMHG | BODY MASS INDEX: 24.13 KG/M2 | TEMPERATURE: 98.5 F | HEART RATE: 69 BPM | HEIGHT: 65 IN

## 2019-07-02 DIAGNOSIS — Z01.818 PREOP GENERAL PHYSICAL EXAM: Primary | ICD-10-CM

## 2019-07-02 PROCEDURE — 99214 OFFICE O/P EST MOD 30 MIN: CPT | Performed by: FAMILY MEDICINE

## 2019-07-02 NOTE — PROGRESS NOTES
[facility]  [id number] [facility] [address]  [phone]  Dept: [phone]    PRE-OP EVALUATION:  Today's date: 2019    Naa Paredes (: 1965) presents for pre-operative evaluation assessment as requested by Dr. Castorena.  She requires evaluation and anesthesia risk assessment prior to undergoing surgery/procedure for treatment of LATERAL CORRECTION OF EAR LOBE ABNORMALITIES   .    Fax number for surgical facility:  Phone no :[phone]  Primary Physician: Sher Duran  Type of Anesthesia Anticipated: General    Patient has a Health Care Directive or Living Will:  NO    Proposed Surgery/ Procedure: LATERAL CORRECTION OF EAR LOBE ABNORMALITIES of left ear lobe  Date of Surgery/ Procedure: 19  Time of Surgery/ Procedure: 830 am   Hospital/Surgical Facility: [facility]   Fax number for surgical facility: [phone]  Primary Physician: Sher Duran  Type of Anesthesia Anticipated: General    Patient has a Health Care Directive or Living Will:  NO    1. NO - Do you have a history of heart attack, stroke, stent, bypass or surgery on an artery in the head, neck, heart or legs?  2. NO - Do you ever have any pain or discomfort in your chest?  3. NO - Do you have a history of  Heart Failure?  4. NO - Are you troubled by shortness of breath when: walking on the level, up a slight hill or at night?  5. NO - Do you currently have a cold, bronchitis or other respiratory infection?  6. NO - Do you have a cough, shortness of breath or wheezing?  7. NO - Do you sometimes get pains in the calves of your legs when you walk?  8. NO - Do you or anyone in your family have previous history of blood clots?  9. NO - Do you or does anyone in your family have a serious bleeding problem such as prolonged bleeding following surgeries or cuts?  10. NO - Have you ever had problems with anemia or been told to take iron pills?  11. NO - Have you had any abnormal blood loss  such as black, tarry or bloody stools, or abnormal vaginal bleeding?  12. NO - Have you ever had a blood transfusion?  13. NO - Have you or any of your relatives ever had problems with anesthesia?  14. NO - Do you have sleep apnea, excessive snoring or daytime drowsiness?  15. NO - Do you have any prosthetic heart valves?  16. NO - Do you have prosthetic joints?  17. NO - Is there any chance that you may be pregnant?        HPI:     HPI related to upcoming procedure: repair of the Lt ear lobe      See problem list for active medical problems.  Problems all longstanding and stable, except as noted/documented.  See ROS for pertinent symptoms related to these conditions.      MEDICAL HISTORY:     Patient Active Problem List    Diagnosis Date Noted     Major depression in complete remission (H) 08/13/2018     Priority: Medium     Closed dislocation of finger of right hand, subsequent encounter 02/06/2018     Priority: Medium     Thoracic outlet syndrome 07/20/2017     Priority: Medium     Migraine with status migrainosus, not intractable, unspecified migraine type 07/14/2016     Priority: Medium     Conversion disorder with mixed symptoms 06/14/2016     Priority: Medium     Major depressive disorder, recurrent, severe without psychotic features (H) 05/13/2016     Priority: Medium     Dr [facility] [place]       Seasonal allergic rhinitis 10/20/2015     Priority: Medium     Colon adenoma 04/23/2015     Priority: Medium     Anxiety 03/26/2015     Priority: Medium     Donta Elmore therapist       Perimenopause 03/26/2015     Priority: Medium     Nickel allergy 06/11/2014     Priority: Medium     Cervicalgia 12/07/2011     Priority: Medium     CARDIOVASCULAR SCREENING; LDL GOAL LESS THAN 160 10/31/2010     Priority: Medium     The 10-year ASCVD risk score (Yamilaines VILLAGRAN Jr., et al., 2013) is: 1.6%    Values used to calculate the score:      Age: 50 years      Sex: Female      Is an : No      Diabetic: No      Tobacco  smoker: No      Systolic Blood Pressure: 136 mmHg      Prescribed Antihypertensives: No      HDL Cholesterol: 62 mg/dL      Total Cholesterol: 236 mg/dL             H/O: alcohol abuse 06/04/2010     Priority: Medium      Past Medical History:   Diagnosis Date     Ankle sprain and strain 5/2/2013     Anxiety 3/26/2015     Black stools 11/25/2015     CARDIOVASCULAR SCREENING; LDL GOAL LESS THAN 160 10/31/2010     Cervicalgia 12/7/2011     Closed dislocation of finger of right hand, subsequent encounter 2/6/2018     Colon adenoma 4/23/2015    Patient denies it. Renee Tiwari RN, 6/9/15.     Conversion disorder with mixed symptoms 6/14/2016     Depression with suicidal ideation 6/19/2015     Depressive disorder      Elevated blood pressure reading without diagnosis of hypertension 11/25/2015     Elevated LFT's 6/4/2010     Fatigue due to depression 11/24/2015     H/O: alcohol abuse 6/4/2010     IBS (irritable bowel syndrome)      Knee pain 10/19/2010     Low back pain 10/19/2010     Lumbago 12/7/2011     Major depression in complete remission (H) 8/13/2018     Major depressive disorder, recurrent, severe without psychotic features (H) 5/13/2016     Major depressive disorder, single episode, in partial remission (H) 11/24/2015     Migraine      Neck pain 10/19/2010     Nephrolithiasis     calcium oxalate     Pain in thoracic spine 1/2/2013     Perimenopause 3/26/2015     Seasonal allergic rhinitis 10/20/2015     Thoracic outlet syndrome 7/20/2017     Vitamin D deficiency disease 6/8/2010     Past Surgical History:   Procedure Laterality Date     COLONOSCOPY N/A 6/9/2015    Procedure: COLONOSCOPY;  Surgeon: Steve Choi MD;  Location:  GI     LITHOTRIPSY       Current Outpatient Medications   Medication Sig Dispense Refill     ALLERGY RELIEF 25 MG capsule   0     fluticasone (FLONASE) 50 MCG/ACT nasal spray   0     OTC products: none    Allergies   Allergen Reactions     No Clinical Screening - See Comments       PN: LW CM1: Contrast Adverse Reaction - None Reaction :  PN: LW FI1: nka      Latex Allergy: NO    Social History     Tobacco Use     Smoking status: Never Smoker     Smokeless tobacco: Never Used   Substance Use Topics     Alcohol use: Yes     Comment: occ wine     History   Drug Use No       REVIEW OF SYSTEMS:   CONSTITUTIONAL: NEGATIVE for fever, chills, change in weight  ENT/MOUTH: NEGATIVE for ear, mouth and throat problems  RESP: NEGATIVE for significant cough or SOB  CV: NEGATIVE for chest pain, palpitations or peripheral edema  GI: NEGATIVE for nausea, abdominal pain, heartburn, or change in bowel habits  MUSCULOSKELETAL: NEGATIVE for significant arthralgias or myalgia  NEURO: NEGATIVE for weakness, dizziness or paresthesias  ENDOCRINE: NEGATIVE for temperature intolerance, skin/hair changes    EXAM:   LMP 08/20/2015     GENERAL APPEARANCE: healthy, alert and no distress     EYES: EOMI, PERRL     HENT: ear canals and TM's normal and nose and mouth without ulcers or lesions     NECK: no adenopathy, no asymmetry, masses, or scars and thyroid normal to palpation     RESP: lungs clear to auscultation - no rales, rhonchi or wheezes     CV: regular rates and rhythm, normal S1 S2, no S3 or S4 and no murmur, click or rub     ABDOMEN:  soft, nontender, no HSM or masses and bowel sounds normal     MS: extremities normal- no gross deformities noted, no evidence of inflammation in joints, FROM in all extremities.     NEURO: Normal strength and tone, sensory exam grossly normal, mentation intact and speech normal     PSYCH: mentation appears normal. and affect normal/bright     LYMPHATICS: No cervical adenopathy    DIAGNOSTICS:       Recent Labs   Lab Test 08/13/18  1048 07/19/16  0954 07/14/16  1056   HGB  --  15.4 15.9*   PLT  --  165 193     --  140   POTASSIUM 3.8 3.7 3.3*   CR 0.68  --  0.62        IMPRESSION:   Reason for surgery/procedure: ear lobe repair  Diagnosis/reason for consult: pre op    The  proposed surgical procedure is considered LOW risk.    REVISED CARDIAC RISK INDEX  The patient has the following serious cardiovascular risks for perioperative complications such as (MI, PE, VFib and 3  AV Block):  No serious cardiac risks  INTERPRETATION: 0 risks: Class I (very low risk - 0.4% complication rate)    The patient has the following additional risks for perioperative complications:  No identified additional risks      ICD-10-CM    1. Preop general physical exam Z01.818        RECOMMENDATIONS:           APPROVAL GIVEN to proceed with proposed procedure, without further diagnostic evaluation       Signed Electronically by: Pearl Charles MD    Copy of this evaluation report is provided to requesting physician.    [place] Preop Guidelines    Revised Cardiac Risk Index

## 2019-08-08 ENCOUNTER — OFFICE VISIT (OUTPATIENT)
Dept: FAMILY MEDICINE | Facility: CLINIC | Age: 54
End: 2019-08-08
Payer: COMMERCIAL

## 2019-08-08 VITALS
OXYGEN SATURATION: 96 % | SYSTOLIC BLOOD PRESSURE: 122 MMHG | DIASTOLIC BLOOD PRESSURE: 86 MMHG | RESPIRATION RATE: 12 BRPM | WEIGHT: 139 LBS | TEMPERATURE: 97.9 F | HEART RATE: 62 BPM | BODY MASS INDEX: 23.13 KG/M2

## 2019-08-08 DIAGNOSIS — R07.81 RIB PAIN ON LEFT SIDE: Primary | ICD-10-CM

## 2019-08-08 PROCEDURE — 99213 OFFICE O/P EST LOW 20 MIN: CPT | Performed by: PHYSICIAN ASSISTANT

## 2019-08-08 RX ORDER — IBUPROFEN 200 MG
400-600 TABLET ORAL EVERY 8 HOURS PRN
Qty: 60 TABLET | Refills: 0 | Status: SHIPPED | OUTPATIENT
Start: 2019-08-08 | End: 2019-09-03

## 2019-08-08 NOTE — PROGRESS NOTES
"Subjective     Naa Paredes is a 53 year old female who presents to clinic today for the following health issues:    HPI   Rib Pain    Onset: 3 days ago    Description:   Location: Left rib pain  Character: Sharp    Intensity: moderate- 6-7/10 in severity    Progression of Symptoms: worse    Accompanying Signs & Symptoms:  Other symptoms: discoloration -some bruising and hard to move    History:   Previous similar pain: no       Precipitating factors:   Trauma:YES- reached into the garbage and heard a \"pop\"- pain started the next day    Alleviating factors:  Improved by: ice and chinese medication(patch)    Therapies Tried and outcome: ice and chinese med, and rubbed with an ointment    Patient Active Problem List   Diagnosis     H/O: alcohol abuse     CARDIOVASCULAR SCREENING; LDL GOAL LESS THAN 160     Cervicalgia     Nickel allergy     Anxiety     Perimenopause     Colon adenoma     Seasonal allergic rhinitis     Major depressive disorder, recurrent, severe without psychotic features (H)     Conversion disorder with mixed symptoms     Migraine with status migrainosus, not intractable, unspecified migraine type     Thoracic outlet syndrome     Closed dislocation of finger of right hand, subsequent encounter     Major depression in complete remission (H)       No current outpatient medications on file.     No current facility-administered medications for this visit.        Allergies   Allergen Reactions     No Clinical Screening - See Comments      PN: LW CM1: Contrast Adverse Reaction - None Reaction :  PN: LW FI1: nka       Reviewed and updated as needed this visit by Provider  Allergies  Meds  Problems         Review of Systems   GENERAL:  No fevers      Objective    /86 (BP Location: Right arm, Patient Position: Chair, Cuff Size: Adult Regular)   Pulse 62   Temp 97.9  F (36.6  C) (Oral)   Resp 12   Wt 63 kg (139 lb)   LMP 08/20/2015   SpO2 96%   BMI 23.13 kg/m    Body mass index is 23.13 " kg/m .  Physical Exam   GENERAL: No acute distress  HEENT: Normocephalic  PULMONARY: Lungs are clear to auscultation bilaterally. No wheezes, rhonchi or crackles.  EXTREMITIES: No ecchymosis or swelling over the left lateral ribs. Some tenderness with palpation over the left lateral ribs, no crepitus. No tenderness with palpation over the anterior left ribs or posterior left ribs.    NEURO: Alert and non-focal        Assessment & Plan     1. Rib pain on left side  Exam reveals no ecchymosis, edema or crepitus. It is unlikely patient fractured a rib with the mechanism of injury. At this time I do not feel x-ray is indicated. Patient offered a rib belt however she declined. Conservative treatment as noted below was reviewed with patient.  - order for DME; Equipment being ordered: rib belt  Dispense: 1 each; Refill: 0       Patient Instructions   Ice 20 minutes 2-3 times daily.     Ibuprofen 400-600 mg up to 3 times daily with food.    Use rib belt as needed for pain and support.    Follow up if not improving.        Return in about 2 weeks (around 8/22/2019) for if not improving or if worsening.    Winifred Mar PA-C  [facility]

## 2019-08-08 NOTE — PATIENT INSTRUCTIONS
Ice 20 minutes 2-3 times daily.     Ibuprofen 400-600 mg up to 3 times daily with food.    Use rib belt as needed for pain and support.    Follow up if not improving.

## 2019-09-03 ENCOUNTER — ANCILLARY PROCEDURE (OUTPATIENT)
Dept: GENERAL RADIOLOGY | Facility: CLINIC | Age: 54
End: 2019-09-03
Attending: PHYSICIAN ASSISTANT
Payer: COMMERCIAL

## 2019-09-03 ENCOUNTER — OFFICE VISIT (OUTPATIENT)
Dept: FAMILY MEDICINE | Facility: CLINIC | Age: 54
End: 2019-09-03
Payer: COMMERCIAL

## 2019-09-03 VITALS
DIASTOLIC BLOOD PRESSURE: 78 MMHG | HEART RATE: 79 BPM | TEMPERATURE: 97.9 F | BODY MASS INDEX: 23.41 KG/M2 | OXYGEN SATURATION: 94 % | SYSTOLIC BLOOD PRESSURE: 108 MMHG | WEIGHT: 140.7 LBS | RESPIRATION RATE: 14 BRPM

## 2019-09-03 DIAGNOSIS — G56.02 CARPAL TUNNEL SYNDROME OF LEFT WRIST: ICD-10-CM

## 2019-09-03 DIAGNOSIS — R07.81 RIB PAIN ON LEFT SIDE: ICD-10-CM

## 2019-09-03 DIAGNOSIS — L91.8 SKIN TAG: ICD-10-CM

## 2019-09-03 DIAGNOSIS — R07.81 RIB PAIN ON LEFT SIDE: Primary | ICD-10-CM

## 2019-09-03 PROCEDURE — 99214 OFFICE O/P EST MOD 30 MIN: CPT | Performed by: PHYSICIAN ASSISTANT

## 2019-09-03 PROCEDURE — 71101 X-RAY EXAM UNILAT RIBS/CHEST: CPT | Mod: LT

## 2019-09-03 RX ORDER — NABUMETONE 500 MG/1
500 TABLET, FILM COATED ORAL 2 TIMES DAILY PRN
Qty: 60 TABLET | Refills: 0 | Status: SHIPPED | OUTPATIENT
Start: 2019-09-03 | End: 2022-09-21

## 2019-09-03 ASSESSMENT — ANXIETY QUESTIONNAIRES
GAD7 TOTAL SCORE: 0
7. FEELING AFRAID AS IF SOMETHING AWFUL MIGHT HAPPEN: NOT AT ALL
2. NOT BEING ABLE TO STOP OR CONTROL WORRYING: NOT AT ALL
GAD7 TOTAL SCORE: 0
6. BECOMING EASILY ANNOYED OR IRRITABLE: NOT AT ALL
4. TROUBLE RELAXING: NOT AT ALL
GAD7 TOTAL SCORE: 0
3. WORRYING TOO MUCH ABOUT DIFFERENT THINGS: NOT AT ALL
7. FEELING AFRAID AS IF SOMETHING AWFUL MIGHT HAPPEN: NOT AT ALL
5. BEING SO RESTLESS THAT IT IS HARD TO SIT STILL: NOT AT ALL
1. FEELING NERVOUS, ANXIOUS, OR ON EDGE: NOT AT ALL

## 2019-09-03 ASSESSMENT — PATIENT HEALTH QUESTIONNAIRE - PHQ9
SUM OF ALL RESPONSES TO PHQ QUESTIONS 1-9: 0
SUM OF ALL RESPONSES TO PHQ QUESTIONS 1-9: 0
10. IF YOU CHECKED OFF ANY PROBLEMS, HOW DIFFICULT HAVE THESE PROBLEMS MADE IT FOR YOU TO DO YOUR WORK, TAKE CARE OF THINGS AT HOME, OR GET ALONG WITH OTHER PEOPLE: NOT DIFFICULT AT ALL

## 2019-09-03 NOTE — PROGRESS NOTES
Subjective     Naa Paredes is a 53 year old female who presents to clinic today for the following health issues:    History of Present Illness        She eats 0-1 servings of fruits and vegetables daily.She consumes 1 sweetened beverage(s) daily.  She is taking medications regularly.     Patient is here to follow up on her left rib pain which is improving with some dull pain left.  She is requesting a scan be done to rule out anything.  No current pain. No shortness of breath or palpitations. Reached into the garbage ~3 weeks ago and felt a pop. Ibuprofen aids in symptoms. Hurts to sleep on this side. She would also like some skin tags checked on the left side of her neck.    Also ongoing pain and numbness in left index and thumb since carpal tunnel surgery ~1 year ago. Was through [facility]a. No improvement since surgery.       Patient Active Problem List   Diagnosis     H/O: alcohol abuse     CARDIOVASCULAR SCREENING; LDL GOAL LESS THAN 160     Cervicalgia     Nickel allergy     Anxiety     Perimenopause     Colon adenoma     Seasonal allergic rhinitis     Major depressive disorder, recurrent, severe without psychotic features (H)     Conversion disorder with mixed symptoms     Migraine with status migrainosus, not intractable, unspecified migraine type     Thoracic outlet syndrome     Closed dislocation of finger of right hand, subsequent encounter     Major depression in complete remission (H)     Past Surgical History:   Procedure Laterality Date     COLONOSCOPY N/A 6/9/2015    Procedure: COLONOSCOPY;  Surgeon: Steve Choi MD;  Location:  GI     LITHOTRIPSY         Social History     Tobacco Use     Smoking status: Never Smoker     Smokeless tobacco: Never Used   Substance Use Topics     Alcohol use: Yes     Comment: occ wine     Family History   Problem Relation Age of Onset     Heart Disease Mother      Cancer - colorectal Father         at age 65     Mental Illness Paternal Uncle      Breast Cancer No  family hx of      Ovarian Cancer No family hx of          Current Outpatient Medications   Medication Sig Dispense Refill     nabumetone (RELAFEN) 500 MG tablet Take 1 tablet (500 mg) by mouth 2 times daily as needed for moderate pain 60 tablet 0     ibuprofen (ADVIL/MOTRIN) 200 MG tablet Take 2-3 tablets (400-600 mg) by mouth every 8 hours as needed for pain 60 tablet 0     order for DME Equipment being ordered: rib belt 1 each 0     Allergies   Allergen Reactions     No Clinical Screening - See Comments      PN: LW CM1: Contrast Adverse Reaction - None Reaction :  PN: LW FI1: nka     BP Readings from Last 3 Encounters:   09/03/19 108/78   08/08/19 122/86   07/02/19 123/82    Wt Readings from Last 3 Encounters:   09/03/19 63.8 kg (140 lb 11.2 oz)   08/08/19 63 kg (139 lb)   03/28/19 65.8 kg (145 lb)                    Reviewed and updated as needed this visit by Provider         Review of Systems   ROS COMP: Constitutional, skin neuro, msk, cardiovascular, pulmonary, gi and gu systems are negative, except as otherwise noted.      Objective    /78 (BP Location: Right arm, Patient Position: Chair, Cuff Size: Adult Regular)   Pulse 79   Temp 97.9  F (36.6  C) (Oral)   Resp 14   Wt 63.8 kg (140 lb 11.2 oz)   LMP 08/20/2015   SpO2 94%   BMI 23.41 kg/m    Body mass index is 23.41 kg/m .  Physical Exam   GENERAL: healthy, alert and no distress  RESP: lungs clear to auscultation - no rales, rhonchi or wheezes  CV: regular rates and rhythm, normal S1 S2, no S3 or S4 and no murmur, click or rub  MS: no rib tenderness to palpation.   PSYCH: mentation appears normal, affect normal/bright    Diagnostic Test Results:  Xray left rib 3 views: questionable healing fracture noted on anterior rib 8. Pending radiology review        Assessment & Plan     (R07.81) Rib pain on left side  (primary encounter diagnosis)  Comment: possible healing fracture noted. Pending radiology review. Not displaced. No pain on exam.  Reassured. relafen prn bid for pain. If no improvement in 2-3 weeks, rtc.   Plan: XR Ribs & Chest Left G/E 3 Views, nabumetone         (RELAFEN) 500 MG tablet        -Medication use and side effects discussed with the patient. Patient is in complete understanding and agreement with plan.       (L91.8) Skin tag  Comment: will have see derm given number and size of these.   Plan: DERMATOLOGY REFERRAL            (G56.02) Carpal tunnel syndrome of left wrist  Comment: will have obtain a second opinion.   Plan: ORTHOPEDICS ADULT REFERRAL                 Follow up: as above     Return in about 6 months (around 3/3/2020) for with pcp.    Matthew Carroll PA-C  [facility]    Answers for HPI/ROS submitted by the patient on 9/3/2019   Chronic problems general questions HPI Form  If you checked off any problems, how difficult have these problems made it for you to do your work, take care of things at home, or get along with other people?: Not difficult at all  PHQ9 TOTAL SCORE: 0  MARSHALL 7 TOTAL SCORE: 0

## 2019-09-03 NOTE — PATIENT INSTRUCTIONS
Patient Education     Rib Fracture (Broken Rib)     A chest x-ray may be done.   Your ribs are curved bones in your chest. They help protect your lungs and expand and contract when you breathe. Children's ribs bend easily and can often withstand a blow or fall. But adult ribs are more likely to break (fracture) under stress. Even coughing or a hard sneeze can fracture a rib.  When to go to the Emergency Room (ER)  Although they can be painful, most rib fractures aren't serious. But they often make it hard to cough or breathe deeply. Get medical care right away if you have:    Trouble breathing.    Nausea, vomiting, or stomach pain with a sore or bruised rib.    Pain that worsens over time.    An injury to the chest or stomach.  What to expect in the ER  Here is what will happen in the ER:     A healthcare provider will ask about your injury and examine you carefully.    An X-ray of your chest will likely be taken to show any major damage to ribs and lungs. But ribs can have small breaks that don't show up on X-rays, even though they still hurt.    You may be given medicine to ease your discomfort.    In rare cases, rib fractures can cause a lung to collapse or lead to bleeding in the chest. In these cases, a tube will be inserted into the chest to reinflate the lung or drain the blood.  Follow-up  You are likely to heal in 6 to 8 weeks. Most rib fractures heal on their own with no lasting effects. Call your healthcare provider right away if you notice any of these symptoms:    Increased chest pain    Shortness of breath    Fever or chills    Coughing up blood  Date Last Reviewed: 4/1/2018 2000-2018 [employer] [address] [facility] [facility], [place] [address]. All rights reserved. This information is not intended as a substitute for professional medical care. Always follow your healthcare professional's instructions.

## 2019-09-04 ASSESSMENT — PATIENT HEALTH QUESTIONNAIRE - PHQ9: SUM OF ALL RESPONSES TO PHQ QUESTIONS 1-9: 0

## 2019-09-04 ASSESSMENT — ANXIETY QUESTIONNAIRES: GAD7 TOTAL SCORE: 0

## 2019-12-09 ENCOUNTER — ANCILLARY PROCEDURE (OUTPATIENT)
Dept: MAMMOGRAPHY | Facility: CLINIC | Age: 54
End: 2019-12-09
Attending: FAMILY MEDICINE
Payer: COMMERCIAL

## 2019-12-09 DIAGNOSIS — Z12.31 VISIT FOR SCREENING MAMMOGRAM: ICD-10-CM

## 2019-12-09 PROCEDURE — 77063 BREAST TOMOSYNTHESIS BI: CPT | Mod: TC

## 2019-12-09 PROCEDURE — 77067 SCR MAMMO BI INCL CAD: CPT | Mod: TC

## 2020-02-21 ENCOUNTER — THERAPY VISIT (OUTPATIENT)
Dept: OCCUPATIONAL THERAPY | Facility: CLINIC | Age: 55
End: 2020-02-21
Payer: COMMERCIAL

## 2020-02-21 DIAGNOSIS — M79.642 HAND PAIN, LEFT: ICD-10-CM

## 2020-02-21 DIAGNOSIS — R20.2 NUMBNESS AND TINGLING IN BOTH HANDS: ICD-10-CM

## 2020-02-21 DIAGNOSIS — R20.0 NUMBNESS AND TINGLING IN BOTH HANDS: ICD-10-CM

## 2020-02-21 PROCEDURE — 97110 THERAPEUTIC EXERCISES: CPT | Mod: GO | Performed by: OCCUPATIONAL THERAPIST

## 2020-02-21 PROCEDURE — 97165 OT EVAL LOW COMPLEX 30 MIN: CPT | Mod: GO | Performed by: OCCUPATIONAL THERAPIST

## 2020-02-21 NOTE — LETTER
GALILEO  AIRAM HAND  [address]  [address]  [facility] [address]  [phone]    2020    Re: Naa Paredes   :   1965  MRN:  [id number]   REFERRING PHYSICIAN:   Mike GURROLA  AIRAM HAND    Date of Initial Evaluation:  2020  Visits:  Rxs Used: 1  Reason for Referral:     Numbness and tingling in both hands  Hand pain, left    Hand Therapy Initial Evaluation  Current Date:  2020    Subjective:  Naa Paredse is a 54 year old R hand dominant female.    Diagnosis: B hand numbness  DOI:  May 2017  MD order date:  20  Procedure: L CTR 2 years ago    Patient reports symptoms of pain, numbness, tingling  and lack of coordination of the B hands which occurred due to Falling on her hands about 3 years ago. Since onset symptoms are gradually getting better. Special tests:  EMG.  Previous treatment: CTR on L. General health as reported by patient is good.  Pertinent medical history includes: None.  Medical allergies: none.  Surgical history: other: CTR, shockwave therapy for kidney stones.  Medication history: None.    Occupational Profile Information:  Current occupation is at [employer]  Currently working in normal job without restrictions  Job Tasks: Prolonged Standing  Prior functional level:  no limitations  Barriers include:none  Mobility: No difficulty  Transportation: drives  Leisure activities/hobbies: Going to [employer]    Upper Extremity Functional Index Score:  SCORE:   Column Totals: /80: 78   (A lower score indicates greater disability.)                Re: Naa Paredes   :   1965    Objective:  Pain Level (Scale 0-10):   2020    R L   At Rest 0/10, only paresthesias 0/10   With Use 0/10 3-4/10     Pain Description:  Date 2020   Location Left: Thumb and index finger  Right: Denies pain   Pain Quality Throbbing   Frequency intermittent     Pain is worst Daytime, in the morning   Exacerbated by unsure   Relieved by  massage   Progression Gradually better     Edema:  None    Sensation:   20: Left: Pt notes constant tingling through thumb and index finger.   Right: Pt reports intermittant tingling through dorsal and volar forearm, and upper arm. Tingling is worst in the morning    Cervical Screen  Pain Report:  - none    + mild    ++ moderate    +++ severe      Active with Gentle Overpressure 2020   Flexion -   Extension -   Lateral Flexion Right -   Lateral Flexion Left -   Rotation Right -   Rotation Left -   Compression NT   Traction NT     ROM:  Wrist AROM full, pain free, per observation                    Re: Naa Paredes   :   1965    Special Tests   - none    + mild    ++ moderate    +++ severe       20    R L   Melisa Test NT NT   Costoclavicular Test NT NT   Elbow Flexion Test - -   Tinels Cubital Tunnel - -   Tinels at PIN - -   Median Nerve Compression at Pronator - -   Anderson test for lumbrical incursion  (fist x 30 secs) - -   Tinels at Carpal Tunnel - +   Phalens - -   Radial nerve compression at PIN site     Tinels DRSN - -   Carpal compression - + discomfort at 25 sec     Neural Tension Testing  MNT: Median Neurodynamic Test (based on DS Farrah's ULNT)   20   Side R L   0-5 Scale 5/5  S1, noted more crepitus than L 5/5  S1   Position:   0/5: Arm across abdomen in coronal plane  1/5: Depress shoulder, ER to neutral Abd shoulder to 45 degrees  2/5: ER shoulder to end range, keep elbow at 90 degrees  3/5: Extend elbow to 0 degrees  4/5: Fully supinate forearm  5/5: Extend wrist, fingers and thumb  Notes:    (+) indicates beyond grade level but less than senior care to next level    (-) indicates over senior care to level    S1  onset/change of patient's symptoms    S2 definite stop point based on patient's discomfort level      Resisted Testing  Pain Report:  - none    + mild    ++ moderate    +++ severe    20    R L   Wrist Ext with RD, Elbow at side 0/10 010    Wrist Ext with UD, Elbow at side 0/10 0/10   Wrist Flex with RD, Elbow at side 0/10 0/10   Wrist Flex with UD, Elbow at side 0/10 0/10   EDC with Elbow at side 0/10 0/10   Long Finger Test 0/10 0/10       Re: Naa Paredes   :   1965    Palpation Radial Nerve Path: Pain level report on scale 0-10/10  Date 20   Side R L   Supraspinatus NT NT   Triangular Interval <1/10 0/10   Spiral Groove 0 0   Radial Head 0 0   PIN 0 0   DSRN 0 0     Strength   (Measured in pounds)  Pain Report: - none  + mild    ++ moderate    +++ severe    2020   Trials R L   1  2  3 62 64   Average       Lat Pinch 2020   Trials R L   1  2  3 15 14   Average       3 Pt Pinch 2020   Trials R L   1  2  3 14 13   Average       Assessment:  Patient presents with symptoms consistent with diagnosis of hand paresthesias,  with conservative intervention.     Patient's limitations or Problem List includes:  Pain and Sensory disturbance of the bilateral hand, thumb and index finger which interferes with the patient's ability to perform Household Chores as compared to previous level of function.    Rehab Potential:  Excellent - Return to full activity, no limitations    Patient will benefit from skilled Occupational Therapy to increase sensation to return to previous activity level and resume normal daily tasks and to reach their rehab potential.    Barriers to Learning:  Language        Re: Naa Paredes   :   1965    Communication Issues:  Patient appears to be able to clearly communicate and understand verbal and written communication and follow directions correctly.  Patient has an  for communication clarity.    Chart Review: Chart Review, Brief history including review of medical and/or therapy records relating to the presenting problem and Simple history review with patient    Identified Performance Deficits: home establishment and management    Assessment of  Occupational Performance:  1-3 Performance Deficits    Clinical Decision Making (Complexity): Low complexity    Treatment Explanation:  The following has been discussed with the patient:  RX ordered/plan of care  Anticipated outcomes  Possible risks and side effects    Plan:  Frequency:  1 X week, once daily  Duration:  for 6 weeks    Treatment Plan:   Therapeutic Exercise:  AROM and PROM  Neuromuscular re-education:  Nerve Gliding  Manual Techniques:  Myofascial release  Orthotic Fabrication:  Static orthosis  Discharge Plan:  Achieve all LTG.  Independent in home treatment program.  Reach maximal therapeutic benefit.    Home Exercise Program:  Radial nerve glide  Forearm stretches    Next Visit:  Nerve glides  [facility]  Consider splint options     Thank you for your referral.    INQUIRIES  Therapist: JUNI Lr/L  [facility]  [id number] [place] [address] DRIVE  [address] [place] [id number]  Phone: [phone]  Fax: [phone]

## 2020-02-21 NOTE — PROGRESS NOTES
Hand Therapy Initial Evaluation  Current Date:  2/21/2020    Subjective:  Naa Paredes is a 54 year old R hand dominant female.    Diagnosis: B hand numbness  DOI:  May 2017  MD order date:  1/22/20  Procedure: L CTR 2 years ago    Patient reports symptoms of pain, numbness, tingling  and lack of coordination of the B hands which occurred due to Falling on her hands about 3 years ago. Since onset symptoms are gradually getting better. Special tests:  EMG.  Previous treatment: CTR on L. General health as reported by patient is good.  Pertinent medical history includes: None.  Medical allergies: none.  Surgical history: other: CTR, shockwave therapy for kidney stones.  Medication history: None.    Occupational Profile Information:  Current occupation is at [employer]  Currently working in normal job without restrictions  Job Tasks: Prolonged Standing  Prior functional level:  no limitations  Barriers include:none  Mobility: No difficulty  Transportation: drives  Leisure activities/hobbies: Going to [employer]    Upper Extremity Functional Index Score:  SCORE:   Column Totals: /80: 78   (A lower score indicates greater disability.)    Objective:  Pain Level (Scale 0-10):   2/21/2020 2/21/2020    R L   At Rest 0/10, only paresthesias 0/10   With Use 0/10 3-4/10     Pain Description:  Date 2/21/2020   Location Left: Thumb and index finger  Right: Denies pain   Pain Quality Throbbing   Frequency intermittent     Pain is worst Daytime, in the morning   Exacerbated by unsure   Relieved by massage   Progression Gradually better     Edema:  None    Sensation:   2/21/20: Left: Pt notes constant tingling through thumb and index finger.   Right: Pt reports intermittant tingling through dorsal and volar forearm, and upper arm. Tingling is worst in the morning    Cervical Screen  Pain Report:  - none    + mild    ++ moderate    +++ severe      Active with Gentle Overpressure 2/21/2020   Flexion -   Extension -   Lateral Flexion  Right -   Lateral Flexion Left -   Rotation Right -   Rotation Left -   Compression NT   Traction NT     ROM:  Wrist AROM full, pain free, per observation    Special Tests   - none    + mild    ++ moderate    +++ severe       2/21/20 2/21/20    R L   Melisa Test NT NT   Costoclavicular Test NT NT   Elbow Flexion Test - -   Tinels Cubital Tunnel - -   Tinels at PIN - -   Median Nerve Compression at Pronator - -   Anderson test for lumbrical incursion  (fist x 30 secs) - -   Tinels at Carpal Tunnel - +   Phalens - -   Radial nerve compression at PIN site     Tinels DRSN - -   Carpal compression - + discomfort at 25 sec     Neural Tension Testing  MNT: Median Neurodynamic Test (based on DS Farrah's ULNT)   2/21/20 2/21/20   Side R L   0-5 Scale 5/5  S1, noted more crepitus than L 5/5  S1   Position:   0/5: Arm across abdomen in coronal plane  1/5: Depress shoulder, ER to neutral Abd shoulder to 45 degrees  2/5: ER shoulder to end range, keep elbow at 90 degrees  3/5: Extend elbow to 0 degrees  4/5: Fully supinate forearm  5/5: Extend wrist, fingers and thumb  Notes:    (+) indicates beyond grade level but less than snf to next level    (-) indicates over snf to level    S1  onset/change of patient's symptoms    S2 definite stop point based on patient's discomfort level      Resisted Testing  Pain Report:  - none    + mild    ++ moderate    +++ severe    2/21/20 2/21/20    R L   Wrist Ext with RD, Elbow at side 0/10 0/10   Wrist Ext with UD, Elbow at side 0/10 0/10   Wrist Flex with RD, Elbow at side 0/10 0/10   Wrist Flex with UD, Elbow at side 0/10 0/10   EDC with Elbow at side 0/10 0/10   Long Finger Test 0/10 0/10       Palpation Radial Nerve Path: Pain level report on scale 0-10/10  Date 2/21/20 2/21/20   Side R L   Supraspinatus NT NT   Triangular Interval <1/10 0/10   Spiral Groove 0 0   Radial Head 0 0   PIN 0 0   DSRN 0 0     Strength   (Measured in pounds)  Pain Report: - none  + mild    ++ moderate    +++  severe    2/21/2020 2/21/2020   Trials R L   1  2  3 62 64   Average       Lat Pinch 2/21/2020 2/21/2020   Trials R L   1  2  3 15 14   Average       3 Pt Pinch 2/21/2020 2/21/2020   Trials R L   1  2  3 14 13   Average       Assessment:  Patient presents with symptoms consistent with diagnosis of hand paresthesias,  with conservative intervention.     Patient's limitations or Problem List includes:  Pain and Sensory disturbance of the bilateral hand, thumb and index finger which interferes with the patient's ability to perform Household Chores as compared to previous level of function.    Rehab Potential:  Excellent - Return to full activity, no limitations    Patient will benefit from skilled Occupational Therapy to increase sensation to return to previous activity level and resume normal daily tasks and to reach their rehab potential.    Barriers to Learning:  Language    Communication Issues:  Patient appears to be able to clearly communicate and understand verbal and written communication and follow directions correctly.  Patient has an  for communication clarity.    Chart Review: Chart Review, Brief history including review of medical and/or therapy records relating to the presenting problem and Simple history review with patient    Identified Performance Deficits: home establishment and management    Assessment of Occupational Performance:  1-3 Performance Deficits    Clinical Decision Making (Complexity): Low complexity    Treatment Explanation:  The following has been discussed with the patient:  RX ordered/plan of care  Anticipated outcomes  Possible risks and side effects    Plan:  Frequency:  1 X week, once daily  Duration:  for 6 weeks    Treatment Plan:   Therapeutic Exercise:  AROM and PROM  Neuromuscular re-education:  Nerve Gliding  Manual Techniques:  Myofascial release  Orthotic Fabrication:  Static orthosis  Discharge Plan:  Achieve all LTG.  Independent in home treatment  program.  Reach maximal therapeutic benefit.    Home Exercise Program:  Radial nerve glide  Forearm stretches    Next Visit:  Nerve glides  [facility]  Consider splint options

## 2020-03-03 ENCOUNTER — THERAPY VISIT (OUTPATIENT)
Dept: OCCUPATIONAL THERAPY | Facility: CLINIC | Age: 55
End: 2020-03-03
Payer: COMMERCIAL

## 2020-03-03 DIAGNOSIS — M79.642 HAND PAIN, LEFT: ICD-10-CM

## 2020-03-03 DIAGNOSIS — R20.2 NUMBNESS AND TINGLING IN BOTH HANDS: ICD-10-CM

## 2020-03-03 DIAGNOSIS — R20.0 NUMBNESS AND TINGLING IN BOTH HANDS: ICD-10-CM

## 2020-03-03 PROCEDURE — 97140 MANUAL THERAPY 1/> REGIONS: CPT | Mod: GO | Performed by: OCCUPATIONAL THERAPIST

## 2020-03-03 PROCEDURE — 97112 NEUROMUSCULAR REEDUCATION: CPT | Mod: GO | Performed by: OCCUPATIONAL THERAPIST

## 2020-03-03 PROCEDURE — 97110 THERAPEUTIC EXERCISES: CPT | Mod: GO | Performed by: OCCUPATIONAL THERAPIST

## 2020-03-10 ENCOUNTER — THERAPY VISIT (OUTPATIENT)
Dept: OCCUPATIONAL THERAPY | Facility: CLINIC | Age: 55
End: 2020-03-10
Payer: COMMERCIAL

## 2020-03-10 DIAGNOSIS — R20.2 NUMBNESS AND TINGLING IN BOTH HANDS: ICD-10-CM

## 2020-03-10 DIAGNOSIS — R20.0 NUMBNESS AND TINGLING IN BOTH HANDS: ICD-10-CM

## 2020-03-10 DIAGNOSIS — M79.642 HAND PAIN, LEFT: ICD-10-CM

## 2020-03-10 PROCEDURE — 97112 NEUROMUSCULAR REEDUCATION: CPT | Mod: GO | Performed by: OCCUPATIONAL THERAPIST

## 2020-03-10 PROCEDURE — 97760 ORTHOTIC MGMT&TRAING 1ST ENC: CPT | Mod: GO | Performed by: OCCUPATIONAL THERAPIST

## 2020-03-10 PROCEDURE — 97140 MANUAL THERAPY 1/> REGIONS: CPT | Mod: GO | Performed by: OCCUPATIONAL THERAPIST

## 2020-03-10 PROCEDURE — 97110 THERAPEUTIC EXERCISES: CPT | Mod: GO | Performed by: OCCUPATIONAL THERAPIST

## 2020-06-05 PROBLEM — R20.2 NUMBNESS AND TINGLING IN BOTH HANDS: Status: RESOLVED | Noted: 2020-02-21 | Resolved: 2020-06-05

## 2020-06-05 PROBLEM — R20.0 NUMBNESS AND TINGLING IN BOTH HANDS: Status: RESOLVED | Noted: 2020-02-21 | Resolved: 2020-06-05

## 2020-06-05 PROBLEM — M79.642 HAND PAIN, LEFT: Status: RESOLVED | Noted: 2020-02-21 | Resolved: 2020-06-05

## 2020-09-24 ENCOUNTER — TELEPHONE (OUTPATIENT)
Dept: FAMILY MEDICINE | Facility: CLINIC | Age: 55
End: 2020-09-24

## 2020-09-24 DIAGNOSIS — M54.50 MIDLINE LOW BACK PAIN WITHOUT SCIATICA, UNSPECIFIED CHRONICITY: Primary | ICD-10-CM

## 2020-09-24 NOTE — TELEPHONE ENCOUNTER
Patient with mother. Requesting physical therapy referral. Placed per request.     -dasha garza, pac

## 2021-05-14 ENCOUNTER — ANCILLARY PROCEDURE (OUTPATIENT)
Dept: MAMMOGRAPHY | Facility: CLINIC | Age: 56
End: 2021-05-14
Payer: COMMERCIAL

## 2021-05-14 DIAGNOSIS — Z12.31 VISIT FOR SCREENING MAMMOGRAM: ICD-10-CM

## 2021-05-14 PROCEDURE — 77063 BREAST TOMOSYNTHESIS BI: CPT | Mod: TC | Performed by: RADIOLOGY

## 2021-05-14 PROCEDURE — 77067 SCR MAMMO BI INCL CAD: CPT | Mod: TC | Performed by: RADIOLOGY

## 2021-10-19 PROBLEM — F32.9 MAJOR DEPRESSION: Status: ACTIVE | Noted: 2018-08-13

## 2022-02-03 ENCOUNTER — ANCILLARY PROCEDURE (OUTPATIENT)
Dept: GENERAL RADIOLOGY | Facility: CLINIC | Age: 57
End: 2022-02-03
Attending: INTERNAL MEDICINE
Payer: COMMERCIAL

## 2022-02-03 ENCOUNTER — OFFICE VISIT (OUTPATIENT)
Dept: URGENT CARE | Facility: URGENT CARE | Age: 57
End: 2022-02-03
Payer: COMMERCIAL

## 2022-02-03 ENCOUNTER — NURSE TRIAGE (OUTPATIENT)
Dept: FAMILY MEDICINE | Facility: CLINIC | Age: 57
End: 2022-02-03
Payer: COMMERCIAL

## 2022-02-03 VITALS
RESPIRATION RATE: 18 BRPM | BODY MASS INDEX: 26.63 KG/M2 | HEART RATE: 114 BPM | DIASTOLIC BLOOD PRESSURE: 94 MMHG | SYSTOLIC BLOOD PRESSURE: 153 MMHG | TEMPERATURE: 99.1 F | OXYGEN SATURATION: 95 % | WEIGHT: 160 LBS

## 2022-02-03 DIAGNOSIS — R06.02 SHORTNESS OF BREATH: ICD-10-CM

## 2022-02-03 DIAGNOSIS — U07.1 INFECTION DUE TO 2019 NOVEL CORONAVIRUS: Primary | ICD-10-CM

## 2022-02-03 DIAGNOSIS — R53.83 FATIGUE, UNSPECIFIED TYPE: ICD-10-CM

## 2022-02-03 LAB
ANION GAP SERPL CALCULATED.3IONS-SCNC: 3 MMOL/L (ref 3–14)
BASOPHILS # BLD AUTO: 0 10E3/UL (ref 0–0.2)
BASOPHILS NFR BLD AUTO: 0 %
BUN SERPL-MCNC: 23 MG/DL (ref 7–30)
CALCIUM SERPL-MCNC: 8.9 MG/DL (ref 8.5–10.1)
CHLORIDE BLD-SCNC: 108 MMOL/L (ref 94–109)
CO2 SERPL-SCNC: 29 MMOL/L (ref 20–32)
CREAT SERPL-MCNC: 0.68 MG/DL (ref 0.52–1.04)
D DIMER PPP FEU-MCNC: <0.27 UG/ML FEU (ref 0–0.5)
EOSINOPHIL # BLD AUTO: 0.2 10E3/UL (ref 0–0.7)
EOSINOPHIL NFR BLD AUTO: 3 %
ERYTHROCYTE [DISTWIDTH] IN BLOOD BY AUTOMATED COUNT: 12 % (ref 10–15)
GFR SERPL CREATININE-BSD FRML MDRD: >90 ML/MIN/1.73M2
GLUCOSE BLD-MCNC: 111 MG/DL (ref 70–99)
HCT VFR BLD AUTO: 46.5 % (ref 35–47)
HGB BLD-MCNC: 15.3 G/DL (ref 11.7–15.7)
LYMPHOCYTES # BLD AUTO: 1.7 10E3/UL (ref 0.8–5.3)
LYMPHOCYTES NFR BLD AUTO: 30 %
MCH RBC QN AUTO: 30.9 PG (ref 26.5–33)
MCHC RBC AUTO-ENTMCNC: 32.9 G/DL (ref 31.5–36.5)
MCV RBC AUTO: 94 FL (ref 78–100)
MONOCYTES # BLD AUTO: 0.6 10E3/UL (ref 0–1.3)
MONOCYTES NFR BLD AUTO: 10 %
NEUTROPHILS # BLD AUTO: 3.1 10E3/UL (ref 1.6–8.3)
NEUTROPHILS NFR BLD AUTO: 56 %
PLATELET # BLD AUTO: 160 10E3/UL (ref 150–450)
POTASSIUM BLD-SCNC: 3.9 MMOL/L (ref 3.4–5.3)
RBC # BLD AUTO: 4.95 10E6/UL (ref 3.8–5.2)
SODIUM SERPL-SCNC: 140 MMOL/L (ref 133–144)
WBC # BLD AUTO: 5.6 10E3/UL (ref 4–11)

## 2022-02-03 PROCEDURE — 71046 X-RAY EXAM CHEST 2 VIEWS: CPT | Performed by: RADIOLOGY

## 2022-02-03 PROCEDURE — 80048 BASIC METABOLIC PNL TOTAL CA: CPT | Performed by: INTERNAL MEDICINE

## 2022-02-03 PROCEDURE — 99203 OFFICE O/P NEW LOW 30 MIN: CPT | Performed by: INTERNAL MEDICINE

## 2022-02-03 PROCEDURE — 85025 COMPLETE CBC W/AUTO DIFF WBC: CPT | Performed by: INTERNAL MEDICINE

## 2022-02-03 PROCEDURE — 85379 FIBRIN DEGRADATION QUANT: CPT | Performed by: INTERNAL MEDICINE

## 2022-02-03 PROCEDURE — 36415 COLL VENOUS BLD VENIPUNCTURE: CPT | Performed by: INTERNAL MEDICINE

## 2022-02-03 NOTE — TELEPHONE ENCOUNTER
S-(situation): Patient called concerned about some longer-lasting symptoms of COVID-19. Opale  conference in for clarity.     B-(background): Patient tested positive for COVID-19 on January 18th. Patient has since tested negative on the 27th of January.     A-(assessment): Patient states that she has new worsening blurred vision that is pretty continuous. Patient also complaining of shortness of breath with exertion. Patient states that both of these symptoms are worsening. Patient is also very fatigued. Patient denies chest pain. Patient confirms dizziness with shortness of breath.     R-(recommendations): Protocol indicates for patient to be seen in ED/UCC. Patient agrees to be seen in urgent care. Patient states she will be seen at the [facility] location.     RUPESH Kwong, RN  [facility]        Reason for Disposition    Blurred vision or visual changes and present now and sudden onset or new (e.g., minutes, hours, days) (Exception: seeing floaters / black specks OR previously diagnosed migraine headaches with same symptoms)    MILD difficulty breathing (e.g., minimal/no SOB at rest, SOB with walking, pulse <100)    Additional Information    Negative: SEVERE difficulty breathing (e.g., struggling for each breath, speaks in single words)    Negative: Difficult to awaken or acting confused (e.g., disoriented, slurred speech)    Negative: Bluish (or gray) lips or face now    Negative: Shock suspected (e.g., cold/pale/clammy skin, too weak to stand, low BP, rapid pulse)    Negative: Sounds like a life-threatening emergency to the triager    Negative: SEVERE or constant chest pain or pressure (Exception: mild central chest pain, present only when coughing)    Negative: MODERATE difficulty breathing (e.g., speaks in phrases, SOB even at rest, pulse 100-120)    Negative: [1] Headache AND [2] stiff neck (can't touch chin to chest)    Protocols used: VISION LOSS OR  CHANGE-A-OH, CORONAVIRUS (COVID-19) DIAGNOSED OR [id number] 8.25.2021

## 2022-02-03 NOTE — PATIENT INSTRUCTIONS
All of your blood tests and x-ray are looking good.   You do not have pneumonia or any other lung complication of the COVID. Your blood counts are normal as is the chemistry panel.  All of this shows that your body is effectively handling the COVID and you should continue to gradually recover.  This virus can cause symptoms of fatigue and achiness and discomfort that linger for weeks but you should eventually get better.     Use acetaminophen (Tylenol) or ibuprofen as needed for pain and achiness.  Get good sleep and nutrition.    You are not contagious to anyone else at this point.

## 2022-02-03 NOTE — PROGRESS NOTES
Assessment & Plan     Infection due to 2019 novel coronavirus  The patient's symptoms are consistent with lingering fatigue and myalgias during her recovery from COVID-19.  No evidence of significant complication.  Considered possible VTE, pneumonia, pleural effusion, myocarditis, restrictive pericarditis, metabolic disturbance or anemia that could be contributing factors.  Her labs,  CXR and clinical exam rule these out.  Reassurance and supportive care.    Shortness of breath  - XR Chest 2 Views; Future  - CBC with platelets and differential; Future  - D dimer, quantitative; Future  - CBC with platelets and differential  - D dimer, quantitative    Fatigue, unspecified type  - Basic metabolic panel  (Ca, Cl, CO2, Creat, Gluc, K, Na, BUN); Future  - CBC with platelets and differential; Future  - Basic metabolic panel  (Ca, Cl, CO2, Creat, Gluc, K, Na, BUN)  - CBC with platelets and differential    Phuc Chin MD  [facility]    Subjective     HPI   Noting that she was positive for COVID-19 on 1/18.  Rechecked COVID test on 1/27 and was negative.  She then went to work.  Noting that she has been experiencing some exertional dyspnea and some lightheadedness.  Reports that both of her legs are sore and tends to be sore in the knees. Denies cough. Denies chest pain. Does feel that she has gained some weight.  Denies sore throat or hoarse voice.  She denies chronic medical conditions such as HTN, heart disease, lung disease, diabetes.  Did have some episodes of depression.  PSH notable for kidney stones, depression requiring hospitalization, some hand surgery.      Review of Systems   Constitutional, HEENT, cardiovascular, pulmonary, gi and gu systems are negative, except as otherwise noted.      Objective    BP (!) 153/94   Pulse 114   Temp 99.1  F (37.3  C)   Resp 18   Wt 72.6 kg (160 lb)   LMP 08/20/2015   SpO2 95%   BMI 26.63 kg/m    Body mass index is 26.63 kg/m .  Physical  Exam   GENERAL APPEARANCE: healthy, alert and no distress  HENT: ear canals and TM's normal and nose and mouth without ulcers or lesions  NECK: no adenopathy, no asymmetry, masses, or scars and thyroid normal to palpation; no JVD  RESP: lungs clear to auscultation - no rales, rhonchi or wheezes  CV: regular rates and rhythm, normal S1 S2, no S3 or S4 and no murmur, click or rub  ABDOMEN: soft, nontender, without hepatosplenomegaly or masses and bowel sounds normal  EXT: no c/c/e  Results for orders placed or performed in visit on 02/03/22 (from the past 24 hour(s))   Basic metabolic panel  (Ca, Cl, CO2, Creat, Gluc, K, Na, BUN)   Result Value Ref Range    Sodium 140 133 - 144 mmol/L    Potassium 3.9 3.4 - 5.3 mmol/L    Chloride 108 94 - 109 mmol/L    Carbon Dioxide (CO2) 29 20 - 32 mmol/L    Anion Gap 3 3 - 14 mmol/L    Urea Nitrogen 23 7 - 30 mg/dL    Creatinine 0.68 0.52 - 1.04 mg/dL    Calcium 8.9 8.5 - 10.1 mg/dL    Glucose 111 (H) 70 - 99 mg/dL    GFR Estimate >90 >60 mL/min/1.73m2   CBC with platelets and differential    Narrative    The following orders were created for panel order CBC with platelets and differential.  Procedure                               Abnormality         Status                     ---------                               -----------         ------                     CBC with platelets and d...[[id number]]                      Final result                 Please view results for these tests on the individual orders.   D dimer, quantitative   Result Value Ref Range    D-Dimer Quantitative <0.27 0.00 - 0.50 ug/mL FEU    Narrative    This D-dimer assay is intended for use in conjunction with a clinical pretest probability assessment model to exclude pulmonary embolism (PE) and deep venous thrombosis (DVT) in outpatients suspected of PE or DVT. The cut-off value is 0.50 ug/mL FEU.   CBC with platelets and differential   Result Value Ref Range    WBC Count 5.6 4.0 - 11.0 10e3/uL    RBC Count  4.95 3.80 - 5.20 10e6/uL    Hemoglobin 15.3 11.7 - 15.7 g/dL    Hematocrit 46.5 35.0 - 47.0 %    MCV 94 78 - 100 fL    MCH 30.9 26.5 - 33.0 pg    MCHC 32.9 31.5 - 36.5 g/dL    RDW 12.0 10.0 - 15.0 %    Platelet Count 160 150 - 450 10e3/uL    % Neutrophils 56 %    % Lymphocytes 30 %    % Monocytes 10 %    % Eosinophils 3 %    % Basophils 0 %    Absolute Neutrophils 3.1 1.6 - 8.3 10e3/uL    Absolute Lymphocytes 1.7 0.8 - 5.3 10e3/uL    Absolute Monocytes 0.6 0.0 - 1.3 10e3/uL    Absolute Eosinophils 0.2 0.0 - 0.7 10e3/uL    Absolute Basophils 0.0 0.0 - 0.2 10e3/uL       CXR, which I have personally reviewed and interpreted, is free of acute infiltrate, effusion or pulmonary vascular congestion.

## 2022-02-06 NOTE — PROGRESS NOTES
SUBJECTIVE:   Naa Paredes is a 52 year old female who presents to clinic today for the following health issues:      Patient is here to follow up on her dislocated right pinky finger.  She states that there is still stiffness in the finger but not getting any worse.  She also brought a CD of the x rays for you to view.    Had an mcp dislocation of digit 5 and went to  and had deduction but only kwan taping  Has loss of full extension, discussed three weeks of spling and ten day follow up with BW  Past Medical History:   Diagnosis Date     Ankle sprain and strain 5/2/2013     Anxiety 3/26/2015     Black stools 11/25/2015     CARDIOVASCULAR SCREENING; LDL GOAL LESS THAN 160 10/31/2010     Cervicalgia 12/7/2011     Colon adenoma 4/23/2015    Patient denies it. Renee Tiwari RN, 6/9/15.     Conversion disorder with mixed symptoms 6/14/2016     Depression with suicidal ideation 6/19/2015     Depressive disorder      Elevated blood pressure reading without diagnosis of hypertension 11/25/2015     Elevated LFT's 6/4/2010     Fatigue due to depression 11/24/2015     H/O: alcohol abuse 6/4/2010     IBS (irritable bowel syndrome)      Knee pain 10/19/2010     Low back pain 10/19/2010     Lumbago 12/7/2011     Major depressive disorder, recurrent, severe without psychotic features (H) 5/13/2016     Major depressive disorder, single episode, in partial remission (H) 11/24/2015     Migraine      Neck pain 10/19/2010     Nephrolithiasis     calcium oxalate     Pain in thoracic spine 1/2/2013     Perimenopause 3/26/2015     Seasonal allergic rhinitis 10/20/2015     Thoracic outlet syndrome 7/20/2017     Vitamin D deficiency disease 6/8/2010       Past Surgical History:   Procedure Laterality Date     COLONOSCOPY N/A 6/9/2015    Procedure: COLONOSCOPY;  Surgeon: Steve Choi MD;  Location:  GI     LITHOTRIPSY         Family History   Problem Relation Age of Onset     HEART DISEASE Mother      Cancer - colorectal  Father      at age 65     MENTAL ILLNESS Paternal Uncle      Breast Cancer No family hx of      Ovarian Cancer No family hx of        Social History   Substance Use Topics     Smoking status: Never Smoker     Smokeless tobacco: Never Used     Alcohol use Yes      Comment: occ wine     On exam the vital signs are stable  Weight is Body mass index is 24.71 kg/(m^2).   Eyes show lula   No neck masses or thyromegaly.Ear nose and throat shows normaol  No bruits, murmers, rubs or extrasounds. No cardiomegaly or chest wall tenderness. Lungs clear, no abdominal masses or organomegaly. No CVA tenderness.  Skin eval no eccymosis    (I74.272D) Finger dislocation, subsequent encounter  (primary encounter diagnosis)  Comment:   Plan: alumifoam splint                  Name band;

## 2022-07-07 ENCOUNTER — OFFICE VISIT (OUTPATIENT)
Dept: FAMILY MEDICINE | Facility: CLINIC | Age: 57
End: 2022-07-07
Payer: COMMERCIAL

## 2022-07-07 VITALS
BODY MASS INDEX: 25.33 KG/M2 | HEART RATE: 84 BPM | HEIGHT: 65 IN | DIASTOLIC BLOOD PRESSURE: 87 MMHG | SYSTOLIC BLOOD PRESSURE: 136 MMHG | OXYGEN SATURATION: 94 % | WEIGHT: 152 LBS

## 2022-07-07 DIAGNOSIS — M77.9 BONE SPUR: ICD-10-CM

## 2022-07-07 DIAGNOSIS — M79.672 PAIN OF LEFT HEEL: Primary | ICD-10-CM

## 2022-07-07 PROCEDURE — 99213 OFFICE O/P EST LOW 20 MIN: CPT | Performed by: PHYSICIAN ASSISTANT

## 2022-07-07 RX ORDER — NAPROXEN 500 MG/1
500 TABLET ORAL 2 TIMES DAILY WITH MEALS
Qty: 60 TABLET | Refills: 0 | Status: SHIPPED | OUTPATIENT
Start: 2022-07-07 | End: 2022-09-21

## 2022-07-07 NOTE — PROGRESS NOTES
"  Assessment & Plan     Pain of left heel    Likely caused by bone spur. Take Naproxen and apply ice to help with inflammation.     - XR Calcaneus Left G/E 2 Views; Future  - naproxen (NAPROSYN) 500 MG tablet; Take 1 tablet (500 mg) by mouth 2 times daily (with meals)      Bone spur    See above.    - naproxen (NAPROSYN) 500 MG tablet; Take 1 tablet (500 mg) by mouth 2 times daily (with meals)               Patient Instructions   Take Naproxen twice a day for at least 2 weeks.    Apply ice 2-3 times a day.    Call if not improving in 3-4 weeks and I can refer you to a foot specialist.        Return in about 2 months (around 9/7/2022) for Physical Exam.    [facility]    Bernabe Jacome is a 56 year old, presenting for the following health issues:  Foot Pain      HPI     Pain History:  Where in your body do you have pain? Musculoskeletal problem/pain  Onset/Duration: 3-4 months  Description  Location: heel - left  Joint Swelling: YES  Redness: No  Pain: YES-   Warmth: No  Intensity:  moderate  Progression of Symptoms:  same  Accompanying signs and symptoms:   Fevers: No  Numbness/tingling/weakness: No  History  Trauma to the area: No  Recent illness:  No  Previous similar problem: No  Previous evaluation:  No  Precipitating or alleviating factors:  Aggravating factors include: walking and overuse  Therapies tried and outcome: massage, otc ointment with no relief      Review of Systems   Constitutional, HEENT, cardiovascular, pulmonary, gi and gu systems are negative, except as otherwise noted.        Objective    /87 (BP Location: Right arm, Patient Position: Sitting, Cuff Size: Adult Regular)   Pulse 84   Ht 1.651 m (5' 5\")   Wt 68.9 kg (152 lb)   LMP 08/20/2015   SpO2 94%   BMI 25.29 kg/m    Body mass index is 25.29 kg/m .       Physical Exam   GENERAL: healthy, alert and no distress  EYES: Eyes grossly normal to inspection, PERRL and conjunctivae " and sclerae normal  MS: no gross musculoskeletal defects noted, no edema  SKIN: no suspicious lesions or rashes  NEURO: Normal strength and tone, mentation intact and speech normal  PSYCH: mentation appears normal, affect normal/bright  Left heel: There is no erythema, edema, or ecchymosis. Tender to palpation of plantar surface of heel. Otherwise non-tender.     Xray - Reviewed and interpreted by me.  Bone spur on heel. Otherwise normal per my read.                .  ..

## 2022-07-07 NOTE — PATIENT INSTRUCTIONS
Take Naproxen twice a day for at least 2 weeks.    Apply ice 2-3 times a day.    Call if not improving in 3-4 weeks and I can refer you to a foot specialist.

## 2022-08-04 ENCOUNTER — OFFICE VISIT (OUTPATIENT)
Dept: PODIATRY | Facility: CLINIC | Age: 57
End: 2022-08-04
Payer: COMMERCIAL

## 2022-08-04 VITALS — WEIGHT: 160 LBS | DIASTOLIC BLOOD PRESSURE: 80 MMHG | SYSTOLIC BLOOD PRESSURE: 122 MMHG | BODY MASS INDEX: 26.63 KG/M2

## 2022-08-04 DIAGNOSIS — M72.2 PLANTAR FASCIITIS, BILATERAL: ICD-10-CM

## 2022-08-04 DIAGNOSIS — M79.671 FOOT PAIN, BILATERAL: Primary | ICD-10-CM

## 2022-08-04 DIAGNOSIS — M21.42 PES PLANUS OF BOTH FEET: ICD-10-CM

## 2022-08-04 DIAGNOSIS — M79.672 FOOT PAIN, BILATERAL: Primary | ICD-10-CM

## 2022-08-04 DIAGNOSIS — M21.41 PES PLANUS OF BOTH FEET: ICD-10-CM

## 2022-08-04 PROCEDURE — 99203 OFFICE O/P NEW LOW 30 MIN: CPT | Performed by: PODIATRIST

## 2022-08-04 NOTE — LETTER
8/4/2022         RE: Naa Paredes  [address]  [facility] [id number]        Dear Colleague,    Thank you for referring your patient, Naa Paredes, to the [facility]. Please see a copy of my visit note below.    PATIENT HISTORY:  Naa Paredes is a 56 year old female who presents to clinic for pain to the left heel.  Using a Cambodian  on the iPad.  States that the left heel has been sore for a few months.  The right one has been sore on and off for years.  That 1 is not really bothering her.  She did have x-rays done on the left heel about 3 weeks ago.  Notes that she has been off her foot for the last 2 weeks and this is helped a lot with pain.  When it was sore it was 5 out of 10.  This was when she was doing too much walking or standing.  She denies specific injury.  Now that the left heel is doing somewhat better she would like to know what can be done to prevent recurrent pain to the area.    Review of Systems:  Patient denies fever, chills, rash, wound, stiffness, numbness, weakness, heart burn, blood in stool, chest pain with activity, calf pain when walking, shortness of breath with activity, chronic cough, easy bleeding/bruising, swelling of ankles, excessive thirst, fatigue, depression, anxiety.  Patient admits to limping at times.     PAST MEDICAL HISTORY:   Past Medical History:   Diagnosis Date     Ankle sprain and strain 5/2/2013     Anxiety 3/26/2015     Black stools 11/25/2015     CARDIOVASCULAR SCREENING; LDL GOAL LESS THAN 160 10/31/2010     Cervicalgia 12/7/2011     Closed dislocation of finger of right hand, subsequent encounter 2/6/2018     Colon adenoma 4/23/2015    Patient denies it. Renee Tiwari RN, 6/9/15.     Conversion disorder with mixed symptoms 6/14/2016     Depression with suicidal ideation 6/19/2015     Depressive disorder      Elevated blood pressure reading without diagnosis of hypertension 11/25/2015      Elevated LFT's 6/4/2010     Fatigue due to depression 11/24/2015     H/O: alcohol abuse 6/4/2010     IBS (irritable bowel syndrome)      Knee pain 10/19/2010     Low back pain 10/19/2010     Lumbago 12/7/2011     Major depression in complete remission (H) 8/13/2018     Major depressive disorder, recurrent, severe without psychotic features (H) 5/13/2016     Major depressive disorder, single episode, in partial remission (H) 11/24/2015     Migraine      Neck pain 10/19/2010     Nephrolithiasis     calcium oxalate     Pain in thoracic spine 1/2/2013     Perimenopause 3/26/2015     Seasonal allergic rhinitis 10/20/2015     Thoracic outlet syndrome 7/20/2017     Vitamin D deficiency disease 6/8/2010        PAST SURGICAL HISTORY:   Past Surgical History:   Procedure Laterality Date     COLONOSCOPY N/A 6/9/2015    Procedure: COLONOSCOPY;  Surgeon: Steve Choi MD;  Location:  GI     LITHOTRIPSY          MEDICATIONS:   Current Outpatient Medications:      nabumetone (RELAFEN) 500 MG tablet, Take 1 tablet (500 mg) by mouth 2 times daily as needed for moderate pain, Disp: 60 tablet, Rfl: 0     naproxen (NAPROSYN) 500 MG tablet, Take 1 tablet (500 mg) by mouth 2 times daily (with meals), Disp: 60 tablet, Rfl: 0     order for DME, Equipment being ordered: rib belt (Patient not taking: Reported on 8/4/2022), Disp: 1 each, Rfl: 0     ALLERGIES:    Allergies   Allergen Reactions     No Clinical Screening - See Comments      PN: LW CM1: Contrast Adverse Reaction - None Reaction :  PN: LW FI1: nka        SOCIAL HISTORY:   Social History     Socioeconomic History     Marital status: Legally      Spouse name: Not on file     Number of children: Not on file     Years of education: Not on file     Highest education level: Not on file   Occupational History     Not on file   Tobacco Use     Smoking status: Never Smoker     Smokeless tobacco: Never Used   Substance and Sexual Activity     Alcohol use: Yes     Comment:  occ wine     Drug use: No     Sexual activity: Not Currently     Partners: Male   Other Topics Concern     Parent/sibling w/ CABG, MI or angioplasty before 65F 55M? Not Asked   Social History Narrative     Not on file     Social Determinants of Health     Financial Resource Strain: Not on file   Food Insecurity: Not on file   Transportation Needs: Not on file   Physical Activity: Not on file   Stress: Not on file   Social Connections: Not on file   Intimate Partner Violence: Not on file   Housing Stability: Not on file        FAMILY HISTORY:   Family History   Problem Relation Age of Onset     Heart Disease Mother      Cancer - colorectal Father         at age 65     Mental Illness Paternal Uncle      Breast Cancer No family hx of      Ovarian Cancer No family hx of         EXAM:Vitals: /80   Wt 72.6 kg (160 lb)   LMP 08/20/2015   BMI 26.63 kg/m    BMI= Body mass index is 26.63 kg/m .    General appearance: Patient is alert and fully cooperative with history & exam.  No sign of distress is noted during the visit.     Psychiatric: Affect is pleasant & appropriate.  Patient appears motivated to improve health.     Respiratory: Breathing is regular & unlabored while sitting.     HEENT: Hearing is intact to spoken word.  Speech is clear.  No gross evidence of visual impairment that would impact ambulation.     Dermatologic: Skin is intact to both lower extremities without significant lesions, rash or abrasion.  No paronychia or evidence of soft tissue infection is noted.     Vascular: DP & PT pulses are intact & regular bilaterally.  No significant edema or varicosities noted.  CFT and skin temperature is normal to both lower extremities.     Neurologic: Lower extremity sensation is intact to light touch.  No evidence of weakness or contracture in the lower extremities.  No evidence of neuropathy.     Musculoskeletal: Patient is ambulatory without assistive device or brace.  Decreased arch height.  Minimal pain  on palpation of the plantar heels left more than right.    Radiographs: left heel xray -   I personally reviewed the xrays - No fracture. Small plantar calcaneal spur.     ASSESSMENT:    Foot pain, bilateral  Plantar fasciitis, bilateral  Pes planus of both feet     Medical Decision Making/Plan:  Reviewed patient's chart in [facility].  Reviewed and discussed x-rays. The potential causes and nature of plantar fasciitis were discussed with the patient.  We reviewed the natural history/prognosis of the condition and risks if left untreated.  These include chronic pain, other sites of pain due to gait changes, and potential plantar fascial rupture.      We discussed possible causes of the condition as it relates to the patients specific situation.      Conservative treatment options were reviewed:  appropriate shoes, avoidance of barefoot walking, inserts/orthoses, stretching, ice, massage, immobilization and NSAIDs.     We also reviewed the options of injection therapy and surgery.  However, it was made clear that surgery is only considered when conservative therapy fails.  The risks and benefits of injection therapy, and surgery were discussed.     After thorough discussion and answering all questions, the patient elected to try inserts in the shoes with arch support or better arch support shoes.  Also recommend stretches and topical pain cream.  If pain continues we could always try an injection or physical therapy.  All questions were answered to patient satisfaction and she will call for the questions or concerns.       Patient risk factor: Patient is at low risk for infection.        Casi Marquez DPM, Podiatry/Foot and Ankle Surgery        Again, thank you for allowing me to participate in the care of your patient.        Sincerely,        Casi Marquez DPM, Podiatry/Foot and Ankle Surgery

## 2022-08-04 NOTE — PROGRESS NOTES
PATIENT HISTORY:  Naa Paredes is a 56 year old female who presents to clinic for pain to the left heel.  Using a Cambodian  on the iPad.  States that the left heel has been sore for a few months.  The right one has been sore on and off for years.  That 1 is not really bothering her.  She did have x-rays done on the left heel about 3 weeks ago.  Notes that she has been off her foot for the last 2 weeks and this is helped a lot with pain.  When it was sore it was 5 out of 10.  This was when she was doing too much walking or standing.  She denies specific injury.  Now that the left heel is doing somewhat better she would like to know what can be done to prevent recurrent pain to the area.    Review of Systems:  Patient denies fever, chills, rash, wound, stiffness, numbness, weakness, heart burn, blood in stool, chest pain with activity, calf pain when walking, shortness of breath with activity, chronic cough, easy bleeding/bruising, swelling of ankles, excessive thirst, fatigue, depression, anxiety.  Patient admits to limping at times.     PAST MEDICAL HISTORY:   Past Medical History:   Diagnosis Date     Ankle sprain and strain 5/2/2013     Anxiety 3/26/2015     Black stools 11/25/2015     CARDIOVASCULAR SCREENING; LDL GOAL LESS THAN 160 10/31/2010     Cervicalgia 12/7/2011     Closed dislocation of finger of right hand, subsequent encounter 2/6/2018     Colon adenoma 4/23/2015    Patient denies it. Renee Tiwari RN, 6/9/15.     Conversion disorder with mixed symptoms 6/14/2016     Depression with suicidal ideation 6/19/2015     Depressive disorder      Elevated blood pressure reading without diagnosis of hypertension 11/25/2015     Elevated LFT's 6/4/2010     Fatigue due to depression 11/24/2015     H/O: alcohol abuse 6/4/2010     IBS (irritable bowel syndrome)      Knee pain 10/19/2010     Low back pain 10/19/2010     Lumbago 12/7/2011     Major depression in complete remission (H) 8/13/2018      Major depressive disorder, recurrent, severe without psychotic features (H) 5/13/2016     Major depressive disorder, single episode, in partial remission (H) 11/24/2015     Migraine      Neck pain 10/19/2010     Nephrolithiasis     calcium oxalate     Pain in thoracic spine 1/2/2013     Perimenopause 3/26/2015     Seasonal allergic rhinitis 10/20/2015     Thoracic outlet syndrome 7/20/2017     Vitamin D deficiency disease 6/8/2010        PAST SURGICAL HISTORY:   Past Surgical History:   Procedure Laterality Date     COLONOSCOPY N/A 6/9/2015    Procedure: COLONOSCOPY;  Surgeon: Steve Choi MD;  Location:  GI     LITHOTRIPSY          MEDICATIONS:   Current Outpatient Medications:      nabumetone (RELAFEN) 500 MG tablet, Take 1 tablet (500 mg) by mouth 2 times daily as needed for moderate pain, Disp: 60 tablet, Rfl: 0     naproxen (NAPROSYN) 500 MG tablet, Take 1 tablet (500 mg) by mouth 2 times daily (with meals), Disp: 60 tablet, Rfl: 0     order for DME, Equipment being ordered: rib belt (Patient not taking: Reported on 8/4/2022), Disp: 1 each, Rfl: 0     ALLERGIES:    Allergies   Allergen Reactions     No Clinical Screening - See Comments      PN: LW CM1: Contrast Adverse Reaction - None Reaction :  PN: LW FI1: nka        SOCIAL HISTORY:   Social History     Socioeconomic History     Marital status: Legally      Spouse name: Not on file     Number of children: Not on file     Years of education: Not on file     Highest education level: Not on file   Occupational History     Not on file   Tobacco Use     Smoking status: Never Smoker     Smokeless tobacco: Never Used   Substance and Sexual Activity     Alcohol use: Yes     Comment: occ wine     Drug use: No     Sexual activity: Not Currently     Partners: Male   Other Topics Concern     Parent/sibling w/ CABG, MI or angioplasty before 65F 55M? Not Asked   Social History Narrative     Not on file     Social Determinants of Health     Financial  Resource Strain: Not on file   Food Insecurity: Not on file   Transportation Needs: Not on file   Physical Activity: Not on file   Stress: Not on file   Social Connections: Not on file   Intimate Partner Violence: Not on file   Housing Stability: Not on file        FAMILY HISTORY:   Family History   Problem Relation Age of Onset     Heart Disease Mother      Cancer - colorectal Father         at age 65     Mental Illness Paternal Uncle      Breast Cancer No family hx of      Ovarian Cancer No family hx of         EXAM:Vitals: /80   Wt 72.6 kg (160 lb)   LMP 08/20/2015   BMI 26.63 kg/m    BMI= Body mass index is 26.63 kg/m .    General appearance: Patient is alert and fully cooperative with history & exam.  No sign of distress is noted during the visit.     Psychiatric: Affect is pleasant & appropriate.  Patient appears motivated to improve health.     Respiratory: Breathing is regular & unlabored while sitting.     HEENT: Hearing is intact to spoken word.  Speech is clear.  No gross evidence of visual impairment that would impact ambulation.     Dermatologic: Skin is intact to both lower extremities without significant lesions, rash or abrasion.  No paronychia or evidence of soft tissue infection is noted.     Vascular: DP & PT pulses are intact & regular bilaterally.  No significant edema or varicosities noted.  CFT and skin temperature is normal to both lower extremities.     Neurologic: Lower extremity sensation is intact to light touch.  No evidence of weakness or contracture in the lower extremities.  No evidence of neuropathy.     Musculoskeletal: Patient is ambulatory without assistive device or brace.  Decreased arch height.  Minimal pain on palpation of the plantar heels left more than right.    Radiographs: left heel xray -   I personally reviewed the xrays - No fracture. Small plantar calcaneal spur.     ASSESSMENT:    Foot pain, bilateral  Plantar fasciitis, bilateral  Pes planus of both feet      Medical Decision Making/Plan:  Reviewed patient's chart in [facility].  Reviewed and discussed x-rays. The potential causes and nature of plantar fasciitis were discussed with the patient.  We reviewed the natural history/prognosis of the condition and risks if left untreated.  These include chronic pain, other sites of pain due to gait changes, and potential plantar fascial rupture.      We discussed possible causes of the condition as it relates to the patients specific situation.      Conservative treatment options were reviewed:  appropriate shoes, avoidance of barefoot walking, inserts/orthoses, stretching, ice, massage, immobilization and NSAIDs.     We also reviewed the options of injection therapy and surgery.  However, it was made clear that surgery is only considered when conservative therapy fails.  The risks and benefits of injection therapy, and surgery were discussed.     After thorough discussion and answering all questions, the patient elected to try inserts in the shoes with arch support or better arch support shoes.  Also recommend stretches and topical pain cream.  If pain continues we could always try an injection or physical therapy.  All questions were answered to patient satisfaction and she will call for the questions or concerns.       Patient risk factor: Patient is at low risk for infection.        Casi Marquez DPM, Podiatry/Foot and Ankle Surgery

## 2022-08-04 NOTE — PATIENT INSTRUCTIONS
Thank you for choosing [facility] Podiatry / Foot & Ankle Surgery!    DR FORMAN'S CLINIC:  [facility]   [address]   [place] [address]      TRIAGE LINE: [phone]  APPOINTMENTS: [phone]  RADIOLOGY: [phone]  SET UP SURGERY: [phone]  FAX NUMBER: [phone]  BILLING QUESTIONS: [phone]       Follow up: as needed.       Can  shoes at [employer] shoes or famous footwear or DS W.  Recommend new balance, danskos, [place] or [employer]ionics.    Inserts in the shoes, recommend Super feet - green color    PLANTAR FASCIITIS  Plantar fasciitis is often referred to as heel spurs or heel pain. Plantar fasciitis is a very common problem that affects people of all foot shapes, age, weight and activity level. Pain may be in the arch or on the weight-bearing surface of the heel. The pain may come on without injury or identifiable cause. Pain is generally present when first getting out of bed in the morning or up from a seated break.     CAUSES  The plantar fascia is a dense fibrous band of tissue that stretches across the bottom surface of the foot. The fascia helps support the foot muscles and arch. Plantar fasciitis is thought to be caused by mechanical strain or overload. Frequent walking without shoes or wearing unsupportive shoes is thought to cause structural overload and ultimately inflammation of the plantar fascia. Some people have heel spurs that can be seen on x-ray. The heel spur is actually a minor component of plantar fascitis and is largely ignored.       SELF TREATMENT   The easiest solution is to stop walking around your home without shoes. Plantar fasciitis is largely a shoe problem. Shoes are either not being worn often enough or your current shoes are inadequate for your weight, foot structure or activity level. The majority of shoes on the market today are not sufficient to resist development of plantar fasciitis or to promote healing. Assume that your  current shoes are inadequate and will need to be replaced. Even high quality shoes wear out with 6 months to one year of frequent use. Weight loss is another option. Losing ten pounds in the next two months may be enough to resolve the problem. Ice applied to the area of pain two to three times per day for ten minutes each session can be very helpful. Warm foot soaks in epsom salts can also relieve pain. This should continue until the problem resolves. Achilles tendon stretching is essential. Stretch multiple times daily to promote healing and to prevent recurrence in the future. Over all stretching of the body is helpful as well such as the calves, thighs and lower back. Normally when one area of the body is tight, other areas are too. Gentle Yoga can be good for this.     Over the counter topical anti inflammatories can be helpful such as biofreeze, bengay, salon pas, ect...  Oral ibuprofen or aleve is recommended as well to try to calm down inflammation.     Night splints can be helpful to gradually stretch the foot at night as a lot of pain is when you get up in the morning. Taking a towel or thera band and stretching the foot back multiple times before you get ou of bed can be beneficial as well.     MEDICAL TREATMENT  Medical treatments often include custom arch supports, cortisone injections, physical therapy, splints to be worn in bed, prescription medications and surgery. The home treatments listed above will be necessary regardless of these advanced medical treatments. Surgery is rarely needed but is very helpful in selected cases.     PROGNOSIS  Plantar fasciitis can last from one day to a lifetime. Some people get intermittent fascitis that is very short-lived. Others suffer daily for years. Excessive body weight, frequent bare foot walking, long hours on the feet, inadequate shoes, predisposing foot structures and excessive activity such as running are all potential issues that lead to chronic and/or  recurring plantar fascitis. Having plantar fasciitis means that you are forever prone to this problem and will require modification of some of the above factors. Most people seek treatment within one to four months. Healing usually requires a similar one to four month time frame. Healing time is relative to the amount of effort spent treating the problem.   Plantar fasciitis is highly recurrent. Risk factors often continue, including return to bare foot walking, inadequate shoes, excessive body weight, excessive activities, etc. Your life style and foot structure may predispose you to recurrent plantar fasciitis. A daily prevention regimen can be very helpful. Ongoing use of shoe inserts, careful attention to appropriate shoes, daily Achilles stretching, etc. may prevent recurrence. Prompt attention at the earliest warning signs of heel pain can resolve the problem in as short as a few days.     EXERCISES  Stair Exercise: Step on the stairs with the ball of your foot and hold your position for at least 15 seconds, then slowly step down with the heels of your foot. You can do this daily and as often as you want.   Picking the Towel: Sit comfortably and then pick the towel up with your toes. You can use any object other than a towel as long as the material can be soft and you can pick it up with your toes.  Rolling the Bottle: Use a small ball or frozen water bottle and then roll it around with your foot.   Flex the Toes: Sit comfortably and then flex your toes by pointing it towards the floor or towards your body. This will relax and flex your foot and exercise your plantar fascia, the calf, and the Achilles tendon. The inability of the foot to stretch often causes the bunching up of the plantar fascia area leading to the pain.  Calf/Achilles Stretching: Lay on you back and raise one foot, then point your toes towards the floor. See photo below:               Hold each stretch for 10 seconds. Stretch 10 times per  set, three sets per day. Morning, afternoon and evening. If your heel pain is very severe in the morning, consider doing the first set of stretches before you get out of bed.      OVER THE COUNTER INSERT RECOMMENDATIONS  SuperFeet   Sofsole Fit Spenco   Power Step   Walk-Fit Arch Cradles     Most of these can be found at your local [employer], [employer], or online.  **A good high quality over the counter insert should cost around $40-$50      [employer] LOCATIONS  [address]  [phone]   [address]  [phone] [place]  [address] [place] [address]  [phone]   [place]  [address]  [phone]   [place]  [address] [facility]  [phone] [place] [address]  [phone]   [place]  [address]  [phone]   [place]  [address]  [phone] [place]  [address]  [phone]

## 2022-09-02 ENCOUNTER — TELEPHONE (OUTPATIENT)
Dept: PODIATRY | Facility: CLINIC | Age: 57
End: 2022-09-02

## 2022-09-02 DIAGNOSIS — M79.671 FOOT PAIN, BILATERAL: Primary | ICD-10-CM

## 2022-09-02 DIAGNOSIS — M79.672 FOOT PAIN, BILATERAL: Primary | ICD-10-CM

## 2022-09-02 DIAGNOSIS — M72.2 PLANTAR FASCIITIS, BILATERAL: ICD-10-CM

## 2022-09-02 RX ORDER — DEXAMETHASONE SODIUM PHOSPHATE 4 MG/ML
4 INJECTION, SOLUTION INTRA-ARTICULAR; INTRALESIONAL; INTRAMUSCULAR; INTRAVENOUS; SOFT TISSUE SEE ADMIN INSTRUCTIONS
Qty: 30 ML | Refills: 0 | Status: SHIPPED | OUTPATIENT
Start: 2022-09-02 | End: 2022-09-21

## 2022-09-02 NOTE — TELEPHONE ENCOUNTER
Order signed for physical therapy.  Put an order for cortisone medication that patient can  at her pharmacy and bring to physical therapy.    Please let patient know.    Thanks,    Casi Marquez DPM

## 2022-09-02 NOTE — TELEPHONE ENCOUNTER
[facility] Call Center    Phone Message    May a detailed message be left on voicemail: yes     Reason for Call: Other: Patient would like a call back to discuss pain management.      Action Taken: Message routed to:  [facility]o    Travel Screening: Not Applicable

## 2022-09-02 NOTE — TELEPHONE ENCOUNTER
Call made using  services.     Patient was last seen 8/4/22. Let patient know at last visit Dr. Marquez recommended appropriate shoes, inserts, stretching, ice, massage and NSAIDs. She states she changed footwear and has not noticed any improvement in pain. She has also tried icing and ibuprofen. She states the ibuprofen had been helping but she stopped taking.     Relayed to patient that injection or physical therapy would be next step. Patient is interested in physical therapy. Order pended for provider review.     Shannon Reilly MSA, ATC  Certified Athletic Trainer

## 2022-09-06 NOTE — TELEPHONE ENCOUNTER
There is not a current consent to communicate on file to speak with patient.     Phone call to patient with the assist of a Cantonese . Informed patient of physical therapy recommended. She states she already called and set up 3 appointments. She asked to confirm where they are located. Provided [facility] address and suite number. She was also informed that some pharmacies don't stock the medication and have to order it.  Asked that she let us know if she has any problems getting the medication. She verbalized understanding.     JYOTI Hill RN

## 2022-09-21 ENCOUNTER — OFFICE VISIT (OUTPATIENT)
Dept: FAMILY MEDICINE | Facility: CLINIC | Age: 57
End: 2022-09-21
Payer: COMMERCIAL

## 2022-09-21 VITALS
HEART RATE: 78 BPM | RESPIRATION RATE: 12 BRPM | TEMPERATURE: 98.2 F | DIASTOLIC BLOOD PRESSURE: 72 MMHG | SYSTOLIC BLOOD PRESSURE: 104 MMHG | WEIGHT: 149 LBS | BODY MASS INDEX: 24.83 KG/M2 | OXYGEN SATURATION: 95 % | HEIGHT: 65 IN

## 2022-09-21 DIAGNOSIS — F44.7 CONVERSION DISORDER WITH MIXED SYMPTOMS: ICD-10-CM

## 2022-09-21 DIAGNOSIS — Z23 ENCOUNTER FOR IMMUNIZATION: ICD-10-CM

## 2022-09-21 DIAGNOSIS — Z12.11 SCREEN FOR COLON CANCER: ICD-10-CM

## 2022-09-21 DIAGNOSIS — Z91.09 NICKEL ALLERGY: ICD-10-CM

## 2022-09-21 DIAGNOSIS — F33.42 RECURRENT MAJOR DEPRESSIVE DISORDER, IN FULL REMISSION (H): ICD-10-CM

## 2022-09-21 DIAGNOSIS — Z00.00 ROUTINE GENERAL MEDICAL EXAMINATION AT A HEALTH CARE FACILITY: Primary | ICD-10-CM

## 2022-09-21 DIAGNOSIS — R20.8 DYSESTHESIA: ICD-10-CM

## 2022-09-21 DIAGNOSIS — M72.2 PLANTAR FASCIITIS: ICD-10-CM

## 2022-09-21 DIAGNOSIS — Z12.4 CERVICAL CANCER SCREENING: ICD-10-CM

## 2022-09-21 DIAGNOSIS — F63.3 TRICHOTILLOMANIA: ICD-10-CM

## 2022-09-21 PROBLEM — S63.259D: Status: RESOLVED | Noted: 2018-02-06 | Resolved: 2022-09-21

## 2022-09-21 PROBLEM — G54.0 THORACIC OUTLET SYNDROME: Status: RESOLVED | Noted: 2017-07-20 | Resolved: 2022-09-21

## 2022-09-21 LAB
ALBUMIN SERPL-MCNC: 4.5 G/DL (ref 3.4–5)
ALP SERPL-CCNC: 84 U/L (ref 40–150)
ALT SERPL W P-5'-P-CCNC: 38 U/L (ref 0–50)
ANION GAP SERPL CALCULATED.3IONS-SCNC: 8 MMOL/L (ref 3–14)
AST SERPL W P-5'-P-CCNC: 27 U/L (ref 0–45)
BILIRUB SERPL-MCNC: 0.4 MG/DL (ref 0.2–1.3)
BUN SERPL-MCNC: 15 MG/DL (ref 7–30)
CALCIUM SERPL-MCNC: 9.3 MG/DL (ref 8.5–10.1)
CHLORIDE BLD-SCNC: 108 MMOL/L (ref 94–109)
CHOLEST SERPL-MCNC: 259 MG/DL
CO2 SERPL-SCNC: 26 MMOL/L (ref 20–32)
CREAT SERPL-MCNC: 0.66 MG/DL (ref 0.52–1.04)
FASTING STATUS PATIENT QL REPORTED: ABNORMAL
GFR SERPL CREATININE-BSD FRML MDRD: >90 ML/MIN/1.73M2
GLUCOSE BLD-MCNC: 103 MG/DL (ref 70–99)
HDLC SERPL-MCNC: 54 MG/DL
LDLC SERPL CALC-MCNC: 161 MG/DL
NONHDLC SERPL-MCNC: 205 MG/DL
POTASSIUM BLD-SCNC: 3.6 MMOL/L (ref 3.4–5.3)
PROT SERPL-MCNC: 7.4 G/DL (ref 6.8–8.8)
SODIUM SERPL-SCNC: 142 MMOL/L (ref 133–144)
TRIGL SERPL-MCNC: 219 MG/DL
TSH SERPL DL<=0.005 MIU/L-ACNC: 2.66 MU/L (ref 0.4–4)

## 2022-09-21 PROCEDURE — 80053 COMPREHEN METABOLIC PANEL: CPT | Performed by: FAMILY MEDICINE

## 2022-09-21 PROCEDURE — 36415 COLL VENOUS BLD VENIPUNCTURE: CPT | Performed by: FAMILY MEDICINE

## 2022-09-21 PROCEDURE — G0145 SCR C/V CYTO,THINLAYER,RESCR: HCPCS | Performed by: FAMILY MEDICINE

## 2022-09-21 PROCEDURE — 99396 PREV VISIT EST AGE 40-64: CPT | Mod: 25 | Performed by: FAMILY MEDICINE

## 2022-09-21 PROCEDURE — 90471 IMMUNIZATION ADMIN: CPT | Performed by: FAMILY MEDICINE

## 2022-09-21 PROCEDURE — 99214 OFFICE O/P EST MOD 30 MIN: CPT | Mod: 25 | Performed by: FAMILY MEDICINE

## 2022-09-21 PROCEDURE — 84443 ASSAY THYROID STIM HORMONE: CPT | Performed by: FAMILY MEDICINE

## 2022-09-21 PROCEDURE — 90750 HZV VACC RECOMBINANT IM: CPT | Performed by: FAMILY MEDICINE

## 2022-09-21 PROCEDURE — 91312 COVID-19,PF,PFIZER BOOSTER BIVALENT: CPT | Performed by: FAMILY MEDICINE

## 2022-09-21 PROCEDURE — 80061 LIPID PANEL: CPT | Performed by: FAMILY MEDICINE

## 2022-09-21 PROCEDURE — 0124A COVID-19,PF,PFIZER BOOSTER BIVALENT: CPT | Performed by: FAMILY MEDICINE

## 2022-09-21 PROCEDURE — 87624 HPV HI-RISK TYP POOLED RSLT: CPT | Performed by: FAMILY MEDICINE

## 2022-09-21 ASSESSMENT — ENCOUNTER SYMPTOMS
SORE THROAT: 0
MYALGIAS: 0
DIZZINESS: 0
PARESTHESIAS: 0
HEARTBURN: 0
HEADACHES: 0
FEVER: 0
WEAKNESS: 0
SHORTNESS OF BREATH: 0
ARTHRALGIAS: 0
CONSTIPATION: 0
JOINT SWELLING: 0
NERVOUS/ANXIOUS: 0
DYSURIA: 0
ABDOMINAL PAIN: 0
HEMATURIA: 0
BREAST MASS: 0
HEMATOCHEZIA: 0
DIARRHEA: 0
CHILLS: 0
NAUSEA: 0
FREQUENCY: 0
PALPITATIONS: 0
EYE PAIN: 0
COUGH: 0
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
## 2022-09-21 NOTE — ASSESSMENT & PLAN NOTE
"She reports a sensation of \"bill\" on the skin of her neck.  Inspection is unremarkable.  Recommend emollients  "

## 2022-09-21 NOTE — ASSESSMENT & PLAN NOTE
"She is concerned about her gray hair.  Her hair is dyed.  In the gray hair roots are broken off.  She reports that her hair is \"falling out\" her hair is becoming thin on the vertex.  Recommend minoxidil  "

## 2022-09-21 NOTE — LETTER
September 28, 2022      Naa Paredes  [address]   [facility] [address]        Dear ,    We are writing to inform you of your test results.    Your test results fall within the expected range(s) or remain unchanged from previous results.  Please continue with current treatment plan.    Resulted Orders   Lipid panel reflex to direct LDL Non-fasting   Result Value Ref Range    Cholesterol 259 (H) <200 mg/dL    Triglycerides 219 (H) <150 mg/dL    Direct Measure HDL 54 >=50 mg/dL    LDL Cholesterol Calculated 161 (H) <=100 mg/dL    Non HDL Cholesterol 205 (H) <130 mg/dL    Patient Fasting > 8hrs? Unknown     Narrative    Cholesterol  Desirable:  <200 mg/dL    Triglycerides  Normal:  Less than 150 mg/dL  Borderline High:  150-199 mg/dL  High:  200-499 mg/dL  Very High:  Greater than or equal to 500 mg/dL    Direct Measure HDL  Female:  Greater than or equal to 50 mg/dL   Male:  Greater than or equal to 40 mg/dL    LDL Cholesterol  Desirable:  <100mg/dL  Above Desirable:  100-129 mg/dL   Borderline High:  130-159 mg/dL   High:  160-189 mg/dL   Very High:  >= 190 mg/dL    Non HDL Cholesterol  Desirable:  130 mg/dL  Above Desirable:  130-159 mg/dL  Borderline High:  160-189 mg/dL  High:  190-219 mg/dL  Very High:  Greater than or equal to 220 mg/dL   TSH with free T4 reflex   Result Value Ref Range    TSH 2.66 0.40 - 4.00 mU/L   Comprehensive metabolic panel (BMP + Alb, Alk Phos, ALT, AST, Total. Bili, TP)   Result Value Ref Range    Sodium 142 133 - 144 mmol/L    Potassium 3.6 3.4 - 5.3 mmol/L    Chloride 108 94 - 109 mmol/L    Carbon Dioxide (CO2) 26 20 - 32 mmol/L    Anion Gap 8 3 - 14 mmol/L    Urea Nitrogen 15 7 - 30 mg/dL    Creatinine 0.66 0.52 - 1.04 mg/dL    Calcium 9.3 8.5 - 10.1 mg/dL    Glucose 103 (H) 70 - 99 mg/dL    Alkaline Phosphatase 84 40 - 150 U/L    AST 27 0 - 45 U/L    ALT 38 0 - 50 U/L    Protein Total 7.4 6.8 - 8.8 g/dL    Albumin 4.5 3.4 - 5.0 g/dL    Bilirubin Total 0.4  0.2 - 1.3 mg/dL    GFR Estimate >90 >60 mL/min/1.73m2      Comment:      Effective December 21, 2021 eGFRcr in adults is calculated using the 2021 CKD-EPI creatinine equation which includes age and gender (Tiera et al., NEJM, DOI: 10.1056/GVVNdf4502059)       If you have any questions or concerns, please call the clinic at the number listed above.       Sincerely,      Sher Duran MD

## 2022-09-21 NOTE — PATIENT INSTRUCTIONS

## 2022-09-23 ASSESSMENT — PATIENT HEALTH QUESTIONNAIRE - PHQ9: SUM OF ALL RESPONSES TO PHQ QUESTIONS 1-9: 8

## 2022-09-26 LAB
BKR LAB AP GYN ADEQUACY: NORMAL
BKR LAB AP GYN INTERPRETATION: NORMAL
BKR LAB AP HPV REFLEX: NORMAL
BKR LAB AP PREVIOUS ABNORMAL: NORMAL
PATH REPORT.COMMENTS IMP SPEC: NORMAL
PATH REPORT.COMMENTS IMP SPEC: NORMAL
PATH REPORT.RELEVANT HX SPEC: NORMAL

## 2022-09-27 ENCOUNTER — PATIENT OUTREACH (OUTPATIENT)
Dept: FAMILY MEDICINE | Facility: CLINIC | Age: 57
End: 2022-09-27

## 2022-09-27 DIAGNOSIS — R87.810 CERVICAL HIGH RISK HPV (HUMAN PAPILLOMAVIRUS) TEST POSITIVE: ICD-10-CM

## 2022-09-27 LAB
HUMAN PAPILLOMA VIRUS 16 DNA: NEGATIVE
HUMAN PAPILLOMA VIRUS 18 DNA: NEGATIVE
HUMAN PAPILLOMA VIRUS FINAL DIAGNOSIS: ABNORMAL
HUMAN PAPILLOMA VIRUS OTHER HR: POSITIVE

## 2022-09-27 NOTE — TELEPHONE ENCOUNTER
9/21/22 NIL Pap, + HR HPV (neg 16/18). Plan cotest in 1 year.      Airway patent, Nasal mucosa clear. Mouth with normal mucosa. Throat has no vesicles, no oropharyngeal exudates and uvula is midline.

## 2022-09-28 ENCOUNTER — THERAPY VISIT (OUTPATIENT)
Dept: PHYSICAL THERAPY | Facility: CLINIC | Age: 57
End: 2022-09-28
Attending: PODIATRIST
Payer: COMMERCIAL

## 2022-09-28 DIAGNOSIS — M72.2 PLANTAR FASCIITIS, BILATERAL: ICD-10-CM

## 2022-09-28 DIAGNOSIS — M79.672 FOOT PAIN, BILATERAL: ICD-10-CM

## 2022-09-28 DIAGNOSIS — M72.2 PLANTAR FASCIITIS: Primary | ICD-10-CM

## 2022-09-28 DIAGNOSIS — M79.671 FOOT PAIN, BILATERAL: ICD-10-CM

## 2022-09-28 PROCEDURE — 97033 APP MDLTY 1+IONTPHRSIS EA 15: CPT | Mod: GP | Performed by: PHYSICAL THERAPIST

## 2022-09-28 PROCEDURE — 97110 THERAPEUTIC EXERCISES: CPT | Mod: GP | Performed by: PHYSICAL THERAPIST

## 2022-09-28 PROCEDURE — 97161 PT EVAL LOW COMPLEX 20 MIN: CPT | Mod: GP | Performed by: PHYSICAL THERAPIST

## 2022-09-28 NOTE — PROGRESS NOTES
[facility]    OUTPATIENT Physical Therapy ORTHOPEDIC EVALUATION  PLAN OF TREATMENT FOR OUTPATIENT REHABILITATION  (COMPLETE FOR INITIAL CLAIMS ONLY)  Patient's Last Name, First Name, M.I.  YOB: 1965  ReggieNaa       Provider s Name:  [facility]   Medical Record No.  [id number]   Start of Care Date:  09/28/22   Onset Date:   09/02/22 (MD order date)   Type:     _X__PT   ___OT Medical Diagnosis:    Encounter Diagnoses   Name Primary?    Foot pain, bilateral     Plantar fasciitis, bilateral     Plantar fasciitis Yes        Treatment Diagnosis:  L plantar fasciitis        Goals:     09/28/22 0500   Body Part   Goals listed below are for L heel pain   Goal #1   Goal #1 ambulation   Previous Functional Level No restrictions   Current Functional Level Minutes patient can walk   Performance Level 7-8 with 7/10 pain   STG Target Performance Minutes patient will be able to walk   Performance Level 15 with 4/10 pain   Rationale for safe household ambulation;for safe outdoor household ambulation;for safe community ambulation;to promote a healthy and active lifestyle;to maintain proper body mechanics/posture while ambulating to avoid additional compensatory injury due to improper gait mechanics   Due Date 10/19/22    LTG Target Performance Minutes patient will be able to  walk   Performance Level 45+ pain free   Rationale for safe household ambulation;for safe outdoor household ambulation;for safe community ambulation;to maintain proper body mechanics/posture while ambulating to avoid additional compensatory injury due to improper gait mechanics;to promote a healthy and active lifestyle   Due Date 12/21/22       Therapy Frequency:  1x/week  Predicted Duration of Therapy Intervention:  12 weeks    Joshua Wilkerson PT                 I CERTIFY THE NEED FOR THESE SERVICES  FURNISHED UNDER        THIS PLAN OF TREATMENT AND WHILE UNDER MY CARE     (Physician attestation of this document indicates review and certification of the therapy plan).                     Certification Date From:  09/28/22   Certification Date To:  12/26/22    Referring Provider:  Casi Marquez    Initial Assessment        See Epic Evaluation SOC Date: 09/28/22

## 2022-09-28 NOTE — PROGRESS NOTES
Physical Therapy Initial Evaluation  Subjective:  The history is provided by the patient. A  was used (cantonese).   Patient Health History  Naa Paredes being seen for L heel pain.     Date of Onset: 5-6 month.   Problem occurred: unknown-suddenly   Pain is reported as 7/10 on pain scale.  General health as reported by patient is good.  Pertinent medical history includes: history of fractures, menopausal, numbness/tingling and osteoarthritis.   Red flags:  None as reported by patient.   Other medical allergies details: see EPIC.           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                     Therapist Generated HPI Evaluation  Problem details: Pt c/o L heel pain x 5-6 months.  Denies injury.  Notes past history of R foot pain from breaking toes 5 years ago, but these no longer limit her.  MD order date 9/2/2022..         Type of problem:  Left foot.    This is a new condition.  Condition occurred with:  Insidious onset.  Where condition occurred: for unknown reasons.  Patient reports pain:  Other (heel).  Pain is described as shooting, stabbing and sharp and is intermittent.  Pain radiates to:  No radiation. Pain is the same all the time.  Since onset symptoms are unchanged.  Symptoms are exacerbated by walking, weight bearing, standing, transfers, ascending stairs, descending stairs and bending/squatting  and relieved by rest.  Special tests included:  X-ray (small calcaneal spur L).    Barriers include:  None as reported by patient.                        Objective:  Standing Alignment:                Ankle/Foot:  Pes planus L and pes planus R        Flexibility/Screens:       Lower Extremity:  Decreased left lower extremity flexibility:Gastroc and Soleus    Decreased right lower extremity flexibility:  Gastroc and Soleus          Ankle/Foot Evaluation  ROM:  AROM is normal.PROM is normal.    PROM:    Dorsiflexion:  Left:    10     Right:   13                 Strength wnl ankle: fatigue  with toe curl foot intrinsics.  LIGAMENT TESTING: normal              SPECIAL TESTS: normal    PALPATION:   Left ankle tenderness present at:  plantar fascia and medial calcaneal    Right ankle tenderness not present at:  plantar fascia  EDEMA: normal            FUNCTIONAL TESTS:           Proprioception:  Stork Balance Test: Left: 10-12 sec increased mm activity  Right: 20-25 sec                                                     General     ROS    Assessment/Plan:    Patient is a 56 year old female with left side ankle complaints.    Patient has the following significant findings with corresponding treatment plan.                Diagnosis 1:  L plantar fasciitis   Pain -  hot/cold therapy, US, home program and iontophoresis   Decreased ROM/flexibility - manual therapy and therapeutic exercise  Decreased strength - therapeutic exercise and therapeutic activities  Decreased proprioception - neuro re-education and therapeutic activities  Impaired gait - gait training  Impaired muscle performance - neuro re-education  Decreased function - therapeutic activities    Therapy Evaluation Codes:   Cumulative Therapy Evaluation is: Low complexity.    Previous and current functional limitations:  (See Goal Flow Sheet for this information)    Short term and Long term goals: (See Goal Flow Sheet for this information)     Communication ability:  Patient has an  for communication clarity.  Treatment Explanation - The following has been discussed with the patient:   RX ordered/plan of care  Anticipated outcomes  Possible risks and side effects  This patient would benefit from PT intervention to resume normal activities.   Rehab potential is good.    Frequency:  1 X week, once daily  Duration:  for 12 weeks  Discharge Plan:  Achieve all LTG.  Independent in home treatment program.  Reach maximal therapeutic benefit.    Please refer to the daily flowsheet for treatment today, total treatment time and time spent  performing 1:1 timed codes.

## 2022-10-03 ENCOUNTER — TELEPHONE (OUTPATIENT)
Dept: FAMILY MEDICINE | Facility: CLINIC | Age: 57
End: 2022-10-03

## 2022-10-03 NOTE — TELEPHONE ENCOUNTER
Pt reported Rash that she saw provider  On 9/21/22 for is not getting better. The Medication prescribed is not working. More red than before, and itchy.    Pt confirmed she's using the medication 2x daily. she's wondering what she should do since it's not getting better.     Kim Galicia/EMT-B  [facility] / [place]

## 2022-10-05 ENCOUNTER — THERAPY VISIT (OUTPATIENT)
Dept: PHYSICAL THERAPY | Facility: CLINIC | Age: 57
End: 2022-10-05
Payer: COMMERCIAL

## 2022-10-05 DIAGNOSIS — M72.2 PLANTAR FASCIITIS: Primary | ICD-10-CM

## 2022-10-05 PROCEDURE — 97110 THERAPEUTIC EXERCISES: CPT | Mod: GP | Performed by: PHYSICAL THERAPIST

## 2022-10-05 PROCEDURE — 97033 APP MDLTY 1+IONTPHRSIS EA 15: CPT | Mod: GP | Performed by: PHYSICAL THERAPIST

## 2022-10-12 ENCOUNTER — THERAPY VISIT (OUTPATIENT)
Dept: PHYSICAL THERAPY | Facility: CLINIC | Age: 57
End: 2022-10-12
Payer: COMMERCIAL

## 2022-10-12 DIAGNOSIS — M72.2 PLANTAR FASCIITIS: Primary | ICD-10-CM

## 2022-10-12 PROCEDURE — 97110 THERAPEUTIC EXERCISES: CPT | Mod: GP | Performed by: PHYSICAL THERAPIST

## 2022-10-12 PROCEDURE — 97033 APP MDLTY 1+IONTPHRSIS EA 15: CPT | Mod: GP | Performed by: PHYSICAL THERAPIST

## 2022-10-19 ENCOUNTER — THERAPY VISIT (OUTPATIENT)
Dept: PHYSICAL THERAPY | Facility: CLINIC | Age: 57
End: 2022-10-19
Payer: COMMERCIAL

## 2022-10-19 DIAGNOSIS — M72.2 PLANTAR FASCIITIS: Primary | ICD-10-CM

## 2022-10-19 PROCEDURE — 97035 APP MDLTY 1+ULTRASOUND EA 15: CPT | Mod: GP | Performed by: PHYSICAL THERAPIST

## 2022-10-19 PROCEDURE — 97110 THERAPEUTIC EXERCISES: CPT | Mod: GP | Performed by: PHYSICAL THERAPIST

## 2022-10-19 PROCEDURE — 97033 APP MDLTY 1+IONTPHRSIS EA 15: CPT | Mod: GP | Performed by: PHYSICAL THERAPIST

## 2022-10-26 ENCOUNTER — THERAPY VISIT (OUTPATIENT)
Dept: PHYSICAL THERAPY | Facility: CLINIC | Age: 57
End: 2022-10-26
Payer: COMMERCIAL

## 2022-10-26 ENCOUNTER — OFFICE VISIT (OUTPATIENT)
Dept: FAMILY MEDICINE | Facility: CLINIC | Age: 57
End: 2022-10-26
Payer: COMMERCIAL

## 2022-10-26 VITALS
RESPIRATION RATE: 12 BRPM | HEART RATE: 75 BPM | SYSTOLIC BLOOD PRESSURE: 136 MMHG | DIASTOLIC BLOOD PRESSURE: 84 MMHG | BODY MASS INDEX: 25.39 KG/M2 | OXYGEN SATURATION: 96 % | TEMPERATURE: 98.1 F | WEIGHT: 152.6 LBS

## 2022-10-26 DIAGNOSIS — M65.4 DE QUERVAIN'S DISEASE (TENOSYNOVITIS): Primary | ICD-10-CM

## 2022-10-26 DIAGNOSIS — M72.2 PLANTAR FASCIITIS: Primary | ICD-10-CM

## 2022-10-26 PROCEDURE — 97035 APP MDLTY 1+ULTRASOUND EA 15: CPT | Mod: GP | Performed by: PHYSICAL THERAPIST

## 2022-10-26 PROCEDURE — 97033 APP MDLTY 1+IONTPHRSIS EA 15: CPT | Mod: GP | Performed by: PHYSICAL THERAPIST

## 2022-10-26 PROCEDURE — 97110 THERAPEUTIC EXERCISES: CPT | Mod: GP | Performed by: PHYSICAL THERAPIST

## 2022-10-26 PROCEDURE — 99213 OFFICE O/P EST LOW 20 MIN: CPT | Performed by: PHYSICIAN ASSISTANT

## 2022-10-26 RX ORDER — NAPROXEN 500 MG/1
500 TABLET ORAL 2 TIMES DAILY WITH MEALS
Qty: 60 TABLET | Refills: 0 | Status: ON HOLD | OUTPATIENT
Start: 2022-10-26 | End: 2023-02-07

## 2022-10-26 NOTE — PATIENT INSTRUCTIONS
Take Naproxen twice a day with food for 2-4 weeks.    Apply ice 3 times a day for 15-20 minutes at a time. Do this for at least 2-4 weeks.    Wear wrist brace during the day. Be sure to take it off a couple times a day to move the wrist around.    Follow-up if not improving in 2-4 weeks.

## 2022-10-26 NOTE — PROGRESS NOTES
Assessment & Plan     De Quervain's disease (tenosynovitis)    Supportive cares discussed including NSAIDs, brace, and ice.    - naproxen (NAPROSYN) 500 MG tablet; Take 1 tablet (500 mg) by mouth 2 times daily (with meals)  - Wrist/Arm/Hand Supplies Order for DME - ONLY FOR DME                 No follow-ups on file.    NINA [facility] ANNA Jacome is a 56 year old presenting for the following health issues:  Pain (Right wrist pain)      HPI     Pain History:  Where in your body do you have pain? Musculoskeletal problem/pain  Onset/Duration: 1 week ago when I woke up I felt pain  Description  Location: wrist - right  Joint Swelling: No  Redness: No  Pain: YES  Warmth: No  Intensity:  mild  Progression of Symptoms:  intermittent  Accompanying signs and symptoms:   Fevers: No  Numbness/tingling/weakness: No  History  Trauma to the area: No  Recent illness:  No  Previous similar problem: numbness left hand fingers 5 to 6 years when she fell near swimming pool. Also dislocation right pinky a long time ago. Nothing like this.   Previous evaluation:  No  Precipitating or alleviating factors:  Aggravating factors include: none. I am able to hold things with my right hand but if it is not in the right position it hurts a lot.   Therapies tried and outcome: medicated oil once with little relief, ice once        Review of Systems   Constitutional, HEENT, cardiovascular, pulmonary, gi and gu systems are negative, except as otherwise noted.        Objective    /84 (BP Location: Right arm, Patient Position: Sitting, Cuff Size: Adult Regular)   Pulse 75   Temp 98.1  F (36.7  C) (Oral)   Resp 12   Wt 69.2 kg (152 lb 9.6 oz)   LMP 08/20/2015   SpO2 96%   BMI 25.39 kg/m    Body mass index is 25.39 kg/m .       Physical Exam   GENERAL: healthy, alert and no distress  EYES: Eyes grossly normal to inspection, PERRL and conjunctivae and sclerae normal  MS: no gross  musculoskeletal defects noted, no edema  SKIN: no suspicious lesions or rashes  NEURO: Normal strength and tone, mentation intact and speech normal  PSYCH: mentation appears normal, affect normal/bright  Right wrist: There is no erythema, edema, or ecchymosis. Mildly tender to palpation over lateral wrist. ROM intact. Pain with Finkelstein test.

## 2022-11-02 ENCOUNTER — THERAPY VISIT (OUTPATIENT)
Dept: PHYSICAL THERAPY | Facility: CLINIC | Age: 57
End: 2022-11-02
Payer: COMMERCIAL

## 2022-11-02 DIAGNOSIS — M72.2 PLANTAR FASCIITIS: Primary | ICD-10-CM

## 2022-11-02 PROCEDURE — 97033 APP MDLTY 1+IONTPHRSIS EA 15: CPT | Mod: GP | Performed by: PHYSICAL THERAPIST

## 2022-11-02 PROCEDURE — 97110 THERAPEUTIC EXERCISES: CPT | Mod: GP | Performed by: PHYSICAL THERAPIST

## 2022-11-02 PROCEDURE — 97035 APP MDLTY 1+ULTRASOUND EA 15: CPT | Mod: GP | Performed by: PHYSICAL THERAPIST

## 2022-11-09 ENCOUNTER — THERAPY VISIT (OUTPATIENT)
Dept: PHYSICAL THERAPY | Facility: CLINIC | Age: 57
End: 2022-11-09
Payer: COMMERCIAL

## 2022-11-09 DIAGNOSIS — M72.2 PLANTAR FASCIITIS: Primary | ICD-10-CM

## 2022-11-09 PROCEDURE — 97110 THERAPEUTIC EXERCISES: CPT | Mod: GP | Performed by: PHYSICAL THERAPIST

## 2022-11-09 PROCEDURE — 97033 APP MDLTY 1+IONTPHRSIS EA 15: CPT | Mod: GP | Performed by: PHYSICAL THERAPIST

## 2022-11-16 ENCOUNTER — THERAPY VISIT (OUTPATIENT)
Dept: PHYSICAL THERAPY | Facility: CLINIC | Age: 57
End: 2022-11-16
Payer: COMMERCIAL

## 2022-11-16 DIAGNOSIS — M72.2 PLANTAR FASCIITIS: Primary | ICD-10-CM

## 2022-11-16 PROCEDURE — 97033 APP MDLTY 1+IONTPHRSIS EA 15: CPT | Mod: GP | Performed by: PHYSICAL THERAPIST

## 2022-11-16 PROCEDURE — 97035 APP MDLTY 1+ULTRASOUND EA 15: CPT | Mod: GP | Performed by: PHYSICAL THERAPIST

## 2022-11-16 PROCEDURE — 97110 THERAPEUTIC EXERCISES: CPT | Mod: GP | Performed by: PHYSICAL THERAPIST

## 2022-11-16 NOTE — PROGRESS NOTES
Subjective:  HPI  Physical Exam                    Objective:  System    Physical Exam    General     ROS    Assessment/Plan:    DISCHARGE REPORT    Progress reporting period is from IE to 11/16/2022.       SUBJECTIVE  Subjective changes noted by patient:  .  Subjective: Cont to slowly improve, better vs IE but still pain in L heel at times.   HEP helpful    Current pain level is  Current Pain level: 2/10.     Previous pain level was   Initial Pain level: 7/10.   Changes in function:  Yes (See Goal flowsheet attached for changes in current functional level)  Adverse reaction to treatment or activity: None    OBJECTIVE  Changes noted in objective findings:    Objective: Mild TTP L heel/PF.  20 SL TR pain free.  Good painfree control creeping.     ASSESSMENT/PLAN  Updated problem list and treatment plan: Diagnosis 1:  L plantar fasciitis   Pain -  home program  Decreased strength - home program  Decreased proprioception - home program  Impaired muscle performance - home program  Decreased function - home program  STG/LTGs have been met or progress has been made towards goals:  Yes (See Goal flow sheet completed today.)  Assessment of Progress: The patient's condition is improving.  Self Management Plans:  Patient is independent in a home treatment program.  Patient is independent in self management of symptoms.  I have re-evaluated this patient and find that the nature, scope, duration and intensity of the therapy is appropriate for the medical condition of the patient.  Naa continues to require the following intervention to meet STG and LTG's:  PT intervention is no longer required to meet STG/LTG.    Recommendations:  This patient is ready to be discharged from therapy and continue their home treatment program.    Please refer to the daily flowsheet for treatment today, total treatment time and time spent performing 1:1 timed codes.

## 2022-11-17 ENCOUNTER — TELEPHONE (OUTPATIENT)
Dept: GASTROENTEROLOGY | Facility: CLINIC | Age: 57
End: 2022-11-17

## 2022-11-17 NOTE — TELEPHONE ENCOUNTER
Screening Questions  BLUE  KIND OF PREP RED  LOCATION [review exclusion criteria] GREEN  SEDATION TYPE        N Are you active on mychart?        Sher Duran MD Ordering/Referring Provider?        [employer] What type of coverage do you have?      N Have you had a positive covid test in the last 90 days?     25.3 1. BMI  [BMI 40+ - review exclusion criteria]    Y  2. Are you able to give consent for your medical care? [IF NO,RN REVIEW]        N  3. Are you taking any prescription pain medications on a routine schedule?      NA  3a. EXTENDED PREP What kind of prescription?     N 4. Do you have any chemical dependencies such as alcohol, street drugs, or methadone?    N 5. Do you have any history of post-traumatic stress syndrome, severe anxiety or history of psychosis?      **If yes 3- 5 , please schedule with MAC sedation.**          IF YES TO ANY 6 - 10 - HOSPITAL SETTING ONLY.     N 6.   Do you need assistance transferring?     N 7.   Have you had a heart or lung transplant?    N 8.   Are you currently on dialysis?   N 9.   Do you use daily home oxygen?   N 10. Do you take nitroglycerin?   10a. NA If yes, how often?     11. [FEMALES]  N Are you currently pregnant?    11a. NA If yes, how many weeks? [ Greater than 12 weeks, OR NEEDED]    N 12. Do you have Pulmonary Hypertension? *NEED PAC APPT AT [facility]*     N 13. [review exclusion criteria]  Do you have any implantable devices in your body (pacemaker, defib, LVAD)?    N 14. In the past 6 months, have you had any heart related issues including cardiomyopathy or heart attack?     14a. NA If yes, did it require cardiac stenting if so when?     N 15. Have you had a stroke or Transient ischemic attack (TIA - aka  mini stroke ) within 6 months?      N 16. Do you have mod to severe Obstructive Sleep Apnea?  [Hospital only - Ok at [place]]    N 17. Do you have SEVERE AND UNCONTROLLED asthma? *NEED PAC APPT AT [facility]*     N 18. Are you currently taking any blood thinners?     " 18a. If yes, inform patient to \"follow up w/ ordering provider for bridging instructions.\"    N 19. Do you take the medication Phentermine?    19a. If yes, \"Hold for 7 days before procedure.  Please consult your prescribing provider if you have questions about holding this medication.\"     N  20. Do you have chronic kidney disease?      N  21. Do you have a diagnosis of diabetes?     N  22. On a regular basis do you go 3-5 days between bowel movements?      23. Preferred LOCAL Pharmacy for Pre Prescription    [ LIST ONLY ONE PHARMACY]        [place], [place] - [address]    - CLOSING REMINDERS -    Informed patient they will need an adult    Cannot take any type of public or medical transportation alone    Conscious Sedation- Needs  for 6 hours after the procedure       MAC/General-Needs  for 24 hours after procedure    Pre-Procedure Covid test to be completed [[facility] PCR Testing Required]    Confirmed Nurse will call to complete assessment       - SCHEDULING DETAILS -     RELL  Surgeon    02/07/23  Date of Procedure  Lower Endoscopy [Colonoscopy]  Type of Procedure Scheduled  [facility] Location   [facility]-If you answer yes to questions #8, #20, #21Which Colonoscopy Prep was Sent?     MODERATE Sedation Type     N PAC / Pre-op Required         Additional comments:            "

## 2022-12-08 NOTE — PROGRESS NOTES
"  SUBJECTIVE:   Naa Paredes is a 52 year old female who presents to clinic today for the following health issues:      Rt pinky finger needs, new splint         Problem list and histories reviewed & adjusted, as indicated.  Additional history: none        Reviewed and updated as needed this visit by clinical staff  Tobacco  Allergies  Meds  Med Hx  Surg Hx  Fam Hx  Soc Hx      Reviewed and updated as needed this visit by Provider         Closed dislocation of finger of right hand, subsequent encounter  (primary encounter diagnosis) reduced, placed in Stax splint.  Lost her splints   ROS  swelling reduced pain reduced  in splint    /89 (BP Location: Right arm, Patient Position: Chair, Cuff Size: Adult Regular)  Pulse 82  Temp 97.5  F (36.4  C) (Oral)  Resp 16  Ht 5' 4.25\" (1.632 m)  Wt 149 lb (67.6 kg)  LMP 08/20/2015  Breastfeeding? No  BMI 25.38 kg/m2  Distal phalanx with flexion deformity as previous.  Swelling erythema reduced    ASSESSMENT / PLAN:  (C52.339F) Closed dislocation of finger of right hand, subsequent encounter  (primary encounter diagnosis)  Comment:  a new splint is provided   Plan:           Sher Duran MD        " Statement Selected

## 2023-01-25 RX ORDER — BISACODYL 5 MG
TABLET, DELAYED RELEASE (ENTERIC COATED) ORAL
Qty: 4 TABLET | Refills: 0 | Status: SHIPPED | OUTPATIENT
Start: 2023-01-25 | End: 2023-02-15

## 2023-02-07 ENCOUNTER — HOSPITAL ENCOUNTER (OUTPATIENT)
Facility: CLINIC | Age: 58
Discharge: HOME | End: 2023-02-07
Attending: INTERNAL MEDICINE | Admitting: INTERNAL MEDICINE
Payer: COMMERCIAL

## 2023-02-07 VITALS
SYSTOLIC BLOOD PRESSURE: 102 MMHG | OXYGEN SATURATION: 94 % | HEIGHT: 65 IN | HEART RATE: 86 BPM | WEIGHT: 149 LBS | BODY MASS INDEX: 24.83 KG/M2 | DIASTOLIC BLOOD PRESSURE: 88 MMHG | TEMPERATURE: 98.4 F | RESPIRATION RATE: 22 BRPM

## 2023-02-07 DIAGNOSIS — Z12.11 SCREEN FOR COLON CANCER: Primary | ICD-10-CM

## 2023-02-07 LAB — COLONOSCOPY: NORMAL

## 2023-02-07 PROCEDURE — 45380 COLONOSCOPY AND BIOPSY: CPT | Mod: PT | Performed by: INTERNAL MEDICINE

## 2023-02-07 PROCEDURE — 88305 TISSUE EXAM BY PATHOLOGIST: CPT | Mod: 26 | Performed by: PATHOLOGY

## 2023-02-07 PROCEDURE — 88305 TISSUE EXAM BY PATHOLOGIST: CPT | Mod: TC | Performed by: INTERNAL MEDICINE

## 2023-02-07 PROCEDURE — G0500 MOD SEDAT ENDO SERVICE >5YRS: HCPCS | Mod: PT | Performed by: INTERNAL MEDICINE

## 2023-02-07 PROCEDURE — 250N000011 HC RX IP 250 OP 636: Performed by: INTERNAL MEDICINE

## 2023-02-07 RX ORDER — FENTANYL CITRATE 50 UG/ML
50-100 INJECTION, SOLUTION INTRAMUSCULAR; INTRAVENOUS EVERY 5 MIN PRN
Status: DISCONTINUED | OUTPATIENT
Start: 2023-02-07 | End: 2023-02-08 | Stop reason: HOSPADM

## 2023-02-07 RX ORDER — ONDANSETRON 2 MG/ML
4 INJECTION INTRAMUSCULAR; INTRAVENOUS
Status: DISCONTINUED | OUTPATIENT
Start: 2023-02-07 | End: 2023-02-08 | Stop reason: HOSPADM

## 2023-02-07 RX ORDER — NALOXONE HYDROCHLORIDE 0.4 MG/ML
0.4 INJECTION, SOLUTION INTRAMUSCULAR; INTRAVENOUS; SUBCUTANEOUS
Status: DISCONTINUED | OUTPATIENT
Start: 2023-02-07 | End: 2023-02-08 | Stop reason: HOSPADM

## 2023-02-07 RX ORDER — NALOXONE HYDROCHLORIDE 0.4 MG/ML
0.2 INJECTION, SOLUTION INTRAMUSCULAR; INTRAVENOUS; SUBCUTANEOUS
Status: DISCONTINUED | OUTPATIENT
Start: 2023-02-07 | End: 2023-02-08 | Stop reason: HOSPADM

## 2023-02-07 RX ORDER — SIMETHICONE 40MG/0.6ML
133 SUSPENSION, DROPS(FINAL DOSAGE FORM)(ML) ORAL
Status: DISCONTINUED | OUTPATIENT
Start: 2023-02-07 | End: 2023-02-08 | Stop reason: HOSPADM

## 2023-02-07 RX ORDER — ATROPINE SULFATE 0.1 MG/ML
1 INJECTION INTRAVENOUS
Status: DISCONTINUED | OUTPATIENT
Start: 2023-02-07 | End: 2023-02-08 | Stop reason: HOSPADM

## 2023-02-07 RX ORDER — MULTIPLE VITAMINS W/ MINERALS TAB 9MG-400MCG
1 TAB ORAL DAILY
COMMUNITY
End: 2023-03-16

## 2023-02-07 RX ORDER — LIDOCAINE 40 MG/G
CREAM TOPICAL
Status: DISCONTINUED | OUTPATIENT
Start: 2023-02-07 | End: 2023-02-08 | Stop reason: HOSPADM

## 2023-02-07 RX ORDER — FLUMAZENIL 0.1 MG/ML
0.2 INJECTION, SOLUTION INTRAVENOUS
Status: DISCONTINUED | OUTPATIENT
Start: 2023-02-07 | End: 2023-02-08 | Stop reason: HOSPADM

## 2023-02-07 RX ORDER — ONDANSETRON 4 MG/1
4 TABLET, ORALLY DISINTEGRATING ORAL EVERY 6 HOURS PRN
Status: DISCONTINUED | OUTPATIENT
Start: 2023-02-07 | End: 2023-02-08 | Stop reason: HOSPADM

## 2023-02-07 RX ORDER — PROCHLORPERAZINE MALEATE 10 MG
10 TABLET ORAL EVERY 6 HOURS PRN
Status: DISCONTINUED | OUTPATIENT
Start: 2023-02-07 | End: 2023-02-08 | Stop reason: HOSPADM

## 2023-02-07 RX ORDER — ONDANSETRON 2 MG/ML
4 INJECTION INTRAMUSCULAR; INTRAVENOUS EVERY 6 HOURS PRN
Status: DISCONTINUED | OUTPATIENT
Start: 2023-02-07 | End: 2023-02-08 | Stop reason: HOSPADM

## 2023-02-07 RX ORDER — DIPHENHYDRAMINE HYDROCHLORIDE 50 MG/ML
25-50 INJECTION INTRAMUSCULAR; INTRAVENOUS
Status: DISCONTINUED | OUTPATIENT
Start: 2023-02-07 | End: 2023-02-08 | Stop reason: HOSPADM

## 2023-02-07 RX ORDER — EPINEPHRINE 1 MG/ML
0.1 INJECTION, SOLUTION INTRAMUSCULAR; SUBCUTANEOUS
Status: DISCONTINUED | OUTPATIENT
Start: 2023-02-07 | End: 2023-02-08 | Stop reason: HOSPADM

## 2023-02-07 RX ADMIN — FENTANYL CITRATE 50 MCG: 50 INJECTION, SOLUTION INTRAMUSCULAR; INTRAVENOUS at 12:13

## 2023-02-07 RX ADMIN — FENTANYL CITRATE 50 MCG: 50 INJECTION, SOLUTION INTRAMUSCULAR; INTRAVENOUS at 12:17

## 2023-02-07 RX ADMIN — MIDAZOLAM 1 MG: 1 INJECTION INTRAMUSCULAR; INTRAVENOUS at 12:13

## 2023-02-07 RX ADMIN — MIDAZOLAM 1 MG: 1 INJECTION INTRAMUSCULAR; INTRAVENOUS at 12:17

## 2023-02-07 ASSESSMENT — ACTIVITIES OF DAILY LIVING (ADL)
ADLS_ACUITY_SCORE: 35

## 2023-02-07 NOTE — H&P
Pre-Endoscopy History and Physical     Naa Paredes MRN# [id number]   YOB: 1965 Age: 57 year old     Date of Procedure: 2/7/2023  Primary care provider: Sher Duran  Type of Endoscopy: Colonoscopy with possible biopsy, possible polypectomy  Reason for Procedure: polyp  Type of Anesthesia Anticipated: Conscious Sedation    HPI:    Naa is a 57 year old female who will be undergoing the above procedure.      A history and physical has been performed. The patient's medications and allergies have been reviewed. The risks and benefits of the procedure and the sedation options and risks were discussed with the patient.  All questions were answered and informed consent was obtained.      She denies a personal or family history of anesthesia complications or bleeding disorders.     Patient Active Problem List   Diagnosis     H/O: alcohol abuse     CARDIOVASCULAR SCREENING; LDL GOAL LESS THAN 160     Nickel allergy     Anxiety     Colon adenoma     Seasonal allergic rhinitis     Conversion disorder with mixed symptoms     Migraine with status migrainosus, not intractable, unspecified migraine type     Major depression in complete remission (H)     Trichotillomania     Dysesthesia     Plantar fasciitis     Encounter for immunization     Cervical high risk HPV (human papillomavirus) test positive     Screen for colon cancer        Past Medical History:   Diagnosis Date     Ankle sprain and strain 5/2/2013     Anxiety 3/26/2015     Black stools 11/25/2015     CARDIOVASCULAR SCREENING; LDL GOAL LESS THAN 160 10/31/2010     Cervicalgia 12/7/2011     Closed dislocation of finger of right hand, subsequent encounter 2/6/2018     Colon adenoma 4/23/2015    Patient denies it. Renee Tiwari RN, 6/9/15.     Conversion disorder with mixed symptoms 6/14/2016     Depression with suicidal ideation 6/19/2015     Depressive disorder      Dysesthesia 9/21/2022     Elevated blood pressure reading without diagnosis of  hypertension 11/25/2015     Elevated LFT's 6/4/2010     Fatigue due to depression 11/24/2015     H/O: alcohol abuse 6/4/2010     IBS (irritable bowel syndrome)      Knee pain 10/19/2010     Low back pain 10/19/2010     Lumbago 12/7/2011     Major depression in complete remission (H) 8/13/2018     Major depressive disorder, recurrent, severe without psychotic features (H) 5/13/2016     Major depressive disorder, single episode, in partial remission (H) 11/24/2015     Migraine      Migraine with status migrainosus, not intractable, unspecified migraine type 7/14/2016     Neck pain 10/19/2010     Nephrolithiasis     calcium oxalate     Pain in thoracic spine 1/2/2013     Perimenopause 3/26/2015     Plantar fasciitis 9/21/2022     Seasonal allergic rhinitis 10/20/2015     Thoracic outlet syndrome 7/20/2017     Trichotillomania 9/21/2022     Vitamin D deficiency disease 6/8/2010        Past Surgical History:   Procedure Laterality Date     COLONOSCOPY N/A 06/09/2015    Procedure: COLONOSCOPY;  Surgeon: Steve Choi MD;  Location: [facility]     LITHOTRIPSY         Social History     Tobacco Use     Smoking status: Never     Smokeless tobacco: Never   Substance Use Topics     Alcohol use: Yes     Comment: occ wine       Family History   Problem Relation Age of Onset     Heart Disease Mother      Colon Cancer Father      Cancer - colorectal Father         at age 65     Mental Illness Paternal Uncle      Breast Cancer No family hx of      Ovarian Cancer No family hx of        Prior to Admission medications    Medication Sig Start Date End Date Taking? Authorizing Provider   bisacodyl (DULCOLAX) 5 MG EC tablet Take 2 tablets at 3 pm the day before your procedure. If your procedure is before 11 am, take 2 additional tablets at 11 pm. If your procedure is after 11 am, take 2 additional tablets at 6 am. For additional instructions refer to your colonoscopy prep instructions. 1/25/23  Yes Steve Choi MD   polyethylene  "glycol (GOLYTELY) 236 g suspension The night before the exam at 6 pm drink an 8-ounce glass every 15 minutes until the jug is half empty. If you arrive before 11 AM: Drink the other half of the Golytely jug at 11 PM night before procedure. If you arrive after 11 AM: Drink the other half of the Golytely jug at 6 AM day of procedure. For additional instructions refer to your colonoscopy prep instructions. 1/25/23  Yes Steve Choi MD   naproxen (NAPROSYN) 500 MG tablet Take 1 tablet (500 mg) by mouth 2 times daily (with meals) 10/26/22   Theodore Santos PA-C       Allergies   Allergen Reactions     No Clinical Screening - See Comments      PN: LW CM1: Contrast Adverse Reaction - None Reaction :  PN: LW FI1: nka        REVIEW OF SYSTEMS:   5 point ROS negative except as noted above in HPI, including Gen., Resp., CV, GI &  system review.    PHYSICAL EXAM:   [facility] 08/20/2015  Estimated body mass index is 25.39 kg/m  as calculated from the following:    Height as of 9/21/22: 1.651 m (5' 5\").    Weight as of 10/26/22: 69.2 kg (152 lb 9.6 oz).   GENERAL APPEARANCE: alert, and oriented  MENTAL STATUS: alert  AIRWAY EXAM: Mallampatti Class I (visualization of the soft palate, fauces, uvula, anterior and posterior pillars)  RESP: lungs clear to auscultation - no rales, rhonchi or wheezes  CV: regular rates and rhythm  DIAGNOSTICS:    Not indicated    IMPRESSION   ASA Class 2 - Mild systemic disease    PLAN:   Plan for Colonoscopy with possible biopsy, possible polypectomy. We discussed the risks, benefits and alternatives and the patient wished to proceed.    The above has been forwarded to the consulting provider.      Signed Electronically by: Steve Choi MD  February 7, 2023          "

## 2023-02-07 NOTE — DISCHARGE INSTRUCTIONS
Understanding Colon and Rectal Polyps    The colon (also called the large intestine) is a muscular tube that forms the last part of the digestive tract. It absorbs water and stores food waste. The colon is about 4 to 6 feet long. The rectum is the last 6 inches of the colon. The colon and rectum have a smooth lining composed of millions of cells. Changes in these cells can lead to growths in the colon that can become cancerous and should be removed. Multiple tests are available to screen for colon cancer, but the colonoscopy is the most recommended test. During colonoscopy, these polyps can be removed. How often you need this test depends on many things including your condition, your family history, symptoms, and what the findings were at the previous colonoscopy.    When the colon lining changes  Changes that happen in the cells that line the colon or rectum can lead to growths called polyps. Over a period of years, polyps can turn cancerous. Removing polyps early may prevent cancer from ever forming.   Polyps  Polyps are fleshy clumps of tissue that form on the lining of the colon or rectum. Small polyps are usually benign (not cancerous). However, over time, cells in a polyp can change and become cancerous. Certain types of polyps known as adenomatous polyps and serrated polyps are pre-cancerous. The risk for cancer increases with the size of the polyp and certain cell and gene features. This means that they can become cancerous if they're not removed. Hyperplastic polyps are benign. They can grow quite large and not turn cancerous.    Cancer  Almost all colorectal cancers start when polyp cells starts growing abnormally. As a cancerous tumor grows, it may involve more and more of the colon or rectum. In time, cancer can also grow beyond the colon or rectum and spread to nearby organs or to glands called lymph nodes. The cells can also travel to other parts of the body. This is known as metastasis. The  earlier a cancerous tumor is removed, the better the chance of preventing its spread.     [employer] last reviewed this educational content on 6/1/2019 2000-2021 [employer]. All rights reserved. This information is not intended as a substitute for professional medical care. Always follow your healthcare professional's instructions.

## 2023-02-08 ASSESSMENT — ACTIVITIES OF DAILY LIVING (ADL)
ADLS_ACUITY_SCORE: 35
ADLS_ACUITY_SCORE: 35

## 2023-02-09 LAB
PATH REPORT.COMMENTS IMP SPEC: NORMAL
PATH REPORT.FINAL DX SPEC: NORMAL
PATH REPORT.GROSS SPEC: NORMAL
PATH REPORT.MICROSCOPIC SPEC OTHER STN: NORMAL
PATH REPORT.RELEVANT HX SPEC: NORMAL
PHOTO IMAGE: NORMAL

## 2023-02-15 ENCOUNTER — OFFICE VISIT (OUTPATIENT)
Dept: FAMILY MEDICINE | Facility: CLINIC | Age: 58
End: 2023-02-15
Payer: COMMERCIAL

## 2023-02-15 VITALS
HEIGHT: 65 IN | TEMPERATURE: 97.9 F | SYSTOLIC BLOOD PRESSURE: 128 MMHG | HEART RATE: 82 BPM | OXYGEN SATURATION: 95 % | RESPIRATION RATE: 16 BRPM | BODY MASS INDEX: 25.71 KG/M2 | WEIGHT: 154.3 LBS | DIASTOLIC BLOOD PRESSURE: 70 MMHG

## 2023-02-15 DIAGNOSIS — F44.7 CONVERSION DISORDER WITH MIXED SYMPTOMS: ICD-10-CM

## 2023-02-15 DIAGNOSIS — D12.6 COLON ADENOMA: ICD-10-CM

## 2023-02-15 DIAGNOSIS — F63.3 TRICHOTILLOMANIA: ICD-10-CM

## 2023-02-15 DIAGNOSIS — M20.011 MALLET FINGER OF RIGHT HAND: ICD-10-CM

## 2023-02-15 DIAGNOSIS — M65.4 DE QUERVAIN'S DISEASE (TENOSYNOVITIS): ICD-10-CM

## 2023-02-15 DIAGNOSIS — M72.2 PLANTAR FASCIITIS: Primary | ICD-10-CM

## 2023-02-15 DIAGNOSIS — Z23 ENCOUNTER FOR IMMUNIZATION: ICD-10-CM

## 2023-02-15 DIAGNOSIS — G56.02 CARPAL TUNNEL SYNDROME OF LEFT WRIST: ICD-10-CM

## 2023-02-15 PROCEDURE — 99214 OFFICE O/P EST MOD 30 MIN: CPT | Mod: 25 | Performed by: FAMILY MEDICINE

## 2023-02-15 PROCEDURE — 90471 IMMUNIZATION ADMIN: CPT | Performed by: FAMILY MEDICINE

## 2023-02-15 PROCEDURE — 90750 HZV VACC RECOMBINANT IM: CPT | Performed by: FAMILY MEDICINE

## 2023-02-15 ASSESSMENT — ANXIETY QUESTIONNAIRES
3. WORRYING TOO MUCH ABOUT DIFFERENT THINGS: NOT AT ALL
2. NOT BEING ABLE TO STOP OR CONTROL WORRYING: SEVERAL DAYS
GAD7 TOTAL SCORE: 4
4. TROUBLE RELAXING: NOT AT ALL
GAD7 TOTAL SCORE: 4
7. FEELING AFRAID AS IF SOMETHING AWFUL MIGHT HAPPEN: SEVERAL DAYS
6. BECOMING EASILY ANNOYED OR IRRITABLE: SEVERAL DAYS
5. BEING SO RESTLESS THAT IT IS HARD TO SIT STILL: NOT AT ALL
1. FEELING NERVOUS, ANXIOUS, OR ON EDGE: SEVERAL DAYS
IF YOU CHECKED OFF ANY PROBLEMS ON THIS QUESTIONNAIRE, HOW DIFFICULT HAVE THESE PROBLEMS MADE IT FOR YOU TO DO YOUR WORK, TAKE CARE OF THINGS AT HOME, OR GET ALONG WITH OTHER PEOPLE: NOT DIFFICULT AT ALL

## 2023-02-15 ASSESSMENT — PATIENT HEALTH QUESTIONNAIRE - PHQ9: SUM OF ALL RESPONSES TO PHQ QUESTIONS 1-9: 5

## 2023-02-15 NOTE — PROGRESS NOTES
"  Assessment & Plan   Problem List Items Addressed This Visit     Carpal tunnel syndrome of left wrist     Status post release, she has hypoesthesia in 1 and 2 rays.  Negative Tinel's.  Seek objective data.         Relevant Medications    FLUoxetine (PROZAC) 20 MG capsule    Other Relevant Orders    Adult Neurology  Referral    Colon adenoma    Conversion disorder with mixed symptoms     Seek objective data in this patient         De Quervain's disease (tenosynovitis)     Tender along extensor pollicis tendons.  Cannot complete Finkelstein.  Previously diagnosed.  Prescribed splint \"inconvenient\" through .  Refer for additional therapies         Relevant Orders    Orthopedic  Referral    Encounter for immunization     Discussed, offered         Relevant Orders    ZOSTER VACCINE RECOMBINANT ADJUVANTED (SHINGRIX) (Completed)    Mallet finger of right hand     Right fifth ray with DIP flexion, mobile, no inflammation, cannot extend to neutral.  Remote tendon avulsion.  Refer         Plantar fasciitis - Primary     Intake complaint of hip pain is plantar fascia pain unresponsive to PT.  Refer to podiatry.  She is fixated on spur, described as \"small\" on radiology report         Relevant Orders    Orthopedic  Referral    Trichotillomania     Previously described.  Her occiput is becoming quite bald with no inflammation.  She tried minoxidil for uncertain duration with uncertain response.  Treat as OCD with high-dose SSRI         Relevant Medications    FLUoxetine (PROZAC) 20 MG capsule                     Return Shingles # 1 go RTC 2-3 mos.   Follow-up Visit   Expected date:  Apr 15, 2023 (Approximate)      Follow Up Appointment Details:     Follow-up with whom?: Other Primary Care Services    Follow-Up for what?: Clinic Staff Visit Comment - shingles # 2    Is this an as-needed follow-up?: No                    Sher Duran MD  [facility] " "  Naa is a 57 year old, presenting for the following health issues:  Wrist Pain (Right wrist), Hip Pain (Left hip/), Colonoscopy (Discuss results), and Hand Problem (Left hand has several finger that are numb.)    HPI     Discuss colonoscopy results     Pain History:  When did you first notice your pain? - Chronic Pain   Have you seen this provider for your pain in the past?   Yes   Where in your body do you have pain? Right wrist, left hip, bone spurs on feet and numbness in left fingers  Are you seeing anyone else for your pain? No    PHQ-9 SCORE 9/21/2022 9/23/2022 2/15/2023   PHQ-9 Total Score - - -   PHQ-9 Total Score MyChart - - -   PHQ-9 Total Score 8 8 5     MARSHALL-7 SCORE 1/8/2019 9/3/2019 2/15/2023   Total Score - - -   Total Score - 0 (minimal anxiety) -   Total Score 0 0 4   .          PHQ-9 SCORE 9/21/2022 9/23/2022 2/15/2023   PHQ-9 Total Score - - -   PHQ-9 Total Score MyChart - - -   PHQ-9 Total Score 8 8 5     MARSHALL-7 SCORE 1/8/2019 9/3/2019 2/15/2023   Total Score - - -   Total Score - 0 (minimal anxiety) -   Total Score 0 0 4     PEG Score 2/15/2023   PEG Total Score 5     PDMP Review     None        Last CSA Agreement:   CSA -- Patient Level:    CSA: None found at the patient level.       Last UDS:             Review of Systems         Objective    /70 (BP Location: Right arm, Patient Position: Sitting, Cuff Size: Adult Regular)   Pulse 82   Temp 97.9  F (36.6  C) (Oral)   Resp 16   Ht 1.651 m (5' 5\")   Wt 70 kg (154 lb 4.8 oz)   LMP 08/20/2015   SpO2 95%   BMI 25.68 kg/m    Body mass index is 25.68 kg/m .  Physical Exam                       "

## 2023-02-16 NOTE — ASSESSMENT & PLAN NOTE
Previously described.  Her occiput is becoming quite bald with no inflammation.  She tried minoxidil for uncertain duration with uncertain response.  Treat as OCD with high-dose SSRI

## 2023-02-16 NOTE — ASSESSMENT & PLAN NOTE
Right fifth ray with DIP flexion, mobile, no inflammation, cannot extend to neutral.  Remote tendon avulsion.  Refer

## 2023-02-16 NOTE — ASSESSMENT & PLAN NOTE
"Tender along extensor pollicis tendons.  Cannot complete Finkelstein.  Previously diagnosed.  Prescribed splint \"inconvenient\" through .  Refer for additional therapies  "

## 2023-02-16 NOTE — ASSESSMENT & PLAN NOTE
Status post release, she has hypoesthesia in 1 and 2 rays.  Negative Tinel's.  Seek objective data.

## 2023-02-16 NOTE — ASSESSMENT & PLAN NOTE
"Intake complaint of hip pain is plantar fascia pain unresponsive to PT.  Refer to podiatry.  She is fixated on spur, described as \"small\" on radiology report  "

## 2023-03-01 ENCOUNTER — ANCILLARY PROCEDURE (OUTPATIENT)
Dept: GENERAL RADIOLOGY | Facility: CLINIC | Age: 58
End: 2023-03-01
Attending: FAMILY MEDICINE
Payer: COMMERCIAL

## 2023-03-01 ENCOUNTER — OFFICE VISIT (OUTPATIENT)
Dept: ORTHOPEDICS | Facility: CLINIC | Age: 58
End: 2023-03-01
Attending: FAMILY MEDICINE
Payer: COMMERCIAL

## 2023-03-01 VITALS
SYSTOLIC BLOOD PRESSURE: 124 MMHG | HEIGHT: 65 IN | DIASTOLIC BLOOD PRESSURE: 76 MMHG | WEIGHT: 154 LBS | BODY MASS INDEX: 25.66 KG/M2

## 2023-03-01 DIAGNOSIS — M65.4 DE QUERVAIN'S DISEASE (TENOSYNOVITIS): ICD-10-CM

## 2023-03-01 DIAGNOSIS — M25.531 RIGHT WRIST PAIN: ICD-10-CM

## 2023-03-01 DIAGNOSIS — M25.531 RIGHT WRIST PAIN: Primary | ICD-10-CM

## 2023-03-01 PROCEDURE — 73110 X-RAY EXAM OF WRIST: CPT | Mod: TC | Performed by: RADIOLOGY

## 2023-03-01 PROCEDURE — 99204 OFFICE O/P NEW MOD 45 MIN: CPT | Performed by: FAMILY MEDICINE

## 2023-03-01 NOTE — PATIENT INSTRUCTIONS
1. Right wrist pain    2. De Quervain's disease (tenosynovitis)      -Patient has acute right wrist pain due to tendinitis  -Patient will start formal hand therapy and home exercise program  -X-rays taken office today do not show any arthritis or bony injury  -Patient may take over-the-counter pain medications as needed  -Patient will follow up if pain does not improve.  To consider a cortisone injection versus oral medications  -Call direct clinic number [[phone]] at any time with questions or concerns.    Albert Yeo MD [facility]  [facility] [place]

## 2023-03-01 NOTE — PROGRESS NOTES
ASSESSMENT & PLAN  Patient Instructions     1. Right wrist pain    2. De Quervain's disease (tenosynovitis)      -Patient has acute right wrist pain due to tendinitis  -Patient will start formal hand therapy and home exercise program  -X-rays taken office today do not show any arthritis or bony injury  -Patient may take over-the-counter pain medications as needed  -Patient will follow up if pain does not improve.  To consider a cortisone injection versus oral medications  -Call direct clinic number [[phone]] at any time with questions or concerns.    Albert Yeo MD [facility]  [facility] [place]          -----    SUBJECTIVE  Naa Paredes is a/an 57 year old Right handed female who is seen in consultation at the request of  Sher Duran M.D. for evaluation of right wrist and left hand pain. The patient is seen by themselves, use of  over IPad today.    Onset: 2-3 month(s) ago of right wrist pain, many years of numbness in left hand, carpal tunnel release did not help. Reports insidious onset without acute precipitating event.  Location of Pain: right radial aspect of wrist, also left 1st and 2nd digit numbness and weakness, denies pain in left fingers just numbness  Rating of Pain at worst: 4/10  Rating of Pain Currently: 3/10  Worsened by: weight bearing through hand, pressure to area   Better with: nothing  Treatments tried: wrist brace, Naproxen of 1 month with mild relief  Associated symptoms: weakness/numbness/tingling in left 1-2 digit   Orthopedic history: YES - Date: h/o left carpal tunnel syndrome  Relevant surgical history: YES - Date: carpal tunnel release 5-6 years ago   Social history: social history: works as PCA    Past Medical History:   Diagnosis Date     Ankle sprain and strain 5/2/2013     Anxiety 3/26/2015     Black stools 11/25/2015     CARDIOVASCULAR SCREENING; LDL GOAL LESS THAN 160 10/31/2010     Cervicalgia 12/7/2011     Closed  dislocation of finger of right hand, subsequent encounter 2/6/2018     Colon adenoma 4/23/2015    Patient denies it. Renee Tiwari RN, 6/9/15.     Conversion disorder with mixed symptoms 6/14/2016     Depression with suicidal ideation 6/19/2015     Depressive disorder      Dysesthesia 9/21/2022     Elevated blood pressure reading without diagnosis of hypertension 11/25/2015     Elevated LFT's 6/4/2010     Fatigue due to depression 11/24/2015     H/O: alcohol abuse 6/4/2010     IBS (irritable bowel syndrome)      Knee pain 10/19/2010     Low back pain 10/19/2010     Lumbago 12/7/2011     Major depression in complete remission (H) 8/13/2018     Major depressive disorder, recurrent, severe without psychotic features (H) 5/13/2016     Major depressive disorder, single episode, in partial remission (H) 11/24/2015     Mallet finger of right hand 2/15/2023     Migraine      Migraine with status migrainosus, not intractable, unspecified migraine type 7/14/2016     Neck pain 10/19/2010     Nephrolithiasis     calcium oxalate     Pain in thoracic spine 1/2/2013     Perimenopause 3/26/2015     Plantar fasciitis 9/21/2022     Seasonal allergic rhinitis 10/20/2015     Thoracic outlet syndrome 7/20/2017     Trichotillomania 9/21/2022     Vitamin D deficiency disease 6/8/2010     Social History     Socioeconomic History     Marital status: Legally    Tobacco Use     Smoking status: Never     Smokeless tobacco: Never   Vaping Use     Vaping Use: Never used   Substance and Sexual Activity     Alcohol use: Yes     Comment: occ wine     Drug use: No     Sexual activity: Not Currently     Partners: Male         Patient's past medical, surgical, social, and family histories were reviewed today and no changes are noted.    REVIEW OF SYSTEMS:  10 point ROS is negative other than symptoms noted above in HPI, Past Medical History or as stated below  Constitutional: NEGATIVE for fever, chills, change in weight  Skin: NEGATIVE  "for worrisome rashes, moles or lesions  GI/: NEGATIVE for bowel or bladder changes  Neuro: NEGATIVE for weakness, dizziness or paresthesias    OBJECTIVE:  /76   Ht 1.651 m (5' 5\")   Wt 69.9 kg (154 lb)   LMP 08/20/2015   BMI 25.63 kg/m     General: healthy, alert and in no distress  HEENT: no scleral icterus or conjunctival erythema  Skin: no suspicious lesions or rash. No jaundice.  CV: regular rhythm by palpation  Resp: normal respiratory effort without conversational dyspnea   Psych: normal mood and affect  Gait: normal steady gait with appropriate coordination and balance  Neuro: Normal sensory exam of bilateral hands. Normal 2 pt discrimination.   MSK:  RIGHT WRIST  Inspection:    No swelling or obvious deformity or asymmetry  Palpation:    Tender about the 1st dorsal compartment. Remainder of bony and ligamentous line marks are nontender.     Metacarpals: normal    Thumb: normal    Fingers: normal  Range of Motion:    Full (active and passive) flexion, extension, pronation/supination, and ulnar/radial deviation.  Strength:    flexion limited slightly by pain. Normal pinch strength.  Special Tests:    Positive: Finkelstein's       Independent visualization of the below image:    Personal review of right wrist x-rays taken in office today show no acute fracture, dislocation or osseous abnormalities.        Albert Yeo MD [facility]    "

## 2023-03-01 NOTE — LETTER
3/1/2023         RE: Naa Paredes  [address]  [place] [address]        Dear Colleague,    Thank you for referring your patient, Naa Paredes, to the [facility] [place]. Please see a copy of my visit note below.    ASSESSMENT & PLAN  Patient Instructions     1. Right wrist pain    2. De Quervain's disease (tenosynovitis)      -Patient has acute right wrist pain due to tendinitis  -Patient will start formal hand therapy and home exercise program  -X-rays taken office today do not show any arthritis or bony injury  -Patient may take over-the-counter pain medications as needed  -Patient will follow up if pain does not improve.  To consider a cortisone injection versus oral medications  -Call direct clinic number [[phone]] at any time with questions or concerns.    Albert Yeo MD [facility]  [facility]          -----    SUBJECTIVE  Naa Paredes is a/an 57 year old Right handed female who is seen in consultation at the request of  Sher Duran M.D. for evaluation of right wrist and left hand pain. The patient is seen by themselves, use of  over IPad today.    Onset: 2-3 month(s) ago of right wrist pain, many years of numbness in left hand, carpal tunnel release did not help. Reports insidious onset without acute precipitating event.  Location of Pain: right radial aspect of wrist, also left 1st and 2nd digit numbness and weakness, denies pain in left fingers just numbness  Rating of Pain at worst: 4/10  Rating of Pain Currently: 3/10  Worsened by: weight bearing through hand, pressure to area   Better with: nothing  Treatments tried: wrist brace, Naproxen of 1 month with mild relief  Associated symptoms: weakness/numbness/tingling in left 1-2 digit   Orthopedic history: YES - Date: h/o left carpal tunnel syndrome  Relevant surgical history: YES - Date: carpal tunnel release 5-6 years ago    Social history: social history: works as PCA    Past Medical History:   Diagnosis Date     Ankle sprain and strain 5/2/2013     Anxiety 3/26/2015     Black stools 11/25/2015     CARDIOVASCULAR SCREENING; LDL GOAL LESS THAN 160 10/31/2010     Cervicalgia 12/7/2011     Closed dislocation of finger of right hand, subsequent encounter 2/6/2018     Colon adenoma 4/23/2015    Patient denies it. Renee Tiwari RN, 6/9/15.     Conversion disorder with mixed symptoms 6/14/2016     Depression with suicidal ideation 6/19/2015     Depressive disorder      Dysesthesia 9/21/2022     Elevated blood pressure reading without diagnosis of hypertension 11/25/2015     Elevated LFT's 6/4/2010     Fatigue due to depression 11/24/2015     H/O: alcohol abuse 6/4/2010     IBS (irritable bowel syndrome)      Knee pain 10/19/2010     Low back pain 10/19/2010     Lumbago 12/7/2011     Major depression in complete remission (H) 8/13/2018     Major depressive disorder, recurrent, severe without psychotic features (H) 5/13/2016     Major depressive disorder, single episode, in partial remission (H) 11/24/2015     Mallet finger of right hand 2/15/2023     Migraine      Migraine with status migrainosus, not intractable, unspecified migraine type 7/14/2016     Neck pain 10/19/2010     Nephrolithiasis     calcium oxalate     Pain in thoracic spine 1/2/2013     Perimenopause 3/26/2015     Plantar fasciitis 9/21/2022     Seasonal allergic rhinitis 10/20/2015     Thoracic outlet syndrome 7/20/2017     Trichotillomania 9/21/2022     Vitamin D deficiency disease 6/8/2010     Social History     Socioeconomic History     Marital status: Legally    Tobacco Use     Smoking status: Never     Smokeless tobacco: Never   Vaping Use     Vaping Use: Never used   Substance and Sexual Activity     Alcohol use: Yes     Comment: occ wine     Drug use: No     Sexual activity: Not Currently     Partners: Male         Patient's past medical, surgical,  "social, and family histories were reviewed today and no changes are noted.    REVIEW OF SYSTEMS:  10 point ROS is negative other than symptoms noted above in HPI, Past Medical History or as stated below  Constitutional: NEGATIVE for fever, chills, change in weight  Skin: NEGATIVE for worrisome rashes, moles or lesions  GI/: NEGATIVE for bowel or bladder changes  Neuro: NEGATIVE for weakness, dizziness or paresthesias    OBJECTIVE:  /76   Ht 1.651 m (5' 5\")   Wt 69.9 kg (154 lb)   LMP 08/20/2015   BMI 25.63 kg/m     General: healthy, alert and in no distress  HEENT: no scleral icterus or conjunctival erythema  Skin: no suspicious lesions or rash. No jaundice.  CV: regular rhythm by palpation  Resp: normal respiratory effort without conversational dyspnea   Psych: normal mood and affect  Gait: normal steady gait with appropriate coordination and balance  Neuro: Normal sensory exam of bilateral hands. Normal 2 pt discrimination.   MSK:  RIGHT WRIST  Inspection:    No swelling or obvious deformity or asymmetry  Palpation:    Tender about the 1st dorsal compartment. Remainder of bony and ligamentous line marks are nontender.     Metacarpals: normal    Thumb: normal    Fingers: normal  Range of Motion:    Full (active and passive) flexion, extension, pronation/supination, and ulnar/radial deviation.  Strength:    flexion limited slightly by pain. Normal pinch strength.  Special Tests:    Positive: Finkelstein's       Independent visualization of the below image:    Personal review of right wrist x-rays taken in office today show no acute fracture, dislocation or osseous abnormalities.        Albert Yeo MD [facility]        Again, thank you for allowing me to participate in the care of your patient.        Sincerely,        Albert Yeo, MD    "

## 2023-03-14 RX ORDER — METRONIDAZOLE 500 MG/1
500 TABLET ORAL
Status: ON HOLD | COMMUNITY
Start: 2023-02-20 | End: 2023-03-21

## 2023-03-14 RX ORDER — ONDANSETRON 4 MG/1
TABLET, ORALLY DISINTEGRATING ORAL
COMMUNITY
Start: 2023-02-20 | End: 2023-03-14

## 2023-03-16 ENCOUNTER — OFFICE VISIT (OUTPATIENT)
Dept: FAMILY MEDICINE | Facility: CLINIC | Age: 58
End: 2023-03-16
Payer: COMMERCIAL

## 2023-03-16 ENCOUNTER — ANCILLARY PROCEDURE (OUTPATIENT)
Dept: GENERAL RADIOLOGY | Facility: CLINIC | Age: 58
End: 2023-03-16
Attending: FAMILY MEDICINE
Payer: COMMERCIAL

## 2023-03-16 VITALS
HEART RATE: 80 BPM | OXYGEN SATURATION: 94 % | TEMPERATURE: 98.2 F | WEIGHT: 152.8 LBS | SYSTOLIC BLOOD PRESSURE: 134 MMHG | RESPIRATION RATE: 12 BRPM | DIASTOLIC BLOOD PRESSURE: 78 MMHG | BODY MASS INDEX: 25.46 KG/M2 | HEIGHT: 65 IN

## 2023-03-16 DIAGNOSIS — R05.1 ACUTE COUGH: ICD-10-CM

## 2023-03-16 DIAGNOSIS — R73.09 ELEVATED GLUCOSE: ICD-10-CM

## 2023-03-16 DIAGNOSIS — Z01.818 PREOP GENERAL PHYSICAL EXAM: Primary | ICD-10-CM

## 2023-03-16 LAB
ANION GAP SERPL CALCULATED.3IONS-SCNC: 13 MMOL/L (ref 7–15)
BASOPHILS # BLD AUTO: 0 10E3/UL (ref 0–0.2)
BASOPHILS NFR BLD AUTO: 0 %
BUN SERPL-MCNC: 14.9 MG/DL (ref 6–20)
CALCIUM SERPL-MCNC: 9.7 MG/DL (ref 8.6–10)
CHLORIDE SERPL-SCNC: 106 MMOL/L (ref 98–107)
CREAT SERPL-MCNC: 0.65 MG/DL (ref 0.51–0.95)
DEPRECATED HCO3 PLAS-SCNC: 23 MMOL/L (ref 22–29)
EOSINOPHIL # BLD AUTO: 0.1 10E3/UL (ref 0–0.7)
EOSINOPHIL NFR BLD AUTO: 2 %
ERYTHROCYTE [DISTWIDTH] IN BLOOD BY AUTOMATED COUNT: 12.4 % (ref 10–15)
GFR SERPL CREATININE-BSD FRML MDRD: >90 ML/MIN/1.73M2
GLUCOSE SERPL-MCNC: 119 MG/DL (ref 70–99)
HCT VFR BLD AUTO: 46.2 % (ref 35–47)
HGB BLD-MCNC: 15.6 G/DL (ref 11.7–15.7)
LYMPHOCYTES # BLD AUTO: 1.7 10E3/UL (ref 0.8–5.3)
LYMPHOCYTES NFR BLD AUTO: 27 %
MCH RBC QN AUTO: 30.8 PG (ref 26.5–33)
MCHC RBC AUTO-ENTMCNC: 33.8 G/DL (ref 31.5–36.5)
MCV RBC AUTO: 91 FL (ref 78–100)
MONOCYTES # BLD AUTO: 0.6 10E3/UL (ref 0–1.3)
MONOCYTES NFR BLD AUTO: 10 %
NEUTROPHILS # BLD AUTO: 3.8 10E3/UL (ref 1.6–8.3)
NEUTROPHILS NFR BLD AUTO: 61 %
PLATELET # BLD AUTO: 142 10E3/UL (ref 150–450)
POTASSIUM SERPL-SCNC: 3.9 MMOL/L (ref 3.4–5.3)
RBC # BLD AUTO: 5.06 10E6/UL (ref 3.8–5.2)
SODIUM SERPL-SCNC: 142 MMOL/L (ref 136–145)
WBC # BLD AUTO: 6.3 10E3/UL (ref 4–11)

## 2023-03-16 PROCEDURE — 71046 X-RAY EXAM CHEST 2 VIEWS: CPT | Mod: TC | Performed by: RADIOLOGY

## 2023-03-16 PROCEDURE — 85025 COMPLETE CBC W/AUTO DIFF WBC: CPT | Performed by: FAMILY MEDICINE

## 2023-03-16 PROCEDURE — 36415 COLL VENOUS BLD VENIPUNCTURE: CPT | Performed by: FAMILY MEDICINE

## 2023-03-16 PROCEDURE — 80048 BASIC METABOLIC PNL TOTAL CA: CPT | Performed by: FAMILY MEDICINE

## 2023-03-16 PROCEDURE — 99214 OFFICE O/P EST MOD 30 MIN: CPT | Performed by: FAMILY MEDICINE

## 2023-03-16 PROCEDURE — 83036 HEMOGLOBIN GLYCOSYLATED A1C: CPT | Performed by: FAMILY MEDICINE

## 2023-03-16 SDOH — ECONOMIC STABILITY: FOOD INSECURITY: WITHIN THE PAST 12 MONTHS, THE FOOD YOU BOUGHT JUST DIDN'T LAST AND YOU DIDN'T HAVE MONEY TO GET MORE.: SOMETIMES TRUE

## 2023-03-16 SDOH — ECONOMIC STABILITY: TRANSPORTATION INSECURITY
IN THE PAST 12 MONTHS, HAS LACK OF TRANSPORTATION KEPT YOU FROM MEETINGS, WORK, OR FROM GETTING THINGS NEEDED FOR DAILY LIVING?: YES

## 2023-03-16 SDOH — ECONOMIC STABILITY: TRANSPORTATION INSECURITY
IN THE PAST 12 MONTHS, HAS THE LACK OF TRANSPORTATION KEPT YOU FROM MEDICAL APPOINTMENTS OR FROM GETTING MEDICATIONS?: YES

## 2023-03-16 SDOH — ECONOMIC STABILITY: FOOD INSECURITY: WITHIN THE PAST 12 MONTHS, YOU WORRIED THAT YOUR FOOD WOULD RUN OUT BEFORE YOU GOT MONEY TO BUY MORE.: SOMETIMES TRUE

## 2023-03-16 SDOH — ECONOMIC STABILITY: INCOME INSECURITY: IN THE LAST 12 MONTHS, WAS THERE A TIME WHEN YOU WERE NOT ABLE TO PAY THE MORTGAGE OR RENT ON TIME?: NO

## 2023-03-16 SDOH — HEALTH STABILITY: PHYSICAL HEALTH: ON AVERAGE, HOW MANY MINUTES DO YOU ENGAGE IN EXERCISE AT THIS LEVEL?: 20 MIN

## 2023-03-16 SDOH — HEALTH STABILITY: PHYSICAL HEALTH: ON AVERAGE, HOW MANY DAYS PER WEEK DO YOU ENGAGE IN MODERATE TO STRENUOUS EXERCISE (LIKE A BRISK WALK)?: 5 DAYS

## 2023-03-16 SDOH — ECONOMIC STABILITY: INCOME INSECURITY: HOW HARD IS IT FOR YOU TO PAY FOR THE VERY BASICS LIKE FOOD, HOUSING, MEDICAL CARE, AND HEATING?: SOMEWHAT HARD

## 2023-03-16 ASSESSMENT — PATIENT HEALTH QUESTIONNAIRE - PHQ9
SUM OF ALL RESPONSES TO PHQ QUESTIONS 1-9: 14
10. IF YOU CHECKED OFF ANY PROBLEMS, HOW DIFFICULT HAVE THESE PROBLEMS MADE IT FOR YOU TO DO YOUR WORK, TAKE CARE OF THINGS AT HOME, OR GET ALONG WITH OTHER PEOPLE: SOMEWHAT DIFFICULT
SUM OF ALL RESPONSES TO PHQ QUESTIONS 1-9: 14

## 2023-03-16 ASSESSMENT — ANXIETY QUESTIONNAIRES
3. WORRYING TOO MUCH ABOUT DIFFERENT THINGS: SEVERAL DAYS
5. BEING SO RESTLESS THAT IT IS HARD TO SIT STILL: SEVERAL DAYS
IF YOU CHECKED OFF ANY PROBLEMS ON THIS QUESTIONNAIRE, HOW DIFFICULT HAVE THESE PROBLEMS MADE IT FOR YOU TO DO YOUR WORK, TAKE CARE OF THINGS AT HOME, OR GET ALONG WITH OTHER PEOPLE: SOMEWHAT DIFFICULT
8. IF YOU CHECKED OFF ANY PROBLEMS, HOW DIFFICULT HAVE THESE MADE IT FOR YOU TO DO YOUR WORK, TAKE CARE OF THINGS AT HOME, OR GET ALONG WITH OTHER PEOPLE?: SOMEWHAT DIFFICULT
7. FEELING AFRAID AS IF SOMETHING AWFUL MIGHT HAPPEN: SEVERAL DAYS
7. FEELING AFRAID AS IF SOMETHING AWFUL MIGHT HAPPEN: SEVERAL DAYS
6. BECOMING EASILY ANNOYED OR IRRITABLE: SEVERAL DAYS
GAD7 TOTAL SCORE: 7
2. NOT BEING ABLE TO STOP OR CONTROL WORRYING: SEVERAL DAYS
GAD7 TOTAL SCORE: 7
GAD7 TOTAL SCORE: 7
4. TROUBLE RELAXING: SEVERAL DAYS
1. FEELING NERVOUS, ANXIOUS, OR ON EDGE: SEVERAL DAYS

## 2023-03-16 ASSESSMENT — LIFESTYLE VARIABLES
SKIP TO QUESTIONS 9-10: 0
HOW OFTEN DO YOU HAVE A DRINK CONTAINING ALCOHOL: MONTHLY OR LESS
HOW MANY STANDARD DRINKS CONTAINING ALCOHOL DO YOU HAVE ON A TYPICAL DAY: 1 OR 2
HOW OFTEN DO YOU HAVE SIX OR MORE DRINKS ON ONE OCCASION: MONTHLY
AUDIT-C TOTAL SCORE: 3

## 2023-03-16 ASSESSMENT — SOCIAL DETERMINANTS OF HEALTH (SDOH)
HOW OFTEN DO YOU ATTEND CHURCH OR RELIGIOUS SERVICES?: 1 TO 4 TIMES PER YEAR
IN A TYPICAL WEEK, HOW MANY TIMES DO YOU TALK ON THE PHONE WITH FAMILY, FRIENDS, OR NEIGHBORS?: THREE TIMES A WEEK
DO YOU BELONG TO ANY CLUBS OR ORGANIZATIONS SUCH AS CHURCH GROUPS UNIONS, FRATERNAL OR ATHLETIC GROUPS, OR SCHOOL GROUPS?: NO
HOW OFTEN DO YOU GET TOGETHER WITH FRIENDS OR RELATIVES?: ONCE A WEEK

## 2023-03-16 NOTE — ASSESSMENT & PLAN NOTE
Pt reports non productive cough, fatigue, , negative covid test, since surgery was planned. Exam reassuring, Chest XRay negative. Suspect conversion process

## 2023-03-16 NOTE — PROGRESS NOTES
[facility]  [id number] [place] [id number]  Phone: [phone]  Primary Provider: Vee Alvarado  Pre-op Performing Provider:    VEE ALVARADO  VIDEO,       PREOPERATIVE EVALUATION:  Today's date: 3/16/2023    Naa Paredes is a 57 year old female who presents for a preoperative evaluation.    Surgical Information:  Surgery/Procedure:Transanal Endoscopic Microsurgical Resection of Rectal Polyp  Surgery Location: [facility]  Surgeon: Hallie Quevedo MD  Surgery Date: 3/21/23  Time of Surgery: 11:30am  Where patient plans to recover: At home with family  Fax number for surgical facility: Note does not need to be faxed, will be available electronically in Epic.    Type of Anesthesia Anticipated: to be determined    Assessment & Plan     The proposed surgical procedure is considered INTERMEDIATE risk.    Problem List Items Addressed This Visit     Acute cough     Pt reports non productive cough, fatigue, , negative covid test, since surgery was planned. Exam reassuring, Chest XRay negative. Suspect conversion process         Relevant Orders    XR Chest 2 Views (Completed)    Preop general physical exam - Primary     No contraindications to proposed procedure         Relevant Orders    CBC with platelets and differential (Completed)    Basic metabolic panel  (Ca, Cl, CO2, Creat, Gluc, K, Na, BUN)         Possible Sleep Apnea:               Pt has metronidazole oral prophylaxis. Instructions reviewed with pt,     RECOMMENDATION:  APPROVAL GIVEN to proceed with proposed procedure, without further diagnostic evaluation.            Subjective     HPI related to upcoming procedure: High grade rectal adenoma    Preop Questions 3/16/2023   1. Have you ever had a heart attack or stroke? No   2. Have you ever had surgery on your heart or blood vessels, such as a stent placement, a coronary artery bypass, or surgery on an artery in your  head, neck, heart, or legs? No   3. Do you have chest pain with activity? No   4. Do you have a history of  heart failure? No   5. Do you currently have a cold, bronchitis or symptoms of other infection? No   6. Do you have a cough, shortness of breath, or wheezing? YES -      7. Do you or anyone in your family have previous history of blood clots? No   8. Do you or does anyone in your family have a serious bleeding problem such as prolonged bleeding following surgeries or cuts? No   9. Have you ever had problems with anemia or been told to take iron pills? UNKNOWN -    10. Have you had any abnormal blood loss such as black, tarry or bloody stools, or abnormal vaginal bleeding? No   11. Have you ever had a blood transfusion? YES -    11a. Have you ever had a transfusion reaction? No   12. Are you willing to have a blood transfusion if it is medically needed before, during, or after your surgery? NO -    13. Have you or any of your relatives ever had problems with anesthesia? UNKNOWN -    14. Do you have sleep apnea, excessive snoring or daytime drowsiness? YES - daytime fatigue as described   14a. Do you have a CPAP machine? No   15. Do you have any artifical heart valves or other implanted medical devices like a pacemaker, defibrillator, or continuous glucose monitor? No   16. Do you have artificial joints? No   17. Are you allergic to latex? No   18. Is there any chance that you may be pregnant? No       Health Care Directive:  Patient does not have a Health Care Directive or Living Will:     Preoperative Review of :   reviewed - no record of controlled substances prescribed.          Review of Systems  CONSTITUTIONAL: NEGATIVE for fever, chills, change in weight  ENT/MOUTH: NEGATIVE for ear, mouth and throat problems  RESP: NEGATIVE for  SOB  CV: NEGATIVE for chest pain, palpitations or peripheral edema  HEME/ALLERGY/IMMUNE: NEGATIVE for bleeding problems    Patient Active Problem List    Diagnosis Date  Noted     Acute cough 03/16/2023     Priority: Medium     Preop general physical exam 03/16/2023     Priority: Medium     De Quervain's disease (tenosynovitis) 02/15/2023     Priority: Medium     Carpal tunnel syndrome of left wrist 02/15/2023     Priority: Medium     Mallet finger of right hand 02/15/2023     Priority: Medium     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     Priority: Medium     Dysesthesia 09/21/2022     Priority: Medium     Plantar fasciitis 09/21/2022     Priority: Medium     Encounter for immunization 09/21/2022     Priority: Medium     Cervical high risk HPV (human papillomavirus) test positive 09/21/2022     Priority: Medium     9/21/22 NIL Pap, + HR HPV (neg 16/18). Plan cotest in 1 year.   9/27/2022 Pt notified by phone via [employer] .        Screen for colon cancer 09/21/2022     Priority: Medium     Major depression in complete remission (H) 08/13/2018     Priority: Medium     Migraine with status migrainosus, not intractable, unspecified migraine type 07/14/2016     Priority: Medium     Conversion disorder with mixed symptoms 06/14/2016     Priority: Medium     Seasonal allergic rhinitis 10/20/2015     Priority: Medium     Colon adenoma 04/23/2015     Priority: Medium     Anxiety 03/26/2015     Priority: Medium     Donta Ramírez therapist       Nickel allergy 06/11/2014     Priority: Medium     CARDIOVASCULAR SCREENING; LDL GOAL LESS THAN 160 10/31/2010     Priority: Medium     The 10-year ASCVD risk score (Yamila VILLAGRAN Jr., et al., 2013) is: 2.1%    Values used to calculate the score:      Age: 56 years      Sex: Female      Is Non- : No      Diabetic: No      Tobacco smoker: No      Systolic Blood Pressure: 104 mmHg      Is BP treated: No      HDL Cholesterol: 54 mg/dL      Total Cholesterol: 259 mg/dL               H/O: alcohol abuse 06/04/2010     Priority: Medium     Disorder of liver 11/05/2004     Priority: Medium     Formatting of this note might be different from the  original.  LW Modifier:  elevated LFT, Bx 04:negative  LW Onset:  50Qgm49  ; Liver Disease  NOS       Abscess of Bartholin's gland 04/05/2004     Priority: Medium     Formatting of this note might be different from the original.  LW Onset:  64Ima77  ; Abscess Bartholin Duct        Past Medical History:   Diagnosis Date     Ankle sprain and strain 05/02/2013     Anxiety 03/26/2015     Black stools 11/25/2015     CARDIOVASCULAR SCREENING; LDL GOAL LESS THAN 160 10/31/2010     Cervicalgia 12/07/2011     Closed dislocation of finger of right hand, subsequent encounter 02/06/2018     Colon adenoma 04/23/2015    Patient denies it. Renee Tiwari RN, 6/9/15.     Conversion disorder with mixed symptoms 06/14/2016     Depression with suicidal ideation 06/19/2015     Depressive disorder      Dysesthesia 09/21/2022     Elevated blood pressure reading without diagnosis of hypertension 11/25/2015     Elevated LFT's 06/04/2010     Fatigue due to depression 11/24/2015     H/O: alcohol abuse 06/04/2010     IBS (irritable bowel syndrome)      Knee pain 10/19/2010     Low back pain 10/19/2010     Lumbago 12/07/2011     Major depression in complete remission (H) 08/13/2018     Major depressive disorder, recurrent, severe without psychotic features (H) 05/13/2016     Major depressive disorder, single episode, in partial remission (H) 11/24/2015     Mallet finger of right hand 02/15/2023     Migraine      Migraine with status migrainosus, not intractable, unspecified migraine type 07/14/2016     Neck pain 10/19/2010     Nephrolithiasis     calcium oxalate     Pain in thoracic spine 01/02/2013     Perimenopause 03/26/2015     Plantar fasciitis 09/21/2022     Seasonal allergic rhinitis 10/20/2015     Thoracic outlet syndrome 07/20/2017     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 Quevedo's instructions (Patient not taking: Reported on 3/16/2023)         Allergies   Allergen Reactions     No Clinical Screening - See Comments      PN: LW CM1: Contrast Adverse Reaction - None Reaction :  PN: LW FI1: nka        Social History     Tobacco Use     Smoking status: Never     Smokeless tobacco: Never   Substance Use Topics     Alcohol use: Yes     Comment: occ wine       History   Drug Use No         Objective     /78 (BP Location: Right arm, Patient Position: Sitting, Cuff Size: Adult Regular)   Pulse 80   Temp 98.2  F (36.8  C) (Oral)   Resp 12   Ht 1.651 m (5' 5\")   Wt 69.3 kg (152 lb 12.8 oz)   LMP 08/20/2015   SpO2 94%   BMI 25.43 kg/m      Physical Exam  Constitutional:       Appearance: Normal appearance.   HENT:      Head: Atraumatic.      Right Ear: Tympanic membrane normal.      Left Ear: Tympanic membrane normal.      Nose: Nose normal.      Mouth/Throat:      Mouth: Mucous membranes are moist.   Eyes:      Pupils: Pupils are equal, round, and reactive to light.   Cardiovascular:      Rate and Rhythm: Normal rate and regular rhythm.      Heart sounds: Normal heart sounds.   Pulmonary:      Effort: Pulmonary effort is normal.      Breath sounds: Normal breath sounds.   Abdominal:      General: Bowel sounds are normal.      Palpations: Abdomen is soft.   Musculoskeletal:      Cervical back: Neck supple.   Skin:     General: Skin is warm and dry.   Neurological:      General: No focal deficit present.      Mental Status: She is alert.   Psychiatric:         Mood and Affect: Mood normal. "           Recent Labs   Lab Test 09/21/22  1503 02/03/22  1701   HGB  --  15.3   PLT  --  160    140   POTASSIUM 3.6 3.9   CR 0.66 0.68        Diagnostics:  Labs pending at this time.  Results will be reviewed when available.   No EKG required, no history of coronary heart disease, significant arrhythmia, peripheral arterial disease or other structural heart disease.  CXR neg  Revised Cardiac Risk Index (RCRI):  The patient has the following serious cardiovascular risks for perioperative complications:   - No serious cardiac risks = 0 points     RCRI Interpretation: 0 points: Class I (very low risk - 0.4% complication rate)           Signed Electronically by: Sher Duran MD  Copy of this evaluation report is provided to requesting physician.

## 2023-03-17 ENCOUNTER — TELEPHONE (OUTPATIENT)
Dept: FAMILY MEDICINE | Facility: CLINIC | Age: 58
End: 2023-03-17
Payer: MEDICAID

## 2023-03-17 DIAGNOSIS — R73.03 PREDIABETES: Primary | ICD-10-CM

## 2023-03-17 PROBLEM — D69.6 THROMBOCYTOPENIA (H): Status: ACTIVE | Noted: 2023-03-17

## 2023-03-17 LAB — HBA1C MFR BLD: 6.3 % (ref 0–5.6)

## 2023-03-17 NOTE — TELEPHONE ENCOUNTER
----- Message from Vee Alvarado MD sent at 3/17/2023  3:26 PM CDT -----  You are ready for your surgery.  He now have prediabetes, but not diabetes.  We should check blood test yearly  VEE ALVARADO

## 2023-03-17 NOTE — TELEPHONE ENCOUNTER
RN called and spoke with pt utilizing Cantonese   -Relayed message from Dr. Duran, see previous note  -Informed pt of the following lab results:  Component      Latest Ref Rng & Units 3/16/2023   Hemoglobin A1C      0.0 - 5.6 % 6.3 (H)     Component      Latest Ref Rng & Units 3/16/2023   Glucose      70 - 99 mg/dL 119 (H)     -Educated pt on prediabetes and further prevention  -Informed pt of diabetic educator resource for prediabetes management, pt agreed and requests referral be placed    Patient was given an opportunity to ask questions, verbalized understanding of plan, and is agreeable.    RN pended diabetic educator referral for Dr. Duran to review and sign, routing to DEANNA GUAMAN RN  Patient Advocate Liaison - PAL RN  [facility]  [phone]

## 2023-03-21 ENCOUNTER — ANESTHESIA (OUTPATIENT)
Dept: SURGERY | Facility: CLINIC | Age: 58
End: 2023-03-21
Payer: COMMERCIAL

## 2023-03-21 ENCOUNTER — HOSPITAL ENCOUNTER (OUTPATIENT)
Facility: CLINIC | Age: 58
Discharge: HOME | End: 2023-03-21
Attending: COLON & RECTAL SURGERY | Admitting: COLON & RECTAL SURGERY
Payer: COMMERCIAL

## 2023-03-21 ENCOUNTER — ANESTHESIA EVENT (OUTPATIENT)
Dept: SURGERY | Facility: CLINIC | Age: 58
End: 2023-03-21
Payer: COMMERCIAL

## 2023-03-21 VITALS
HEIGHT: 65 IN | DIASTOLIC BLOOD PRESSURE: 76 MMHG | BODY MASS INDEX: 25.26 KG/M2 | TEMPERATURE: 97 F | WEIGHT: 151.6 LBS | SYSTOLIC BLOOD PRESSURE: 122 MMHG | RESPIRATION RATE: 18 BRPM | HEART RATE: 59 BPM | OXYGEN SATURATION: 95 %

## 2023-03-21 DIAGNOSIS — D12.8 ADENOMATOUS RECTAL POLYP: Primary | ICD-10-CM

## 2023-03-21 LAB — GLUCOSE BLDC GLUCOMTR-MCNC: 121 MG/DL (ref 70–99)

## 2023-03-21 PROCEDURE — 250N000025 HC SEVOFLURANE, PER MIN: Performed by: COLON & RECTAL SURGERY

## 2023-03-21 PROCEDURE — 258N000003 HC RX IP 258 OP 636: Performed by: NURSE ANESTHETIST, CERTIFIED REGISTERED

## 2023-03-21 PROCEDURE — 250N000011 HC RX IP 250 OP 636: Performed by: NURSE ANESTHETIST, CERTIFIED REGISTERED

## 2023-03-21 PROCEDURE — 88307 TISSUE EXAM BY PATHOLOGIST: CPT | Mod: TC | Performed by: COLON & RECTAL SURGERY

## 2023-03-21 PROCEDURE — 370N000017 HC ANESTHESIA TECHNICAL FEE, PER MIN: Performed by: COLON & RECTAL SURGERY

## 2023-03-21 PROCEDURE — 360N000075 HC SURGERY LEVEL 2, PER MIN: Performed by: COLON & RECTAL SURGERY

## 2023-03-21 PROCEDURE — 710N000009 HC RECOVERY PHASE 1, LEVEL 1, PER MIN: Performed by: COLON & RECTAL SURGERY

## 2023-03-21 PROCEDURE — 258N000003 HC RX IP 258 OP 636: Performed by: ANESTHESIOLOGY

## 2023-03-21 PROCEDURE — 250N000009 HC RX 250: Performed by: NURSE ANESTHETIST, CERTIFIED REGISTERED

## 2023-03-21 PROCEDURE — 250N000009 HC RX 250: Performed by: COLON & RECTAL SURGERY

## 2023-03-21 PROCEDURE — 272N000001 HC OR GENERAL SUPPLY STERILE: Performed by: COLON & RECTAL SURGERY

## 2023-03-21 PROCEDURE — 82962 GLUCOSE BLOOD TEST: CPT

## 2023-03-21 PROCEDURE — 710N000012 HC RECOVERY PHASE 2, PER MINUTE: Performed by: COLON & RECTAL SURGERY

## 2023-03-21 PROCEDURE — 250N000011 HC RX IP 250 OP 636: Performed by: COLON & RECTAL SURGERY

## 2023-03-21 PROCEDURE — 999N000141 HC STATISTIC PRE-PROCEDURE NURSING ASSESSMENT: Performed by: COLON & RECTAL SURGERY

## 2023-03-21 PROCEDURE — 88307 TISSUE EXAM BY PATHOLOGIST: CPT | Mod: 26 | Performed by: PATHOLOGY

## 2023-03-21 RX ORDER — HYDROMORPHONE HYDROCHLORIDE 1 MG/ML
0.5 INJECTION, SOLUTION INTRAMUSCULAR; INTRAVENOUS; SUBCUTANEOUS EVERY 5 MIN PRN
Status: DISCONTINUED | OUTPATIENT
Start: 2023-03-21 | End: 2023-03-21 | Stop reason: HOSPADM

## 2023-03-21 RX ORDER — KETOROLAC TROMETHAMINE 30 MG/ML
INJECTION, SOLUTION INTRAMUSCULAR; INTRAVENOUS PRN
Status: DISCONTINUED | OUTPATIENT
Start: 2023-03-21 | End: 2023-03-21

## 2023-03-21 RX ORDER — LIDOCAINE HYDROCHLORIDE 40 MG/ML
SOLUTION TOPICAL PRN
Status: DISCONTINUED | OUTPATIENT
Start: 2023-03-21 | End: 2023-03-21

## 2023-03-21 RX ORDER — OXYCODONE HYDROCHLORIDE 5 MG/1
5 TABLET ORAL
Status: DISCONTINUED | OUTPATIENT
Start: 2023-03-21 | End: 2023-03-21 | Stop reason: HOSPADM

## 2023-03-21 RX ORDER — ONDANSETRON 4 MG/1
4 TABLET, ORALLY DISINTEGRATING ORAL EVERY 30 MIN PRN
Status: DISCONTINUED | OUTPATIENT
Start: 2023-03-21 | End: 2023-03-21 | Stop reason: HOSPADM

## 2023-03-21 RX ORDER — SODIUM CHLORIDE, SODIUM LACTATE, POTASSIUM CHLORIDE, CALCIUM CHLORIDE 600; 310; 30; 20 MG/100ML; MG/100ML; MG/100ML; MG/100ML
INJECTION, SOLUTION INTRAVENOUS CONTINUOUS
Status: DISCONTINUED | OUTPATIENT
Start: 2023-03-21 | End: 2023-03-21 | Stop reason: HOSPADM

## 2023-03-21 RX ORDER — MAGNESIUM SULFATE HEPTAHYDRATE 40 MG/ML
2 INJECTION, SOLUTION INTRAVENOUS
Status: DISCONTINUED | OUTPATIENT
Start: 2023-03-21 | End: 2023-03-21 | Stop reason: HOSPADM

## 2023-03-21 RX ORDER — ALBUTEROL SULFATE 0.83 MG/ML
2.5 SOLUTION RESPIRATORY (INHALATION) EVERY 4 HOURS PRN
Status: DISCONTINUED | OUTPATIENT
Start: 2023-03-21 | End: 2023-03-21 | Stop reason: HOSPADM

## 2023-03-21 RX ORDER — BUPIVACAINE HYDROCHLORIDE AND EPINEPHRINE 2.5; 5 MG/ML; UG/ML
INJECTION, SOLUTION EPIDURAL; INFILTRATION; INTRACAUDAL; PERINEURAL PRN
Status: DISCONTINUED | OUTPATIENT
Start: 2023-03-21 | End: 2023-03-21 | Stop reason: HOSPADM

## 2023-03-21 RX ORDER — ONDANSETRON 2 MG/ML
4 INJECTION INTRAMUSCULAR; INTRAVENOUS EVERY 30 MIN PRN
Status: DISCONTINUED | OUTPATIENT
Start: 2023-03-21 | End: 2023-03-21 | Stop reason: HOSPADM

## 2023-03-21 RX ORDER — LIDOCAINE 40 MG/G
CREAM TOPICAL
Status: DISCONTINUED | OUTPATIENT
Start: 2023-03-21 | End: 2023-03-21 | Stop reason: HOSPADM

## 2023-03-21 RX ORDER — KETOROLAC TROMETHAMINE 15 MG/ML
15 INJECTION, SOLUTION INTRAMUSCULAR; INTRAVENOUS
Status: DISCONTINUED | OUTPATIENT
Start: 2023-03-21 | End: 2023-03-21 | Stop reason: HOSPADM

## 2023-03-21 RX ORDER — OXYCODONE HYDROCHLORIDE 5 MG/1
5 TABLET ORAL EVERY 4 HOURS PRN
Qty: 5 TABLET | Refills: 0 | Status: SHIPPED | OUTPATIENT
Start: 2023-03-21 | End: 2023-04-26

## 2023-03-21 RX ORDER — CEFAZOLIN SODIUM/WATER 2 G/20 ML
2 SYRINGE (ML) INTRAVENOUS
Status: COMPLETED | OUTPATIENT
Start: 2023-03-21 | End: 2023-03-21

## 2023-03-21 RX ORDER — MINERAL OIL
OIL (ML) MISCELLANEOUS PRN
Status: DISCONTINUED | OUTPATIENT
Start: 2023-03-21 | End: 2023-03-21 | Stop reason: HOSPADM

## 2023-03-21 RX ORDER — LIDOCAINE HYDROCHLORIDE 20 MG/ML
INJECTION, SOLUTION INFILTRATION; PERINEURAL PRN
Status: DISCONTINUED | OUTPATIENT
Start: 2023-03-21 | End: 2023-03-21

## 2023-03-21 RX ORDER — SODIUM CHLORIDE, SODIUM LACTATE, POTASSIUM CHLORIDE, CALCIUM CHLORIDE 600; 310; 30; 20 MG/100ML; MG/100ML; MG/100ML; MG/100ML
INJECTION, SOLUTION INTRAVENOUS CONTINUOUS PRN
Status: DISCONTINUED | OUTPATIENT
Start: 2023-03-21 | End: 2023-03-21

## 2023-03-21 RX ORDER — FENTANYL CITRATE 50 UG/ML
25 INJECTION, SOLUTION INTRAMUSCULAR; INTRAVENOUS EVERY 5 MIN PRN
Status: DISCONTINUED | OUTPATIENT
Start: 2023-03-21 | End: 2023-03-21 | Stop reason: HOSPADM

## 2023-03-21 RX ORDER — CEFAZOLIN SODIUM/WATER 2 G/20 ML
2 SYRINGE (ML) INTRAVENOUS SEE ADMIN INSTRUCTIONS
Status: DISCONTINUED | OUTPATIENT
Start: 2023-03-21 | End: 2023-03-21 | Stop reason: HOSPADM

## 2023-03-21 RX ORDER — GLYCOPYRROLATE 0.2 MG/ML
INJECTION, SOLUTION INTRAMUSCULAR; INTRAVENOUS PRN
Status: DISCONTINUED | OUTPATIENT
Start: 2023-03-21 | End: 2023-03-21

## 2023-03-21 RX ORDER — ONDANSETRON 2 MG/ML
INJECTION INTRAMUSCULAR; INTRAVENOUS PRN
Status: DISCONTINUED | OUTPATIENT
Start: 2023-03-21 | End: 2023-03-21

## 2023-03-21 RX ORDER — HYDRALAZINE HYDROCHLORIDE 20 MG/ML
2.5-5 INJECTION INTRAMUSCULAR; INTRAVENOUS EVERY 10 MIN PRN
Status: DISCONTINUED | OUTPATIENT
Start: 2023-03-21 | End: 2023-03-21 | Stop reason: HOSPADM

## 2023-03-21 RX ORDER — FENTANYL CITRATE 50 UG/ML
50 INJECTION, SOLUTION INTRAMUSCULAR; INTRAVENOUS EVERY 5 MIN PRN
Status: DISCONTINUED | OUTPATIENT
Start: 2023-03-21 | End: 2023-03-21 | Stop reason: HOSPADM

## 2023-03-21 RX ORDER — NEOSTIGMINE METHYLSULFATE 1 MG/ML
VIAL (ML) INJECTION PRN
Status: DISCONTINUED | OUTPATIENT
Start: 2023-03-21 | End: 2023-03-21

## 2023-03-21 RX ORDER — PROPOFOL 10 MG/ML
INJECTION, EMULSION INTRAVENOUS PRN
Status: DISCONTINUED | OUTPATIENT
Start: 2023-03-21 | End: 2023-03-21

## 2023-03-21 RX ORDER — HYDROMORPHONE HCL IN WATER/PF 6 MG/30 ML
0.2 PATIENT CONTROLLED ANALGESIA SYRINGE INTRAVENOUS EVERY 5 MIN PRN
Status: DISCONTINUED | OUTPATIENT
Start: 2023-03-21 | End: 2023-03-21 | Stop reason: HOSPADM

## 2023-03-21 RX ORDER — DEXAMETHASONE SODIUM PHOSPHATE 4 MG/ML
INJECTION, SOLUTION INTRA-ARTICULAR; INTRALESIONAL; INTRAMUSCULAR; INTRAVENOUS; SOFT TISSUE PRN
Status: DISCONTINUED | OUTPATIENT
Start: 2023-03-21 | End: 2023-03-21

## 2023-03-21 RX ORDER — LABETALOL HYDROCHLORIDE 5 MG/ML
10 INJECTION, SOLUTION INTRAVENOUS
Status: DISCONTINUED | OUTPATIENT
Start: 2023-03-21 | End: 2023-03-21 | Stop reason: HOSPADM

## 2023-03-21 RX ORDER — FENTANYL CITRATE 50 UG/ML
INJECTION, SOLUTION INTRAMUSCULAR; INTRAVENOUS PRN
Status: DISCONTINUED | OUTPATIENT
Start: 2023-03-21 | End: 2023-03-21

## 2023-03-21 RX ORDER — METRONIDAZOLE 500 MG/100ML
500 INJECTION, SOLUTION INTRAVENOUS
Status: COMPLETED | OUTPATIENT
Start: 2023-03-21 | End: 2023-03-21

## 2023-03-21 RX ADMIN — GLYCOPYRROLATE 0.4 MG: 0.2 INJECTION, SOLUTION INTRAMUSCULAR; INTRAVENOUS at 16:17

## 2023-03-21 RX ADMIN — ROCURONIUM BROMIDE 50 MG: 50 INJECTION, SOLUTION INTRAVENOUS at 14:34

## 2023-03-21 RX ADMIN — PHENYLEPHRINE HYDROCHLORIDE 100 MCG: 10 INJECTION INTRAVENOUS at 15:55

## 2023-03-21 RX ADMIN — Medication 2 G: at 14:24

## 2023-03-21 RX ADMIN — SODIUM CHLORIDE, POTASSIUM CHLORIDE, SODIUM LACTATE AND CALCIUM CHLORIDE: 600; 310; 30; 20 INJECTION, SOLUTION INTRAVENOUS at 17:00

## 2023-03-21 RX ADMIN — SODIUM CHLORIDE, POTASSIUM CHLORIDE, SODIUM LACTATE AND CALCIUM CHLORIDE: 600; 310; 30; 20 INJECTION, SOLUTION INTRAVENOUS at 14:24

## 2023-03-21 RX ADMIN — LIDOCAINE HYDROCHLORIDE 50 MG: 20 INJECTION, SOLUTION INFILTRATION; PERINEURAL at 14:34

## 2023-03-21 RX ADMIN — PHENYLEPHRINE HYDROCHLORIDE 100 MCG: 10 INJECTION INTRAVENOUS at 15:16

## 2023-03-21 RX ADMIN — FENTANYL CITRATE 100 MCG: 50 INJECTION, SOLUTION INTRAMUSCULAR; INTRAVENOUS at 14:34

## 2023-03-21 RX ADMIN — PHENYLEPHRINE HYDROCHLORIDE 100 MCG: 10 INJECTION INTRAVENOUS at 15:31

## 2023-03-21 RX ADMIN — MIDAZOLAM 2 MG: 1 INJECTION INTRAMUSCULAR; INTRAVENOUS at 14:20

## 2023-03-21 RX ADMIN — ONDANSETRON 4 MG: 2 INJECTION INTRAMUSCULAR; INTRAVENOUS at 14:34

## 2023-03-21 RX ADMIN — DEXMEDETOMIDINE HYDROCHLORIDE 0.5 MCG/KG/HR: 100 INJECTION, SOLUTION INTRAVENOUS at 14:36

## 2023-03-21 RX ADMIN — PROPOFOL 120 MG: 10 INJECTION, EMULSION INTRAVENOUS at 14:34

## 2023-03-21 RX ADMIN — METRONIDAZOLE 500 MG: 500 INJECTION, SOLUTION INTRAVENOUS at 12:40

## 2023-03-21 RX ADMIN — PHENYLEPHRINE HYDROCHLORIDE 100 MCG: 10 INJECTION INTRAVENOUS at 15:43

## 2023-03-21 RX ADMIN — SODIUM CHLORIDE, POTASSIUM CHLORIDE, SODIUM LACTATE AND CALCIUM CHLORIDE: 600; 310; 30; 20 INJECTION, SOLUTION INTRAVENOUS at 12:19

## 2023-03-21 RX ADMIN — LIDOCAINE HYDROCHLORIDE 4 ML: 40 SOLUTION TOPICAL at 14:35

## 2023-03-21 RX ADMIN — KETOROLAC TROMETHAMINE 15 MG: 30 INJECTION, SOLUTION INTRAMUSCULAR at 14:34

## 2023-03-21 RX ADMIN — NEOSTIGMINE METHYLSULFATE 3.5 MG: 1 INJECTION, SOLUTION INTRAVENOUS at 16:17

## 2023-03-21 RX ADMIN — PHENYLEPHRINE HYDROCHLORIDE 100 MCG: 10 INJECTION INTRAVENOUS at 14:49

## 2023-03-21 RX ADMIN — GLYCOPYRROLATE 0.2 MG: 0.2 INJECTION, SOLUTION INTRAMUSCULAR; INTRAVENOUS at 14:51

## 2023-03-21 RX ADMIN — DEXAMETHASONE SODIUM PHOSPHATE 8 MG: 4 INJECTION, SOLUTION INTRA-ARTICULAR; INTRALESIONAL; INTRAMUSCULAR; INTRAVENOUS; SOFT TISSUE at 14:34

## 2023-03-21 ASSESSMENT — ACTIVITIES OF DAILY LIVING (ADL)
ADLS_ACUITY_SCORE: 33
ADLS_ACUITY_SCORE: 35

## 2023-03-21 NOTE — DISCHARGE INSTRUCTIONS
DR. KITTY AUGUSTIN M.D.  CLINIC PHONE NUMBER:  [phone]  [facility]      GENERAL ANESTHESIA OR SEDATION ADULT DISCHARGE INSTRUCTIONS   SPECIAL PRECAUTIONS FOR 24 HOURS AFTER SURGERY    IT IS NOT UNUSUAL TO FEEL LIGHT-HEADED OR FAINT, UP TO 24 HOURS AFTER SURGERY OR WHILE TAKING PAIN MEDICATION.  IF YOU HAVE THESE SYMPTOMS; SIT FOR A FEW MINUTES BEFORE STANDING AND HAVE SOMEONE ASSIST YOU WHEN YOU GET UP TO WALK OR USE THE BATHROOM.    YOU SHOULD REST AND RELAX FOR THE NEXT 24 HOURS AND YOU MUST MAKE ARRANGEMENTS TO HAVE SOMEONE STAY WITH YOU FOR AT LEAST 24 HOURS AFTER YOUR DISCHARGE.  AVOID HAZARDOUS AND STRENUOUS ACTIVITIES.  DO NOT MAKE IMPORTANT DECISIONS FOR 24 HOURS.    DO NOT DRIVE ANY VEHICLE OR OPERATE MECHANICAL EQUIPMENT FOR 24 HOURS FOLLOWING THE END OF YOUR SURGERY.  EVEN THOUGH YOU MAY FEEL NORMAL, YOUR REACTIONS MAY BE AFFECTED BY THE MEDICATION YOU HAVE RECEIVED.    DO NOT DRINK ALCOHOLIC BEVERAGES FOR 24 HOURS FOLLOWING YOUR SURGERY.    DRINK CLEAR LIQUIDS (APPLE JUICE, GINGER ALE, 7-UP, BROTH, ETC.).  PROGRESS TO YOUR REGULAR DIET AS YOU FEEL ABLE.    YOU MAY HAVE A DRY MOUTH, A SORE THROAT, MUSCLES ACHES OR TROUBLE SLEEPING.  THESE SHOULD GO AWAY AFTER 24 HOURS.    CALL YOUR DOCTOR FOR ANY OF THE FOLLOWING:  SIGNS OF INFECTION (FEVER, GROWING TENDERNESS AT THE SURGERY SITE, A LARGE AMOUNT OF DRAINAGE OR BLEEDING, SEVERE PAIN, FOUL-SMELLING DRAINAGE, REDNESS OR SWELLING.    IT HAS BEEN OVER 8 TO 10 HOURS SINCE SURGERY AND YOU ARE STILL NOT ABLE TO URINATE (PASS WATER).     You received Toradol, an IV form of Ibuprofen (Motrin) at 2:35 PM.  Do not take any Ibuprofen products until 8:35 PM or after.

## 2023-03-21 NOTE — BRIEF OP NOTE
[facility]    Brief Operative Note    Pre-operative diagnosis: Rectal polyp [K62.1]  Post-operative diagnosis right lateral rectal polyp    Procedure: Procedure(s):  Transanal Endoscopic Microsurgical Resection of Rectal Polyp  Surgeon: Surgeon(s) and Role:     * Hallie Quevedo MD - Primary  Anesthesia: General   Estimated Blood Loss: Minimal    Drains: None  Specimens:   ID Type Source Tests Collected by Time Destination   1 : Right Lateral Rectal Polyp Polyp Rectum SURGICAL PATHOLOGY EXAM Hallie Quevedo MD 3/21/2023  3:27 PM      Findings:   2.5 cm right lateral rectal polyp at about 5 cm from the anal verge.  Complications: None.  Implants: * No implants in log *

## 2023-03-21 NOTE — ANESTHESIA CARE TRANSFER NOTE
Patient: Naa Paredes    Procedure: Procedure(s):  Transanal Endoscopic Microsurgical Resection of Rectal Polyp       Diagnosis: Rectal polyp [K62.1]  Diagnosis Additional Information: No value filed.    Anesthesia Type:   General     Note:    Oropharynx: oral airway in place  Level of Consciousness: drowsy  Oxygen Supplementation: face mask  Level of Supplemental Oxygen (L/min / FiO2): 8  Independent Airway: airway patency satisfactory and stable  Dentition: dentition unchanged  Vital Signs Stable: post-procedure vital signs reviewed and stable  Report to RN Given: handoff report given  Patient transferred to: PACU    Handoff Report: Identifed the Patient, Identified the Reponsible Provider, Reviewed the pertinent medical history, Discussed the surgical course, Reviewed Intra-OP anesthesia mangement and issues during anesthesia, Set expectations for post-procedure period and Allowed opportunity for questions and acknowledgement of understanding      Vitals:  Vitals Value Taken Time   /58 03/21/23 1632   Temp 37    Pulse 55 03/21/23 1635   Resp 19 03/21/23 1635   SpO2 99 % 03/21/23 1635   Vitals shown include unvalidated device data.    Electronically Signed By: ADRIAN Tadeo CRNA  March 21, 2023  4:36 PM

## 2023-03-21 NOTE — PROGRESS NOTES
Oxy rx on paper in paper chart for  to take to preferred pharmacy.     Catarina Harris RN on 3/21/2023 at 4:31 PM

## 2023-03-21 NOTE — OP NOTE
OPERATIVE NOTE    PERIOPERATIVE DIAGNOSIS: Right lateral mid rectal polyp    POSTOPERATIVE DIAGNOSIS:   Same     PROCEDURE: Transanal endoscopic microsurgical resection of rectal polyp, proctoscopy     SURGEON:  Hallie Quevedo MD    Assistant: Marina Scott MD [facility] colorectal surgery fellow     ANESTHESIA:   General endotracheal anesthesia, regional pudendal block 30 cc of quarter percent Marcaine with 1-200,000 epinephrine     INDICATIONS FOR PROCEDURE:   Naa had a colonoscopy demonstrating a rectal polyp.  Ultrasound did not demonstrate any evidence of invasion.  It was noted to be roughly 6 cm from the anal verge on the right lateral aspect.  Given the large flat nature of this I recommended a transanal endoscopic microsurgical resection.  We reviewed the risks of bleeding, infection, alteration of bowel control, recurrence, unexpected pathology, and other risks associated with anesthesia.  She understood and wished to proceed.     FINDINGS:   There was a roughly 2.5 cm sessile polyp in the mid rectum.  This was located on the right lateral aspect.  The suture line ended up at about 4 cm from the anal verge and roughly third of the circumference on the right lateral aspect.  No other pathology was noted.     DESCRIPTION OF PROCEDURE:    After informed consent was obtained patient was taken to the operating room positioned on the operating table in the supine position.  She was intubated.  Camargo catheter was placed. She was then positioned in a right lateral decubitus position on a beanbag.  The hips and joints were padded as were all other pressure points.  She was positioned on the split leg table.  The perineum was prepped and draped in usual fashion.  After appropriate timeout we began with a proctoscopy confirming the location.  The Lr bivalve was inserted gently dilating the anus followed by the obturator of the Fansler retractor, then the obturator of the operating scope.  This  proceeded without difficulty.    We then introduced the operating scope and secured it to the bed with the Eamon arm.  The polyp was centered in the center of the operating scope was positioned.  The rectum was insufflated to a pressure of 15.  We then marked out roughly a centimeter margin circumferentially around the polyp.  The inferior aspect was incised full-thickness to the perirectal fat.  We then used the harmonic scalpel to come around the perimeter.  Having done so we then completed the dissection of the base.  There was no bleeding.  We thinned out the specimen on pin board and the margins were grossly negative.  This was sent to pathology fresh.    We then irrigated the bed of the wound which was nicely hemostatic.  We used a running 2-0 V-Loc from each corner and overlapped in the center.  We reirrigated and found the suture line to have excellent apposition, hemostasis, and was tension-free.    The operating scope was removed and a proctoscopy was performed to 15 cm.  On withdrawal we could see that the suture line was at about 4 cm.  This was also palpated and found to be nicely intact.  Palpation within the vagina did not reveal any involvement.    A pudendal block was done using 15 cc of quarter percent Marcaine onto each side.    Dry gauze dressing was placed externally.  She was returned to the supine position the Camargo catheter was removed.  She was activated in the operating room and taken recovery in stable condition.     COMPLICATIONS:   No immediate complications  SPECIMENS: Right lateral rectal polyp marked proximal distal, anterior posterior given that this was the right lateral lesion.     Hallie Quevedo MD

## 2023-03-21 NOTE — ANESTHESIA POSTPROCEDURE EVALUATION
Patient: Naa Paredes    Procedure: Procedure(s):  Transanal Endoscopic Microsurgical Resection of Rectal Polyp       Anesthesia Type:  General    Note:  Disposition: Outpatient   Postop Pain Control: Uneventful            Sign Out: Well controlled pain   PONV: No   Neuro/Psych: Uneventful            Sign Out: Acceptable/Baseline neuro status   Airway/Respiratory: Uneventful            Sign Out: Acceptable/Baseline resp. status   CV/Hemodynamics: Uneventful            Sign Out: Acceptable CV status   Other NRE: NONE   DID A NON-ROUTINE EVENT OCCUR? No           Last vitals:  Vitals Value Taken Time   /59 03/21/23 1654   Temp 96.8  F (36  C) 03/21/23 1635   Pulse 56 03/21/23 1657   Resp 21 03/21/23 1657   SpO2 95 % 03/21/23 1657   Vitals shown include unvalidated device data.    Electronically Signed By: Christiano Osborne MD  March 21, 2023  4:59 PM

## 2023-03-21 NOTE — ANESTHESIA PROCEDURE NOTES
Airway       Patient location during procedure: OR       Procedure Start/Stop Times: 3/21/2023 2:35 PM  Staff -        CRNA: Carline Esteves APRN CRNA       Performed By: CRNA  Consent for Airway        Urgency: elective  Indications and Patient Condition       Indications for airway management: ella-procedural       Induction type:intravenous       Mask difficulty assessment: 1 - vent by mask    Final Airway Details       Final airway type: endotracheal airway       Successful airway: ETT - single  Endotracheal Airway Details        ETT size (mm): 7.0       Cuffed: yes       Successful intubation technique: direct laryngoscopy       DL Blade Type: MAC 3       Grade View of Cords: 1       Adjucts: stylet       Position: Center       Bite block used: Soft    Post intubation assessment        Placement verified by: capnometry        Number of attempts at approach: 1       Secured with: plastic tape       Ease of procedure: easy       Dentition: Intact    Medication(s) Administered   Medication Administration Time: 3/21/2023 2:35 PM

## 2023-03-21 NOTE — ANESTHESIA PREPROCEDURE EVALUATION
Anesthesia Pre-Procedure Evaluation    Patient: Naa Paredes   MRN: [id number] : 1965        Procedure : Procedure(s):  Transanal Endoscopic Microsurgical Resection of Rectal Polyp          Past Medical History:   Diagnosis Date     Ankle sprain and strain 2013     Anxiety 2015     Black stools 2015     CARDIOVASCULAR SCREENING; LDL GOAL LESS THAN 160 10/31/2010     Cervicalgia 2011     Closed dislocation of finger of right hand, subsequent encounter 2018     Colon adenoma 2015    Patient denies it. Renee Tiwari RN, 6/9/15.     Conversion disorder with mixed symptoms 2016     Depression with suicidal ideation 2015     Depressive disorder      Dysesthesia 2022     Elevated blood pressure reading without diagnosis of hypertension 2015     Elevated LFT's 2010     Fatigue due to depression 2015     H/O: alcohol abuse 2010     IBS (irritable bowel syndrome)      Knee pain 10/19/2010     Low back pain 10/19/2010     Lumbago 2011     Major depression in complete remission (H) 2018     Major depressive disorder, recurrent, severe without psychotic features (H) 2016     Major depressive disorder, single episode, in partial remission (H) 2015     Mallet finger of right hand 02/15/2023     Migraine      Migraine with status migrainosus, not intractable, unspecified migraine type 2016     Neck pain 10/19/2010     Nephrolithiasis     calcium oxalate     Pain in thoracic spine 2013     Perimenopause 2015     Plantar fasciitis 2022     Prediabetes 3/17/2023     Seasonal allergic rhinitis 10/20/2015     Thoracic outlet syndrome 2017     Thrombocytopenia (H) 3/17/2023     Trichotillomania 2022     Vitamin D deficiency disease 2010      Past Surgical History:   Procedure Laterality Date     CARPAL TUNNEL RELEASE RT/LT Left      COLONOSCOPY N/A 2015    Procedure:  COLONOSCOPY;  Surgeon: Steve Choi MD;  Location:  GI     COLONOSCOPY N/A 02/07/2023    Procedure: COLONOSCOPY (FV) with polypectomy using cold forceps;  Surgeon: Steve Choi MD;  Location:  GI     LITHOTRIPSY        Allergies   Allergen Reactions     No Clinical Screening - See Comments      PN: LW CM1: Contrast Adverse Reaction - None Reaction :  PN: LW FI1: nka      Social History     Tobacco Use     Smoking status: Never     Smokeless tobacco: Never   Substance Use Topics     Alcohol use: Yes     Comment: occ wine      Wt Readings from Last 1 Encounters:   03/21/23 68.8 kg (151 lb 9.6 oz)        Anesthesia Evaluation   Pt has had prior anesthetic.     No history of anesthetic complications       ROS/MED HX  ENT/Pulmonary:     (+) allergic rhinitis,     Neurologic:     (+) migraines,     Cardiovascular:  - neg cardiovascular ROS     METS/Exercise Tolerance:     Hematologic:  - neg hematologic  ROS     Musculoskeletal:   (+) arthritis,     GI/Hepatic:     (+) Inflammatory bowel disease, liver disease,     Renal/Genitourinary:     (+) renal disease, Nephrolithiasis ,     Endo:     (+) type II DM, Not using insulin,     Psychiatric/Substance Use:     (+) psychiatric history anxiety, depression and other (comment) alcohol abuse     Infectious Disease:  - neg infectious disease ROS     Malignancy:       Other:      (+) , H/O Chronic Pain,        Physical Exam    Airway        Mallampati: II   TM distance: > 3 FB   Neck ROM: full   Mouth opening: > 3 cm    Respiratory Devices and Support         Dental       (+) Edentulous      Cardiovascular          Rhythm and rate: regular and normal     Pulmonary           breath sounds clear to auscultation           OUTSIDE LABS:  CBC:   Lab Results   Component Value Date    WBC 6.3 03/16/2023    WBC 5.6 02/03/2022    HGB 15.6 03/16/2023    HGB 15.3 02/03/2022    HCT 46.2 03/16/2023    HCT 46.5 02/03/2022     (L) 03/16/2023     02/03/2022     BMP:    Lab Results   Component Value Date     03/16/2023     09/21/2022    POTASSIUM 3.9 03/16/2023    POTASSIUM 3.6 09/21/2022    CHLORIDE 106 03/16/2023    CHLORIDE 108 09/21/2022    CO2 23 03/16/2023    CO2 26 09/21/2022    BUN 14.9 03/16/2023    BUN 15 09/21/2022    CR 0.65 03/16/2023    CR 0.66 09/21/2022     (H) 03/16/2023     (H) 09/21/2022     COAGS: No results found for: PTT, INR, FIBR  POC:   Lab Results   Component Value Date    HCG Negative 05/10/2013     HEPATIC:   Lab Results   Component Value Date    ALBUMIN 4.5 09/21/2022    PROTTOTAL 7.4 09/21/2022    ALT 38 09/21/2022    AST 27 09/21/2022    ALKPHOS 84 09/21/2022    BILITOTAL 0.4 09/21/2022     OTHER:   Lab Results   Component Value Date    A1C 6.3 (H) 03/16/2023    VIVEK 9.7 03/16/2023    LIPASE 179 05/10/2013    TSH 2.66 09/21/2022       Anesthesia Plan    ASA Status:  2   NPO Status:  NPO Appropriate    Anesthesia Type: General.     - Airway: ETT   Induction: Intravenous.   Maintenance: Balanced.   Techniques and Equipment:       - Drips/Meds: Dexmed. infusion     Consents    Anesthesia Plan(s) and associated risks, benefits, and realistic alternatives discussed. Questions answered and patient/representative(s) expressed understanding.    - Discussed:     - Discussed with:  Patient,       - Extended Intubation/Ventilatory Support Discussed: No.      - Patient is DNR/DNI Status: No    Use of blood products discussed: No .     Postoperative Care    Pain management: Multi-modal analgesia, Oral pain medications, IV analgesics.   PONV prophylaxis: Ondansetron (or other 5HT-3), Dexamethasone or Solumedrol     Comments:                Christiano Osborne MD   lung cta  cv rrr s1s2

## 2023-03-28 LAB
PATH REPORT.COMMENTS IMP SPEC: NORMAL
PATH REPORT.COMMENTS IMP SPEC: NORMAL
PATH REPORT.FINAL DX SPEC: NORMAL
PATH REPORT.GROSS SPEC: NORMAL
PATH REPORT.MICROSCOPIC SPEC OTHER STN: NORMAL
PATH REPORT.RELEVANT HX SPEC: NORMAL
PHOTO IMAGE: NORMAL

## 2023-03-31 ENCOUNTER — THERAPY VISIT (OUTPATIENT)
Dept: OCCUPATIONAL THERAPY | Facility: CLINIC | Age: 58
End: 2023-03-31
Attending: FAMILY MEDICINE
Payer: COMMERCIAL

## 2023-03-31 DIAGNOSIS — M25.531 RIGHT WRIST PAIN: ICD-10-CM

## 2023-03-31 DIAGNOSIS — M65.4 DE QUERVAIN'S DISEASE (TENOSYNOVITIS): ICD-10-CM

## 2023-03-31 PROCEDURE — 97535 SELF CARE MNGMENT TRAINING: CPT | Mod: GO

## 2023-03-31 PROCEDURE — 97760 ORTHOTIC MGMT&TRAING 1ST ENC: CPT | Mod: GO

## 2023-03-31 PROCEDURE — 97110 THERAPEUTIC EXERCISES: CPT | Mod: GO

## 2023-03-31 PROCEDURE — 97165 OT EVAL LOW COMPLEX 30 MIN: CPT | Mod: GO

## 2023-03-31 NOTE — PROGRESS NOTES
[facility] Initial Evaluation     Current Date: 3/31/2023    Diagnosis: R DeQuervain's tenosynovitis   DOI: ~4 months   Referring Provider: Dr. Albert Yeo     Subjective:    Past Medical History:   Diagnosis Date     Ankle sprain and strain 05/02/2013     Anxiety 03/26/2015     Black stools 11/25/2015     CARDIOVASCULAR SCREENING; LDL GOAL LESS THAN 160 10/31/2010     Cervicalgia 12/07/2011     Closed dislocation of finger of right hand, subsequent encounter 02/06/2018     Colon adenoma 04/23/2015    Patient denies it. Renee Tiwari RN, 6/9/15.     Conversion disorder with mixed symptoms 06/14/2016     Depression with suicidal ideation 06/19/2015     Depressive disorder      Dysesthesia 09/21/2022     Elevated blood pressure reading without diagnosis of hypertension 11/25/2015     Elevated LFT's 06/04/2010     Fatigue due to depression 11/24/2015     H/O: alcohol abuse 06/04/2010     IBS (irritable bowel syndrome)      Knee pain 10/19/2010     Low back pain 10/19/2010     Lumbago 12/07/2011     Major depression in complete remission (H) 08/13/2018     Major depressive disorder, recurrent, severe without psychotic features (H) 05/13/2016     Major depressive disorder, single episode, in partial remission (H) 11/24/2015     Mallet finger of right hand 02/15/2023     Migraine      Migraine with status migrainosus, not intractable, unspecified migraine type 07/14/2016     Neck pain 10/19/2010     Nephrolithiasis     calcium oxalate     Pain in thoracic spine 01/02/2013     Perimenopause 03/26/2015     Plantar fasciitis 09/21/2022     Prediabetes 3/17/2023     Seasonal allergic rhinitis 10/20/2015     Thoracic outlet syndrome 07/20/2017     Thrombocytopenia (H) 3/17/2023     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     Vitamin D deficiency disease 06/08/2010        Allergies   Allergen Reactions     No Clinical Screening - See Comments      PN: LW CM1: Contrast Adverse Reaction - None Reaction :  PN: LW FI1: nka     Pt  reports onset of R wrist pain around 4 months ago. She does note enjoying playing games on her phone and at the [employer]y, which may have contributed to her onset. Pt also has L side pain, w/ hx of CTR.     Occupational Profile Information:  Right hand dominant  Prior functional level:  no limitations - shared household chores   Patient reports symptoms of pain  Special tests:  x-ray.    Previous treatment: home massage and topical cream   Barriers include:none  Mobility: No difficulty  Transportation: drives  Currently working in normal job without restrictions - [employer]   Leisure activities/hobbies: play games  Other:   Improving     Functional Outcome Measure:   Upper Extremity Functional Index Score:  SCORE:   Column Totals: 77/80:   (A lower score indicates greater disability.)    Objective:  Pain Level (Scale 0-10):   3/31/2023   At Rest 0   With Use 2-3     Pain Description:  Date 3/31/2023   Location Radial wrist and thumb   Pain Quality Aching and Sharp   Frequency intermittent     Pain is worst  daytime   Exacerbated by  lifting, repetitive motions   Relieved by rest   Progression improving     Sensation   WNL throughout all nerve distributions; per patient report    Edema   - none  + mild    ++ moderate    +++ severe    3/31/2023   DWC L: 15.9 cm  R: 16.6 cm      ROM  Pain Report: - none  + mild    ++ moderate    +++ severe   Thumb 3/31/2023 3/31/2023   AROM (PROM) L R   MP /55 /55   IP /60 /60   RABD 60 47+   PABD 50 48+   Opposition 10 9     Wrist 3/31/2023 3/31/2023   AROM (PROM) L R   Extension 70 70   Flexion 70 45+   RD 18 12+   UD 38 38     Resisted Testing  Pain Report: - none  + mild    ++ moderate    +++ severe    3/31/2023   APL ++   EPB ++   EPL -   FCR -     Strength   (Measured in pounds)  Pain Report: - none  + mild    ++ moderate    +++ severe    3/31/2023 3/31/2023   Trials L R   1  2  3 55 50   Average       Lat Pinch 3/31/2023 3/31/2023   Trials L R   1  2  3 15 18   Average       3 Pt  Pinch 3/31/2023 3/31/2023   Trials L R   1  2  3 15 15   Average       Special Tests   Pain Report:  - none    + mild    ++ moderate    +++ severe    3/31/2023   Finkelsteins +   WHAT test ++     Palpation R UE  Pain Report:  - none    + mild    ++ moderate    +++ severe    3/31/2023   Radial Styloid -   1st DC +   FCR -   Thumb CMC -   PIN Site -   Extensor Wad -     Assessment:  Patient presents with symptoms consistent with diagnosis of right DeQuervain's tenosynovitis, with conservative intervention.     Patient's limitations or Problem List includes:  Pain, Decreased ROM/motion, Increased edema and Decreased  of the right wrist and thumb which interferes with the patient's ability to perform Recreational Activities and Household Chores as compared to previous level of function.    Rehab Potential:  Excellent - Return to full activity, no limitations    Patient will benefit from skilled Occupational Therapy to increase ROM and  strength and decrease pain and edema to return to previous activity level and resume normal daily tasks and to reach their rehab potential.    Barriers to Learning:  Language    Communication Issues:  Patient appears to be able to clearly communicate and understand verbal and written communication and follow directions correctly.  Patient has an  for communication clarity.    Chart Review: Chart Review and Simple history review with patient    Identified Performance Deficits: home establishment and management, meal preparation and cleanup, shopping and leisure activities    Assessment of Occupational Performance:  3-5 Performance Deficits    Clinical Decision Making (Complexity): Low complexity    Treatment Explanation:  The following has been discussed with the patient:  RX ordered/plan of care  Anticipated outcomes  Possible risks and side effects    Plan:  Frequency:  1 X week, once daily  Duration:  for 8 weeks    Treatment Plan:    Modalities:    US   Therapeutic  Exercise:    AROM, PROM, Isotonics, Isometrics and Eccentrics  Neuromuscular re-ed:   Kinesiotaping  Manual Techniques:   Joint mobilization, Friction massage and Myofascial release  Orthotic Fabrication:    Static  Self Care:    Ergonomic Considerations and Work Tasks    Discharge Plan:  Achieve all LTG.  Independent in home treatment program.  Reach maximal therapeutic benefit.    Discharge Plan:    Achieve all LTG.  Independent in home treatment program.  Reach maximal therapeutic benefit.    Home Exercise Program:  Wrist radial/ulnar dev AROM  EPB flex/ext AROM  Thumboform during activity  Activity modifications  FM to 1st d.c.    Next Visit:  Progress HEP as tolerated  FM to 1st d.c.   Trial k-taping

## 2023-03-31 NOTE — PROGRESS NOTES
[facility]    OUTPATIENT Occupational Therapy ORTHOPEDIC EVALUATION  PLAN OF TREATMENT FOR OUTPATIENT REHABILITATION  (COMPLETE FOR INITIAL CLAIMS ONLY)  Patient's Last Name, First Name, M.I.  YOB: 1965  Naa Paredes       Provider s Name:  [facility]   Medical Record No.  [id number]   Start of Care Date:   3/31/23   Onset Date:   11/30/22   Treatment Diagnosis:  R DeQuervain's tenosynovitis Medical Diagnosis:     De Quervain's disease (tenosynovitis)  Right wrist pain       Goals:     03/31/23 0500   Goal #1   Goal #1 household chores   Previous Performance Level Independent   Current Functional Task Lift   Current Performance Level mod pain   STG Target Per[employer]ance Shopping bag   STG Target Perform Level mild or less pain, brace as needed   Due Date 04/28/23   LTG Target Task/Performance Pain free household chores   Due Date 05/26/23         Therapy Frequency:   1x/week  Predicted Duration of Therapy Intervention:    8 weeks    Yuni Uribe OTR                 I CERTIFY THE NEED FOR THESE SERVICES FURNISHED UNDER        THIS PLAN OF TREATMENT AND WHILE UNDER MY CARE     (Physician attestation of this document indicates review and certification of the therapy plan).                     Certification Date From:    3/31/23  Certification Date To:   5/26/23    Referring Provider:  Albert Yeo    Initial Assessment        See Epic Evaluation

## 2023-04-12 ENCOUNTER — THERAPY VISIT (OUTPATIENT)
Dept: OCCUPATIONAL THERAPY | Facility: CLINIC | Age: 58
End: 2023-04-12
Payer: COMMERCIAL

## 2023-04-12 DIAGNOSIS — M65.4 DE QUERVAIN'S DISEASE (TENOSYNOVITIS): ICD-10-CM

## 2023-04-12 DIAGNOSIS — M25.531 RIGHT WRIST PAIN: Primary | ICD-10-CM

## 2023-04-12 PROCEDURE — 97535 SELF CARE MNGMENT TRAINING: CPT | Mod: GO | Performed by: OCCUPATIONAL THERAPIST

## 2023-04-12 PROCEDURE — 97140 MANUAL THERAPY 1/> REGIONS: CPT | Mod: GO | Performed by: OCCUPATIONAL THERAPIST

## 2023-04-18 ENCOUNTER — VIRTUAL VISIT (OUTPATIENT)
Dept: EDUCATION SERVICES | Facility: CLINIC | Age: 58
End: 2023-04-18
Attending: FAMILY MEDICINE
Payer: COMMERCIAL

## 2023-04-18 DIAGNOSIS — R73.03 PREDIABETES: ICD-10-CM

## 2023-04-18 PROCEDURE — 99207 PR FOOT EXAM NO CHARGE: CPT | Mod: 93 | Performed by: DIETITIAN, REGISTERED

## 2023-04-18 PROCEDURE — 97802 MEDICAL NUTRITION INDIV IN: CPT | Mod: 93 | Performed by: DIETITIAN, REGISTERED

## 2023-04-18 NOTE — PROGRESS NOTES
[id number]  called out at 3:05pm    Medical Nutrition Therapy for Diabetes  Visit Type:Initial assessment and intervention    Naa Paredes presents today for MNT and education related to prediabetes.   She is accompanied by self.     ASSESSMENT:   Patient comments/concerns relating to nutrition: Patient reports limited appetite and depression recently.      Patients intake is high in sugar sweetened beverages and carbohydrates.  Her intake is low in vegetables.      NUTRITION HISTORY:  Sometimes also eats bagel and biscotti.   24 hour recall: noodle, egg, vegetable, cake and ice cream    Beverages: Coffee black coffee with milk and water/day, soda water, juice (1-2 times per week 1 glass), coke (1-2 glass)      Misses meals? no  Eats out:  [employer] or [employer]     Previous diet education:  No     Food allergies/intolerances: none stated    Diet is high in: calories, carbs, fat (saturated), fat (unsaturated) and sodium  Diet is low in: fiber and vegetables    EXERCISE: walks 3-4km 5 times a week.     SOCIO/ECONOMIC:   Lives with: self    BLOOD GLUCOSE MONITORING:  Patient glucose self monitoring as follows: does not need to test blood sugars    BG values are: unable to assess  Patient's most recent   Lab Results   Component Value Date    A1C 6.3 03/16/2023    is not meeting goal of <5.7    MEDICATIONS:  Current Outpatient Medications   Medication     oxyCODONE (ROXICODONE) 5 MG tablet     No current facility-administered medications for this visit.       LABS:  Lab Results   Component Value Date    A1C 6.3 03/16/2023     Lab Results   Component Value Date     03/21/2023     09/21/2022    GLC 85 08/13/2018     Lab Results   Component Value Date     09/21/2022     08/13/2018     HDL Cholesterol   Date Value Ref Range Status   08/13/2018 68 >49 mg/dL Final     Direct Measure HDL   Date Value Ref Range Status   09/21/2022 54 >=50 mg/dL Final   ]  GFR Estimate   Date Value  spoke to the mother about abnormal EEG   to monitor for seizures       Electronically signed by:AARON PEÑA M.D.  Apr 11 2018  4:16PM CST     Ref Range Status   03/16/2023 >90 >60 mL/min/1.73m2 Final     Comment:     eGFR calculated using 2021 CKD-EPI equation.   08/13/2018 >90 >60 mL/min/1.7m2 Final     Comment:     Non  GFR Calc     Lab Results   Component Value Date    CR 0.65 03/16/2023    CR 0.68 08/13/2018     No results found for: MICROALBUMIN    ANTHROPOMETRICS:  Vitals: LMP 08/20/2015   There is no height or weight on file to calculate BMI.      Wt Readings from Last 5 Encounters:   03/21/23 68.8 kg (151 lb 9.6 oz)   03/16/23 69.3 kg (152 lb 12.8 oz)   03/01/23 69.9 kg (154 lb)   02/15/23 70 kg (154 lb 4.8 oz)   02/07/23 67.6 kg (149 lb)       Weight Change: stable, but steadily decreasing.    NUTRITION DIAGNOSIS: Food- and nutrition-related knowledge deficit related to a1c 6.3  as evidenced by patients intake is high in carbohydrates.     NUTRITION INTERVENTION:  Education given to support: general nutrition guidelines, consistent meals, exercise, behavior modification, portion control and sweetened beverages.    PATIENT'S BEHAVIOR CHANGE GOALS:   See Patient Instructions for patient stated behavior change goals. AVS was printed and given to patient at today's appointment.    MONITOR / EVALUATE:  RD will monitor/evaluate:  Blood Glucose / A1c  Food and nutrition knowledge / skills  Food / Beverage / Nutrient intake   Weight change    FOLLOW-UP:  Follow up with RD as needed.    Senait Boyd MS, RD, LD, CDE  Time spent in minutes: 49 minutes  Encounter: Individual

## 2023-04-18 NOTE — LETTER
4/18/2023         RE: Naa Paredes  [address]  [facility] [address]        Dear Colleague,    Thank you for referring your patient, Naa Paredes, to the [facility]. Please see a copy of my visit note below.    [id number]  called out at 3:05pm    Medical Nutrition Therapy for Diabetes  Visit Type:Initial assessment and intervention    Naa Paredes presents today for MNT and education related to prediabetes.   She is accompanied by self.     ASSESSMENT:   Patient comments/concerns relating to nutrition: Patient reports limited appetite and depression recently.      Patients intake is high in sugar sweetened beverages and carbohydrates.  Her intake is low in vegetables.      NUTRITION HISTORY:  Sometimes also eats bagel and biscotti.   24 hour recall: noodle, egg, vegetable, cake and ice cream    Beverages: Coffee black coffee with milk and water/day, soda water, juice (1-2 times per week 1 glass), coke (1-2 glass)      Misses meals? no  Eats out:  [employer] or [employer]     Previous diet education:  No     Food allergies/intolerances: none stated    Diet is high in: calories, carbs, fat (saturated), fat (unsaturated) and sodium  Diet is low in: fiber and vegetables    EXERCISE: walks 3-4km 5 times a week.     SOCIO/ECONOMIC:   Lives with: self    BLOOD GLUCOSE MONITORING:  Patient glucose self monitoring as follows: does not need to test blood sugars    BG values are: unable to assess  Patient's most recent   Lab Results   Component Value Date    A1C 6.3 03/16/2023    is not meeting goal of <5.7    MEDICATIONS:  Current Outpatient Medications   Medication     oxyCODONE (ROXICODONE) 5 MG tablet     No current facility-administered medications for this visit.       LABS:  Lab Results   Component Value Date    A1C 6.3 03/16/2023     Lab Results   Component Value Date     03/21/2023     09/21/2022    GLC 85 08/13/2018     Lab Results    Component Value Date     09/21/2022     08/13/2018     HDL Cholesterol   Date Value Ref Range Status   08/13/2018 68 >49 mg/dL Final     Direct Measure HDL   Date Value Ref Range Status   09/21/2022 54 >=50 mg/dL Final   ]  GFR Estimate   Date Value Ref Range Status   03/16/2023 >90 >60 mL/min/1.73m2 Final     Comment:     eGFR calculated using 2021 CKD-EPI equation.   08/13/2018 >90 >60 mL/min/1.7m2 Final     Comment:     Non  GFR Calc     Lab Results   Component Value Date    CR 0.65 03/16/2023    CR 0.68 08/13/2018     No results found for: MICROALBUMIN    ANTHROPOMETRICS:  Vitals: LMP 08/20/2015   There is no height or weight on file to calculate BMI.      Wt Readings from Last 5 Encounters:   03/21/23 68.8 kg (151 lb 9.6 oz)   03/16/23 69.3 kg (152 lb 12.8 oz)   03/01/23 69.9 kg (154 lb)   02/15/23 70 kg (154 lb 4.8 oz)   02/07/23 67.6 kg (149 lb)       Weight Change: stable, but steadily decreasing.    NUTRITION DIAGNOSIS: Food- and nutrition-related knowledge deficit related to a1c 6.3  as evidenced by patients intake is high in carbohydrates.     NUTRITION INTERVENTION:  Education given to support: general nutrition guidelines, consistent meals, exercise, behavior modification, portion control and sweetened beverages.    PATIENT'S BEHAVIOR CHANGE GOALS:   See Patient Instructions for patient stated behavior change goals. AVS was printed and given to patient at today's appointment.    MONITOR / EVALUATE:  RD will monitor/evaluate:  Blood Glucose / A1c  Food and nutrition knowledge / skills  Food / Beverage / Nutrient intake   Weight change    FOLLOW-UP:  Follow up with RD as needed.    Senait Boyd MS, RD, LD, CDE  Time spent in minutes: 49 minutes  Encounter: Individual

## 2023-04-21 ENCOUNTER — HOSPITAL ENCOUNTER (EMERGENCY)
Facility: CLINIC | Age: 58
Discharge: HOME | End: 2023-04-21
Attending: EMERGENCY MEDICINE | Admitting: EMERGENCY MEDICINE
Payer: COMMERCIAL

## 2023-04-21 ENCOUNTER — THERAPY VISIT (OUTPATIENT)
Dept: OCCUPATIONAL THERAPY | Facility: CLINIC | Age: 58
End: 2023-04-21
Payer: COMMERCIAL

## 2023-04-21 VITALS
OXYGEN SATURATION: 99 % | TEMPERATURE: 97.6 F | DIASTOLIC BLOOD PRESSURE: 77 MMHG | SYSTOLIC BLOOD PRESSURE: 143 MMHG | HEART RATE: 80 BPM | RESPIRATION RATE: 16 BRPM

## 2023-04-21 DIAGNOSIS — M65.4 DE QUERVAIN'S DISEASE (TENOSYNOVITIS): ICD-10-CM

## 2023-04-21 DIAGNOSIS — F32.A DEPRESSION, UNSPECIFIED DEPRESSION TYPE: ICD-10-CM

## 2023-04-21 DIAGNOSIS — M25.531 RIGHT WRIST PAIN: Primary | ICD-10-CM

## 2023-04-21 PROCEDURE — 90791 PSYCH DIAGNOSTIC EVALUATION: CPT

## 2023-04-21 PROCEDURE — 97763 ORTHC/PROSTC MGMT SBSQ ENC: CPT | Mod: GO | Performed by: OCCUPATIONAL THERAPIST

## 2023-04-21 PROCEDURE — 99285 EMERGENCY DEPT VISIT HI MDM: CPT | Mod: 25

## 2023-04-21 RX ORDER — HYDROXYZINE HYDROCHLORIDE 25 MG/1
25 TABLET, FILM COATED ORAL 3 TIMES DAILY PRN
Qty: 15 TABLET | Refills: 0 | Status: SHIPPED | OUTPATIENT
Start: 2023-04-21 | End: 2023-04-26

## 2023-04-21 ASSESSMENT — COLUMBIA-SUICIDE SEVERITY RATING SCALE - C-SSRS
ATTEMPT LIFETIME: NO
TOTAL  NUMBER OF INTERRUPTED ATTEMPTS LIFETIME: NO
TOTAL  NUMBER OF ABORTED OR SELF INTERRUPTED ATTEMPTS LIFETIME: NO
1. HAVE YOU WISHED YOU WERE DEAD OR WISHED YOU COULD GO TO SLEEP AND NOT WAKE UP?: NO
6. HAVE YOU EVER DONE ANYTHING, STARTED TO DO ANYTHING, OR PREPARED TO DO ANYTHING TO END YOUR LIFE?: NO
2. HAVE YOU ACTUALLY HAD ANY THOUGHTS OF KILLING YOURSELF?: NO

## 2023-04-21 ASSESSMENT — ENCOUNTER SYMPTOMS: DYSPHORIC MOOD: 1

## 2023-04-21 ASSESSMENT — ACTIVITIES OF DAILY LIVING (ADL): ADLS_ACUITY_SCORE: 35

## 2023-04-21 NOTE — ED PROVIDER NOTES
"  History     Chief Complaint:  Mental Health Problem     The history is provided by the patient. A  was used (Cantonese).      Naa Paredes is a 57 year old female with a history of conversion disorder, depression, anxiety, and alcohol abuse who presents with depressed mood over the past two months. She describes experiencing an associated feeling of being mentally \"off balance.\" She currently does not take any medications for mental health management, though she notes that she has previously taken psychiatric medications 6 years ago. This is the first time she has sought medical evaluation for her mental health in the last 6 years. Denies suicidal or homicidal ideation.    Independent Historian:    None - Patient Only    Review of External Notes: I reviewed several progress, operative, and primary care notes from February, March, and April of 2023 which seem to be colorectal and orthopedic in nature and not specifically pertinent to her presentation today.    ROS:  Review of Systems   Psychiatric/Behavioral: Positive for dysphoric mood. Negative for suicidal ideas.        Negative for Homicidal ideation   All other systems reviewed and are negative.    Allergies:   The patient has no known allergies.    Medications:    The patient is currently on no regular medications.     Past Medical History:    Anxiety  Colon adenoma   Conversion disorder with mixed symptoms   Depression with suicidal ideation   Dysesthesia   Alcohol abuse  IBS   Migraine    Nephrolithiasis   Plantar fasciitis   Prediabetes   Seasonal allergic rhinitis   Thoracic outlet syndrome   Thrombocytopenia  Trichotillomania  Vitamin D deficiency disease   Abscess of Bartholin's gland  Disorder of liver  De Quervain's disease  Carpal tunnel syndrome of left wrist  Mallet finger of right hand     Past Surgical History:    Carpal tunnel release, left  Lithotripsy  Rectal polyp resection    Family History:    Mother: heart " disease  Father: colon cancer    Social History:  The patient presents to the ED alone.  The patient presents to the ED via private car.  PCP: Sher Duran     Physical Exam     Patient Vitals for the past 24 hrs:   BP Temp Pulse Resp SpO2   04/21/23 1422 (!) 143/77 -- 80 16 99 %   04/21/23 0941 (!) 158/95 -- 76 -- --   04/21/23 0939 -- 97.6  F (36.4  C) -- 15 97 %      Physical Exam  Constitutional: Vital signs reviewed as above.   HEENT:               Head: No external signs of trauma noted.               Eyes: Conjunctivae are normal. Pupils are equal, round, and reactive to light.    Cardiovascular: Normal rate, regular rhythm and normal heart sounds.    Pulmonary/Chest: Effort normal and breath sounds normal. No respiratory distress. Patient has no wheezes.   Gastrointestinal: Soft, non-tender, non-distended.  Musculoskeletal: No gross deformities noted. Normal tone  Skin: Skin is warm and dry. No diaphoresis noted.   Neurological: Patient is alert and oriented to person, place, and time.   Psychiatric: Patient has a depressed mood and flat affect. Reasonable eye contact    Emergency Department Course     Emergency Department Course & Assessments:       Assessments & Consultations/Discussion of Management or Tests:  ED Course as of 04/21/23 1441   Fri Apr 21, 2023   1131 I obtained history and performed an examination of the patient.    1330 D/W Tyron from DEC post evaluation. Patient can be discharged. Will work on setting up med management appointment.   1417 The patient was rechecked and updated. Discussed results and plan of care.      Social Determinants of Health affecting care:  Stress/Adjustment Disorders    Disposition:  The patient was discharged to home.     Impression & Plan      CMS Diagnoses: None    Medical Decision Making:  This 57-year-old female patient presents to the ED due to depression.  Please see the HPI and exam for specifics.  The patient denies suicidal or homicidal ideations.  She  notes she has been feeling more depressed.  After my initial screening I felt she was medically stable and asked mental health to evaluate her.  Recommendations include outpatient medical management.     At this time, I think the patient can be discharged and can follow-up as noted.  She can always return with new or worsening symptoms.  Anticipatory guidance given prior to discharge.    Diagnosis:    ICD-10-CM    1. Depression, unspecified depression type  F32.A          Discharge Medications:  New Prescriptions    HYDROXYZINE (ATARAX) 25 MG TABLET    Take 1 tablet (25 mg) by mouth 3 times daily as needed for anxiety      Scribe Disclosure:  I, Yareli Harry, am serving as a scribe at 12:03 PM on 4/21/2023 to document services personally performed by Ghassan Higgins DO based on my observations and the provider's statements to me.     4/21/2023   Ghassan Higgins DO Burns, Bradley Joseph, DO  04/21/23 6914
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
## 2023-04-21 NOTE — DISCHARGE INSTRUCTIONS
If I am feeling unsafe or I am in a crisis, I will:   Contact my established care providers   Call the National Suicide Prevention Lifeline: [phone]   Go to the nearest emergency room   Call 113          Warning signs that I or other people might notice when a crisis is developing for me: My depression is getting unmanageable and needs additional assistance. Same with my anxiety, If I am unable to feel better on my own or the medication I have is not helping.    Things I am able to do on my own to cope or help me feel better: Read a good book. Listen to music. Meditation. Breathing exercises.     Things that I am able to do with others to cope or help me better: Spend quality time with family and friends.    Things I can use or do for distraction: Music. Walks. Puzzles, anything to distract from routine.    Changes I can make to support my mental health and wellness: Take my medication according to the label. If I am having issues with the medication, let the provider know prior to just stopping.     People in my life that I can ask for help: Javon Blackwell, medication provider, friends.     Your [facility] has a mental health crisis team you can call 24/7: [facility] Crisis: [phone]    Other things that are important when I m in crisis: I am not alone, I have resources, just ask. I can return to the ER also.

## 2023-04-21 NOTE — ED TRIAGE NOTES
Patient arrives with complaints of depression. Pt reports for the last 2 months she has been feeling down. Denies thoughts of killing herself. Has not attempted suicide. Patient describes intermittent bouts of SOB, heart racing and difficulty sleeping. ABCs intact in triage.    Patient requested IV medication to help relax during triage.  explained in [place] IV medications are commonly used. Patient is ok with taking oral benzos.

## 2023-04-26 ENCOUNTER — OFFICE VISIT (OUTPATIENT)
Dept: FAMILY MEDICINE | Facility: CLINIC | Age: 58
End: 2023-04-26
Payer: COMMERCIAL

## 2023-04-26 VITALS
BODY MASS INDEX: 25.31 KG/M2 | OXYGEN SATURATION: 94 % | HEIGHT: 65 IN | DIASTOLIC BLOOD PRESSURE: 84 MMHG | SYSTOLIC BLOOD PRESSURE: 134 MMHG | RESPIRATION RATE: 16 BRPM | TEMPERATURE: 97.9 F | HEART RATE: 81 BPM | WEIGHT: 151.9 LBS

## 2023-04-26 DIAGNOSIS — D69.6 THROMBOCYTOPENIA (H): Primary | ICD-10-CM

## 2023-04-26 DIAGNOSIS — F41.9 ANXIETY: ICD-10-CM

## 2023-04-26 DIAGNOSIS — F32.0 MILD MAJOR DEPRESSION (H): ICD-10-CM

## 2023-04-26 PROCEDURE — 99214 OFFICE O/P EST MOD 30 MIN: CPT | Performed by: FAMILY MEDICINE

## 2023-04-26 RX ORDER — ARIPIPRAZOLE 5 MG/1
5 TABLET ORAL DAILY
Start: 2023-04-26 | End: 2023-06-22 | Stop reason: ALTCHOICE

## 2023-04-26 ASSESSMENT — PATIENT HEALTH QUESTIONNAIRE - PHQ9: SUM OF ALL RESPONSES TO PHQ QUESTIONS 1-9: 24

## 2023-04-26 NOTE — PROGRESS NOTES
"  Assessment & Plan   Problem List Items Addressed This Visit     Anxiety     She has previously seen a Cantonese speaking therapist.  Appointment 2 days after with psychiatry         Relevant Medications    ARIPiprazole (ABILIFY) 5 MG tablet    Mild major depression (H)     She has an appointment with [facility] Friday.  She reports \"I do not want to tell I want to die.  I do not want to be put in hospital.\"  Counseling/psychiatry without the need of a  will be to her benefit.  She has been prescribed Abilify by her upcoming psychiatry team, but has not taken that yet.  She found hydroxyzine intolerable, apparently by loose stools as side effect.  No plan can be extracted.  Encourage follow-up with appointment 2 days hence.  She has been given numbers for additional supportive contact         Thrombocytopenia (H) - Primary     Single matter.  No bleeding/bruising.  Monitor             I recommend that she follow-up with her appointment Friday.  I review her additional provided by the ED.  She requests a referral for acupuncture, provided       MED REC REQUIRED  Post Medication Reconciliation Status:   BMI:   Estimated body mass index is 25.28 kg/m  as calculated from the following:    Height as of this encounter: 1.651 m (5' 5\").    Weight as of this encounter: 68.9 kg (151 lb 14.4 oz).   Weight management plan: defer to future visit    Depression Screening Follow Up        4/26/2023     2:21 PM   PHQ   PHQ-9 Total Score 24   Q9: Thoughts of better off dead/self-harm past 2 weeks More than half the days                 Follow Up    Follow Up Actions Taken      Discussed the following ways the patient can remain in a safe environment:        Sher Duran MD  [facility]    Bernabe Jacome is a 57 year old, presenting for the following health issues:  Hospital F/U        4/26/2023     2:03 PM   Additional Questions   Roomed by Lina Koch     [facility]     ED/UC " "Followup:    Facility:  [facility]  Date of visit: 4/21/23  Reason for visit: Depression  Current Status: patient has not been feeling well. She saw a psychiatrist yesterday and Hydroxyzine was replaced with Abilify.  . She states she is always thinking about going to the hospital.           Review of Systems   Loose stools\" poo\" I cannot determine if this has been a longer duration.  1 day 3 days ago      Objective    /84 (BP Location: Right arm, Patient Position: Sitting, Cuff Size: Adult Regular)   Pulse 81   Temp 97.9  F (36.6  C) (Oral)   Resp 16   Ht 1.651 m (5' 5\")   Wt 68.9 kg (151 lb 14.4 oz)   LMP 08/20/2015   SpO2 94%   BMI 25.28 kg/m    Body mass index is 25.28 kg/m .  Physical Exam   Psychomotor retardation  She insists on speaking English in spite of available Cantonese           Sher Duran MD              "

## 2023-04-26 NOTE — LETTER
4/26/2023         RE: Naa Paredes  [address]  [facility] [id number]          Acupuncture Per discretion  6 montha      Sincerely,        Sher Duran MD

## 2023-04-27 NOTE — ASSESSMENT & PLAN NOTE
"She has an appointment with [facility] Friday.  She reports \"I do not want to tell I want to die.  I do not want to be put in hospital.\"  Counseling/psychiatry without the need of a  will be to her benefit.  She has been prescribed Abilify by her upcoming psychiatry team, but has not taken that yet.  She found hydroxyzine intolerable, apparently by loose stools as side effect.  No plan can be extracted.  Encourage follow-up with appointment 2 days hence.  She has been given numbers for additional supportive contact  "

## 2023-05-01 ENCOUNTER — THERAPY VISIT (OUTPATIENT)
Dept: OCCUPATIONAL THERAPY | Facility: CLINIC | Age: 58
End: 2023-05-01
Payer: COMMERCIAL

## 2023-05-01 DIAGNOSIS — M65.4 DE QUERVAIN'S DISEASE (TENOSYNOVITIS): ICD-10-CM

## 2023-05-01 DIAGNOSIS — M25.531 RIGHT WRIST PAIN: Primary | ICD-10-CM

## 2023-05-01 PROCEDURE — 97763 ORTHC/PROSTC MGMT SBSQ ENC: CPT | Mod: GO | Performed by: OCCUPATIONAL THERAPIST

## 2023-05-01 PROCEDURE — 97140 MANUAL THERAPY 1/> REGIONS: CPT | Mod: GO | Performed by: OCCUPATIONAL THERAPIST

## 2023-05-10 ENCOUNTER — TELEPHONE (OUTPATIENT)
Dept: ORTHOPEDICS | Facility: CLINIC | Age: 58
End: 2023-05-10
Payer: MEDICAID

## 2023-05-10 DIAGNOSIS — M25.532 ACUTE PAIN OF LEFT WRIST: Primary | ICD-10-CM

## 2023-05-12 ENCOUNTER — THERAPY VISIT (OUTPATIENT)
Dept: OCCUPATIONAL THERAPY | Facility: CLINIC | Age: 58
End: 2023-05-12
Payer: COMMERCIAL

## 2023-05-12 DIAGNOSIS — M25.532 ACUTE PAIN OF LEFT WRIST: ICD-10-CM

## 2023-05-12 PROCEDURE — 97165 OT EVAL LOW COMPLEX 30 MIN: CPT | Mod: GO | Performed by: OCCUPATIONAL THERAPIST

## 2023-05-12 PROCEDURE — 97535 SELF CARE MNGMENT TRAINING: CPT | Mod: GO | Performed by: OCCUPATIONAL THERAPIST

## 2023-05-12 NOTE — PROGRESS NOTES
Hand Therapy Initial Evaluation    Current Date:  5/12/2023    Diagnosis: R DeQuervain's tenosynovitis, L wrist pain (new diagnosis)  DOI: ~4 months to right, left fell at [place] 4-6 years ago, symptoms ever since.  Referring Provider: Dr. Albert Yeo     Subjective:  Naa Paredes is a 57 year old female.    Patient reports symptoms of the bilateral wrists which occurred due to unknown, overuse. Since onset symptoms are Unchanged to the left, slightly improved to the right. General health as reported by patient is fair.  Pertinent medical history includes: Not disclosed by patient.  Medical allergies: Not disclosed by patient..  Surgical history: orthopedic: left carpal tunnel release, unsure of date, not with [place] per patient.  Medication history: Not disclosed by patient.    Current occupation is currently not working.  Job Tasks: Prolonged Sitting, Repetitive Tasks    Pt reports onset of R wrist pain around 4 months ago. She does note enjoying playing games on her phone and at the [employer], which may have contributed to her onset. Pt also has L side pain, w/ hx of CTR, feels symptoms are unresolved. Feels like she might need a second surgery for the left.     Occupational Profile Information:  Right hand dominant  Prior functional level:  no limitations - shared household chores   Patient reports symptoms of pain  Special tests:  x-ray.    Previous treatment: home massage and topical cream   Barriers include:none  Mobility: No difficulty  Transportation: drives  Currently working in normal job without restrictions - [employer]   Leisure activities/hobbies: play games    Functional Outcome Measure:   Upper Extremity Functional Index Score:  SCORE:   Column Totals: /80: 68   (A lower score indicates greater disability.)    - 77/80 on initial evaluation 3/31/2023    Objective:  Pain Level (Scale 0-10):   3/31/2023 5/12/2023   At Rest 0 0/10   With Use 2-3 2-3/10     Pain Description:  Date  3/31/2023 5/12/2023   Location Radial wrist and thumb Right radial wrist and thumb, paraesthesia through median nerve distribution, left hand   Pain Quality Aching and Sharp Aching and sharp   Frequency intermittent   Intermittent   Pain is worst  daytime Daytime   Exacerbated by  lifting, repetitive motions Lifting, repetitive motion   Relieved by rest Rest, orthotic wear   Progression improving Slowly improving     Sensation   WNL throughout all nerve distributions RUE; decreased median nerve distribution LUE, per patient report    Edema   - none  + mild    ++ moderate    +++ severe    3/31/2023   DWC L: 15.9 cm  R: 16.6 cm      ROM  Pain Report: - none  + mild    ++ moderate    +++ severe   Thumb 3/31/2023 3/31/2023   AROM (PROM) L R   MP /55 /55   IP /60 /60   RABD 60 47+   PABD 50 48+   Opposition 10 9     Wrist 3/31/2023 3/31/2023   AROM (PROM) L R   Extension 70 70   Flexion 70 45+   RD 18 12+   UD 38 38     Resisted Testing  Pain Report: - none  + mild    ++ moderate    +++ severe    3/31/2023 5/12/2023 5/12/2023    R R L   APL ++  5/5, +   EPB ++  3+/5, +   EPL -     FCR -       Strength   (Measured in pounds)  Pain Report: - none  + mild    ++ moderate    +++ severe    3/31/2023 3/31/2023 5/12/2023 5/12/2023   Trials L R L R   1  2  3 55 50 53 50   Average   53 50     Lat Pinch 3/31/2023 3/31/2023 5/12/2023 5/12/2023   Trials L R L R   1  2  3 15 18 15 18   Average         3 Pt Pinch 3/31/2023 3/31/2023   Trials L R   1  2  3 15 15   Average       Special Tests   Pain Report:  - none    + mild    ++ moderate    +++ severe    3/31/2023 5/12/2023 5/12/2023    R R L   Finkelsteins + +    WHAT test ++ +    Carpal Compression Test--Durkan Test (30 sec)  (-) (-)   Tinel's Carpal Tunnel  (-) (-)   Adnerson Test for Lumbrical Incursion  (fist x 30 secs)  (-) + at 15-20 seconds   Phalens  (-) (-)     Neural Tension Testing  MNT: Median Neurodynamic Test (based on DS Farrah's ULNT)   5/12/2023   0-5 Scale 5/5  (LUE)   Position:   0/5: Arm across abdomen in coronal plane  1/5: Depress shoulder, ER to neutral ABD shoulder to 45 degrees  2/5: ER shoulder to end range, keep elbow at 90 degrees  3/5: Extend elbow to 0 degrees  4/5: Fully supinate forearm  5/5: Extend wrist, fingers and thumb  Notes:    (+) indicates beyond grade level but less than MCC to next level    (-) indicates over MCC to level    S1  onset/change of patient's symptoms    S2 definite stop point based on patient's discomfort level    Palpation RUE  Pain Report:  - none    + mild    ++ moderate    +++ severe    3/31/2023   Radial Styloid -   1st DC +   FCR -   Thumb CMC -   PIN Site -   Extensor Wad -     Please refer to the daily flowsheet for treatment today, total treatment time and time spent performing 1:1 timed codes.       Assessment:  Patient presents with symptoms consistent with diagnosis of bilateral wrist pain, with non-surgical intervention.     Patient's limitations or Problem List includes:  Pain, Decreased ROM/motion, Increased edema, Weakness, Sensory disturbance, Hypomobility, Decreased , Decreased pinch, Decreased coordination, Decreased dexterity and Tightness in musculature of the bilateral wrist and hand which interferes with the patient's ability to perform Self Care Tasks (dressing), Work Tasks, Sleep Patterns, Recreational Activities, Household Chores and Driving  as compared to previous level of function.    Rehab Potential:  Good - Return to full activity, some limitations    Patient will benefit from skilled Occupational Therapy to increase ROM, flexibility,  strength, pinch strength, stability of wrist, stability of thumb, coordination, dexterity and sensation and decrease pain and edema to return to previous activity level and resume normal daily tasks and to reach their rehab potential.    Barriers to Learning:  Language    Communication Issues:  Patient appears to be able to clearly communicate and understand  verbal and written communication and follow directions correctly.  Patient has an  for communication clarity.    Chart Review: Chart Review, Brief history including review of medical and/or therapy records relating to the presenting problem and Simple history review with patient    Identified Performance Deficits: dressing, child rearing, communication management, driving and community mobility, health management and maintenance, home establishment and management, meal preparation and cleanup, Latter-day and spiritual activities and expression, shopping, sleep, school, leisure activities and social participation    Assessment of Occupational Performance:  3-5 Performance Deficits    Clinical Decision Making (Complexity): Low complexity    Treatment Explanation:  The following has been discussed with the patient:  RX ordered/plan of care  Anticipated outcomes  Possible risks and side effects    Plan:  Frequency:  1 X week, once daily  Duration:  for 8 weeks    Treatment Plan:    Modalities:    US   Therapeutic Exercise:    AROM, AAROM, PROM, Tendon Gliding, Blocking, Place and Hold, Isotonics, Isometrics and Stabilization  Therapeutic Activities:  Functional activities   CTS, DeQuervain's joint protection, activity modification  Neuromuscular re-ed:   Nerve Gliding, Coordination/Dexterity, Desensitization, Kinesthetic Training, Proprioceptive Training, Posture, Kinesiotaping, Isometrics and Stabilization  Manual Techniques:   Coordination/Dexterity, Joint mobilization, Scar mobilization, Friction massage and Myofascial release  Orthotic Fabrication:    Static and Forearm based  Self Care:    Self Care Tasks and Ergonomic Considerations    Discharge Plan:  Achieve all LTG.  Independent in home treatment program.  Reach maximal therapeutic benefit.    Home Program:   Per PTRX, now including median nerve glides LUE  Continue RUE FA-based thumb spica at nighttime and quiet rest  DeQuervain's (R), now CTS (L)  joint protection and activity modification    Next Visit:  Assess tolerance to active median nerve glides, begin passive to tolerance  LUE strengthening for DeQuervain's as indicated

## 2023-05-19 ENCOUNTER — THERAPY VISIT (OUTPATIENT)
Dept: OCCUPATIONAL THERAPY | Facility: CLINIC | Age: 58
End: 2023-05-19
Payer: COMMERCIAL

## 2023-05-19 DIAGNOSIS — M65.4 DE QUERVAIN'S DISEASE (TENOSYNOVITIS): ICD-10-CM

## 2023-05-19 DIAGNOSIS — M25.531 RIGHT WRIST PAIN: Primary | ICD-10-CM

## 2023-05-19 PROCEDURE — 97035 APP MDLTY 1+ULTRASOUND EA 15: CPT | Mod: GO | Performed by: OCCUPATIONAL THERAPIST

## 2023-05-19 PROCEDURE — 97140 MANUAL THERAPY 1/> REGIONS: CPT | Mod: GO | Performed by: OCCUPATIONAL THERAPIST

## 2023-06-18 ENCOUNTER — HOSPITAL ENCOUNTER (EMERGENCY)
Facility: CLINIC | Age: 58
Discharge: HOME | End: 2023-06-18
Payer: COMMERCIAL

## 2023-06-18 VITALS
SYSTOLIC BLOOD PRESSURE: 158 MMHG | TEMPERATURE: 97.5 F | HEART RATE: 72 BPM | DIASTOLIC BLOOD PRESSURE: 92 MMHG | OXYGEN SATURATION: 97 % | RESPIRATION RATE: 18 BRPM

## 2023-06-18 NOTE — ED TRIAGE NOTES
Arrives from home with family. States that she feels depressed for four months. States she is on medications for this, without improvement.     States that she has thoughts about suicide but not a plan, which has been ongoing for weeks.

## 2023-06-19 ENCOUNTER — HOSPITAL ENCOUNTER (EMERGENCY)
Facility: CLINIC | Age: 58
Discharge: HOME | End: 2023-06-19
Attending: EMERGENCY MEDICINE | Admitting: EMERGENCY MEDICINE
Payer: COMMERCIAL

## 2023-06-19 VITALS
TEMPERATURE: 98 F | DIASTOLIC BLOOD PRESSURE: 97 MMHG | RESPIRATION RATE: 16 BRPM | SYSTOLIC BLOOD PRESSURE: 147 MMHG | OXYGEN SATURATION: 99 % | HEART RATE: 81 BPM

## 2023-06-19 DIAGNOSIS — F32.A DEPRESSION, UNSPECIFIED DEPRESSION TYPE: Primary | ICD-10-CM

## 2023-06-19 DIAGNOSIS — R07.89 CHEST HEAVINESS: ICD-10-CM

## 2023-06-19 DIAGNOSIS — R45.851 SUICIDAL IDEATION: ICD-10-CM

## 2023-06-19 LAB
ALBUMIN SERPL BCG-MCNC: 4.6 G/DL (ref 3.5–5.2)
ALBUMIN UR-MCNC: NEGATIVE MG/DL
ALP SERPL-CCNC: 71 U/L (ref 35–104)
ALT SERPL W P-5'-P-CCNC: 21 U/L (ref 0–50)
AMPHETAMINES UR QL SCN: NORMAL
ANION GAP SERPL CALCULATED.3IONS-SCNC: 13 MMOL/L (ref 7–15)
APPEARANCE UR: CLEAR
AST SERPL W P-5'-P-CCNC: 29 U/L (ref 0–45)
BARBITURATES UR QL SCN: NORMAL
BASOPHILS # BLD AUTO: 0 10E3/UL (ref 0–0.2)
BASOPHILS NFR BLD AUTO: 0 %
BENZODIAZ UR QL SCN: NORMAL
BILIRUB SERPL-MCNC: 0.2 MG/DL
BILIRUB UR QL STRIP: NEGATIVE
BUN SERPL-MCNC: 17.2 MG/DL (ref 6–20)
BZE UR QL SCN: NORMAL
CALCIUM SERPL-MCNC: 9.4 MG/DL (ref 8.6–10)
CANNABINOIDS UR QL SCN: NORMAL
CHLORIDE SERPL-SCNC: 104 MMOL/L (ref 98–107)
COLOR UR AUTO: ABNORMAL
CREAT SERPL-MCNC: 0.74 MG/DL (ref 0.51–0.95)
DEPRECATED HCO3 PLAS-SCNC: 24 MMOL/L (ref 22–29)
EOSINOPHIL # BLD AUTO: 0.2 10E3/UL (ref 0–0.7)
EOSINOPHIL NFR BLD AUTO: 3 %
ERYTHROCYTE [DISTWIDTH] IN BLOOD BY AUTOMATED COUNT: 12.2 % (ref 10–15)
GFR SERPL CREATININE-BSD FRML MDRD: >90 ML/MIN/1.73M2
GLUCOSE SERPL-MCNC: 101 MG/DL (ref 70–99)
GLUCOSE UR STRIP-MCNC: NEGATIVE MG/DL
HCT VFR BLD AUTO: 42.3 % (ref 35–47)
HGB BLD-MCNC: 14.2 G/DL (ref 11.7–15.7)
HGB UR QL STRIP: NEGATIVE
IMM GRANULOCYTES # BLD: 0 10E3/UL
IMM GRANULOCYTES NFR BLD: 0 %
KETONES UR STRIP-MCNC: NEGATIVE MG/DL
LEUKOCYTE ESTERASE UR QL STRIP: ABNORMAL
LYMPHOCYTES # BLD AUTO: 1.9 10E3/UL (ref 0.8–5.3)
LYMPHOCYTES NFR BLD AUTO: 27 %
MCH RBC QN AUTO: 31.1 PG (ref 26.5–33)
MCHC RBC AUTO-ENTMCNC: 33.6 G/DL (ref 31.5–36.5)
MCV RBC AUTO: 93 FL (ref 78–100)
MONOCYTES # BLD AUTO: 0.5 10E3/UL (ref 0–1.3)
MONOCYTES NFR BLD AUTO: 7 %
MUCOUS THREADS #/AREA URNS LPF: PRESENT /LPF
NEUTROPHILS # BLD AUTO: 4.4 10E3/UL (ref 1.6–8.3)
NEUTROPHILS NFR BLD AUTO: 63 %
NITRATE UR QL: NEGATIVE
NRBC # BLD AUTO: 0 10E3/UL
NRBC BLD AUTO-RTO: 0 /100
OPIATES UR QL SCN: NORMAL
PH UR STRIP: 5.5 [PH] (ref 5–7)
PLATELET # BLD AUTO: 146 10E3/UL (ref 150–450)
POTASSIUM SERPL-SCNC: 3.8 MMOL/L (ref 3.4–5.3)
PROT SERPL-MCNC: 6.8 G/DL (ref 6.4–8.3)
RBC # BLD AUTO: 4.56 10E6/UL (ref 3.8–5.2)
RBC URINE: <1 /HPF
SODIUM SERPL-SCNC: 141 MMOL/L (ref 136–145)
SP GR UR STRIP: 1.01 (ref 1–1.03)
TROPONIN T SERPL HS-MCNC: <6 NG/L
UROBILINOGEN UR STRIP-MCNC: NORMAL MG/DL
WBC # BLD AUTO: 7 10E3/UL (ref 4–11)
WBC URINE: 1 /HPF

## 2023-06-19 PROCEDURE — 99285 EMERGENCY DEPT VISIT HI MDM: CPT | Mod: 25

## 2023-06-19 PROCEDURE — 36415 COLL VENOUS BLD VENIPUNCTURE: CPT | Performed by: EMERGENCY MEDICINE

## 2023-06-19 PROCEDURE — 85025 COMPLETE CBC W/AUTO DIFF WBC: CPT | Performed by: EMERGENCY MEDICINE

## 2023-06-19 PROCEDURE — 84484 ASSAY OF TROPONIN QUANT: CPT | Performed by: EMERGENCY MEDICINE

## 2023-06-19 PROCEDURE — 80307 DRUG TEST PRSMV CHEM ANLYZR: CPT | Performed by: EMERGENCY MEDICINE

## 2023-06-19 PROCEDURE — 80053 COMPREHEN METABOLIC PANEL: CPT | Performed by: EMERGENCY MEDICINE

## 2023-06-19 PROCEDURE — 81001 URINALYSIS AUTO W/SCOPE: CPT | Performed by: EMERGENCY MEDICINE

## 2023-06-19 PROCEDURE — 90791 PSYCH DIAGNOSTIC EVALUATION: CPT

## 2023-06-19 PROCEDURE — 93005 ELECTROCARDIOGRAM TRACING: CPT

## 2023-06-19 ASSESSMENT — COLUMBIA-SUICIDE SEVERITY RATING SCALE - C-SSRS
4. HAVE YOU HAD THESE THOUGHTS AND HAD SOME INTENTION OF ACTING ON THEM?: NO
2. HAVE YOU ACTUALLY HAD ANY THOUGHTS OF KILLING YOURSELF?: YES
6. HAVE YOU EVER DONE ANYTHING, STARTED TO DO ANYTHING, OR PREPARED TO DO ANYTHING TO END YOUR LIFE?: NO
6. HAVE YOU EVER DONE ANYTHING, STARTED TO DO ANYTHING, OR PREPARED TO DO ANYTHING TO END YOUR LIFE?: NO
5. HAVE YOU STARTED TO WORK OUT OR WORKED OUT THE DETAILS OF HOW TO KILL YOURSELF? DO YOU INTEND TO CARRY OUT THIS PLAN?: NO
ATTEMPT LIFETIME: NO
TOTAL  NUMBER OF INTERRUPTED ATTEMPTS LIFETIME: NO
TOTAL  NUMBER OF ABORTED OR SELF INTERRUPTED ATTEMPTS LIFETIME: NO
1. IN THE PAST MONTH, HAVE YOU WISHED YOU WERE DEAD OR WISHED YOU COULD GO TO SLEEP AND NOT WAKE UP?: YES

## 2023-06-19 ASSESSMENT — ENCOUNTER SYMPTOMS
NAUSEA: 0
CHILLS: 0
BACK PAIN: 0
NECK PAIN: 0
HEADACHES: 0
COUGH: 0
PALPITATIONS: 0
SHORTNESS OF BREATH: 0
RHINORRHEA: 0
VOMITING: 0
FEVER: 0
DIZZINESS: 0
EYE PAIN: 0
ABDOMINAL PAIN: 0
DYSPHORIC MOOD: 1

## 2023-06-19 ASSESSMENT — ACTIVITIES OF DAILY LIVING (ADL)
ADLS_ACUITY_SCORE: 35

## 2023-06-19 NOTE — ED TRIAGE NOTES
Pt presents to the ED with depression and suicidal ideation with a plan but no intent to act on the plan. Pt states there is no triggers for her SI, and if she were to act on it she would use a knife.      Triage Assessment     Row Name 06/19/23 2294       Triage Assessment (Adult)    Airway WDL WDL       Respiratory WDL    Respiratory WDL WDL       Skin Circulation/Temperature WDL    Skin Circulation/Temperature WDL WDL       Cardiac WDL    Cardiac WDL WDL       Peripheral/Neurovascular WDL    Peripheral Neurovascular WDL WDL       Cognitive/Neuro/Behavioral WDL    Cognitive/Neuro/Behavioral WDL X    Mood/Behavior sad;flat affect  slow to respond

## 2023-06-19 NOTE — ED PROVIDER NOTES
History     Chief Complaint:  Suicidal       The history is provided by the patient and a relative.      Naa Paredes is a 57 year old female who presents with increased anxiety and depressed moods over the past 4 months.  Over this period of time, she has had decreased motivation to cook, clean, or do anything despite feeling the house is very messy.  She feels like her life is imbalanced. There are no specific triggers at home.  Patient states that she has daily suicidal ideation, but denies active plan.  She denies homicidal ideation or hallucinations.  Patient complains of feeling generally unwell, but when asked if she feels unwell physically or mentally, she states may be a little combination of both. Patient cannot clearly express her physical symptoms, but she states that she perhaps has some chest heaviness associated.  The chest heaviness is worse in the morning and is nonexertional. She denies shortness of breath.  She was previously taking sertraline but felt this was not helpful for her, so she was started on aripiprazole.  She does not feel that this has helped either and rather has made her symptoms worse.  Daughter notes she is nervous to take her medications at times.  Daughter also reports that over the past 2 months, the patient has considered coming to the hospital but has felt anxious about doing so.  She came to the ED yesterday but did not stay due to the long wait times.    Review of Systems   Constitutional: Negative for chills and fever.   HENT: Negative for congestion and rhinorrhea.    Eyes: Negative for pain and visual disturbance.   Respiratory: Negative for cough and shortness of breath.    Cardiovascular: Negative for chest pain and palpitations.        Chest heaviness   Gastrointestinal: Negative for abdominal pain, nausea and vomiting.   Musculoskeletal: Negative for back pain and neck pain.   Neurological: Negative for dizziness and headaches.   Psychiatric/Behavioral: Positive  for dysphoric mood and suicidal ideas.     Independent Historian:   Daughter - They report additional history as noted above.    Review of External Notes:   4/26/23 office visit note    Medications:    Aripiprazole   Sertraline     Past Medical History:    Anxiety  Depression  Alcohol abuse  IBS  Migraine  Prediabetes  Thrombocytopenia     Past Surgical History:    Carpal tunnel release  Colonoscopy  Lithotripsy  Transanal endoscopic microsurgery     Physical Exam     Patient Vitals for the past 24 hrs:   BP Temp Temp src Pulse Resp SpO2   06/19/23 1550 (!) 147/97 98  F (36.7  C) Temporal 81 16 99 %      Physical Exam  Constitutional:       Appearance: Normal appearance.   HENT:      Head: Normocephalic and atraumatic.   Eyes:      Extraocular Movements: Extraocular movements intact.      Conjunctiva/sclera: Conjunctivae normal.   Cardiovascular:      Rate and Rhythm: Normal rate and regular rhythm.   Pulmonary:      Effort: Pulmonary effort is normal. No respiratory distress.      Breath sounds: Clear to auscultation bilateral  Abdominal:      General: Abdomen is flat. There is no distension.      Palpations: Abdomen is soft.      Tenderness: There is no abdominal tenderness.   Musculoskeletal:      Cervical back: Normal range of motion. No rigidity.       Right lower leg: No edema.      Left lower leg: No edema.   Skin:     General: Skin is warm and dry.   Neurological:      General: No focal deficit present.      Mental Status: Alert and oriented to person, place, and time.   Psychiatric:         Mood and Affect: Mood normal.         Behavior: Behavior normal.    Emergency Department Course     ECG  EKG 12-lead, tracing only  Normal sinus rhythm.  Rate of 61.  Normal UT and QRS.  Normal QTc.  No acute ST elevation or depression as compared with 3/17/2016 EKG.    Laboratory:  Labs Ordered and Resulted from Time of ED Arrival to Time of ED Departure   COMPREHENSIVE METABOLIC PANEL - Abnormal       Result Value     Sodium 141      Potassium 3.8      Chloride 104      Carbon Dioxide (CO2) 24      Anion Gap 13      Urea Nitrogen 17.2      Creatinine 0.74      Calcium 9.4      Glucose 101 (*)     Alkaline Phosphatase 71      AST 29      ALT 21      Protein Total 6.8      Albumin 4.6      Bilirubin Total 0.2      GFR Estimate >90     ROUTINE UA WITH MICROSCOPIC REFLEX TO CULTURE - Abnormal    Color Urine Light Yellow      Appearance Urine Clear      Glucose Urine Negative      Bilirubin Urine Negative      Ketones Urine Negative      Specific Gravity Urine 1.015      Blood Urine Negative      pH Urine 5.5      Protein Albumin Urine Negative      Urobilinogen Urine Normal      Nitrite Urine Negative      Leukocyte Esterase Urine Trace (*)     Mucus Urine Present (*)     RBC Urine <1      WBC Urine 1     CBC WITH PLATELETS AND DIFFERENTIAL - Abnormal    WBC Count 7.0      RBC Count 4.56      Hemoglobin 14.2      Hematocrit 42.3      MCV 93      MCH 31.1      MCHC 33.6      RDW 12.2      Platelet Count 146 (*)     % Neutrophils 63      % Lymphocytes 27      % Monocytes 7      % Eosinophils 3      % Basophils 0      % Immature Granulocytes 0      NRBCs per 100 WBC 0      Absolute Neutrophils 4.4      Absolute Lymphocytes 1.9      Absolute Monocytes 0.5      Absolute Eosinophils 0.2      Absolute Basophils 0.0      Absolute Immature Granulocytes 0.0      Absolute NRBCs 0.0     TROPONIN T, HIGH SENSITIVITY - Normal    Troponin T, High Sensitivity <6     DRUG ABUSE SCREEN 1 URINE (ED) - Normal    Amphetamines Urine Screen Negative      Barbituates Urine Screen Negative      Benzodiazepine Urine Screen Negative      Cannabinoids Urine Screen Negative      Cocaine Urine Screen Negative      Opiates Urine Screen Negative          Emergency Department Course & Assessments:    Assessments/Consultations/Discussion of Management or Tests:  ED Course as of 06/20/23 0038   Mon Jun 19, 2023   7668 Initial assessment. I gathered history and  examined the patient as noted above.    1804 EKG 12-lead, tracing only  Normal sinus rhythm.  Rate of 61.  Normal AK and QRS.  Normal QTc.  No acute ST elevation or depression as compared with 3/17/2016 EKG.   2015 DEC met with Sherlyn. Day treatment appointment scheduled for next Monday to increase mental health support. No plan or intent with passive suicidal thoughts. No immediate concerns. Does have individual therapist. Adult child and grandson at home. Not alone. Cleared for discharge by DEC.   2108 Had extensive discussion with patient and family in room.  And family contracted for safety and feels comfortable with discharge at this time.  Patient has follow-up appointment on Thursday with primary care provider for which she will get further evaluation regarding chest heaviness sensation.  Patient has a heart score of 2.  Appropriate for outpatient management.  Discussed strict return precautions.  Answered all questions.  Patient voiced understanding and agreement with plan.       Social Determinants of Health affecting care:   Language barrier    Disposition:  The patient was discharged to home.     Impression & Plan    CMS Diagnoses: None    HEART Score  Background  Calculates the overall risk of adverse event in patient's presenting with chest pain.  Based on 5 criteria (each assigned 0-2 points) including suspiciousness of history, EKG, age, risk factors and troponin.    Data  57 year old female  has H/O: alcohol abuse; CARDIOVASCULAR SCREENING; LDL GOAL LESS THAN 160; Nickel allergy; Anxiety; Colon adenoma; Seasonal allergic rhinitis; Conversion disorder with mixed symptoms; Migraine with status migrainosus, not intractable, unspecified migraine type; Mild major depression (H); Trichotillomania; Dysesthesia; Plantar fasciitis; Encounter for immunization; Cervical high risk HPV (human papillomavirus) test positive; Screen for colon cancer; Abscess of Bartholin's gland; Disorder of liver; De Quervain's  disease (tenosynovitis); Carpal tunnel syndrome of left wrist; Mallet finger of right hand; Acute cough; Preop general physical exam; Prediabetes; Thrombocytopenia (H); and Right wrist pain on their problem list.   reports that she has never smoked. She has never used smokeless tobacco.  family history includes Cancer - colorectal in her father; Colon Cancer in her father; Heart Disease in her mother; Mental Illness in her paternal uncle.  Recent Labs   Lab 23  1730   CTROPT <6     Criteria   0-2 points for each of 5 items (maximum of 10 points):  Score 0- History slightly suspicious for coronary syndrome  Score 0- EKG Normal  Score 1- Age 45 to 65 years old  Score 1- One to 2 risk factors for atherosclerotic disease  Score 0- Within normal limits for troponin levels  Interpretation  Risk of adverse outcome  Heart Score: 2  Total Score 0-3- Adverse Outcome Risk 2.5% - Supports early discharge with appropriate follow-up      Medical Decision Makin-year-old female as described above presents to the emergency department for feelings of generalized unwellness both physically and mentally.  Predominantly, patient has been experiencing depressed mood and suicidal ideation.  She feels like her life is imbalanced.  Patient hemodynamically stable at time evaluation.  Afebrile.  Patient still endorsing suicidal ideation, but no active plan.  No homicidal ideation or hallucinations.  During medical screening process, patient states that maybe she has some associated chest heaviness, but unclear to discern if this is secondary to her emotional distress, as result, we will screen for acute cardiac processes. 1 set cardiac enzyme as symptom >6 hours. Patient has low risk heart score. Likely appropriate for outpatient work up if negative. Once medically cleared, DEC  to evaluate.  Patient will be placed on OLIVIA hold.  Discussed care plan with patient and daughter at bedside who voiced understanding and agreement  with plan.  Answered all questions.  Additional work-up and orders as listed in chart.    Please refer to ED course above for details on the patient's treatment course and any changes or updates in care plan beyond my initial evaluation and MDM.    Diagnosis:    ICD-10-CM    1. Depression, unspecified depression type  F32.A       2. Suicidal ideation  R45.851       3. Chest heaviness  R07.89            Scribe Disclosure:  I, Marli Glez, am serving as a scribe at 4:21 PM on 6/19/2023 to document services personally performed by Miguel Duval DO based on my observations and the provider's statements to me.     6/19/2023   Miguel Duval DO Yeh, Ferris, DO  06/20/23 0038

## 2023-06-19 NOTE — ED NOTES
Daughter at bedside. Pt placed in hospital scrubs. Belongings in DEC office. Room made safe. Call light within reach.

## 2023-06-20 LAB
ATRIAL RATE - MUSE: 61 BPM
DIASTOLIC BLOOD PRESSURE - MUSE: NORMAL MMHG
INTERPRETATION ECG - MUSE: NORMAL
P AXIS - MUSE: -5 DEGREES
PR INTERVAL - MUSE: 144 MS
QRS DURATION - MUSE: 100 MS
QT - MUSE: 412 MS
QTC - MUSE: 414 MS
R AXIS - MUSE: -7 DEGREES
SYSTOLIC BLOOD PRESSURE - MUSE: NORMAL MMHG
T AXIS - MUSE: 26 DEGREES
VENTRICULAR RATE- MUSE: 61 BPM

## 2023-06-20 NOTE — DISCHARGE INSTRUCTIONS
Aftercare Plan  If I am feeling unsafe or I am in a crisis, I will:   Contact my established care providers   Call the National Suicide Prevention Lifeline: 988  Go to the nearest emergency room   Call 911     Warning signs that I or other people might notice when a crisis is developing for me:  Having thoughts with plan or method to harm self  Unable to complete daily activities of living      Things I am able to do on my own to cope or help me feel better:   Stay on sleep schedule, practice good sleep hygiene  Refrain from alcohol or drug use  Take medication as prescribed  Practice mindfulness activities to decrease anxiety    Things that I am able to do with others to cope or help me better:  Make healthy meals with adult daughters  Start exercise program  Set small goal each day     Things I can use or do for distraction:   Engage in activity with grandson or children  Work  Express your thoughts to family or friends  Try guided imagery    Changes I can make to support my mental health and wellness:   Exercise  Walk outside  Practice a craft  Listen to music  Practice mindfulness     People in my life that I can ask for help:   Children    Your [facility] has a mental health crisis team you can call 24/7: [facility]  [phone]    Other things that are important when I'm in crisis:   Remind yourself of the things you wish to live for       Additional resources and information:   Appointment scheduled for you for Day Treatment:        Adults 18+ [facility] is the longer, focused treatment component that meets 2 - 3 times per week. Morning, afternoon, and evening groups are available. STAT is the short-term comprehensive assessment and intensive stabilization component. This 10 day intensive component runs Monday through Thursday. APPOINTMENTS ARE VIA TELEHEALTH AT THIS TIME    [facility]  [address] [place] [address] [phone]  [phone] 6/26/2023 1:00 PM    ---------------------------------------------------------------------------------------------------------  Crisis Lines  Crisis Text Line  Text 991525  You will be connected with a trained live crisis counselor to provide support.    Por sandy, texto  SHANTELL a 973759 o texto a 442-AYUDAME en WhatsApp    The [employer] Project (LGBTQ Youth Crisis Line)  5.699.248.7714  text START to 955-874      Community Resources  Fast Tracker  Linking people to mental health and substance use disorder resources  [employer].3225 films     [place] Mental Health Warm Line  Peer to peer support  Monday thru Saturday, 12 pm to 10 pm  090.717.8265 or 5.840.611.5335  Text \"Support\" to 00332    National [place] on Mental Illness (JASPAL)  269.769.9542 or 1.888.JASPAL.HELPS      Mental Health Apps  My3  https://Plato Networks.org/    VirtualHopeBox  https://ApiFix.org/apps/virtual-hope-box/      Additional Information  Today you were seen by a licensed mental health professional through Triage and Transition services, [facility] ([facility])  for a crisis assessment in the Emergency Department at [facility].  It is recommended that you follow up with your established providers (psychiatrist, mental health therapist, and/or primary care doctor - as relevant) as soon as possible. Coordinators from [facility] will be calling you in the next 24-48 hours to ensure that you have the resources you need.  You can also contact [facility] coordinators directly at [phone]. You may have been scheduled for or offered an appointment with a mental health provider. [facility] maintains an extensive network of licensed behavioral health providers to connect patients with the services they need.  We do not charge providers a fee to participate in our referral network.  We match patients with providers based on a patient's specific needs, insurance coverage, and location.  Our first effort will be to refer you to a " provider within your care system, and will utilize providers outside your care system as needed.

## 2023-06-20 NOTE — CONSULTS
Diagnostic Evaluation Consultation  Crisis Assessment    Patient was assessed: Luciano  Patient location: [place]  Was a release of information signed: Yes. Providers included on the release: Psychiatry provider      Referral Data and Chief Complaint  Naa Paredes is a 57 year old, who uses she/her pronouns, and presents to the ED with family/friends. Patient is referred to the ED by self. Patient is presenting to the ED for the following concerns: dysphoric mood, anxiety, feeling imbalanced.      Informed Consent and Assessment Methods     Patient is her own guardian. Writer met with patient and explained the crisis assessment process, including applicable information disclosures and limits to confidentiality, assessed understanding of the process, and obtained consent to proceed with the assessment. Patient was observed to be able to participate in the assessment as evidenced by responding to questions.. Assessment methods included conducting a formal interview with patient, review of medical records, collaboration with medical staff, and obtaining relevant collateral information from family and community providers when available..     Over the course of this crisis assessment provided reassurance, offered validation, engaged patient in problem solving and disposition planning, worked with patient on safety and aftercare planning, assisted in processing patient's thoughts and feeling relating to mental health symptoms, provided psychoeducation and facilitated family communication. Patient's response to interventions was accepting of increased support services.     Summary of Patient Situation  Pt arrived to the ED due to concerns with depression and anxiety.  Pt reports she did not sleep last night, has ongoing difficulty sleeping, lacks energy and motivation and experiencing passive suicidal ideation such as life not worth living.  Pt denies any active thoughts including method or plan.  Pt denies intent.  Pt  describes feeling stressed that her house is messy despite efforts to try to keep it tidy.  No other stressors identified.  Pt presented to ED in April 2023 with similar concerns.  Indicates worsening mood and anxiety for past 4 months.  Pt endorses the following symptoms, worry, lack of motivation, low energy, depressed mood, indecision and difficulty making decisions, poor sleep, chills and hot flashes, restlessness.     Per chart review, pt has tried multiple medications for depression.  Pt states she tries a medication for a few days, experiences side effects, then switches to a different medication.  Pt provided education regarding taking medication consistently for a few weeks to see if side effects decrease and she begins to feel mood improvement.  Pt is seeing a therapist virtually.  Despite these efforts, pt feels her depression has not improved and she is unsure of what to do next.  Pt is unable to identify any coping strategies at this time.      Of note, pt has history of persistent physical symptom complaints.  Chart review indicates hx of Conversion Disorder.    Brief Psychosocial History  Pt lives in apartment with adult daughter and grandson.  College age daughter is home for the summer and present with pt in ED.  She works part-time as a PCA. Pt unable to identify any hobbies or interests at this time.    Significant Clinical History  Pt has history of anxiety, depression and conversion disorder.  Pt reports she was hospitalized once about 8 years ago.  Hx of individual therapy and medication use.  Per chart review, pt has hx of alcohol abuse and persistent physical complaints.       Collateral Information  The following information was received from Carline  whose relationship to the patient is daughter. Information was obtained in person. Their phone number is  XX and they last had contact with patient on present in ED.    What happened today: Pt requested to come to hospital, states she feels  "\"unbalanced\"    What is different about patient's functioning: low motivation, speech is more mumbling, decreased energy     Concern about alcohol/drug use: No    What do you think the patient needs: unsure    Has patient made comments about wanting to kill themselves/others:  No    If d/c is recommended, can they take part in safety/aftercare planning: Yes      Other information: Daughter notes pt has seemed less motivated, denies any immediate concerns     Risk Assessment  [place] Suicide Severity Rating Scale Full Clinical Version:  Suicidal Ideation  1. Wish to be Dead (Past 1 Month): Yes  2. Non-Specific Active Suicidal Thoughts (Past 1 Month): Yes  3. Active Suicidal Ideation with any Methods (Not Plan) Without Intent to Act (Past 1 Month): No  4. Active Suicidal Ideation with Some Intent to Act, Without Specific Plan (Past 1 Month): No  5. Active Suicidal Ideation with Specific Plan and Intent (Past 1 Month): No     Suicidal Behavior  Actual Attempt (Lifetime): No  Has subject engaged in non-suicidal self-injurious behavior? (Lifetime): No  Interrupted Attempts (Lifetime): No  Aborted or Self-Interrupted Attempt (Lifetime): No  Preparatory Acts or Behavior (Lifetime): No  C-SSRS Risk (Lifetime/Recent)  Calculated C-SSRS Risk Score (Lifetime/Recent): Low Risk    [place] Suicide Severity Rating Scale Since Last Contact:       Validity of evaluation is impacted by presenting factors during interview : language barrier,  used.   Comments regarding subjective versus objective responses to [place] tool: consistent with denying of plan or intent  Environmental or Psychosocial Events: loss of relationship due to divorce/separation  Chronic Risk Factors: history of psychiatric hospitalization and chronic and ongoing sleep difficulties   Warning Signs: seeking access to means to hurt or kill self, hopelessness, increasing substance use or abuse, withdrawing from friends, family, and society, anxiety, " agitation, unable to sleep, sleeping all the time and no reason for living, no sense of purpose in life  Protective Factors: strong bond to family unit, community support, or employment, responsibilities and duties to others, including pets and children, lives in a responsibly safe and stable environment, sense of importance of health and wellness, supportive ongoing medical and mental health care relationships and help seeking  Interpretation of Risk Scoring, Risk Mitigation Interventions and Safety Plan:  Patient is alert, oriented and able to respond to questions, at times needing repeating or rephrasing.  Pt admits to passive suicidal ideation but denies intent or plan.  No hx of suicide attempts.   Pt lives with adult children and is engaged in outpatient services.  Pt willing to safety plan and is accepting of additional therapy services.  Adult daughter and pt verbalize understanding of plan to return to ED or utilize crisis line if pt's symptoms worsen.      Does the patient have thoughts of harming others? No     Is the patient engaging in sexually inappropriate behavior?  no        Current Substance Abuse     Is there recent substance abuse? no     Was a urine drug screen or blood alcohol level obtained: No       Mental Status Exam     Affect: Flat   Appearance: Appropriate    Attention Span/Concentration: Attentive  Eye Contact: Variable   Fund of Knowledge: Appropriate    Language /Speech Content: Fluent   Language /Speech Volume: Soft    Language /Speech Rate/Productions: Slow    Recent Memory: Intact   Remote Memory: Intact   Mood: Depressed    Orientation to Person: Yes    Orientation to Place: Yes   Orientation to Time of Day: Yes    Orientation to Date: Yes    Situation (Do they understand why they are here?): Yes    Psychomotor Behavior: Underactive    Thought Content: Clear   Thought Form: Tangential      History of commitment: No       Medication    Psychotropic medications: No current  medications but a history of trials of multiple antidepressants.  Medication changes made in the last two weeks: Yes: pt reports switching       Current Care Team    Primary Care Provider: Yes. Name: Roger. Location:  . Date of last visit:  . Frequency:  . Perceived helpfulness:  .  Psychiatrist: Yes. Name: [facility]. Location: [facility]. Date of last visit:  . Frequency:  . Perceived helpfulness:  .  Therapist: Yes. Name: virtual therapist. Location:  . Date of last visit:  . Frequency:  . Perceived helpfulness:  .  : No     CTSS or ARMHS: No  ACT Team: No  Other: No      Diagnosis    296.30 (F33.9) Major Depressive Disorder, Recurrent Episode, Unspecified _  300.00 (F41.9) Unspecified Anxiety Disorder   Conversion disorder (functional neurological symptom disorder), With mixed symptoms F44.7 Hx of           Clinical Summary and Substantiation of Recommendations    After therapeutic assessment, intervention and aftercare planning by ED care team and LM and in consultation with attending provider, the patient's circumstances and mental state were appropriate for outpatient management.  It is the recommendation of this clinician that pt discharge with outpatient mental health support including day treatment intake appointment, individual therapy and medication management.  At this time, the pt is not presenting as an acute risk to self or others.  Pt denies a plan or intent for suicide, no history of attempts, help seeking.   Disposition    Recommended disposition: Individual Therapy, Medication Management and Programmatic Care: Day Treatment       Reviewed case and recommendations with attending provider. Attending Name: Dr Duval   Attending concurs with disposition: Yes       Patient and/or validated legal guardian concurs with disposition: Yes       Final disposition: Individual therapy , Medication management and Programmatic care: Day treatment.     Outpatient Details (if applicable):   Aftercare  plan and appointments placed in the AVS and provided to patient: Yes. Given to patient by ED staff    Was lethal means counseling provided as a part of aftercare planning? No;       Assessment Details    Patient interview started at: 7:10 pm and completed at: 8:15 pm.     Total duration spent on the patient case in minutes: 1.75 hrs      CPT code(s) utilized: 30725 - Psychotherapy for Crisis - 60 (30-74*) min       RIK Guerrero, [facility], Psychotherapist  [facility] - Triage & Transition Services  Callback: [phone]      Aftercare Plan  If I am feeling unsafe or I am in a crisis, I will:   Contact my established care providers   Call the National Suicide Prevention Lifeline: 988  Go to the nearest emergency room   Call 771      Warning signs that I or other people might notice when a crisis is developing for me:  Having thoughts with plan or method to harm self  Unable to complete daily activities of living        Things I am able to do on my own to cope or help me feel better:   Stay on sleep schedule, practice good sleep hygiene  Refrain from alcohol or drug use  Take medication as prescribed  Practice mindfulness activities to decrease anxiety     Things that I am able to do with others to cope or help me better:  Make healthy meals with adult daughters  Start exercise program  Set small goal each day      Things I can use or do for distraction:   Engage in activity with grandson or children  Work  Express your thoughts to family or friends  Try guided imagery     Changes I can make to support my mental health and wellness:   Exercise  Walk outside  Practice a craft  Listen to music  Practice mindfulness      People in my life that I can ask for help:   Children     Your [facility] has a mental health crisis team you can call 24/7: [facility]  [phone]     Other things that are important when I'm in crisis:   Remind yourself of the things you wish to live for        Additional resources and  "information:   Appointment scheduled for you for Day Treatment:         Adults 18+ Da[facility] is the longer, focused treatment component that meets 2 - 3 times per week. Morning, afternoon, and evening groups are available. STAT is the short-term comprehensive assessment and intensive stabilization component. This 10 day intensive component runs Monday through Thursday. APPOINTMENTS ARE VIA TELEHEALTH AT THIS TIME   [facility] [place] [address] [place] [address]     [phone] 6/26/2023 1:00 PM     ---------------------------------------------------------------------------------------------------------  Crisis Lines  Crisis Text Line  Text 948172  You will be connected with a trained live crisis counselor to provide support.     Por espanol, texto  SHANTELL a 914124 o texto a 442-AYUDAME en WhatsApp     The [employer] Project (LGBTQ Youth Crisis Line)  1.715.718.7464  text START to 747-699        Community Resources  Fast Tracker  Linking people to mental health and substance use disorder resources  fasttrackiPerceptionsn.org      [facility] Warm Line  Peer to peer support  Monday thru Saturday, 12 pm to 10 pm  178.350.9284 or 6.889.577.7791  Text \"Support\" to 56782     National [place] on Mental Illness (JASPAL)  060.489.9866 or 1888.JASPAL.HELPS        Mental Health Apps  My3  https://myQuantiSensepp.org/     VirtualHopeBox  https://[employer].org/apps/virtual-hope-box/        Additional Information  Today you were seen by a licensed mental health professional through Triage and Transition services, [facility] ([facility])  for a crisis assessment in the Emergency Department at [facility].  It is recommended that you follow up with your established providers (psychiatrist, mental health therapist, and/or primary care doctor - as relevant) as soon as possible. Coordinators from [facility] will be calling you in the next 24-48 " hours to ensure that you have the resources you need.  You can also contact [facility] coordinators directly at [phone]. You may have been scheduled for or offered an appointment with a mental health provider. [facility] maintains an extensive network of licensed behavioral health providers to connect patients with the services they need.  We do not charge providers a fee to participate in our referral network.  We match patients with providers based on a patient's specific needs, insurance coverage, and location.  Our first effort will be to refer you to a provider within your care system, and will utilize providers outside your care system as needed.

## 2023-06-22 ENCOUNTER — HOSPITAL ENCOUNTER (EMERGENCY)
Facility: CLINIC | Age: 58
Discharge: LEFT BEFORE DISCHARGE | End: 2023-06-22
Admitting: EMERGENCY MEDICINE
Payer: COMMERCIAL

## 2023-06-22 ENCOUNTER — OFFICE VISIT (OUTPATIENT)
Dept: FAMILY MEDICINE | Facility: CLINIC | Age: 58
End: 2023-06-22
Payer: COMMERCIAL

## 2023-06-22 VITALS
RESPIRATION RATE: 16 BRPM | TEMPERATURE: 98 F | DIASTOLIC BLOOD PRESSURE: 82 MMHG | WEIGHT: 148 LBS | HEIGHT: 64 IN | HEART RATE: 93 BPM | OXYGEN SATURATION: 94 % | BODY MASS INDEX: 25.27 KG/M2 | SYSTOLIC BLOOD PRESSURE: 138 MMHG

## 2023-06-22 VITALS
SYSTOLIC BLOOD PRESSURE: 152 MMHG | RESPIRATION RATE: 18 BRPM | TEMPERATURE: 98.7 F | DIASTOLIC BLOOD PRESSURE: 89 MMHG | OXYGEN SATURATION: 100 % | HEART RATE: 74 BPM

## 2023-06-22 DIAGNOSIS — F33.0 MILD EPISODE OF RECURRENT MAJOR DEPRESSIVE DISORDER (H): Primary | ICD-10-CM

## 2023-06-22 PROBLEM — F33.42 RECURRENT MAJOR DEPRESSIVE DISORDER, IN FULL REMISSION (H): Status: ACTIVE | Noted: 2023-06-22

## 2023-06-22 PROCEDURE — 99281 EMR DPT VST MAYX REQ PHY/QHP: CPT

## 2023-06-22 PROCEDURE — 99214 OFFICE O/P EST MOD 30 MIN: CPT | Performed by: FAMILY MEDICINE

## 2023-06-22 RX ORDER — LURASIDONE HYDROCHLORIDE 20 MG/1
20 TABLET, FILM COATED ORAL
Start: 2023-06-22 | End: 2023-06-30

## 2023-06-22 ASSESSMENT — PATIENT HEALTH QUESTIONNAIRE - PHQ9: SUM OF ALL RESPONSES TO PHQ QUESTIONS 1-9: 27

## 2023-06-22 NOTE — PROGRESS NOTES
"  Assessment & Plan   Problem List Items Addressed This Visit     Mild episode of recurrent major depressive disorder (H) - Primary     \" I want to go hospital\" microphonia flat affect.  She has an appointment tomorrow with her counselor.  Her psychiatrist has changed her to Latuda.  Meds updated.  Crisis numbers provided ED evaluation criteria reviewed.         Relevant Medications    lurasidone (LATUDA) 20 MG TABS tablet        Patient evaluation in ED reviewed       Depression Screening Follow Up        6/22/2023     1:56 PM   PHQ   PHQ-9 Total Score 27   Q9: Thoughts of better off dead/self-harm past 2 weeks Nearly every day                 Follow Up    Follow Up Actions Taken      Discussed the following ways the patient can remain in a safe environment:        Sher Duran MD  [facility]    Bernabe Jacome is a 57 year old, presenting for the following health issues:  Depression        6/22/2023     1:45 PM   Additional Questions   Roomed by Kimberly GARCIA         6/22/2023     1:45 PM   Patient Reported Additional Medications   Patient reports taking the following new medications Lurasidone 20mg     HPI     Abnormal Mood Symptoms  Onset: 03/01/2023    Description:   Depression: YES  Anxiety: Patient states she is not sure    Accompanying Signs & Symptoms:  Still participating in activities that you used to enjoy: Sometimes  Fatigue: YES  Irritability: Sometyimes  Difficulty concentrating: YES  Changes in appetite: YES  Problems with sleep: YES  Heart racing/beating fast : YES  Thoughts of hurting yourself or others:yes    History:   Recent stress: YES  Prior depression hospitalization: roughly eight years ago  Family history of depression: YES  Family history of anxiety: YES    Precipitating factors:   Alcohol/drug use: YES- to Alcohol rarely      Alleviating factors:  Exercise     Therapies Tried and outcome: currently on medications to help       How many servings of fruits and " "vegetables do you eat daily?  4 or more    On average, how many sweetened beverages do you drink each day (Examples: soda, juice, sweet tea, etc.  Do NOT count diet or artificially sweetened beverages)?   1    How many days per week do you exercise enough to make your heart beat faster? 3 or less    How many minutes a day do you exercise enough to make your heart beat faster? 9 or less    How many days per week do you miss taking your medication? 0          Review of Systems   As described      Objective    /82 (BP Location: Right arm, Patient Position: Sitting, Cuff Size: Adult Large)   Pulse 93   Temp 98  F (36.7  C) (Oral)   Resp 16   Ht 1.626 m (5' 4\")   Wt 67.1 kg (148 lb)   LMP 08/20/2015   SpO2 94%   BMI 25.40 kg/m    Body mass index is 25.4 kg/m .  Physical Exam   As described        Sher Duran MD              "

## 2023-06-22 NOTE — ED TRIAGE NOTES
Recently seen for worsening depression and suicidal thoughts, no plan. Has follow up appt today, but states unable to wait for appt.  present with pt. VSS. ABCs intact. A/Ox4.

## 2023-06-23 PROBLEM — F33.0 MILD EPISODE OF RECURRENT MAJOR DEPRESSIVE DISORDER (H): Status: ACTIVE | Noted: 2023-06-22

## 2023-06-23 NOTE — ASSESSMENT & PLAN NOTE
"\" I want to go hospital\" microphonia flat affect.  She has an appointment tomorrow with her counselor.  Her psychiatrist has changed her to Latuda.  Meds updated.  Crisis numbers provided ED evaluation criteria reviewed.  "

## 2023-06-29 ENCOUNTER — HOSPITAL ENCOUNTER (OUTPATIENT)
Facility: CLINIC | Age: 58
Setting detail: OBSERVATION
Discharge: HOME | End: 2023-06-30
Attending: EMERGENCY MEDICINE
Payer: COMMERCIAL

## 2023-06-29 DIAGNOSIS — F41.9 ANXIETY: ICD-10-CM

## 2023-06-29 DIAGNOSIS — F33.0 MILD EPISODE OF RECURRENT MAJOR DEPRESSIVE DISORDER (H): ICD-10-CM

## 2023-06-29 DIAGNOSIS — F33.2 MDD (MAJOR DEPRESSIVE DISORDER), RECURRENT SEVERE, WITHOUT PSYCHOSIS (H): ICD-10-CM

## 2023-06-29 DIAGNOSIS — F44.7 CONVERSION DISORDER WITH MIXED SYMPTOMS: ICD-10-CM

## 2023-06-29 DIAGNOSIS — R45.851 SUICIDAL IDEATION: ICD-10-CM

## 2023-06-29 PROBLEM — F32.A DEPRESSION WITH SUICIDAL IDEATION: Status: ACTIVE | Noted: 2023-06-29

## 2023-06-29 PROCEDURE — 90791 PSYCH DIAGNOSTIC EVALUATION: CPT

## 2023-06-29 PROCEDURE — 99285 EMERGENCY DEPT VISIT HI MDM: CPT | Mod: 25

## 2023-06-29 PROCEDURE — 250N000013 HC RX MED GY IP 250 OP 250 PS 637

## 2023-06-29 PROCEDURE — G0378 HOSPITAL OBSERVATION PER HR: HCPCS

## 2023-06-29 PROCEDURE — 99223 1ST HOSP IP/OBS HIGH 75: CPT

## 2023-06-29 RX ORDER — LURASIDONE HYDROCHLORIDE 40 MG/1
40 TABLET, FILM COATED ORAL EVERY EVENING
Status: DISCONTINUED | OUTPATIENT
Start: 2023-06-29 | End: 2023-06-30 | Stop reason: HOSPADM

## 2023-06-29 RX ORDER — TRAZODONE HYDROCHLORIDE 50 MG/1
50 TABLET, FILM COATED ORAL
Status: DISCONTINUED | OUTPATIENT
Start: 2023-06-29 | End: 2023-06-30 | Stop reason: HOSPADM

## 2023-06-29 RX ORDER — HYDROXYZINE HYDROCHLORIDE 25 MG/1
25-50 TABLET, FILM COATED ORAL 3 TIMES DAILY PRN
Status: DISCONTINUED | OUTPATIENT
Start: 2023-06-29 | End: 2023-06-30 | Stop reason: HOSPADM

## 2023-06-29 RX ORDER — LANOLIN ALCOHOL/MO/W.PET/CERES
3 CREAM (GRAM) TOPICAL
Status: DISCONTINUED | OUTPATIENT
Start: 2023-06-29 | End: 2023-06-30 | Stop reason: HOSPADM

## 2023-06-29 RX ADMIN — LURASIDONE HYDROCHLORIDE 40 MG: 40 TABLET, COATED ORAL at 19:02

## 2023-06-29 RX ADMIN — MELATONIN TAB 3 MG 3 MG: 3 TAB at 20:34

## 2023-06-29 RX ADMIN — HYDROXYZINE HYDROCHLORIDE 25 MG: 25 TABLET, FILM COATED ORAL at 19:02

## 2023-06-29 ASSESSMENT — COLUMBIA-SUICIDE SEVERITY RATING SCALE - C-SSRS
2. HAVE YOU ACTUALLY HAD ANY THOUGHTS OF KILLING YOURSELF?: YES
TOTAL  NUMBER OF INTERRUPTED ATTEMPTS SINCE LAST CONTACT: NO
SUICIDE, SINCE LAST CONTACT: NO
1. SINCE LAST CONTACT, HAVE YOU WISHED YOU WERE DEAD OR WISHED YOU COULD GO TO SLEEP AND NOT WAKE UP?: NO
ATTEMPT SINCE LAST CONTACT: NO
TOTAL  NUMBER OF ABORTED OR SELF INTERRUPTED ATTEMPTS SINCE LAST CONTACT: NO
5. HAVE YOU STARTED TO WORK OUT OR WORKED OUT THE DETAILS OF HOW TO KILL YOURSELF? DO YOU INTEND TO CARRY OUT THIS PLAN?: NO
REASONS FOR IDEATION SINCE LAST CONTACT: DOES NOT APPLY
6. HAVE YOU EVER DONE ANYTHING, STARTED TO DO ANYTHING, OR PREPARED TO DO ANYTHING TO END YOUR LIFE?: NO

## 2023-06-29 ASSESSMENT — ACTIVITIES OF DAILY LIVING (ADL)
ADLS_ACUITY_SCORE: 35

## 2023-06-29 NOTE — ED PROVIDER NOTES
History     Chief Complaint:  Depression and Suicidal Ideation     The history is provided by the patient.      Naa Paredes is a 57 year old female who presents with depression and suicidal ideation. The patient reports that she has been feeling increasingly depressed recently and is having suicidal thoughts. She adds that she has not been sleeping well, averaging about 5 hours a night and has little appetite. She states that she was started on Lurasidone on 6/22/23 and has been taking it as prescribed. She has a psychiatrist and therapist.     Independent Historian:   None - Patient Only    Review of External Notes:   I reviewed the ED note from 6/19/2023    Medications:    Lurasidone     Past Medical History:     Anxiety   Cervicalgia   Colon adenoma   Conversion disorder with mixed symptoms   Depression   Dysesthesia   Alcohol abuse   IBS   Lumbago   Mallet finger, right hand   Migraine    Nephrolithiasis   Perimenopause   Plantar fasciitis   Prediabetes   Seasonal allergic rhinitis   Thoracic outlet syndrome   Thrombocytopenia   Trichotillomania   Vitamin D deficiency disease   Abscess of Bartholin's gland    Past Surgical History:    Carpal tunnel release  Lithotripsy  Transanal endoscopic microsurgical resection of rectal polyp       Physical Exam     Patient Vitals for the past 24 hrs:   BP Temp Temp src Pulse Resp SpO2   06/29/23 1209 133/79 98.7  F (37.1  C) Temporal 94 16 95 %        Physical Exam  Nursing note and vitals reviewed.  Constitutional:  Appears comfortable.   HENT:    Mucus membranes are moist.  Cardiovascular:  Normal rate, regular rhythm.      Normal heart sounds.     No murmur heard.  Pulmonary/Chest:  Breath sounds clear. No respiratory distress.     No stridor.   Neurological:   Alert and appropriate. No focal weakness.  Gait is normal.  Skin:    Skin is warm and dry. No rash noted. No diaphoresis.   Psychiatric:   Depressed mood. Flat affect      Emergency Department Course      Emergency Department Course & Assessments:    PSS-3    Date and Time Over the past 2 weeks have you felt down, depressed, or hopeless? Over the past 2 weeks have you had thoughts of killing yourself? Have you ever attempted to kill yourself? When did this last happen? User   06/29/23 1209 yes yes no -- [facility]      C-SSRS ([place])    Date and Time Q1 Wished to be Dead (Past Month) Q2 Suicidal Thoughts (Past Month) Q3 Suicidal Thought Method Q4 Suicidal Intent without Specific Plan Q5 Suicide Intent with Specific Plan Q6 Suicide Behavior (Lifetime) Within the Past 3 Months? RETIRED: Level of Risk per Screen Screening Not Complete User   06/29/23 1209 yes yes no yes no no -- -- -- [facility]              Suicide assessment completed by mental health (D.E.C., LCSW, etc.)    Independent Interpretation (X-rays, CTs, rhythm strip):  None    Consultations/Discussion of Management or Tests:  None     Assessments:  ED Course as of 06/29/23 1308   Thu Jun 29, 2023   1220 I obtained history and examined the patient as noted above.        Social Determinants of Health affecting care:   None    Disposition:  The patient was transferred to [facility].     Impression & Plan      Medical Decision Making:  Patient comes in with suicidal ideation.  She has been seen a number of times but it just keeps getting worse.  Now she is not eating and sleeping very well.  She wants to come in and is cleared medically to go to [facility].  She may need inpatient mental health care but will need evaluation by [facility] and to see the psychiatrist again.  She will be transferred to [facility].    Diagnosis:    ICD-10-CM    1. Depression with suicidal ideation  F32.A     R45.851            Scribe Disclosure:  I, ROBY HAN, am serving as a scribe at 12:31 PM on 6/29/2023 to document services personally performed by Celeste Araya MD based on my observations and the provider's statements to me.      6/29/2023   Celeste Araya MD Powell, Tracy Alan,  MD  06/29/23 9514

## 2023-06-29 NOTE — PROGRESS NOTES
"57 year old female with history of depression received from ED due to increased depression. Reports poor sleep, no concentration. endorses SI. Nursing and risk assessments completed. Assessments reviewed with [facility] and physician. Admission information reviewed with patient. Patient given a tour of [facility] and instructions on using the facility. Questions regarding [facility] addressed. Pt safety search completed.     Patient states she has been feeling depressed for the last for months. States a few things happened 4 moths ago that she does not want to talk abut then states She bought some weight loss products  from a chinese company and when the package came it was open and nothing was inside. She also states there was trouble with a car company payment.  When asked about the increase in depression over the last 2 months she states\" Everyday depression. Every time I look in the mirror I feel ugly.  I can't concentrate to do anything, I can't eat much, Can't sleep and I speak softly.\" She endorses suicidal thinking states \"when I close my eyes I think about using a knife or rope. Just thoughts though.\" Denies any SI attempts. When asked about Ah she states \"I hear buzzing by ey ears. Denies VH. Mood is depressed, sad. Affect is quiet, depressed.     Intake done with [facility]  over the phone  "

## 2023-06-29 NOTE — ED PROVIDER NOTES
"EmPATH Unit - Initial Psychiatric Observation Note  [facility] Emergency Department  Observation Initiation Date: Jun 29, 2023    Naa Paredes MRN: [id number]   Age: 57 year old YOB: 1965     History     Chief Complaint   Patient presents with     Depression     Suicidal     HPI  Naa Paredes is a 57 year old female with a past history notable for depression, trauma, anxiety, and conversion disorder who presented to the emergency department with increasing depression and suicidal ideation. Patient has presented to the emergency department 4x in the last 10 days with a similar presentation. In the emergency department, Naa was determined to be medically stable and transferred to the EmPATH unit for psychiatric assessment.  Record review indicates one prior admission 6/19/2015-7/1/2015 at [facility] with depression. Subsequent records note numerous visit to primary care provider in which pateint notes that she has stopped medications or requesting to switch medications. ECT has been considered but patient has declined.Naa has an outpatient psychiatrist and therapist. Naa was recently started on Latuda on 6/22/23 by her outpatient psychiatrist. They have currently been in the emergency department for 7 hours.     [facility]  utilized for interview. On examination today Naa reports that she feels depressed. She has no appetite and is fatigued all the time. She endorses passive suicidal thoughts, denies plan and intent. She notes she has never had active thoughts. At times she has vivid memories and at other times her mind is \"blank\" and \"forgetful.\" She has periods where her heart is racing and she feels overwhelmed. She has felt like this for years and doesn't think it will ever get better. She denies AH/VH. She has a psychiatrist who started her on Latuda on 6/22 and increased the dose to 40mg three days ago. She doesn't think it has helped. She says she doesn't like " "to take medications because they don't \"act fast enough.\" She asks if there are medications that will work \"fast in one day.\" Per collateral from family, Naa often stops taking medications after only a few weeks because she doesn't think they work quick enough. Family notes that Naa has a therapist and psychiatrist but that follow through is very difficult. Naa has tried numerous medications but cannot recall which medications. She recently did [employer] testing which is what prompted her psychiatrist to try Latuda. She doesn't know when she will see her psychiatrist next. She has seen a therapist but doesn't know who they are. She says that after she went to the hospital they never scheduled her for an appointment. Per family, Naa has a therapist and psychiatrist that she meets with regularly. Discussed continuing with recent Latuda increase and hydroxyzine for anxiety. Naa again reports she doesn't like taking medications unless they work quickly. She does not want to stay at [facility] due to the open milieu and sleeping in a recliner but she knows her family wants her to stay tonight. She is agreeable to staying.     Past Medical History  Past Medical History:   Diagnosis Date     Ankle sprain and strain 05/02/2013     Anxiety 03/26/2015     Black stools 11/25/2015     CARDIOVASCULAR SCREENING; LDL GOAL LESS THAN 160 10/31/2010     Cervicalgia 12/07/2011     Closed dislocation of finger of right hand, subsequent encounter 02/06/2018     Colon adenoma 04/23/2015    Patient denies it. Renee Tiwari RN, 6/9/15.     Conversion disorder with mixed symptoms 06/14/2016     Depression with suicidal ideation 06/19/2015     Depressive disorder      Dysesthesia 09/21/2022     Elevated blood pressure reading without diagnosis of hypertension 11/25/2015     Elevated LFT's 06/04/2010     Fatigue due to depression 11/24/2015     H/O: alcohol abuse 06/04/2010     IBS (irritable bowel syndrome)      Knee pain " 10/19/2010     Low back pain 10/19/2010     Lumbago 12/07/2011     Major depression in complete remission (H) 08/13/2018     Major depressive disorder, recurrent, severe without psychotic features (H) 05/13/2016     Major depressive disorder, single episode, in partial remission (H) 11/24/2015     Mallet finger of right hand 02/15/2023     Migraine      Migraine with status migrainosus, not intractable, unspecified migraine type 07/14/2016     Mild episode of recurrent major depressive disorder (H) 6/22/2023    Psychiatry Dr Mena [facility]     Neck pain 10/19/2010     Nephrolithiasis     calcium oxalate     Pain in thoracic spine 01/02/2013     Perimenopause 03/26/2015     Plantar fasciitis 09/21/2022     Prediabetes 3/17/2023     Seasonal allergic rhinitis 10/20/2015     Thoracic outlet syndrome 07/20/2017     Thrombocytopenia (H) 3/17/2023     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     Vitamin D deficiency disease 06/08/2010     Past Surgical History:   Procedure Laterality Date     CARPAL TUNNEL RELEASE RT/LT Left      COLONOSCOPY N/A 06/09/2015    Procedure: COLONOSCOPY;  Surgeon: Steve Choi MD;  Location:  GI     COLONOSCOPY N/A 02/07/2023    Procedure: COLONOSCOPY (FV) with polypectomy using cold forceps;  Surgeon: Steve Choi MD;  Location:  GI     LITHOTRIPSY       TRANSANAL ENDOSCOPIC MICROSURGERY N/A 3/21/2023    Procedure: Transanal endoscopic microsurgical resection of rectal polyp, proctoscopy;  Surgeon: Hallie Quevedo MD;  Location:  OR     lurasidone (LATUDA) 20 MG TABS tablet      Allergies   Allergen Reactions     No Clinical Screening - See Comments      PN: LW CM1: Contrast Adverse Reaction - None Reaction :  PN: LW FI1: nka     Family History  Family History   Problem Relation Age of Onset     Heart Disease Mother      Colon Cancer Father      Cancer - colorectal Father         at age 65     Mental Illness Paternal Uncle      Breast Cancer No family hx of       "Ovarian Cancer No family hx of      Social History   Social History     Tobacco Use     Smoking status: Never     Smokeless tobacco: Never   Vaping Use     Vaping Use: Never used   Substance Use Topics     Alcohol use: Yes     Comment: occ wine     Drug use: No          Review of Systems  A medically appropriate review of systems was performed with pertinent positives and negatives noted in the HPI, and all other systems negative.    Physical Examination   BP: 133/79  Pulse: 94  Temp: 98.7  F (37.1  C)  Resp: 16  Height: 162.6 cm (5' 4\")  Weight: 67.8 kg (149 lb 8 oz)  SpO2: 95 %    Physical Exam  General: Appears stated age.   Neuro: Alert and fully oriented. Extremities appear to demonstrate normal strength on visual inspection.   Integumentary/Skin: no rash visualized, normal color    Psychiatric Examination   Appearance: awake, alert, adequately groomed and dressed in hospital scrubs  Attitude:  somewhat cooperative  Eye Contact:  poor   Mood:  depressed  Affect:  intensity is flat  Speech:  decreased prosody  Psychomotor Behavior:  no evidence of tardive dyskinesia, dystonia, or tics and intact station, gait and muscle tone  Thought Process:  linear  Associations:  no loose associations  Thought Content:  passive suicidal ideation present, no auditory hallucinations present and no visual hallucinations present  Insight:  limited  Judgement:  fair  Oriented to:  time, person, and place  Attention Span and Concentration:  fair  Recent and Remote Memory:  fair  Language: able to name/identify objects without impairment  Fund of Knowledge: intact with awareness of current and past events    ED Course     ED Course as of 06/29/23 1858   Thu Jun 29, 2023   1220 I obtained history and examined the patient as noted above.        Labs Ordered and Resulted from Time of ED Arrival to Time of ED Departure - No data to display    Assessments & Plan (with Medical Decision Making)   Patient presenting with increased " depression and anxiety. Patient notes extensive history of medication trials. Medication trials include cymbalta, Seroquel, trazodone, hydroxyzine, abilify, zoloft, mirtazapine, and wellbutrin. Collateral information from family note that patient often stops taking medications if they do not work quickly, difficult to determine if past medications have had adequate trials. Treatment plan focused on continuing Latuda recently started by psychiatrist targeting depression. Record review indicates ECT has been presented to patient in the past, consider referral given history of medication trials. Patient has established outpatient psychiatrist and therapist. Patient has previously been recommended to IOP and Day treatment but has not followed through with recommendations, patient would benefit from increased therapy. Nursing notes reviewed noting no acute issues.     I have reviewed the assessment completed by the [facility].     During the observation period, the patient did not require medications for agitation, and did not require restraints/seclusion for patient and/or provider safety.     The patient was found to have a psychiatric condition that would benefit from an observation stay in the emergency department for further psychiatric stabilization and/or coordination of a safe disposition. The observation plan includes serial assessments of psychiatric condition, potential administration of medications if indicated, further disposition pending the patient's psychiatric course during the monitoring period.     Preliminary diagnosis:    ICD-10-CM    1. Conversion disorder with mixed symptoms  F44.7       2. Anxiety  F41.9       3. MDD (major depressive disorder), recurrent severe, without psychosis (H)  F33.2       4. Suicidal ideation  R45.851            Treatment Plan:  -Continue Latuda 40mg daily targeting depression, patient notes this was recently increased by outpatient psychiatrist on 6/27/23, consider further  dose optimization   -Start hydroxyzine 25-50mg three times daily as needed for anxiety  -comfort medications ordered including melatonin and trazodone for sleep  -Referral for IOP or PHP following discharge   -Follow-up with established outpatient therapist and psychiatrist   -Medication education provided this visit including but not limited to: Rationale for medication, importance of medication adherence, medication interactions, common medication side effects, benefits of medications.  -Remain at EmPATH under observation with reassessment tomorrow     --  ADRIAN Jenkins CNP   [facility] EMERGENCY DEPT  EmPATH Unit     Nimo Najera APRN CNP  06/29/23 1954

## 2023-06-29 NOTE — CONSULTS
Diagnostic Evaluation Consultation  Crisis Assessment    Patient was assessed: In Person  Patient location: declocations: [facility]  Was a release of information signed: Yes. Providers included on the release: Nanci (last name unknown) Provider at [facility]      Referral Data and Chief Complaint  Naa is a 57 year old, who uses she/her pronouns, and presents to the ED with family/friends. Patient is referred to the ED by self. Patient is presenting to the ED for the following concerns: depression and suicide ideation. The Pt does speak english but found a phone cantonese  via language line to be supportive     Informed Consent and Assessment Methods     Patient is her own guardian. Writer met with patient and explained the crisis assessment process, including applicable information disclosures and limits to confidentiality, assessed understanding of the process, and obtained consent to proceed with the assessment. Patient was observed to be able to participate in the assessment as evidenced by the patient's willingness to meet and discuss her mental health. Assessment methods included conducting a formal interview with patient, review of medical records, collaboration with medical staff, and obtaining relevant collateral information from family and community providers when available.    Over the course of this crisis assessment provided reassurance, offered validation, engaged patient in problem solving and disposition planning, worked with patient on safety and aftercare planning, provided psychoeducation and facilitated family communication. Patient's response to interventions was Nodding, agreement.     Summary of Patient Situation    The Pt has presented to the ED 4 times in the last 10 days. The Pt left without being seen two times. Pt had a DEC assessment on 6/18/23 with a recommendation of increasing outpatient supports and a referral for Day Treatment.    Pt arrived to the ED due to concerns with  "ongoing depression.  Pt reports she has difficulty sleeping, lacks energy and motivation and experiencing passive suicidal ideation such as life not worth living.  Pt denies any active thoughts including method or plan.  Pt denies intent. Pt describes feeling stressed that her house is messy despite efforts to try to keep it tidy. Pt endorsed financial stressors.  Indicates worsening mood and anxiety for past 4 months.  Pt endorses the following symptoms, worry, lack of motivation, low energy, depressed mood, indecision and difficulty making decisions, poor sleep, chills and hot flashes, restlessness.      Per chart review, information from the Pt's  and the Pt endorse that the Pt has had multiple medication changes for the last 8 years. The Pt endorses she will take a medication for a few days and then stop because it does not have an immediate effect.    Of note, pt has history of persistent physical symptom complaints.  Chart review indicates hx of Conversion Disorder.           Brief Psychosocial History    Pt lives in apartment with adult daughter, Rosalva and grandson. Her  does not live in the household, but they have a supportive relationship and see each other regularly. College age daughter is home for the summer and living in the household. Pt unable to identify any hobbies or interests at this time. Pt worked for many years at [employer] but reports her mood is too low in the winter to work.    The Pt's  identified past hobbies and interests including dancing, trips to the [place], spending time with friends. The Pt has reduced her activities since COVID. The Pt identifies worries about aging and money. Pt \"gave money to a friend 4 months ago.\" The friend then moved and did not pat the Pt back.                Significant Clinical History    Pt has history of anxiety, depression and conversion disorder.  Pt reports she was hospitalized once about 8 years ago. Pt's  reports the " hospital was in [place] and they observed the Pt's medicine changes. Pt and her  believe that is why the Pt has been asking to go to the hospital for th last two weeks. Hx of individual therapy and medication use. Per chart review persistent physical complaints.     The Pt's  reports the Pt had genome testing recently and this was impetus for change to Latuda recently. Pt's  and daughters endorse the Pt does not regularly take her medication. Daughters believe the reason for the Pt's depression is her reduced appetite and not eating healthy food.           Collateral Information      The following information was received from Rosalva whose relationship to the patient is Adult daughter. Information was obtained via phone. Their phone number is [phone]  and they last had contact with patient on this morning.    What happened today: Not sure. Adult Daughter lives with the patient    What is different about patient's functioning: Daughter reports Pt is not eating healthy enough, sleeping too much.    Concern about alcohol/drug use: No    What do you think the patient needs: Healthy Food, more social contact    Has patient made comments about wanting to kill themselves/others:  No    If d/c is recommended, can they take part in safety/aftercare planning: Yes Daughter reports she works a lot but is willing to help support    Other information: NA    The following information was received from Javon whose relationship to the patient is . Information was obtained via phone. Their phone number is [phone] and they last had contact with patient on 6/28/23.    What happened today: The Pt has been asking family members to take pt to the hospital daily. Pt has not expressed why she wants to go. Pt's  endorses that the Pt did have an inpatient stay in an inpatient mental health unit 8 years ago in [place]. Pt's  suspects that is why she has been asking to go to the hospital.      What is different about patient's functioning: This has been the Pt's baseline for a few years. Pt is prescribed medications but does not take them regularly, Pt has had passive suicidal ideation for a few years.  reports that the Pt's mood changes have followed the seasons, depressive in the winter.  believes the Pt's depression is based around aging, and life changes.     Concern about alcohol/drug use: No    What do you think the patient needs: A medical nurse, better diet, more exercise, and taking medications as prescribed.    Has patient made comments about wanting to kill themselves/others:  No    If d/c is recommended, can they take part in safety/aftercare planning: Yes Pt's  is willing to observe the Pt take her medication each night and involve the Pt's daughters in this plan.    Other information: NA           Risk Assessment        [place] Suicide Severity Rating Scale Since Last Contact: 6/18/23    Suicidal Ideation (Since Last Contact)  1. Wish to be Dead (Since Last Contact): No  2. Non-Specific Active Suicidal Thoughts (Since Last Contact): Yes  3. Active Suicidal Ideation with any Methods (Not Plan) Without Intent to Act (Since Last Contact): No  4. Active Suicidal Ideation with Some Intent to Act, Without Specific Plan (Since Last Contact): No  5. Active Suicidal Ideation with Specific Plan and Intent (Since Last Contact): No  Suicidal Behavior (Since Last Contact)  Actual Attempt (Since Last Contact): No  Has subject engaged in non-suicidal self-injurious behavior? (Since Last Contact): No  Interrupted Attempts (Since Last Contact): No  Aborted or Self-Interrupted Attempt (Since Last Contact): No  Preparatory Acts or Behavior (Since Last Contact): No  Suicide (Since Last Contact): No  Actual/Potential Lethality (Most Lethal Attempt)  Most Lethal Attempt Date:  (NA)  C-SSRS Risk (Since Last Contact)  Calculated C-SSRS Risk Score (Since Last Contact): Low Risk    Validity  of evaluation is impacted by presenting factors during interview English is not the Pt's first language. Writer used an .     Environmental or Psychosocial Events: helplessness/hopelessness and social isolation  Chronic Risk Factors: history of psychiatric hospitalization and chronic and ongoing sleep difficulties   Warning Signs: hopelessness, pain (new or worsening) and anxiety, agitation, unable to sleep, sleeping all the time  Protective Factors: strong bond to family unit, community support, or employment, responsibilities and duties to others, including pets and children, lives in a responsibly safe and stable environment, able to access care without barriers, supportive ongoing medical and mental health care relationships, help seeking and good problem-solving, coping, and conflict resolution skills  Interpretation of Risk Scoring, Risk Mitigation Interventions and Safety Plan:         Does the patient have thoughts of harming others? No     Is the patient engaging in sexually inappropriate behavior?  no        Current Substance Abuse     Is there recent substance abuse? no     Was a urine drug screen or blood alcohol level obtained: No       Mental Status Exam     Affect: Flat   Appearance: Appropriate    Attention Span/Concentration: Attentive  Eye Contact: Avoidant and Engaged   Fund of Knowledge: Delayed    Language /Speech Content: Fluent   Language /Speech Volume: Soft    Language /Speech Rate/Productions: Minimally Responsive    Recent Memory: Intact   Remote Memory: Intact   Mood: Depressed and Sad    Orientation to Person: Yes    Orientation to Place: Yes   Orientation to Time of Day: Yes    Orientation to Date: Yes    Situation (Do they understand why they are here?): Yes    Psychomotor Behavior: Underactive    Thought Content: Clear   Thought Form: Tangential      History of commitment: No           Medication    Psychotropic medications: Yes. Pt is currently taking Latuda 40mg.  Medication compliant: No: Pt and her family endorse that the Pt will take her medication for a few days and stop. Recent medication changes: Yes Latuda increase from 20mg to 40mg this week  Medication changes made in the last two weeks: Yes: Yes. Pt is currently taking Latuda 40mg. Medication compliant: No: Pt and her family endorse that the Pt will take her medication for a few days and stop. Recent medication changes: Yes Latuda increase from 20mg to 40mg this week.       Current Care Team    Primary Care Provider: Dr. Duran last seen 6/22/23  Psychiatrist: Nanci (Last name unknown) [facility]  Therapist: Yes, name unknown Pt reports she sees therapist weekly virtually  : No     CTSS or ARMHS: No  ACT Team: No  Other: No      Diagnosis    300.11 Conversion Disorder (Functional Neurological Symptom Disorder)  (F44.6) With special sensory symptom   296.30 (F33.9) Major Depressive Disorder, Recurrent Episode, Unspecified _ and With seasonal pattern - by history   300.00 (F41.9) Unspecified Anxiety Disorder - by history     Clinical Summary and Substantiation of Recommendations    After therapeutic assessment, intervention and aftercare planning by ED care team and [facility] and in consultation with attending provider, the patient's circumstances and mental state were appropriate for outpatient management.  It is the recommendation of this clinician that pt be observed overnight in [facility] then discharge with outpatient mental health support including in person day treatment intake appointment, individual therapy and medication management.  At this time, the pt is not presenting as an acute risk to self or others.  Pt denies a plan or intent for suicide, no history of attempts, help seeking.    Pt will remain on EmPATH for Observation overnight with a planned morning discharge if she is able to safety plan.   The Pt's  has agreed to assist the pt with medication monitoring in person or on video.    The Pt's daughter Rosalva will call and speak to the pt's psychiatric provider tomorrow.   Assessment Details    Patient interview started at: 245 and completed at: 325pm.     Total time spent with the patient or their family: .75 hrs      CPT code(s) utilized: 81418 - Psychotherapy for Crisis - 60 (30-74*) min       SANDRA Mcclure, MSW, LICSW, Psychotherapist  [facility] - Triage & Transition Services  Callback: [phone]

## 2023-06-29 NOTE — ED TRIAGE NOTES
Pt depressed for about 4 months but last 2 months have been worse    Pt reports coming to hospital before for depression and is seeing a therapist and taking medications.       Pt having passive suicidal thoughts with no plan   Denies any hx of trying to hurt self before    Triage Assessment     Row Name 06/29/23 1204       Triage Assessment (Adult)    Airway WDL WDL       Cardiac WDL    Cardiac WDL WDL       Peripheral/Neurovascular WDL    Peripheral Neurovascular WDL WDL       Cognitive/Neuro/Behavioral WDL    Cognitive/Neuro/Behavioral WDL WDL

## 2023-06-30 VITALS
RESPIRATION RATE: 16 BRPM | WEIGHT: 149.5 LBS | BODY MASS INDEX: 25.52 KG/M2 | DIASTOLIC BLOOD PRESSURE: 80 MMHG | TEMPERATURE: 98 F | SYSTOLIC BLOOD PRESSURE: 133 MMHG | HEART RATE: 78 BPM | OXYGEN SATURATION: 97 % | HEIGHT: 64 IN

## 2023-06-30 PROCEDURE — 250N000013 HC RX MED GY IP 250 OP 250 PS 637

## 2023-06-30 PROCEDURE — G0378 HOSPITAL OBSERVATION PER HR: HCPCS

## 2023-06-30 PROCEDURE — 99239 HOSP IP/OBS DSCHRG MGMT >30: CPT

## 2023-06-30 RX ORDER — LURASIDONE HYDROCHLORIDE 40 MG/1
40 TABLET, FILM COATED ORAL
Qty: 30 TABLET | Refills: 0 | Status: SHIPPED | OUTPATIENT
Start: 2023-06-30

## 2023-06-30 RX ADMIN — HYDROXYZINE HYDROCHLORIDE 25 MG: 25 TABLET, FILM COATED ORAL at 07:50

## 2023-06-30 ASSESSMENT — COLUMBIA-SUICIDE SEVERITY RATING SCALE - C-SSRS
REASONS FOR IDEATION SINCE LAST CONTACT: DOES NOT APPLY
SUICIDE, SINCE LAST CONTACT: NO
TOTAL  NUMBER OF INTERRUPTED ATTEMPTS SINCE LAST CONTACT: NO
1. SINCE LAST CONTACT, HAVE YOU WISHED YOU WERE DEAD OR WISHED YOU COULD GO TO SLEEP AND NOT WAKE UP?: YES
TOTAL  NUMBER OF ABORTED OR SELF INTERRUPTED ATTEMPTS SINCE LAST CONTACT: NO
5. HAVE YOU STARTED TO WORK OUT OR WORKED OUT THE DETAILS OF HOW TO KILL YOURSELF? DO YOU INTEND TO CARRY OUT THIS PLAN?: NO
ATTEMPT SINCE LAST CONTACT: NO
2. HAVE YOU ACTUALLY HAD ANY THOUGHTS OF KILLING YOURSELF?: YES
6. HAVE YOU EVER DONE ANYTHING, STARTED TO DO ANYTHING, OR PREPARED TO DO ANYTHING TO END YOUR LIFE?: NO

## 2023-06-30 ASSESSMENT — ACTIVITIES OF DAILY LIVING (ADL)
ADLS_ACUITY_SCORE: 35

## 2023-06-30 NOTE — PROGRESS NOTES
"Pt up in recliner, reports not sleeping well, and \"still feel the same as I did when came in.\"  She denies SI , but feels hopeless, sad, and anxious, she requests \"medication.\" Given a PRN dose of atarax, and ordered breakfast.   "

## 2023-06-30 NOTE — PROGRESS NOTES
Pt had breakfast and took a shower, she has been visiting with peers and walking around the unit.

## 2023-06-30 NOTE — ED NOTES
Patient agrees to discharge plan. Discharge instructions reviewed with patient including follow-up care plan. Medications:ordered and sent to the pt's own pharmacy. Reviewed safety plan and outpatient resources. Denies SI and HI. All belongings that were brought into the hospital have been returned to patient. Escorted off the unit at 1610 accompanied by [facility] staff. Discharged to home via ride with daughter.

## 2023-06-30 NOTE — ED NOTES
"Patient met with the provider this evening and the plan is to stay overnight on observation status. Provider restarted her Latuda and added Hydroxyzine PRN for anxiety. Patient compliant with both medications this evening. She plans to sleep in sensory room B. Still endorsing passive suicidal thoughts and auditory hallucinations that are \"buzzing\" sounds. Denies HI. Presents with a flat affect and appears depressed in mood. Soft spoken. Withdrawn and isolative. Will continue to monitor overnight.   "

## 2023-06-30 NOTE — DISCHARGE INSTRUCTIONS
Aftercare Plan  If I am feeling unsafe or I am in a crisis, I will:   Contact my established care providers   Call the National Suicide Prevention Lifeline: 988  Go to the nearest emergency room   Call 911     Warning signs that I or other people might notice when a crisis is developing for me: thoughts of harming self or others, suicidal thoughts with some level of intent to act on thoughts.     Things I am able to do on my own to cope or help me feel better: go for a walk, eat some good food, play BINGO.     Grounding Techniques:  Try to notice where you are, your surroundings including the people, the sounds like the TV or radio.  Concentrate on your breathing. Take a deep cleansing breath from your diaphragm. Count the breaths as you exhale. Make sure you breath slowly.  Hold something that you find comforting, for some it may be a stuffed animal or a blanket. Notice how it feels in your hands. Is it hard or soft?  During a non-crisis time make a list of positive affirmations. Print them out and keep them handy for times of intense anxiety. At those times, read them aloud.  Try the Planet DDS game:  Name 5 things you can see in the room with you  Name 4 things you can feel ( chair on my back  or  feet on floor )   Name 3 things you can hear right now ( people talking  or  tv )   Name 2 things you can smell right now (or, 2 things you like the smell of)   Name 1 good thing about yourself  Create A Safe Place  Image a safe place -- it can be a real or imaginary place:   What do you see -- especially colors?   What sounds do you hear?   What sensations do you feel?   What smells do you smell?   What people or animals would you want in your safe place?   Imagine a protective bubble, wall or boundary around your safe place.   Imagine a door or gate with a guard at your safe place.   Image a lock and key to your safe place and only you can unlock it.  You can draw or make a collage that represents your safe place.   Choose  "a souvenir of your safe place -- a color, an object, a song.   Keep your image of your safe place so you can come back to it when you need to.     Things that I am able to do with others to cope or help me better: walk with someone, talk with someone you trust     Things I can use or do for distraction: going outdoors, listening to music     Changes I can make to support my mental health and wellness: maintain a regular eating and sleeping schedule, follow up with outpatient mental health therapist and medication management; consider assessment for day treatment or intensive outpatient programming.     People in my life that I can ask for help: daughters, , therapist, psychiatry provider.      Your [facility] has a mental health crisis team you can call 24/7: [facility]  [phone]    Additional resources and information:         Crisis Lines  Crisis Text Line  Text 081541  You will be connected with a trained live crisis counselor to provide support.    Por dayannaanol, texto  SHANTELL a 108408 o texto a 442-AYUDAME en WhatsApp    The [employer] Project (LGBTQ Youth Crisis Line)  1.590.456.2293  text START to 545-288      Community Resources  Fast Tracker  Linking people to mental health and substance use disorder resources  fastOLXckLocal Voice Median.org     [place] Mental Health Warm Line  Peer to peer support  Monday thru Saturday, 12 pm to 10 pm  684.553.7219 or 2.858.331.5681  Text \"Support\" to 83876    National [place] on Mental Illness (JASPAL)  188.833.8100 or 1.888.JASPAL.HELPS      Mental Health Apps  My3  https://my3app.org/    VirtualHopeBox  https://Booklr.org/apps/virtual-hope-box/      Additional Information  Today you were seen by a licensed mental health professional through Triage and Transition services, [facility] ([facility])  for a crisis assessment in the Emergency Department at [facility].  It is recommended that you follow up with your established providers " (psychiatrist, mental health therapist, and/or primary care doctor - as relevant) as soon as possible. Coordinators from [facility] will be calling you in the next 24-48 hours to ensure that you have the resources you need.  You can also contact [facility] coordinators directly at [phone]. You may have been scheduled for or offered an appointment with a mental health provider. [facility] maintains an extensive network of licensed behavioral health providers to connect patients with the services they need.  We do not charge providers a fee to participate in our referral network.  We match patients with providers based on a patient's specific needs, insurance coverage, and location.  Our first effort will be to refer you to a provider within your care system, and will utilize providers outside your care system as needed.

## 2023-06-30 NOTE — ED PROVIDER NOTES
EmPATH Unit - Psychiatric Observation Discharge Summary  [facility] Emergency Department  Discharge Date: 6/30/2023    Naa Paredes MRN: [id number]   Age: 57 year old YOB: 1965     Brief HPI & Initial ED Course     Chief Complaint   Patient presents with     Depression     Suicidal     HPI  Naa Paredes is a 57 year old female with a past history notable for depression, trauma, anxiety, and conversion disorder who presented to the emergency department with increasing depression and suicidal ideation. Patient has presented to the emergency department 4x in the last 10 days with a similar presentation. In the emergency department, Naa was determined to be medically stable and transferred to the EmPATH unit for psychiatric assessment.  Record review indicates one prior admission 6/19/2015-7/1/2015 at [facility] with depression. Subsequent records note numerous visit to primary care provider in which pateint notes that she has stopped medications or requesting to switch medications. ECT has been considered but patient has declined.Naa has an outpatient psychiatrist and therapist. Naa was recently started on Latuda on 6/22/23 by her outpatient psychiatrist. Naa has been in the emergency for 26 hours. Naa has been asking nursing staff if she can discharge as she thought she would be able to leave by 2pm today. She has declined  services today. On approach today she reports that the intensity of her suicidal ideation and depressive thoughts have decreased. She appears brighter today and smiled when speaking with writer. She has passive suicidal ideation, denies plan and intent, and reports this is her current baseline. Denies AH/VH. She is agreeable to continuing Latuda 40mg daily. Reviewed the importance of taking this with food consistently each day and that it can take time for medication to reach therapeutic levels. Naa verbalized understanding and says her  "daughter is going to help her take her medications consistently. Naa resides with her daughter and feels safe and supportive there. She has a psychiatric med provider through [facility] and a therapist that she will follow-up with. Discussed IOP referral, she declines this. At time of discharge, Naa feels ready to return home and will follow-up with outpatient providers. Medication refilled and sent to preferred [facility].         Physical Examination   BP: 126/80  Pulse: 74  Temp: 98  F (36.7  C)  Resp: 16  Height: 162.6 cm (5' 4\")  Weight: 67.8 kg (149 lb 8 oz)  SpO2: 95 %    Physical Exam  General: Appears stated age.   Neuro: Alert and fully oriented. Extremities appear to demonstrate normal strength on visual inspection.   Integumentary/Skin: no rash visualized, normal color    Psychiatric Examination   Appearance: awake, alert, adequately groomed and dressed in hospital scrubs  Attitude:  cooperative   Eye Contact:  poor   Mood:  depressed  Affect:  intensity is flat  Speech:  decreased prosody  Psychomotor Behavior:  no evidence of tardive dyskinesia, dystonia, or tics and intact station, gait and muscle tone  Thought Process:  linear  Associations:  no loose associations  Thought Content:  passive suicidal ideation present, no auditory hallucinations present and no visual hallucinations present; reports passive suicidal is baseline   Insight:  limited  Judgement:  fair  Oriented to:  time, person, and place  Attention Span and Concentration:  fair  Recent and Remote Memory:  fair  Language: able to name/identify objects without impairment  Fund of Knowledge: intact with awareness of current and past events    Results     ED Course as of 06/30/23 1500   Thu Jun 29, 2023   1220 I obtained history and examined the patient as noted above.        Labs Ordered and Resulted from Time of ED Arrival to Time of ED Departure - No data to display    Observation Course   The patient was found to have a " psychiatric condition that would benefit from an observation stay in the emergency department for further psychiatric stabilization and/or coordination of a safe disposition. The plan upon observation admission included serial assessments of psychiatric condition, potential administration of medications if indicated, further disposition pending the patient's psychiatric course during the monitoring period.     Serial assessments of the patient's psychiatric condition were performed. Nursing notes were reviewed. During the observation period, the patient did not require medications for agitation, and did not require restraints/seclusion for patient and/or provider safety.     After a period of working with the treatment team on the EmPATH unit, the patient's mental state improved to allow a safe transition to outpatient care. After counseling on the diagnosis, work-up, and treatment plan, the patient was discharged. Close follow-up with a psychiatrist and/or therapist was recommended and community psychiatric resources were provided. Patient is to return to the ED if any urgent or potentially life-threatening concerns.     Discharge Diagnoses:   Final diagnoses:   Conversion disorder with mixed symptoms   Anxiety   MDD (major depressive disorder), recurrent severe, without psychosis (H)   Suicidal ideation       Treatment Plan:  -Continue Latuda 40mg daily targeting depression, patient notes this was recently increased by outpatient psychiatrist on 6/27/23, consider further dose optimization   -Referral for IOP or PHP following discharge, patient declined this referral   -Follow-up with established outpatient therapist and psychiatrist at [facility]   -Medication education provided this visit including but not limited to: Rationale for medication, importance of medication adherence, medication interactions, common medication side effects, benefits of medications.  -Individual psychotherapy and outpatient  psychiatric care recommended for additional support and ongoing development of nonpharmacologic coping skills and strategies.    -Problem focused supportive therapy and education provided today related to patient's current and acute stressors, symptoms, and diagnoses.  -Discharge to home with outpatient services      At the time of discharge, the patient's acute suicide risk was determined to be low due to the following factors: Reduction in the intensity of mood/anxiety symptoms that preceded the admission, denial of suicidal thoughts, denies feeling helpless or helpless, not currently under the influence of alcohol or illicit substances, denies experiencing command hallucinations, no immediate access to firearms. The patient's acute risk could be higher if noncompliant with their treatment plan, medications, follow-up appointments or using illicit substances or alcohol. Protective factors include: social supports, children, stable housing  I spent more than 30 minutes on discharge day activities.    --  ADRIAN Jenkins CNP  [facility] EMERGENCY DEPT  EmPATH Unit     Nimo Najera APRN CNP  06/30/23 2373

## 2023-06-30 NOTE — PLAN OF CARE
Naa Paredes  June 29, 2023  Plan of Care Hand-off Note     Patient Care Path: Observation    Plan for Care:     After therapeutic assessment, intervention and aftercare planning by ED care team and [facility] and in consultation with attending provider, the patient's circumstances and mental state were appropriate for outpatient management.   It is the recommendation of this clinician that pt be observed overnight in EmPATH then discharge with outpatient mental health support including in person day treatment intake appointment, individual therapy and medication management.  At this time, the pt is not presenting as an acute risk to self or others.  Pt denies a plan or intent for suicide, no history of attempts, help seeking.    Pt will remain on EmPATH for Observation overnight with a planned morning discharge if she is able to safety plan.   The Pt's  has agreed to assist the pt with medication monitoring in person or on video.   Pt's  can assist with transport at discontinue on 6/30/23 if it is before 2pm  The Pt's daughter Rosalva will call and speak to the pt's psychiatric provider tomorrow.      Critical Safety Issues: NA    Overview:  This patient is a child/adolescent: No    This patient has additional special visitor precautions: No    Legal Status: Voluntary    Contacts:    Reggie Alejandro: Contact [phone] able to assist with discontinue  Daughter Rosalva [phone]      Psychiatry Consult:  Psychiatry Consult not requested because The Pt has an outpatient provider    Updated RN and Consulting Psychiatric Provider regarding plan of care.    SANDRA Mcclure

## 2023-06-30 NOTE — PROGRESS NOTES
[facility] Progress Note    Client Name: Naa Paredes  Date: June 30, 2023  Service Type:  Group Therapy  Session Start Time:  105p  Session End Time: 135p  Session Length: 30 min  Attendees: Patient and other group members  Facilitator:SANDRA Lea     Topic:   Check in, mindfulness exercise    Intervention:    Group process: support, challenge, affirm, psycho-education.     Response:  Patient did participate in group. Behavior in group was engaged, however, pt reports she does not speak much English.        SANDRA Starks

## 2023-06-30 NOTE — PROGRESS NOTES
[facility] Crisis Reassessment      Naa Paredes was reassessed at the request of care team  for the following reasons: suicidal ideatoin. Pt was first seen on 6/29/2023 by Mali Trujillo; see the initial assessment note for details.      Patient Presentation    Initial ED presentation details: Pt presented to the ED due to concerns of ongoing depression. Pt reported difficulty sleeping, lack of energy, and motivation, and passive suicidal ideation. Pt denied intention, method, or plan to act on SI.     Current patient presentation: Writer met with pt in consultation room in [facility]. Pt declined [employer] , however, later in assessment, writer requested to have  present to clarify some communication, pt was agreeable to this. Pt would like to discharge home, and is hoping her medication change will be effective. Pt reports she lives with her 2 dtrs and grandson. Pt reports feeling supported by her daughters and son. Pt reports she feels she would be able to reach out if symptoms worsened. Pt reports she has called [phone] in the past for crisis support. Writer also encouraged pt to call [phone] if needing immediate crisis support.     Pt reports she still experiences passive SI, however reports she would never do anything to end her life. Pt denies method, intent, or planning. Pt denies HI, does not endorse A/V H. However, pt reports that when she sleeps, she does not wake up rested and feels as if she has been working all night.     Pt identifies multiple coping strategies including: going for a walk, eating good food, and playing BINGO. Pt is future-oriented, expressing that she wants to find a job, although, is fearful of interviewing. Pt also reports wanting to make more money, and feeling overwhelmed by the amount of things she has to do at home. When asked what her plans were for the night, pt reports she plans to have her daughter bring her to  her prescription, and then will have to do some things  around the house.     Pt reports she sees her OP MH therapist at [facility] 1/week, but does not have anything currently scheduled. Writer encouraged pt to schedule, and pt signed JOSÉ for stephani to update Nanci, psychiatrist, of pt's visit.     Changes observed since initial assessment: Pt continues to reports passive SI, however, no method, intent, or planning. Pt would like to discharge home.       Risk of Harm    Owen Suicide Severity Rating Scale Since Last Contact: 6/30/2023  Suicidal Ideation (Since Last Contact)  1. Wish to be Dead (Since Last Contact): Yes  2. Non-Specific Active Suicidal Thoughts (Since Last Contact): Yes  3. Active Suicidal Ideation with any Methods (Not Plan) Without Intent to Act (Since Last Contact): No  4. Active Suicidal Ideation with Some Intent to Act, Without Specific Plan (Since Last Contact): No  5. Active Suicidal Ideation with Specific Plan and Intent (Since Last Contact): No  Suicidal Behavior (Since Last Contact)  Actual Attempt (Since Last Contact): No  Has subject engaged in non-suicidal self-injurious behavior? (Since Last Contact): No  Interrupted Attempts (Since Last Contact): No  Aborted or Self-Interrupted Attempt (Since Last Contact): No  Preparatory Acts or Behavior (Since Last Contact): No  Suicide (Since Last Contact): No  Actual/Potential Lethality (Most Lethal Attempt)  Most Lethal Attempt Date:  (NA)  C-SSRS Risk (Since Last Contact)  Calculated C-SSRS Risk Score (Since Last Contact): Low Risk    Validity of evaluation is not impacted by presenting factors during interview .   Comments regarding subjective versus objective responses to Owen tool: NA  Environmental or Psychosocial Events: helplessness/hopelessness and social isolation  Chronic Risk Factors: history of psychiatric hospitalization and chronic and ongoing sleep difficulties   Warning Signs: hopelessness, pain (new or worsening) and anxiety, agitation, unable to sleep, sleeping all the  time  Protective Factors: strong bond to family unit, community support, or employment, responsibilities and duties to others, including pets and children, lives in a responsibly safe and stable environment, able to access care without barriers, supportive ongoing medical and mental health care relationships, help seeking and good problem-solving, coping, and conflict resolution skills  Interpretation of Risk Scoring, Risk Mitigation Interventions and Safety Plan:  Pt reports passive SI, reports wishes to be dead, however, reports she would never act on that. Pt denies method, planning, and intent.           Does the patient have thoughts of harming others? No    Mental Status Exam   Affect: Appropriate   Appearance: Appropriate    Attention Span/Concentration: Attentive?    Eye Contact: Engaged   Fund of Knowledge: Appropriate    Language /Speech Content: Fluent   Language /Speech Volume: Soft    Language /Speech Rate/Productions: Normal    Recent Memory: Intact   Remote Memory: Intact   Mood: Anxious and Depressed    Orientation to Person: Yes    Orientation to Place: Yes   Orientation to Time of Day: Yes    Orientation to Date: Yes    Situation (Do they understand why they are here?): Yes    Psychomotor Behavior: Normal    Thought Content: Clear   Thought Form: Goal Directed and Intact       Additional Collateral Information   No additional collateral.       Therapeutic Intervention  The following therapeutic methodologies were employed when working with the patient: Establishing rapport, Active listening, Assess dimensions of crisis, Establish a discharge plan, Trauma-Informed Care and Safety planning. Patient response to intervention: engaged.    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 Pt would like to discharge home, declines assessment for programmatic care: day treatment or IOP at this time. Pt is hopeful her medication will be helpful. Pt is future-oriented as evidenced by [facility], and eventually obtain a job and make money. Pt describes her family as supportive, and plans to have her  assist her in setting up appointments for therapy and medication management with previously established providers thorough [facility].     Plan:    Disposition  Recommended disposition: Individual Therapy, Medication Management and Programmatic Care: IOP vs day treatment.         Reviewed case and recommendations with attending provider. Attending Name: Nimo Najera      Attending concurs with disposition: Yes      Patient and/or verified legal guardian concurs with disposition: Yes      Final disposition: Individual therapy  and Medication management.         Assessment Details  Total duration spent on the patient case in minutes: .50 hrs     CPT code(s) utilized: 99536 - Psychotherapy (with patient) - 30 (16-37*) min       Elio Daniels, JAZMIN, [facility]  Callback: [phone]      Aftercare Plan  If I am feeling unsafe or I am in a crisis, I will:   Contact my established care providers   Call the National Suicide Prevention Lifeline: 988  Go to the nearest emergency room   Call 571     Warning signs that I or other people might notice when a crisis is developing for me: thoughts of harming self or others, suicidal thoughts with some level of intent to act on thoughts.     Things I am able to do on my own to cope or help me feel better: go for a walk, eat some good food, play BINGO.     Grounding Techniques:    Try to notice where you are, your surroundings including the people, the sounds like the TV or  radio.    Concentrate on your breathing. Take a deep cleansing breath from your diaphragm. Count the breaths as you exhale. Make sure you breath slowly.    Hold something that you find comforting, for some it may be a stuffed animal or a blanket. Notice how it feels in your hands. Is it hard or soft?    During a non-crisis time make a list of positive affirmations. Print them out and keep them handy for times of intense anxiety. At those times, read them aloud.  Try the HomeLight game:    Name 5 things you can see in the room with you    Name 4 things you can feel ( chair on my back  or  feet on floor )     Name 3 things you can hear right now ( people talking  or  tv )     Name 2 things you can smell right now (or, 2 things you like the smell of)     Name 1 good thing about yourself  Create A Safe Place    Image a safe place -- it can be a real or imaginary place:     What do you see -- especially colors?     What sounds do you hear?     What sensations do you feel?     What smells do you smell?     What people or animals would you want in your safe place?     Imagine a protective bubble, wall or boundary around your safe place.     Imagine a door or gate with a guard at your safe place.     Image a lock and key to your safe place and only you can unlock it.    You can draw or make a collage that represents your safe place.     Choose a souvenir of your safe place -- a color, an object, a song.     Keep your image of your safe place so you can come back to it when you need to.     Things that I am able to do with others to cope or help me better: walk with someone, talk with someone you trust     Things I can use or do for distraction: going outdoors, listening to music     Changes I can make to support my mental health and wellness: maintain a regular eating and sleeping schedule, follow up with outpatient mental health therapist and medication management; consider assessment for day treatment or intensive outpatient  "programming.     People in my life that I can ask for help: daughters, , therapist, psychiatry provider.      Your county has a mental health crisis team you can call 24/7: [facility]  [phone]    Additional resources and information:         Crisis Lines  Crisis Text Line  Text 793790  You will be connected with a trained live crisis counselor to provide support.    Por espanol, texto  SHANTELL a 361962 o texto a 442-AYUDAME en WhatsApp    The [employer] (LGBTQ Youth Crisis Line)  7.787.410.3681  text START to 241-872      [employer]  Fast Tracker  Linking people to mental health and substance use disorder resources  PÃºbliKo.Curiyo     [place] Mental Health Warm Line  Peer to peer support  Monday thru Saturday, 12 pm to 10 pm  635.803.8052 or 4.694.251.3040  Text \"Support\" to 61223    National [place] on Mental Illness (JASPAL)  455.387.6825 or 1.888.JASPAL.HELPS      Mental Health Apps  My3  https://Zin.gl.org/    VirtualHopeBox  https://EasyPaintorg/apps/virtual-hope-box/      Additional Information  Today you were seen by a licensed mental health professional through Triage and Transition services, [facility] ([facility])  for a crisis assessment in the Emergency Department at [facility].  It is recommended that you follow up with your established providers (psychiatrist, mental health therapist, and/or primary care doctor - as relevant) as soon as possible. Coordinators from [facility] will be calling you in the next 24-48 hours to ensure that you have the resources you need.  You can also contact [facility] coordinators directly at [phone]. You may have been scheduled for or offered an appointment with a mental health provider. [facility] maintains an extensive network of licensed behavioral health providers to connect patients with the services they need.  We do not charge providers a fee to participate in our referral network.  We match patients with " providers based on a patient's specific needs, insurance coverage, and location.  Our first effort will be to refer you to a provider within your care system, and will utilize providers outside your care system as needed.

## 2023-06-30 NOTE — ED NOTES
Pt appears to have slept/rested during the noc shift in sensory room B, respirations even and unlabored during 15 minute safety checks.

## 2023-07-01 ENCOUNTER — TELEPHONE (OUTPATIENT)
Dept: BEHAVIORAL HEALTH | Facility: CLINIC | Age: 58
End: 2023-07-01
Payer: MEDICAID

## 2023-07-01 ENCOUNTER — HOSPITAL ENCOUNTER (EMERGENCY)
Facility: CLINIC | Age: 58
Discharge: PSYCHIATRIC OR SUBSTANCE USE FACILITY | End: 2023-07-02
Attending: EMERGENCY MEDICINE | Admitting: EMERGENCY MEDICINE
Payer: COMMERCIAL

## 2023-07-01 DIAGNOSIS — R45.851 SUICIDAL IDEATION: ICD-10-CM

## 2023-07-01 PROCEDURE — 250N000013 HC RX MED GY IP 250 OP 250 PS 637: Performed by: EMERGENCY MEDICINE

## 2023-07-01 PROCEDURE — 99285 EMERGENCY DEPT VISIT HI MDM: CPT | Mod: 25

## 2023-07-01 PROCEDURE — 90791 PSYCH DIAGNOSTIC EVALUATION: CPT

## 2023-07-01 RX ORDER — OLANZAPINE 10 MG/1
10 TABLET, ORALLY DISINTEGRATING ORAL 2 TIMES DAILY PRN
Status: DISCONTINUED | OUTPATIENT
Start: 2023-07-01 | End: 2023-07-02 | Stop reason: HOSPADM

## 2023-07-01 RX ORDER — LORAZEPAM 1 MG/1
1 TABLET ORAL EVERY 8 HOURS PRN
Status: DISCONTINUED | OUTPATIENT
Start: 2023-07-01 | End: 2023-07-02 | Stop reason: HOSPADM

## 2023-07-01 RX ORDER — HYDROXYZINE HYDROCHLORIDE 25 MG/1
25 TABLET, FILM COATED ORAL
Status: DISCONTINUED | OUTPATIENT
Start: 2023-07-01 | End: 2023-07-02 | Stop reason: HOSPADM

## 2023-07-01 RX ORDER — LURASIDONE HYDROCHLORIDE 20 MG/1
40 TABLET, FILM COATED ORAL EVERY EVENING
Status: DISCONTINUED | OUTPATIENT
Start: 2023-07-01 | End: 2023-07-02 | Stop reason: HOSPADM

## 2023-07-01 RX ADMIN — LURASIDONE HYDROCHLORIDE 40 MG: 20 TABLET, FILM COATED ORAL at 19:37

## 2023-07-01 ASSESSMENT — COLUMBIA-SUICIDE SEVERITY RATING SCALE - C-SSRS
REASONS FOR IDEATION SINCE LAST CONTACT: COMPLETELY TO END OR STOP THE PAIN (YOU COULDN'T GO ON LIVING WITH THE PAIN OR HOW YOU WERE FEELING)
ATTEMPT SINCE LAST CONTACT: NO
TOTAL  NUMBER OF PREPARATORY ACTS SINCE LAST CONTACT: 1
TOTAL  NUMBER OF INTERRUPTED ATTEMPTS SINCE LAST CONTACT: 1
TOTAL  NUMBER OF ABORTED OR SELF INTERRUPTED ATTEMPTS SINCE LAST CONTACT: NO
TOTAL  NUMBER OF INTERRUPTED ATTEMPTS SINCE LAST CONTACT: YES
SUICIDE, SINCE LAST CONTACT: NO
6. HAVE YOU EVER DONE ANYTHING, STARTED TO DO ANYTHING, OR PREPARED TO DO ANYTHING TO END YOUR LIFE?: YES
1. SINCE LAST CONTACT, HAVE YOU WISHED YOU WERE DEAD OR WISHED YOU COULD GO TO SLEEP AND NOT WAKE UP?: YES
5. HAVE YOU STARTED TO WORK OUT OR WORKED OUT THE DETAILS OF HOW TO KILL YOURSELF? DO YOU INTEND TO CARRY OUT THIS PLAN?: YES
2. HAVE YOU ACTUALLY HAD ANY THOUGHTS OF KILLING YOURSELF?: YES

## 2023-07-01 ASSESSMENT — ACTIVITIES OF DAILY LIVING (ADL)
ADLS_ACUITY_SCORE: 35
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
## 2023-07-01 NOTE — PHARMACY-ADMISSION MEDICATION HISTORY
Pharmacist Admission Medication History    Admission medication history is complete. The information provided in this note is only as accurate as the sources available at the time of the update.    Medication reconciliation/reorder completed by provider prior to medication history? No    Information Source(s): Patient, Family member and CareEverywhere/SureScripts via in-person    Pertinent Information: Pt reports only medication right now is latuda.    Changes made to PTA medication list:    Added: None    Deleted: None    Changed: None    Medication Affordability:       Allergies reviewed with patient and updates made in EHR: no    Medication History Completed By: Cheyenne [facility] 7/1/2023 5:18 PM    Prior to Admission medications    Medication Sig Last Dose Taking? Auth Provider Long Term End Date   lurasidone (LATUDA) 40 MG TABS tablet Take 1 tablet (40 mg) by mouth daily with food 6/30/2023 Yes Nimo Najera APRN CNP Yes
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
## 2023-07-01 NOTE — ED TRIAGE NOTES
Patient presents to ED with daughter for concern for suicidal ideation. Patient states she was discharged from [facility] this morning. Daughter received multiple phone calls from sister stating patient had knife to wrists stating she was going to kill herself. Does not want to go to [facility]

## 2023-07-01 NOTE — ED NOTES
Video Observation initiated, patient informed. Changed into scrubs and belongings secured per unit protocol.    Arelis Booker

## 2023-07-02 ENCOUNTER — HOSPITAL ENCOUNTER (INPATIENT)
Facility: CLINIC | Age: 58
LOS: 11 days | Discharge: HOME | End: 2023-07-13
Attending: STUDENT IN AN ORGANIZED HEALTH CARE EDUCATION/TRAINING PROGRAM | Admitting: STUDENT IN AN ORGANIZED HEALTH CARE EDUCATION/TRAINING PROGRAM
Payer: COMMERCIAL

## 2023-07-02 VITALS
WEIGHT: 150 LBS | OXYGEN SATURATION: 93 % | BODY MASS INDEX: 27.6 KG/M2 | TEMPERATURE: 98.4 F | RESPIRATION RATE: 18 BRPM | HEART RATE: 68 BPM | HEIGHT: 62 IN | DIASTOLIC BLOOD PRESSURE: 75 MMHG | SYSTOLIC BLOOD PRESSURE: 121 MMHG

## 2023-07-02 DIAGNOSIS — F31.4 SEVERE DEPRESSED BIPOLAR I DISORDER WITHOUT PSYCHOTIC FEATURES (H): Primary | Chronic | ICD-10-CM

## 2023-07-02 PROBLEM — R45.851 SUICIDAL IDEATION: Status: ACTIVE | Noted: 2023-07-02

## 2023-07-02 LAB
ALBUMIN SERPL BCG-MCNC: 4.7 G/DL (ref 3.5–5.2)
ALP SERPL-CCNC: 74 U/L (ref 35–104)
ALT SERPL W P-5'-P-CCNC: 26 U/L (ref 0–50)
AMPHETAMINES UR QL SCN: NORMAL
ANION GAP SERPL CALCULATED.3IONS-SCNC: 12 MMOL/L (ref 7–15)
AST SERPL W P-5'-P-CCNC: 22 U/L (ref 0–45)
BARBITURATES UR QL SCN: NORMAL
BASOPHILS # BLD AUTO: 0 10E3/UL (ref 0–0.2)
BASOPHILS NFR BLD AUTO: 0 %
BENZODIAZ UR QL SCN: NORMAL
BILIRUB SERPL-MCNC: 0.4 MG/DL
BUN SERPL-MCNC: 16.5 MG/DL (ref 6–20)
BZE UR QL SCN: NORMAL
CALCIUM SERPL-MCNC: 9.6 MG/DL (ref 8.6–10)
CANNABINOIDS UR QL SCN: NORMAL
CHLORIDE SERPL-SCNC: 103 MMOL/L (ref 98–107)
CHOLEST SERPL-MCNC: 267 MG/DL
CREAT SERPL-MCNC: 0.79 MG/DL (ref 0.51–0.95)
DEPRECATED HCO3 PLAS-SCNC: 26 MMOL/L (ref 22–29)
EOSINOPHIL # BLD AUTO: 0.1 10E3/UL (ref 0–0.7)
EOSINOPHIL NFR BLD AUTO: 2 %
ERYTHROCYTE [DISTWIDTH] IN BLOOD BY AUTOMATED COUNT: 12.2 % (ref 10–15)
GFR SERPL CREATININE-BSD FRML MDRD: 86 ML/MIN/1.73M2
GLUCOSE SERPL-MCNC: 138 MG/DL (ref 70–99)
HCT VFR BLD AUTO: 48.3 % (ref 35–47)
HDLC SERPL-MCNC: 66 MG/DL
HGB BLD-MCNC: 16.1 G/DL (ref 11.7–15.7)
IMM GRANULOCYTES # BLD: 0 10E3/UL
IMM GRANULOCYTES NFR BLD: 1 %
LDLC SERPL CALC-MCNC: 170 MG/DL
LYMPHOCYTES # BLD AUTO: 1.2 10E3/UL (ref 0.8–5.3)
LYMPHOCYTES NFR BLD AUTO: 21 %
MCH RBC QN AUTO: 31 PG (ref 26.5–33)
MCHC RBC AUTO-ENTMCNC: 33.3 G/DL (ref 31.5–36.5)
MCV RBC AUTO: 93 FL (ref 78–100)
MONOCYTES # BLD AUTO: 0.3 10E3/UL (ref 0–1.3)
MONOCYTES NFR BLD AUTO: 6 %
NEUTROPHILS # BLD AUTO: 3.8 10E3/UL (ref 1.6–8.3)
NEUTROPHILS NFR BLD AUTO: 70 %
NONHDLC SERPL-MCNC: 201 MG/DL
NRBC # BLD AUTO: 0 10E3/UL
NRBC BLD AUTO-RTO: 0 /100
OPIATES UR QL SCN: NORMAL
PCP QUAL URINE (ROCHE): NORMAL
PLATELET # BLD AUTO: 163 10E3/UL (ref 150–450)
POTASSIUM SERPL-SCNC: 3.8 MMOL/L (ref 3.4–5.3)
PROT SERPL-MCNC: 7.3 G/DL (ref 6.4–8.3)
RBC # BLD AUTO: 5.2 10E6/UL (ref 3.8–5.2)
SARS-COV-2 RNA RESP QL NAA+PROBE: NEGATIVE
SODIUM SERPL-SCNC: 141 MMOL/L (ref 136–145)
TRIGL SERPL-MCNC: 156 MG/DL
TSH SERPL DL<=0.005 MIU/L-ACNC: 1.36 UIU/ML (ref 0.3–4.2)
WBC # BLD AUTO: 5.4 10E3/UL (ref 4–11)

## 2023-07-02 PROCEDURE — 85025 COMPLETE CBC W/AUTO DIFF WBC: CPT | Performed by: EMERGENCY MEDICINE

## 2023-07-02 PROCEDURE — 87635 SARS-COV-2 COVID-19 AMP PRB: CPT

## 2023-07-02 PROCEDURE — 124N000002 HC R&B MH UMMC

## 2023-07-02 PROCEDURE — 36415 COLL VENOUS BLD VENIPUNCTURE: CPT | Performed by: EMERGENCY MEDICINE

## 2023-07-02 PROCEDURE — 80061 LIPID PANEL: CPT | Performed by: EMERGENCY MEDICINE

## 2023-07-02 PROCEDURE — 250N000013 HC RX MED GY IP 250 OP 250 PS 637

## 2023-07-02 PROCEDURE — 80307 DRUG TEST PRSMV CHEM ANLYZR: CPT | Performed by: EMERGENCY MEDICINE

## 2023-07-02 PROCEDURE — 80053 COMPREHEN METABOLIC PANEL: CPT | Performed by: EMERGENCY MEDICINE

## 2023-07-02 PROCEDURE — 84443 ASSAY THYROID STIM HORMONE: CPT | Performed by: EMERGENCY MEDICINE

## 2023-07-02 PROCEDURE — 99222 1ST HOSP IP/OBS MODERATE 55: CPT | Mod: AI | Performed by: STUDENT IN AN ORGANIZED HEALTH CARE EDUCATION/TRAINING PROGRAM

## 2023-07-02 RX ORDER — OLANZAPINE 10 MG/2ML
10 INJECTION, POWDER, FOR SOLUTION INTRAMUSCULAR 3 TIMES DAILY PRN
Status: DISCONTINUED | OUTPATIENT
Start: 2023-07-02 | End: 2023-07-13 | Stop reason: HOSPADM

## 2023-07-02 RX ORDER — HYDROXYZINE HYDROCHLORIDE 25 MG/1
25 TABLET, FILM COATED ORAL EVERY 4 HOURS PRN
Status: DISCONTINUED | OUTPATIENT
Start: 2023-07-02 | End: 2023-07-13 | Stop reason: HOSPADM

## 2023-07-02 RX ORDER — OLANZAPINE 10 MG/1
10 TABLET ORAL 3 TIMES DAILY PRN
Status: DISCONTINUED | OUTPATIENT
Start: 2023-07-02 | End: 2023-07-13 | Stop reason: HOSPADM

## 2023-07-02 RX ORDER — POLYETHYLENE GLYCOL 3350 17 G/17G
17 POWDER, FOR SOLUTION ORAL DAILY PRN
Status: DISCONTINUED | OUTPATIENT
Start: 2023-07-02 | End: 2023-07-13 | Stop reason: HOSPADM

## 2023-07-02 RX ORDER — LURASIDONE HYDROCHLORIDE 40 MG/1
40 TABLET, FILM COATED ORAL DAILY
Status: DISCONTINUED | OUTPATIENT
Start: 2023-07-02 | End: 2023-07-13 | Stop reason: HOSPADM

## 2023-07-02 RX ORDER — MAGNESIUM HYDROXIDE/ALUMINUM HYDROXICE/SIMETHICONE 120; 1200; 1200 MG/30ML; MG/30ML; MG/30ML
30 SUSPENSION ORAL EVERY 4 HOURS PRN
Status: DISCONTINUED | OUTPATIENT
Start: 2023-07-02 | End: 2023-07-13 | Stop reason: HOSPADM

## 2023-07-02 RX ORDER — LANOLIN ALCOHOL/MO/W.PET/CERES
3 CREAM (GRAM) TOPICAL
Status: DISCONTINUED | OUTPATIENT
Start: 2023-07-02 | End: 2023-07-13 | Stop reason: HOSPADM

## 2023-07-02 RX ORDER — ACETAMINOPHEN 325 MG/1
650 TABLET ORAL EVERY 4 HOURS PRN
Status: DISCONTINUED | OUTPATIENT
Start: 2023-07-02 | End: 2023-07-13 | Stop reason: HOSPADM

## 2023-07-02 RX ADMIN — LURASIDONE HYDROCHLORIDE 40 MG: 40 TABLET, COATED ORAL at 17:11

## 2023-07-02 ASSESSMENT — ACTIVITIES OF DAILY LIVING (ADL)
LAUNDRY: WITH SUPERVISION
ADLS_ACUITY_SCORE: 35
CHANGE_IN_FUNCTIONAL_STATUS_SINCE_ONSET_OF_CURRENT_ILLNESS/INJURY: NO
HEARING_DIFFICULTY_OR_DEAF: NO
DRESS: INDEPENDENT;SCRUBS (BEHAVIORAL HEALTH)
WALKING_OR_CLIMBING_STAIRS_DIFFICULTY: NO
ADLS_ACUITY_SCORE: 35
ORAL_HYGIENE: INDEPENDENT
DIFFICULTY_COMMUNICATING: NO
DRESS: SCRUBS (BEHAVIORAL HEALTH);INDEPENDENT
ADLS_ACUITY_SCORE: 35
ADLS_ACUITY_SCORE: 30
ADLS_ACUITY_SCORE: 30
ORAL_HYGIENE: INDEPENDENT
ADLS_ACUITY_SCORE: 30
ADLS_ACUITY_SCORE: 30
CONCENTRATING,_REMEMBERING_OR_MAKING_DECISIONS_DIFFICULTY: NO
VISION_MANAGEMENT: GLASSES
DRESSING/BATHING_DIFFICULTY: NO
WEAR_GLASSES_OR_BLIND: YES
TOILETING_ISSUES: NO
ADLS_ACUITY_SCORE: 35
FALL_HISTORY_WITHIN_LAST_SIX_MONTHS: NO
DIFFICULTY_EATING/SWALLOWING: NO
ADLS_ACUITY_SCORE: 35
ADLS_ACUITY_SCORE: 30
ADLS_ACUITY_SCORE: 30
DOING_ERRANDS_INDEPENDENTLY_DIFFICULTY: NO
HYGIENE/GROOMING: INDEPENDENT

## 2023-07-02 NOTE — ED NOTES
IP MH Referral Acuity Rating Score (RARS)    LMHP complete at referral to IP MH, with [facility]; and, daily while awaiting IP MH placement. Call score to PPS.  CRITERIA SCORING   New [facility] and Involuntary for IP MH (not adolescent) 1/1   Boarding over 24 hours 0/1   Vulnerable adult at least 55+ with multiple co morbidities; or, Patient age 11 or under 0/1   Suicide ideation without relief of precipitating factors 1/1   Current plan for suicide 0/1   Current plan for homicide 0/1   Imminent risk or actual attempt to seriously harm another without relief of factors precipitating the attempt 0/1   Severe dysfunction in daily living (ex: complete neglect for self care, extreme disruption in vegetative function, extreme deterioration in social interactions) 1/1   Recent (last 2 weeks) or current physical aggression in the ED 0/1   Restraints or seclusion episode in ED 0/1   Verbal aggression, agitation, yelling, etc., while in the ED 0/1   Active psychosis with psychomotor agitation or catatonia 0/1   Need for constant or near constant redirection (from leaving, from others, etc).  0/1   Intrusive or disruptive behaviors 0/1   TOTAL Acuity Total Score: 3     SANDRA Flanagan

## 2023-07-02 NOTE — PLAN OF CARE
"Naa Paredes  July 1, 2023  Plan of Care Hand-off Note     Patient Care Path: Inpatient Mental Health    Plan for Care:     It is the recommendation of this writer that pt be admitted to an inpatient psychiatric unit for further stabilization of her mental health symptoms. Pt had an interrupted suicide attempt today where she held a knife to her wrist with intent to end her life until her daughter took it away from her. Pt is minimizing the extent of her mental health symptoms, telling this writer that she held the knife to her wrist \"as a joke\" and telling triage staff that her daughter made up her interrupted attempt.     Critical Safety Issues: interrupted suicide attempt    Overview:  This patient is a child/adolescent: No    This patient has additional special visitor precautions: No    Legal Status: [facility] expiring 07/06/23 @ 8:16 PM    Contacts:   Rosalva Paredes, daughter, PH: [phone]  Genesis Paredes, daughter, PH: [phone]    Psychiatry Consult:  Adult Psychiatry Consult requested related to [facility]. Psychiatry IP Consult Order Placed: Yes    Updated RN and Attending Provider regarding plan of care.    SANDRA Flanagan      "

## 2023-07-02 NOTE — PROVIDER NOTIFICATION
07/02/23 1455   Patient Belongings   Did you bring any home meds/supplements to the hospital?  No   Patient Belongings Put in Hospital Secure Location (Security or Locker, etc.) clothing   Belongings Search Yes   Clothing Search Yes       1 pair of tennis shoes, clothing, hat  ..A               Admission:  I am responsible for any personal items that are not sent to the safe or pharmacy.  [facility] is not responsible for loss, theft or damage of any property in my possession.    Signature:  _________________________________ Date: _______  Time: _____                                              Staff Signature:  ____________________________ Date: ________  Time: _____      2nd Staff person, if patient is unable/unwilling to sign:    Signature: ________________________________ Date: ________  Time: _____     Discharge:  [facility] has returned all of my personal belongings:    Signature: _________________________________ Date: ________  Time: _____                                          Staff Signature:  ____________________________ Date: ________  Time: _____

## 2023-07-02 NOTE — PLAN OF CARE
Pt alert and oriented, able to verbalize needs know with minimal response. Asked to take a shower and it was offered. Pt was out in the hallway pacing, guarded and somehow isolated, cooperative upon approach. Pt endorses anxiety 4/10, depression 5/10, denies  A/V Hallucination, SI and all other mental health issues. Pt ate in the dining room and 100% consumed. Pt is compliant with medication, hygiene is adequate, safety contracted in the unit, no any behaviors/concerns noted.    Problem: Depression  Goal: Improved Mood  Outcome: Progressing     Problem: Suicidal Behavior  Goal: Suicidal Behavior is Absent or Managed  Outcome: Progressing   Goal Outcome Evaluation:    Plan of Care Reviewed With: patient

## 2023-07-02 NOTE — ED NOTES
Patient does not speak English very well, she can understand brief questions she said that she was only kidding when she told her daughter she was going to kill her self.  Pleasant, cooperative.  Dr. Cavazos called and she wanted me to ask patient if she would be willing to use the  services , patient agreed to the  services when she arrives to [facility].
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
## 2023-07-02 NOTE — TELEPHONE ENCOUNTER
No appropriate beds are currently available within the  system. Bed search update (metro) @ 11:53PM       [facility]: @ cap per website  [facility]: @ cap per website  [facility]: @ cap per website. [facility] @ 11:53PM reports they are full for the weekend  [facility]: @ cap per website  [facility]: @ cap per website  [facility]: @ cap per website  [facility]: @ cap per website  [facility]: @ cap per website  [facility]: @ cap per website    Pt remains on work list until appropriate placement is available    Bed became available on station 20 in shared room. Requested on-call resident review @ 05:23    On call resident accepts for 20NB @ 05:49 but requests additional labs prior to transfer: CBC, CMP, lipid panel, TSH and UDS. Placed in queue for 20NB    R: 20 / [facility]    Notified unit @ 05:53  Disposition given to ED @ 05:59. Relayed on-call's request for labs and notified that pt will have a roommate
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
## 2023-07-03 ENCOUNTER — PATIENT OUTREACH (OUTPATIENT)
Dept: CARE COORDINATION | Facility: CLINIC | Age: 58
End: 2023-07-03
Payer: MEDICAID

## 2023-07-03 LAB — HBA1C MFR BLD: 5.8 %

## 2023-07-03 PROCEDURE — 124N000002 HC R&B MH UMMC

## 2023-07-03 PROCEDURE — 250N000013 HC RX MED GY IP 250 OP 250 PS 637

## 2023-07-03 PROCEDURE — 99221 1ST HOSP IP/OBS SF/LOW 40: CPT | Mod: AI

## 2023-07-03 PROCEDURE — G0177 OPPS/PHP; TRAIN & EDUC SERV: HCPCS

## 2023-07-03 PROCEDURE — 83036 HEMOGLOBIN GLYCOSYLATED A1C: CPT

## 2023-07-03 PROCEDURE — 36415 COLL VENOUS BLD VENIPUNCTURE: CPT

## 2023-07-03 RX ADMIN — LURASIDONE HYDROCHLORIDE 40 MG: 40 TABLET, COATED ORAL at 17:48

## 2023-07-03 RX ADMIN — HYDROXYZINE HYDROCHLORIDE 25 MG: 25 TABLET, FILM COATED ORAL at 17:47

## 2023-07-03 ASSESSMENT — ACTIVITIES OF DAILY LIVING (ADL)
ADLS_ACUITY_SCORE: 30
ORAL_HYGIENE: INDEPENDENT
ADLS_ACUITY_SCORE: 30
LAUNDRY: WITH SUPERVISION
DRESS: INDEPENDENT
HYGIENE/GROOMING: INDEPENDENT
ADLS_ACUITY_SCORE: 30
ADLS_ACUITY_SCORE: 30

## 2023-07-03 NOTE — CARE PLAN
07/03/23 1341   Individualization/Patient Specific Goals   Patient Personal Strengths stable living environment;family/social support   Patient Vulnerabilities lacks insight into illness   Anxieties, Fears or Concerns Mental health concerns, does not want to have depression   Individualized Care Needs None   Patient/Family-Specific Goals (Include Timeframe) Wants to feel safe with self   Interprofessional Rounds   Summary Reason for admission   Participants CTC;psychiatrist;other (see comments);nursing   Behavioral Team Discussion   Participants Dr. Cavazos, Psychistry resident, 3rd year med student, Nayeli URBINA, [facility]   Progress Initial assessment   Anticipated length of stay 5-7 days   Continued Stay Criteria/Rationale admitted for SA and worsening depression. Needs clinical stabilization and medication mgmt   Medical/Physical No acute medical concerns   Precautions See below   Plan Psychiatry Team will meet with patient daily to assess psychiatric needs and to discuss medication options/side effects; during hospitalization patient will be encouraged to attend therapy groups and to participate in unit programming. CTC will coordinate disposition and aftercare plan   Rationale for change in precautions or plan None   Safety Plan See below   Anticipated Discharge Disposition home with family     PRECAUTIONS AND SAFETY    Behavioral Orders   Procedures    Code 1 - Restrict to Unit    Routine Programming     As clinically indicated    Status 15     Every 15 minutes.    Suicide precautions     Patients on Suicide Precautions should have a Combination Diet ordered that includes a Diet selection(s) AND a Behavioral Tray selection for Safe Tray - with utensils, or Safe Tray - NO utensils         Safety  Safety WDL: WDL  [facility], dining room  Safety Measures: safety rounds completed

## 2023-07-03 NOTE — PROGRESS NOTES
[facility]    Background: Transitional Care Management program identified per system criteria and reviewed by [facility] team for possible outreach.    Assessment: Upon chart review, [facility] Team member will not proceed with patient outreach related to this episode of Transitional Care Management program due to reason below:    Patient has presented to Emergency Department, been readmitted to hospital, or transferred to another hospital.    Plan: Transitional Care Management episode addressed appropriately per reason noted above.      KELLE Godoy  [facility], [facility]    *Connected Care Resource Team does NOT follow patient ongoing. Referrals are identified based on internal discharge reports and the outreach is to ensure patient has an understanding of their discharge instructions.

## 2023-07-03 NOTE — PLAN OF CARE
"Pt is visible in the milieu. Quiet and guarded, keeps to self , not social with peers. Pt used the exercise bike this shift. She was able to attend group. Pt is slow to respond to questions. Voice is soft. Pt denied anxiety, depression, SI and hallucinations. Pt is eating and drinking well.      Problem: Adult Behavioral Health Plan of Care  Goal: Patient-Specific Goal (Individualization)  Description: You can add care plan individualizations to a care plan. Examples of Individualization might be:  \"Parent requests to be called daily at 9am for status\", \"I have a hard time hearing out of my right ear\", or \"Do not touch me to wake me up as it startles me\".  Outcome: Progressing     Problem: Depression  Goal: Improved Mood  Outcome: Progressing     Problem: Suicidal Behavior  Goal: Suicidal Behavior is Absent or Managed  Outcome: Progressing     Problem: Sleep Disturbance  Goal: Adequate Sleep/Rest  Outcome: Progressing   Goal Outcome Evaluation:    Plan of Care Reviewed With: patient                   "

## 2023-07-03 NOTE — PHARMACY-ADMISSION MEDICATION HISTORY
Please see Admission Medication History completed on 7/1/23 under previous encounter at [facility] for information regarding prior to admission medications.    Ana Jj, PharmD, BCPS

## 2023-07-03 NOTE — PLAN OF CARE
07/02/23      Group Therapy Session   Group Attendance This patient attended the group session   Time Session Began 2010   Time Session Ended 2045   Total Time (minutes) 35   Total # Attendees 3   Group Type Psychotherapy   Group Topic Covered Activities to help distract from emotional preoccupation. Reviewed the basis of neuro plasticity, the hope that comes with it, and activities that may help the brain be resilient.    Group Session Detail Group reviewed ways to leverage helpful, positive activities that evoke positive, healthy emotions. Prompted the group to discuss their experiences with activities that aid positive emotions. Discussed specific examples of ways to cope with negative emotions, and ways to observe that a new skill set has been built, as well as ways to nurture those positive, new skill.  Discussed distraction techniques where patients identified their preferred distraction objects. Included in the examples were drawings that have repetitive patterns, journal writing, focusing on healthy thoughts.    Patient Response/Contribution This patient attended and stayed for the entire group. Pt is newly admitted. Listened quietly. Said her name, but made no other contribution. Reported that she learned something good from the group.   Patient Participation Detail Attended and stayed for the entire group.

## 2023-07-03 NOTE — PLAN OF CARE
Problem: Sleep Disturbance  Goal: Adequate Sleep/Rest  Outcome: Progressing    Pt appears to have  slept for  7 hours. Pt did not complain of pain or discomfort, and no PRN medications were given. 15 minutes safety checks were in place. Staff will continue to offer support to pt.
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
## 2023-07-03 NOTE — PROGRESS NOTES
"  ----------------------------------------------------------------------------------------------------------  [facility]  Psychiatry Progress Note  Hospital Day #1     Interim History:     The patient's care was discussed with the treatment team and chart notes were reviewed.    Vitals: VSS  Sleep: 7 hours (07/03/23 0600)  Scheduled medications: Took all scheduled medications as prescribed  Psychiatric PRN medications: No psychiatric prns given    Staff Report:   No acute events or safety concerns overnight. Please see staff notes for details.      Subjective:     Patient Interview:  Naa Paredes was interviewed in the interview room today. She states that she \"feels so bad\" and that she has low energy today. Naa notes that she has been feeling this way everyday for the past 2 months. She states that she has been feeling more like dying recently. Naa states that her depressive symptoms are worse in the morning. She notes that she is \"scared and worried she'll go crazy\". Naa states that she takes her medications, but feels the same. She denies symptoms of leobardo and any hallucinations. Naa notes that she has difficulty concentrating and cannot watch TV/read a book. She states that when she cooks on her stove, she sometimes forgets to turn the stove off. Naa notes that she has difficulty controlling her body and doesn't have good balance. She states that it is noisy when she wakes up, but people are speaking in English so she doesn't understand what they are saying. Naa notes that she feels purposeless and like her brain is blank. She also states that she feels weak and has been eating but doesn't have an appetite. Naa notes that she does not use alcohol because she doesn't think it will help her.     Phone call with daughters:  Daughter Rosalva suggested to reach out to other daughters Carline or Genesis who had more information, but noted that " during the past two months her mother has been getting worse.  Other daughter mentioned that her mom's low mood started getting worse mid-April, as she wakes up anxious and scared to be alone. She would wake her up, and would self isolate more and more, only leaving the [place] to the casino twice a week for gambling with her ex . She says that the patient has been overwhelmed lately with the presence of her grandson at home as well as her mother's illness and the need to care for both, whoch could have contibuted to her current state. 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 She remembers her mother was hospitalized 8 years ago for depression but doesn't remember any details, and only knows that a regimen of Latuda was started lately due to gene testing and that the dose was increased some days ago.    ROS:  Patient has no bothersome physical symptoms  Patient denies acute concerns     Objective:     Vitals:  /61 (BP Location: Right arm, Patient Position: Sitting)   Pulse 79   Temp 98.2  F (36.8  C) (Oral)   Resp 16   Wt 67.1 kg (148 lb)   LMP 08/20/2015   SpO2 100%   BMI 27.07 kg/m      Allergies:  Allergies   Allergen Reactions    No Clinical Screening - See Comments      PN: LW CM1: Contrast Adverse Reaction - None Reaction :  PN: LW FI1: nka       Current Medications:  Scheduled:  Current Facility-Administered Medications   Medication Dose Route Frequency    lurasidone  40 mg Oral Daily       PRN:  Current Facility-Administered Medications   Medication Dose Route Frequency    acetaminophen  650 mg Oral Q4H PRN    alum & mag hydroxide-simethicone  30 mL Oral Q4H PRN    hydrOXYzine  25 mg Oral Q4H PRN    melatonin  3 mg Oral At Bedtime PRN    OLANZapine  10 mg Oral TID PRN    Or    OLANZapine  10 mg Intramuscular TID PRN    polyethylene glycol  17 g Oral Daily PRN       Labs and  "Imaging:  New results:   Recent Results (from the past 24 hour(s))   Hemoglobin A1c    Collection Time: 07/03/23  8:06 AM   Result Value Ref Range    Hemoglobin A1C 5.8 (H) <5.7 %       Data this admission:  - CBC: elevated hemoglobin and hematocrit   - CMP unremarkable  - TSH normal  - Hgb A1c elevated at 5.8  - Lipids elevated LDL, Non HDL, & triglycerides      Mental Status Exam:     Oriented to:  Grossly Oriented  General:  Awake  Appearance:  appears stated age and appropriate weight  Behavior/Attitude:  calm and cooperative  Eye Contact:  appropriate  Psychomotor: slowed no catatonia present  Speech:  loud volume/tone and paucity  Language: Not Fluent in English - Cantonese  needed.  Mood:  \"So bad\"  Affect:  congruent with mood and sad  Thought Process:  coherent and impoverished  Thought Content:  suicidal ideation with plan (without intent); No apparent delusions  Associations:  intact  Insight:  good due to her identification of depressive symptoms.  Judgment:  fair due to the cognitive distortions related to her severe depression and her willingness to ask for treatment  Impulse control: fair  Attention Span:  inattentive  Concentration:  grossly intact  Recent and Remote Memory:  not formally assessed  Fund of Knowledge:  average  Muscle Strength and Tone: normal  Gait and Station: Normal     Psychiatric Assessment     Naa Paredes is a 57 year old female previously diagnosed with MDD, unspecified anxiety disorder and conversion disorder admitted on a 72 hr hold 07/02/2023 due to concern for SI and report to daughter she would cut her wrist in the context of psychosocial stressors including chronic mental health issues and financial stress and no benefit from medication regimen. Most recent psychiatric hospitalization was in 2015 for similar presentation. Significant symptoms on admission include avolition, irritable/depressed mood, sleep disturbance, depersonalization, and suicidal ideation. " The MSE on admission was pertinent for depressed mood with SI, restricted affect, delayed, quiet speech, poor eye contact. Biological contributions to mental health presentation include previous diagnoses, possible medication nonadherence and hyperlipidemia per admission labs. Psychological contributions to mental health presentation include concern for untreated trauma, partial insight, and unclear coping skills. Social factors contributing to mental health presentation include complicated family dynamics and current significant financial stressors. Protective factors include family support, willing to try medications, engaged in outpt mental health treatment.       In summary, the patient's reported symptoms of depression in the context of recent significant financial stressors, lack of benefit from medication regimen, and complicated family dynamics are consistent with historic diagnosis of MDD w/SI. She will likely benefit from optimization of medication and establishing greater outpatient supports this admission.     Given that she currently has SI, patient warrants inpatient psychiatric hospitalization to maintain her safety.      Psychiatric Plan by Diagnosis      Today's changes:  - None  - Tried to reach out to her Psychiatrist Dr Mena, waiting for his call back.       # MDD  # Unspecified anxiety disorder   1. Medications:  - PTA latuda 40mg daily was continued     2. Pertinent Labs/Monitoring:   - ordered updated hemoglobin A1c since previous was elevated, new one came back at 5.7     3. Additional Plans:  - Patient will be treated in therapeutic milieu with appropriate individual and group therapies as described        Psychiatric Hospital Course:    Naa Paredes was initially admitted to [facility] on a 72 hour hold, but has since accepted a voluntary hospitalisation.  Medications:  PTA latuda 40mg was continued.     The risks, benefits, alternatives, and side effects were discussed and understood  by the patient.     Medical Assessment and Plan     Medical diagnoses to be addressed this admission:    # Hyperlipidemia found on admission labs  Repeat Hemoglobin A1c ordered (3 months ago was elevated)     Medical course: Patient was physically examined by the ED prior to being transferred to the unit and was found to be medically stable and appropriate for admission.     Consults:  Medicine consult for hyperlipidemia on 7/3: No inpatient treatment needed, patient would benefit from lifestyle modifications and outpatient follow-up.     Checklist     Legal Status: Voluntary     Safety Assessment:   Behavioral Orders   Procedures    Code 1 - Restrict to Unit    Routine Programming     As clinically indicated    Status 15     Every 15 minutes.    Suicide precautions     Patients on Suicide Precautions should have a Combination Diet ordered that includes a Diet selection(s) AND a Behavioral Tray selection for Safe Tray - with utensils, or Safe Tray - NO utensils         Risk Assessment:  Risk for harm is moderate-high.  Risk factors: SI, trauma, past behaviors and financial stressors  Protective factors: family and engaged in treatment     SIO: None    Disposition: TBD. Disposition pending clinical stabilization, medication optimization and development of an appropriate discharge plan.     Attestations     This patient was seen and discussed with my attending physician.  Mag Johnston, MS3  [facility] [employer]    Attestation:  I was present with the medical student who participated in the service and in the documentation of the note. I have verified the history and personally performed the physical exam and medical decision making. I agree with the assessment and plan of care as documented in the note.    Ramez Dagher, MD  PGY-1 Psychiatry Resident    This patient has been seen and evaluated by me, Tami Cavazos DO.  I have discussed this patient with the team including the resident and medical student(s) and I  agree with the findings and plan in this note.  Dr. Tami Cavazos, DO, COLLEEN

## 2023-07-03 NOTE — CARE PLAN
07/03/23 1345   Group Therapy Session   Group Attendance attended group session   Time Session Began 1015   Time Session Ended 1200   Total Time (minutes) 70   Total # Attendees 6-7   Group Type community;recreation   Group Topic Covered balanced lifestyle;leisure exploration/use of leisure time   Group Session Detail OT Clinic: open group activities to promote self-expression and leisure exploration while working to demonstrate cognitive skills   Patient Participation Detail Pt participated in open OT group to work on a sticker-by-number project. Pt initially expressed being uninterested in the activity but decided to work on a project after writer demonstrated and explained the activity. Pt appeared quiet and focused, completing the entire project within the group. Pt declined keeping the project and other activities, and left the group.

## 2023-07-03 NOTE — CONSULTS
"[facility]  Consult Note - Hospitalist Service  Date of Admission:  7/2/2023  Consult Requested by: Psychiatry -- Ramez Dagher, MD  Reason for Consult: \"Dyslipidemia & slightly increased HgA1C\"    Assessment & Plan   Naa Paredes is a 57 year old female w/ PMH of IBS, multiple joint pains, migraines, nephrolithiasis, h/o alcohol abuse, prediabetes, allergic rhinitis, thrombocytopenia, trichotillomania, MDD, anxiety, conversion disorder, admitted to inpatient psych on 7/2/2023 for suicidal ideation w/ plans to cut her wrists. Medicine was consulted on 7/3/23 for dyslipidemia and slightly elevated A1C.    #Suicidal ideation in setting of MDD  #Anxiety  #Conversion disorder  Patient presents after threatening to commit suicide, holding scissors to her wrist and saying she was going to cut herself. Admitted to inpatient psychiatry for management.   - continue PTA lurasidone 40mg tab once daily  - management per psychiatry    --- agree with hydroxyzine prn, olanzapine prn    #Prediabetes (A1C 5.8)  Previous A1C 3mo ago of 6.3. Does not qualify pt for diabetes diagnosis. First line for prediabetes management is lifestyle modification in diet and exercise.  - no acute management indicated at this time  - follow up with PCP outpatient for detailed discussion regarding lifestyle changes & follow up recommendations  - General lifestyle recs: increase cardiovascular exercise (30 minutes daily)-- walking, biking, dancing, etc. Decrease processed foods and high fat foods in favor of lean meats and fruits/veggies.     #Dyslipidemia  Lipid labs drawn close to 1000 in morning, potentially not fasting. Regardless, first line treatment plan for pt would be lifestyle modifications in diet & exercise. Prefer not to start statin inpatient as initiation requires monitoring labs and adverse effects, so best to have PCP initiate w/ consistent follow up. Per chart review, pt has hx of elevated " "LFTs, so want to ensure these are monitored if statin is initiated. Additionally, pt has hx of elevated LDL & cholesterol dating back at least 13yr per chart review.   - no acute management indicated at this time  - follow up with PCP outpatient for fasting labs & detailed discussion regarding lifestyle changes  - General lifestyle recs: increase cardiovascular exercise (30 minutes daily)-- walking, biking, dancing, etc. Decrease processed foods and high fat foods in favor of lean meats and fruits/veggies.    #Hx of elevated LFTs  Upon chart review, pt has \"disorder of liver\" listed in her medical history. Also noted that \"Bx 04: negative\", which is supported by surgical pathology report in [facility] (needle core bx of liver parenchyma w/ no pathologic diagnosis). Appears that elevated liver enzymes occurred in 2003.   - no acute management, see above for outpatient follow up recs    #Multiple joint pains  #Back pain  Patient has recorded hx of pain of knee pain, ankle sprains, hand pains as well as cervical, thoracic, and lumbar pain. No acute changes in pt pain reported per ED note.   - acetaminophen PRN for pain    Other/Chronic Problems:  #IBS: no acute management indicated  #Migraines: no acute management indicated, no PTA migraine medications recorded  #Nephrolithiasis: encourage oral hydration, no acute management indicated  #Thrombocytopenia: platelets 163 on 7/2, no acute management indicated  #Allergic rhinitis: no PTA meds recorded, no acute management indicated  #Cervical high risk HPV test positive, 9/21/2022: no acute management indicated, pt to follow up outpatient for cotest 1 year from positive test.   #Colon adenoma: Colonoscopy on 2/7/23-- one 8mm polyp in med rectum that was bx. Otherwise, nl study.   #Remote hx of alcohol abuse: no JAN obtained upon arrival, no mention of concern for intoxication in ED/psych notes; no acute management indicated    Given consult placed for non-acute/ " "historical issues, did not see pt in person and provided recommendations based on review of vitals, labwork, historical documents, notes from this admission.   The patient's care plan was discussed with the psychiatry resident Dr. Pérez via phone.    Thank you for allowing us to be a part of this patient's medical care. Medicine will sign off at this time as pt is stable and requires no acute medical intervention at this time. Please contact medicine with further questions and concerns.     Clinically Significant Risk Factors                         # Overweight: Estimated body mass index is 27.07 kg/m  as calculated from the following:    Height as of an earlier encounter on 7/2/23: 1.575 m (5' 2\").    Weight as of this encounter: 67.1 kg (148 lb)., PRESENT ON ADMISSION          Luis Caballero PA-C  Hospitalist Service  Securely message with [employer] (more info)  Text page via [facility] Paging/Directory   ______________________________________________________________________    Chief Complaint   Medicine consulted for slightly elevated A1C & dyslipidemia.    History is obtained by chart review and discussion with provider.     History of Present Illness   Naa Paredes is a 57 year old female w/ PMH of IBS, multiple joint pains, migraines, nephrolithiasis, h/o alcohol abuse, prediabetes, allergic rhinitis, thrombocytopenia, trichotillomania, MDD, anxiety, conversion disorder, admitted to inpatient psych on 7/2/2023 for suicidal ideation w/ plans to cut her wrists. Medicine was consulted on 7/3/23 for dyslipidemia and slightly elevated A1C.     Per chart review, pt has been feeling increasingly depressed over last 2 months and has been experiencing suicidal ideation frequently. SI has been worse lately. Daughters provided collateral history.        Past Medical History    Past Medical History:   Diagnosis Date     Ankle sprain and strain 05/02/2013     Anxiety 03/26/2015     Black stools 11/25/2015     CARDIOVASCULAR " SCREENING; LDL GOAL LESS THAN 160 10/31/2010     Cervicalgia 12/07/2011     Closed dislocation of finger of right hand, subsequent encounter 02/06/2018     Colon adenoma 04/23/2015    Patient denies it. Renee Tiwari RN, 6/9/15.     Conversion disorder with mixed symptoms 06/14/2016     Depression with suicidal ideation 06/19/2015     Depressive disorder      Dysesthesia 09/21/2022     Elevated blood pressure reading without diagnosis of hypertension 11/25/2015     Elevated LFT's 06/04/2010     Fatigue due to depression 11/24/2015     H/O: alcohol abuse 06/04/2010     IBS (irritable bowel syndrome)      Knee pain 10/19/2010     Low back pain 10/19/2010     Lumbago 12/07/2011     Major depression in complete remission (H) 08/13/2018     Major depressive disorder, recurrent, severe without psychotic features (H) 05/13/2016     Major depressive disorder, single episode, in partial remission (H) 11/24/2015     Mallet finger of right hand 02/15/2023     Migraine      Migraine with status migrainosus, not intractable, unspecified migraine type 07/14/2016     Mild episode of recurrent major depressive disorder (H) 6/22/2023    Psychiatry Dr Mena Psychologist Jeremy Reed     Neck pain 10/19/2010     Nephrolithiasis     calcium oxalate     Pain in thoracic spine 01/02/2013     Perimenopause 03/26/2015     Plantar fasciitis 09/21/2022     Prediabetes 3/17/2023     Seasonal allergic rhinitis 10/20/2015     Thoracic outlet syndrome 07/20/2017     Thrombocytopenia (H) 3/17/2023     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     Vitamin D deficiency disease 06/08/2010       Past Surgical History   Past Surgical History:   Procedure Laterality Date     CARPAL TUNNEL RELEASE RT/LT Left      COLONOSCOPY N/A 06/09/2015    Procedure: COLONOSCOPY;  Surgeon: Steve Choi MD;  Location:  GI     COLONOSCOPY N/A 02/07/2023    Procedure: COLONOSCOPY (FV) with polypectomy using cold forceps;  Surgeon: Steve Choi MD;  Location:  GI      LITHOTRIPSY       TRANSANAL ENDOSCOPIC MICROSURGERY N/A 3/21/2023    Procedure: Transanal endoscopic microsurgical resection of rectal polyp, proctoscopy;  Surgeon: Hallie Quevedo MD;  Location: [facility] OR       Medications   I have reviewed this patient's current medications  Current Facility-Administered Medications   Medication     acetaminophen (TYLENOL) tablet 650 mg     alum & mag hydroxide-simethicone (MAALOX) suspension 30 mL     hydrOXYzine (ATARAX) tablet 25 mg     lurasidone (LATUDA) tablet 40 mg     melatonin tablet 3 mg     OLANZapine (zyPREXA) tablet 10 mg    Or     OLANZapine (zyPREXA) injection 10 mg     polyethylene glycol (MIRALAX) Packet 17 g          Social History   I have reviewed this patient's social history and updated it with pertinent information if needed.  Social History     Tobacco Use     Smoking status: Never     Smokeless tobacco: Never   Vaping Use     Vaping Use: Never used   Substance Use Topics     Alcohol use: Yes     Comment: occ wine     Drug use: No       Allergies   Allergies   Allergen Reactions     No Clinical Screening - See Comments      PN: LW CM1: Contrast Adverse Reaction - None Reaction :  PN: LW FI1: nka     ------------------------------------------------------------------------     Physical Exam   Vital Signs: Temp: 98.2  F (36.8  C) Temp src: Oral BP: 118/61 Pulse: 79   Resp: 16 SpO2: 100 % O2 Device: None (Room air)    Weight: 148 lbs 0 oz    Pt not seen in person as consult reasons are not acute and do not require inpatient intervention, no physical exam indicated at this time.     Medical Decision Making       40 MINUTES SPENT BY ME on the date of service doing chart review, history, exam, documentation & further activities per the note.      Data   ------------------------- PAST 24 HR DATA REVIEWED -----------------------------------------------    I have personally reviewed the following data over the past 24 hrs:    TSH: N/A T4: N/A A1C: 5.8 (H)        Imaging results reviewed over the past 24 hrs:   No results found for this or any previous visit (from the past 24 hour(s)).

## 2023-07-04 PROCEDURE — 124N000002 HC R&B MH UMMC

## 2023-07-04 PROCEDURE — 90853 GROUP PSYCHOTHERAPY: CPT

## 2023-07-04 PROCEDURE — 250N000013 HC RX MED GY IP 250 OP 250 PS 637

## 2023-07-04 RX ADMIN — HYDROXYZINE HYDROCHLORIDE 25 MG: 25 TABLET, FILM COATED ORAL at 11:12

## 2023-07-04 RX ADMIN — LURASIDONE HYDROCHLORIDE 40 MG: 40 TABLET, COATED ORAL at 18:07

## 2023-07-04 RX ADMIN — HYDROXYZINE HYDROCHLORIDE 25 MG: 25 TABLET, FILM COATED ORAL at 18:07

## 2023-07-04 ASSESSMENT — ACTIVITIES OF DAILY LIVING (ADL)
ADLS_ACUITY_SCORE: 30
ORAL_HYGIENE: INDEPENDENT
ADLS_ACUITY_SCORE: 30
LAUNDRY: WITH SUPERVISION
ADLS_ACUITY_SCORE: 30
DRESS: INDEPENDENT
ADLS_ACUITY_SCORE: 30
ADLS_ACUITY_SCORE: 30
HYGIENE/GROOMING: INDEPENDENT
ADLS_ACUITY_SCORE: 30

## 2023-07-04 NOTE — PLAN OF CARE
Problem: Adult Behavioral Health Plan of Care  Goal: Plan of Care Review  Outcome: Progressing  Flowsheets (Taken 7/3/2023 5407)  Patient Agreement with Plan of Care: agrees   Goal Outcome Evaluation:    Plan of Care Reviewed With: patient          Pt was visible in the milieu but kept to herself. She was calm, quiet, guarded, and withdrawn. Her affect was flat and blunted. Her speech was soft and slow. Difficult sometimes to understand what she is saying. No interaction with peers or staff. She was out for dinner and snacks and had good food and fluids intake. She denied pain all shift. Rated anxiety and depression 5/10. She was given prn Atarax 25 mg which was helpful. She endorsed SI but said she has no plan. Denied HI/AH/VH. Contracted for safety. She was cooperative and med compliant. Family visited this evening and she said the visit went well. Pt is requesting for family to bring her cultural food. She was told to talk to the team so that she can get the order. Pt agreed. No other concern or outburst behavior noted this shift. No safety concern noted this shift. Will continue to monitor and will assist if need arise.

## 2023-07-04 NOTE — PLAN OF CARE
"Pt is visible in the milieu, sits by the [facility] area, watching TV. Pt is not social with peers. Pleasant upon approach. Pt endorsed SI and contracts for safety. Pt said (via the ) \" I am scared, afraid that different medications are not helpful\". Pt said that Latuda is helping her with sleep, but when she wakes up, she feels terrible, feeling anxious and sad. \"I am afraid I cant hang on anymore, I haven't found the medication that helped me\". \"I don't have the appetite to eat the food here. Pt said that she used to be very alert and with lots of energy even went dancing, was involved in the community, now she feels old and ugly, not healthy looking. My house is cluttered , I don't even have the energy to  things\". Pt endorses severe depression. Pt worries a lot , thinking  that she may loose the ability to cook  And remember things. She said that she was not able to do anything from March to May. Pt feels hopeless.Pt also worries about language barrier.  Pt said that she has this tightness on her head. Offered Tylenol but she refused.   Pt took shower in the morning. She ate some 60 - 70% of her meals.   In the afternoon, pt  came for a visit.      PRN given: Hydroxyzine  Problem: Adult Behavioral Health Plan of Care  Goal: Patient-Specific Goal (Individualization)  Description: You can add care plan individualizations to a care plan. Examples of Individualization might be:  \"Parent requests to be called daily at 9am for status\", \"I have a hard time hearing out of my right ear\", or \"Do not touch me to wake me up as it startles me\".  Outcome: Not Progressing     Problem: Depression  Goal: Improved Mood  Outcome: Progressing  Intervention: Monitor and Manage Depressive Symptoms  Recent Flowsheet Documentation  Taken 7/4/2023 1100 by Nayeli Roberts, RN  Family/Support System Care:    self-care encouraged    support provided     Problem: Suicidal Behavior  Goal: Suicidal Behavior is " Absent or Managed  Outcome: Progressing     Problem: Sleep Disturbance  Goal: Adequate Sleep/Rest  Outcome: Progressing   Goal Outcome Evaluation:    Plan of Care Reviewed With: patient

## 2023-07-04 NOTE — PLAN OF CARE
Problem: Sleep Disturbance  Goal: Adequate Sleep/Rest  Outcome: Progressing      Pt appears to have  slept for 6.5  hours. Pt did not complain of pain or discomfort, and no PRN medications were given. 15 minutes safety checks were in place. Staff will continue to offer support to pt.

## 2023-07-05 PROCEDURE — 250N000013 HC RX MED GY IP 250 OP 250 PS 637

## 2023-07-05 PROCEDURE — 124N000002 HC R&B MH UMMC

## 2023-07-05 PROCEDURE — 99233 SBSQ HOSP IP/OBS HIGH 50: CPT | Mod: GC | Performed by: PSYCHIATRY & NEUROLOGY

## 2023-07-05 RX ORDER — DESIPRAMINE HYDROCHLORIDE 25 MG/1
25 TABLET ORAL AT BEDTIME
Status: DISCONTINUED | OUTPATIENT
Start: 2023-07-05 | End: 2023-07-05

## 2023-07-05 RX ORDER — LITHIUM CARBONATE 300 MG/1
300 TABLET, FILM COATED, EXTENDED RELEASE ORAL AT BEDTIME
Status: DISCONTINUED | OUTPATIENT
Start: 2023-07-05 | End: 2023-07-06

## 2023-07-05 RX ADMIN — HYDROXYZINE HYDROCHLORIDE 25 MG: 25 TABLET, FILM COATED ORAL at 16:09

## 2023-07-05 RX ADMIN — HYDROXYZINE HYDROCHLORIDE 25 MG: 25 TABLET, FILM COATED ORAL at 08:09

## 2023-07-05 RX ADMIN — LITHIUM CARBONATE 300 MG: 300 TABLET, EXTENDED RELEASE ORAL at 19:11

## 2023-07-05 RX ADMIN — LURASIDONE HYDROCHLORIDE 40 MG: 40 TABLET, COATED ORAL at 17:53

## 2023-07-05 ASSESSMENT — ACTIVITIES OF DAILY LIVING (ADL)
ADLS_ACUITY_SCORE: 30
HYGIENE/GROOMING: HANDWASHING;SHOWER;INDEPENDENT
ADLS_ACUITY_SCORE: 30
ADLS_ACUITY_SCORE: 30
ADLS_ACUITY_SCORE: 31
HYGIENE/GROOMING: HANDWASHING;INDEPENDENT
ADLS_ACUITY_SCORE: 31
ORAL_HYGIENE: INDEPENDENT
ADLS_ACUITY_SCORE: 30
ADLS_ACUITY_SCORE: 31
LAUNDRY: WITH SUPERVISION
DRESS: SCRUBS (BEHAVIORAL HEALTH);INDEPENDENT
ORAL_HYGIENE: INDEPENDENT
ADLS_ACUITY_SCORE: 30
ADLS_ACUITY_SCORE: 30
ADLS_ACUITY_SCORE: 31
ADLS_ACUITY_SCORE: 31
ADLS_ACUITY_SCORE: 30

## 2023-07-05 NOTE — PLAN OF CARE
"07/04/23      Group Therapy Session   Group Attendance This patient attended the group session   Time Session Began 2000   Time Session Ended 2050   Total Time (minutes) 50   Total # Attendees 6   Group Type Psychotherapy   Group Topic Covered Importance of social support in relapse prevention. Insight orientation, ways to effectively work with a treatment team. Ways to effectively address fears.     Group Session Detail Patients were guided in discussing various stages in mental health that social support can be helpful. Ways to retain positive social support were reviewed. Participants identified how they plan to use social support systems in helping them identify when they are having early warning signs of mental health crisis, so as to seek help timely.   Patient Response/Contribution This patient attended and stayed for the entire group. Pt did not provide any verbal contribution other than reporting her name when asked, and saying \"thank you so much\" at the end of the group.   Patient Participation Detail This patient attended group.           "

## 2023-07-05 NOTE — PLAN OF CARE
Assessment/Intervention/Current Symtoms and Care Coordination  Pt continues to be engage in treatment and stabilization. Met with the patient. She reported that she had problems with sleep.        Discharge Plan or Goal  Return home when stable   Resume psychiatry/therapy     Barriers to Discharge   Pt is currently undergoing stabilization     Referral Status  Primary Outpatient Psychiatrist: Nanci Mena MD at [facility]  Primary Physician: Sher Duran  Therapist: Luiza David PsyD, [facility]        Legal Status     72 hour hold

## 2023-07-05 NOTE — PLAN OF CARE
Problem: Depression  Goal: Improved Mood  Outcome: Progressing     Problem: Suicidal Behavior  Goal: Suicidal Behavior is Absent or Managed  Outcome: Progressing     Problem: Adult Behavioral Health Plan of Care  Goal: Absence of New-Onset Illness or Injury  Intervention: Identify and Manage Fall Risk  Recent Flowsheet Documentation  Taken 7/5/2023 1628 by Fernando Small, RN  Safety Measures:    environmental rounds completed    safety rounds completed   Goal Outcome Evaluation:    Plan of Care Reviewed With: patient        Patient appeared sad with periods of brightness noted. Reported suicidal ideation with no plans. Distraction encouraged, headphone provided. Observed intermittently watching tv and pacing the hallways. Endorsed moderate anxiety and ema depression, hydroxyzine offered and administered. ADLs WNL, adequate PO intake. Patient denied pain or discomfort. Alert and oriented, able to communicate needs and verbalize feelings. Cooperative and compliant with medications.

## 2023-07-05 NOTE — PLAN OF CARE
Goal Outcome Evaluation:    Plan of Care Reviewed With: patient            Problem: Adult Behavioral Health Plan of Care  Goal: Plan of Care Review  Outcome: Progressing  Flowsheets (Taken 7/5/2023 1300)  Patient Agreement with Plan of Care: agrees    Behavioral  Pt slept 7 hours overnight; good intake of foods and fluids. Compliant with medications. Hygiene is appropriate. no behavioral escalation or concerns noted this shift. pleasant and cooperative upon approach; speech is soft and there is a language barrier. However, pt is able to make needs known.  available via phone and will be in person tomorrow between 10 am and 10-:30 am. Pt has no interactions with peers or staff; attended groups; denied SI, SIB, HI, and hallucinations; affect is flat; mood is anxious but pleasant upon approach.     Medical    No complaints of physical pain/discomfort this shift. Vital signs stable. Blood pressure 120/85, pulse 78, temperature 98.1  F (36.7  C), temperature source Temporal, resp. rate 16, weight 68 kg (150 lb), last menstrual period 08/20/2015, SpO2 95 %, not currently breastfeeding.      PRNS:    Plan

## 2023-07-05 NOTE — PROGRESS NOTES
"  ----------------------------------------------------------------------------------------------------------  [facility]  Psychiatry Progress Note  Hospital Day #3     Interim History:     The patient's care was discussed with the treatment team and chart notes were reviewed.    Vitals: VSS  Sleep: 7 hours (07/05/23 0600)  Scheduled medications: Took all scheduled medications as prescribed  Psychiatric PRN medications: Hydroxyzine (25 mg) @ 0809     Staff Report:   No acute events or safety concerns overnight. Rated depression and anxiety 6/10 last night and was given hydroxyzine. Family visited yesterday evening. Please see staff notes for details.      Subjective:     Patient Interview:  Naa Paredes was interviewed in the conference room today. She states that she is \"still not very well today\". Naa notes that this morning when she woke up she felt anxious. She states that she took some medication after breakfast and then felt tired so she went back to bed. Naa notes that she still felt anxious after getting out of bed again. She states that she doesn't know what is causing the anxiety. Naa notes that she \"finds it hard to go on\". She states that she feels tired and has no energy. Naa notes that when she feels anxious at home she normally takes her medications (doesn't remember names), but when she is here she doesn't have any of those. She states that she can sleep at night and feels tired, but she wakes up at 5:00 or 6:00 and cannot fall back asleep. Naa notes that in the morning she feels scared because she has a \"blank mind\". She states that she is not sure whether her concentration has improved or not. Naa notes that her symptoms have been gradnuallly worsening in the past 2 months and are worse now then they were before. She states that we can call her  for details. Naa notes that there was a period of time where she used the " "slot machine, but she currently does not play as much as before. She notes that she has not lost a lot of money due to gambling. Naa states that she picks out her hair and cannot control it herself. She notes that she is agreeable to adding a new medication as long as it will benefit her - \"Yes, I need help\".     Phone call with daughters 07/05:  Daughter Rosalva suggested to reach out to other daughters Carline or [employer] who had more information, but noted that during the past two months her mother has been getting worse.    Other daughter mentioned that her mom's low mood started getting worse mid-April, as she wakes up anxious and scared to be alone. She would wake her up, and would self isolate more and more, only leaving the house to the casino twice a week for gambling with her ex . She says that the patient has been overwhelmed lately with the presence of her grandson at home as well as her mother's illness and the need to care for both, which could have contibuted to her current state. 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 She remembers her mother was hospitalized 8 years ago for depression but doesn't remember any details, and only knows that a regimen of Latuda was started lately due to gene testing and that the dose was increased some days ago.    Phone call with outpatient psychiatrist Dr Mena  07/07:  Dr Mena reviewed the patient's charts and note that she had already told her that she was diagnosed with Bipolar Disorder during her hospitalization 8 years ago, and mentions that she had some manic episodes, but doesn't elaborate any further. She states she doesn't have the names of the patient's prior treatments, but notes that she started her on Aripiprazole 10 then 15mg, which was then switched to Lurasidone because the patient did not tolerate aripiprazole and had a " "positive gene screening that she recommended for Lurasidone.    ROS:  Patient has no bothersome physical symptoms  Patient denies acute concerns     Objective:     Vitals:  /70 (Patient Position: Supine)   Pulse 88   Temp 98  F (36.7  C) (Oral)   Resp 16   Wt 68 kg (150 lb)   LMP 08/20/2015   SpO2 100%   BMI 27.44 kg/m      Allergies:  Allergies   Allergen Reactions     No Clinical Screening - See Comments      PN: LW CM1: Contrast Adverse Reaction - None Reaction :  PN: LW FI1: nka       Current Medications:  Scheduled:  Current Facility-Administered Medications   Medication Dose Route Frequency     lurasidone  40 mg Oral Daily       PRN:  Current Facility-Administered Medications   Medication Dose Route Frequency     acetaminophen  650 mg Oral Q4H PRN     alum & mag hydroxide-simethicone  30 mL Oral Q4H PRN     hydrOXYzine  25 mg Oral Q4H PRN     melatonin  3 mg Oral At Bedtime PRN     OLANZapine  10 mg Oral TID PRN    Or     OLANZapine  10 mg Intramuscular TID PRN     polyethylene glycol  17 g Oral Daily PRN       Labs and Imaging:  New results:   No results found for this or any previous visit (from the past 24 hour(s)).    Data this admission:  - CBC: elevated hemoglobin and hematocrit   - CMP unremarkable  - TSH normal  - Hgb A1c elevated at 5.8  - Lipids elevated LDL, Non HDL, & triglycerides      Mental Status Exam:     Oriented to:  Grossly Oriented  General:  Awake  Appearance:  appears stated age and appropriate weight  Behavior/Attitude:  calm and cooperative  Eye Contact:  appropriate  Psychomotor: slowed no catatonia present  Speech:  soft volume/tone, paucity and slowed, not using full sentences  Language: Not Fluent in English - Cantonese  needed.  Mood:  \"So bad\"  Affect:  congruent with mood and sad  Thought Process:  coherent and impoverished  Thought Content:  suicidal ideation with plan (without intent); No apparent delusions  Associations:  intact  Insight:  good due to " her identification of depressive symptoms.  Judgment:  fair due to the cognitive distortions related to her severe depression and her willingness to ask for treatment.  Impulse control: fair  Attention Span:  inattentive  Concentration:  grossly intact  Recent and Remote Memory:  not formally assessed  Fund of Knowledge:  average  Muscle Strength and Tone: normal  Gait and Station: Normal     Psychiatric Assessment     Naa Paredes is a 57 year old female previously diagnosed with MDD, unspecified anxiety disorder and conversion disorder admitted on a 72 hr hold 07/02/2023 due to concern for SI and report to daughter she would cut her wrist in the context of psychosocial stressors including chronic mental health issues and financial stress and no benefit from medication regimen. Most recent psychiatric hospitalization was in 2015 for similar presentation. Significant symptoms on admission include avolition, irritable/depressed mood, sleep disturbance, depersonalization, and suicidal ideation. The MSE on admission was pertinent for depressed mood with SI, restricted affect, delayed, quiet speech, poor eye contact. Biological contributions to mental health presentation include previous diagnoses, possible medication nonadherence and hyperlipidemia per admission labs. Psychological contributions to mental health presentation include concern for untreated trauma, partial insight, and unclear coping skills. Social factors contributing to mental health presentation include complicated family dynamics and current significant financial stressors. Protective factors include family support, willing to try medications, engaged in outpt mental health treatment.       In summary, the patient's reported symptoms of depression in the context of recent significant financial stressors, lack of benefit from medication regimen, and complicated family dynamics are consistent with historic diagnosis of MDD w/SI. She will likely benefit  from optimization of medication and establishing greater outpatient supports this admission.     Given that she currently has SI, patient warrants inpatient psychiatric hospitalization to maintain her safety.      Psychiatric Plan by Diagnosis      Today's changes:  - Start Lithium 300mg CR as a Treatment for Bipolar Depression on 07/05     # MDD  # Unspecified anxiety disorder   1. Medications:  - PTA latuda 40mg daily was continued  - Start Lithium 300mg CR as a Treatment for Bipolar Depression on 07/05     2. Pertinent Labs/Monitoring:   - ordered updated hemoglobin A1c since previous was elevated, new one came back at 5.8     3. Additional Plans:  - Patient will be treated in therapeutic milieu with appropriate individual and group therapies as described        Psychiatric Hospital Course:    Naa Paredes was initially admitted to [facility] on a 72 hour hold, but has since accepted a voluntary hospitalisation.    Medications:  ? PTA latuda 40mg was continued.  ? Lithium 300mg CR was started as a Treatment for Bipolar Depression on 07/05.     The risks, benefits, alternatives, and side effects were discussed and understood by the patient.     Medical Assessment and Plan     Medical diagnoses to be addressed this admission:    # Hyperlipidemia found on admission labs  Repeat Hemoglobin A1c ordered (3 months ago was elevated) : 5.8    Medical course: Patient was physically examined by the ED prior to being transferred to the unit and was found to be medically stable and appropriate for admission.     Consults:  Medicine consult for hyperlipidemia on 7/3: No inpatient treatment needed, patient would benefit from lifestyle modifications and outpatient follow-up.     Checklist     Legal Status: Voluntary     Safety Assessment:   Behavioral Orders   Procedures     Code 1 - Restrict to Unit     Routine Programming     As clinically indicated     Status 15     Every 15 minutes.     Suicide precautions     Patients on  Suicide Precautions should have a Combination Diet ordered that includes a Diet selection(s) AND a Behavioral Tray selection for Safe Tray - with utensils, or Safe Tray - NO utensils         Risk Assessment:  Risk for harm is moderate-high.  Risk factors: SI, trauma, past behaviors and financial stressors  Protective factors: family and engaged in treatment     SIO: None    Disposition: TBD. Disposition pending clinical stabilization, medication optimization and development of an appropriate discharge plan.     Attestations     This patient was seen and discussed with my attending physician.  Mag Johnston, MS3  [facility] [employer]    Attestation:  I was present with the medical student who participated in the service and in the documentation of the note. I have verified the history and personally performed the physical exam and medical decision making. I agree with the assessment and plan of care as documented in the note.    Ramez Dagher, MD  PGY-1 Psychiatry Resident

## 2023-07-05 NOTE — PLAN OF CARE
Problem: Adult Behavioral Health Plan of Care  Goal: Plan of Care Review  Outcome: Progressing  Flowsheets (Taken 7/4/2023 1750)  Patient Agreement with Plan of Care: agrees   Goal Outcome Evaluation:    Plan of Care Reviewed With: patient          Pt was out in the lounge most of the shift. She was calm, quiet, guarded, and withdrawn. She was observed pacing hallway most of the shift. Affect was flat and blunted. Her speech soft and slow. Difficult sometimes to understand what she is saying. She had no interaction with peers or staff. Kept to herself most of the shift. She was out for dinner and snacks and had good food and fluids intake. She denied pain all shift. Rated anxiety and depression 6/10. She was given prn Atarax 25 mg which was helpful. She endorsed SI but said she has no plan. Denied HI/AH/VH. Contracted for safety. She was cooperative and med compliant. Family visited this evening and she said the visit went well. Pt is requesting to start taking Vit. D. Pt stated that she takes it at home. She was told to talk to the team in the morning which she agreed. No other concern or outburst behavior noted this shift. No safety concern noted this shift. Will continue to monitor and will assist if need arise.

## 2023-07-06 PROBLEM — F33.0 MILD EPISODE OF RECURRENT MAJOR DEPRESSIVE DISORDER (H): Status: RESOLVED | Noted: 2023-06-22 | Resolved: 2023-07-06

## 2023-07-06 PROBLEM — F31.4 SEVERE DEPRESSED BIPOLAR I DISORDER WITHOUT PSYCHOTIC FEATURES (H): Chronic | Status: ACTIVE | Noted: 2023-07-06

## 2023-07-06 PROBLEM — F32.0 MILD MAJOR DEPRESSION (H): Status: RESOLVED | Noted: 2018-08-13 | Resolved: 2023-07-06

## 2023-07-06 PROBLEM — R45.851 DEPRESSION WITH SUICIDAL IDEATION: Status: RESOLVED | Noted: 2023-06-29 | Resolved: 2023-07-06

## 2023-07-06 PROBLEM — F32.A DEPRESSION WITH SUICIDAL IDEATION: Status: RESOLVED | Noted: 2023-06-29 | Resolved: 2023-07-06

## 2023-07-06 PROCEDURE — 250N000013 HC RX MED GY IP 250 OP 250 PS 637

## 2023-07-06 PROCEDURE — 124N000002 HC R&B MH UMMC

## 2023-07-06 PROCEDURE — 99233 SBSQ HOSP IP/OBS HIGH 50: CPT | Mod: GC | Performed by: PSYCHIATRY & NEUROLOGY

## 2023-07-06 RX ORDER — LITHIUM CARBONATE 300 MG/1
600 TABLET, FILM COATED, EXTENDED RELEASE ORAL AT BEDTIME
Status: DISCONTINUED | OUTPATIENT
Start: 2023-07-06 | End: 2023-07-07

## 2023-07-06 RX ADMIN — HYDROXYZINE HYDROCHLORIDE 25 MG: 25 TABLET, FILM COATED ORAL at 09:42

## 2023-07-06 RX ADMIN — HYDROXYZINE HYDROCHLORIDE 25 MG: 25 TABLET, FILM COATED ORAL at 20:38

## 2023-07-06 RX ADMIN — Medication 3 MG: at 20:38

## 2023-07-06 RX ADMIN — HYDROXYZINE HYDROCHLORIDE 25 MG: 25 TABLET, FILM COATED ORAL at 14:29

## 2023-07-06 RX ADMIN — LITHIUM CARBONATE 600 MG: 300 TABLET, EXTENDED RELEASE ORAL at 19:47

## 2023-07-06 RX ADMIN — LURASIDONE HYDROCHLORIDE 40 MG: 40 TABLET, COATED ORAL at 18:09

## 2023-07-06 ASSESSMENT — ACTIVITIES OF DAILY LIVING (ADL)
HYGIENE/GROOMING: HANDWASHING;SHOWER;INDEPENDENT
LAUNDRY: WITH SUPERVISION
ADLS_ACUITY_SCORE: 31
DRESS: SCRUBS (BEHAVIORAL HEALTH);INDEPENDENT
ORAL_HYGIENE: INDEPENDENT;PROMPTS
DRESS: SCRUBS (BEHAVIORAL HEALTH);INDEPENDENT
ADLS_ACUITY_SCORE: 31
HYGIENE/GROOMING: HANDWASHING;SHOWER;INDEPENDENT
ADLS_ACUITY_SCORE: 31
ADLS_ACUITY_SCORE: 31
ORAL_HYGIENE: PROMPTS;INDEPENDENT
ADLS_ACUITY_SCORE: 31
LAUNDRY: WITH SUPERVISION
ADLS_ACUITY_SCORE: 31

## 2023-07-06 NOTE — PLAN OF CARE
"Goal Outcome Evaluation:    Plan of Care Reviewed With: patient          Problem: Adult Behavioral Health Plan of Care  Goal: Plan of Care Review  Outcome: Progressing  Flowsheets (Taken 7/6/2023 1300)  Patient Agreement with Plan of Care: agrees        Behavioral  Pt slept 7 hours overnight; good intake of foods and fluids. Compliant with medications. Hygiene is appropriate. Pt is isolative with no interactions with peers or staff but pleasant and cooperative upon approach; speech is soft with language barrier. However, pt is able to make needs known.  was in person today during assessment. Endorsed both depression and anxiety \"4-5\". PRN Hydroxyzine was utilized. Pt attended partial morning groups; denied SI, SIB, HI, and hallucinations; affect is flat; mood is anxious but pleasant upon approach.      Medical     No complaints of physical pain/discomfort this shift. Vital signs stable. Blood pressure 124/83, pulse 89, temperature 98.3  F (36.8  C), temperature source Oral, resp. rate 16, weight 68.2 kg (150 lb 6.4 oz), last menstrual period 08/20/2015, SpO2 96 %, not currently breastfeeding.          PRNS:   - Hydroxyzine     Plan  - Dietitian consult for more options such as fish, chicken, wild rice soup etc  - OK to have outside food for cultural purpose    "

## 2023-07-06 NOTE — PLAN OF CARE
Assessment/Intervention/Current Symtoms and Care Coordination  Pt continues to be engage in treatment and stabilization. Met with the patient.     - Patient agreed to start a mood stabilizer (Lithium) on 7/5. Patient had requested to be discharged but she has agreed to remain hospitalized for further stabilization and med adjustments.         Discharge Plan or Goal  Return home when stable   Resume psychiatry/therapy     Barriers to Discharge   Pt is currently undergoing stabilization     Referral Status  Primary Outpatient Psychiatrist: Nanci Mena MD at [facility]  Primary Physician: Sher Duran  Therapist: Luiza David PsyD, [facility]        Legal Status     Voluntary

## 2023-07-06 NOTE — PROGRESS NOTES
----------------------------------------------------------------------------------------------------------  [facility]  Psychiatry Progress Note  Hospital Day #4     Interim History:     The patient's care was discussed with the treatment team and chart notes were reviewed.    Vitals: VSS  Sleep: 7 hours (07/05/23 0600)  Scheduled medications: Took all scheduled medications as prescribed  Psychiatric PRN medications: Hydroxyzine (25mg) 0942    Staff Report:   No acute events or safety concerns overnight. Had moderate anxiety and high depression yesterday evening and received hydroxyzine. Please see staff notes for details.      Subjective:     Patient Interview:  Naa Paredes was interviewed in the conference room today with an . She notes that last night she slept well, but this morning she feels the same in that she wants to fall back asleep but could not. Naa states that her mood has been the same, but that she is not feeling very sad. She notes that at 10 AM there was a group activity and she couldn't join them and she felt anxious and powerless. Naa states that she took hydroxyzine for her anxiety. She notes that during the group activity she didn't say anything but she got up and left because she felt warm, sweaty, and a pounding heart. Naa states that this occurs most days in the morning around 9AM and last for about an hour. When asked about how she has felt after starting Lurasidone, Naa was not aware that she was taking this medication. She notes that she has slept better with Lurasidone, but in the morning she still feels the same.     Naa states that she does not recall being diagnosed with bipolar disorder. She notes that 8 years ago she went to the ED on her own because she couldn't sleep for several months. Naa states that 8 years ago when she was in the hospital, they asked her several questions about suicidal  "ideation, such as hitting other cars, and she did have these thoughts but did not have any intention of doing it. She notes during this time, that she does not remember if she had decreased sleep, increased energy, happier/irritable mood, and increased impulsivity (spending more money). However, Naa notes she was in the hospital for 11 days and that she had menopause with hormonal changes and then got her period with lots of blood loss. She states that the idea of contacting her daughters regarding these symptoms makes her anxious, but it is okay for us to contact them.     When asked about prior leobardo or hypomania symptoms, Naa notes that about 5 years ago in 2017, she was so self-confident and she went dancing a lot and she \"felt like she was a star\". She states that during this time she \"felt like she could do anything\". Naa notes that these feelings lasted for months and that her mood was high during this time. She states that during this period she could skip sleep and would still have the same amount of energy. Naa notes that this was not normal for her. She states that during this period she would go dancing and learned many different types, including belly dancing, and she would not have done this previously.    Naa states that her mood hasn't been swinging recently, but she has just been feeling more depressed.She notes that she has been feeling ready to leave the hospital because the food is very unappealing. Naa states \"I just want to die and finish this whole thing\".     Phone call with daughters, 07/05:  Daughter Rosalva suggested to reach out to other daughters Carline or Genesis who had more information, but noted that during the past two months her mother has been getting worse.    Other daughter mentioned that her mom's low mood started getting worse mid-April, as she wakes up anxious and scared to be alone. She would wake her up, and would self isolate more and more, only " leaving the house to the [employer] twice a week for gambling with her ex . She says that the patient has been overwhelmed lately with the presence of her grandson at home as well as her mother's illness and the need to care for both, which could have contibuted to her current state. 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 She remembers her mother was hospitalized 8 years ago for depression but doesn't remember any details, and only knows that a regimen of Latuda was started lately due to gene testing and that the dose was increased some days ago.    Phone call with outpatient psychiatrist, Dr Mena, 07/05:  Dr Mena reviewed the patient's charts and note that she had already told her that she was diagnosed with Bipolar Disorder during her hospitalization 8 years ago, and mentions that she had some manic episodes, but doesn't elaborate any further. She states she doesn't have the names of the patient's prior treatments, but notes that she started her on Aripiprazole 10 then 15mg, which was then switched to Lurasidone because the patient did not tolerate aripiprazole and had a positive gene screening that she recommended for Lurasidone.    ROS:  Patient has no bothersome physical symptoms  Patient denies acute concerns     Objective:     Vitals:  /83 (BP Location: Right arm, Patient Position: Sitting, Cuff Size: Adult Regular)   Pulse 89   Temp 98.3  F (36.8  C) (Oral)   Resp 16   Wt 68.2 kg (150 lb 6.4 oz)   LMP 08/20/2015   SpO2 96%   BMI 27.51 kg/m      Allergies:  Allergies   Allergen Reactions     No Clinical Screening - See Comments      PN: LW CM1: Contrast Adverse Reaction - None Reaction :  PN: LW FI1: nka       Current Medications:  Scheduled:  Current Facility-Administered Medications   Medication Dose Route Frequency     lithium ER  300 mg Oral At Bedtime     lurasidone   "40 mg Oral Daily       PRN:  Current Facility-Administered Medications   Medication Dose Route Frequency     acetaminophen  650 mg Oral Q4H PRN     alum & mag hydroxide-simethicone  30 mL Oral Q4H PRN     hydrOXYzine  25 mg Oral Q4H PRN     melatonin  3 mg Oral At Bedtime PRN     OLANZapine  10 mg Oral TID PRN    Or     OLANZapine  10 mg Intramuscular TID PRN     polyethylene glycol  17 g Oral Daily PRN       Labs and Imaging:  New results:   No results found for this or any previous visit (from the past 24 hour(s)).    Data this admission:  - CBC: elevated hemoglobin and hematocrit   - CMP unremarkable  - TSH normal  - Hgb A1c elevated at 5.8  - Lipids elevated LDL, Non HDL, & triglycerides      Mental Status Exam:     Oriented to:  Grossly Oriented  General:  Awake  Appearance:  appears stated age and appropriate weight  Behavior/Attitude:  calm and cooperative  Eye Contact:  appropriate  Psychomotor: slowed no catatonia present  Speech:  soft volume/tone, paucity and slowed, not using full sentences  Language: Not Fluent in English - Cantonese  needed.  Mood:  \"So bad\"  Affect:  congruent with mood and sad  Thought Process:  coherent and impoverished  Thought Content:  suicidal ideation with plan (without intent); No apparent delusions  Associations:  intact  Insight:  good due to her identification of depressive symptoms.  Judgment:  fair due to the cognitive distortions related to her severe depression and her willingness to ask for treatment.  Impulse control: fair  Attention Span:  inattentive  Concentration:  grossly intact  Recent and Remote Memory:  not formally assessed  Fund of Knowledge:  average  Muscle Strength and Tone: normal  Gait and Station: Normal     Psychiatric Assessment     Naa Paredes is a 57 year old female previously diagnosed with MDD, unspecified anxiety disorder and conversion disorder admitted on a 72 hr hold 07/02/2023 due to concern for SI and report to daughter she " would cut her wrist in the context of psychosocial stressors including chronic mental health issues and financial stress and no benefit from medication regimen. Most recent psychiatric hospitalization was in 2015 for similar presentation. Significant symptoms on admission include avolition, irritable/depressed mood, sleep disturbance, depersonalization, and suicidal ideation. The MSE on admission was pertinent for depressed mood with SI, restricted affect, delayed, quiet speech, poor eye contact. Biological contributions to mental health presentation include previous diagnoses, possible medication nonadherence and hyperlipidemia per admission labs. Psychological contributions to mental health presentation include concern for untreated trauma, partial insight, and unclear coping skills. Social factors contributing to mental health presentation include complicated family dynamics and current significant financial stressors. Protective factors include family support, willing to try medications, engaged in outpt mental health treatment.       In summary, the patient's reported symptoms of depression in the context of recent significant financial stressors, lack of benefit from medication regimen, and complicated family dynamics are consistent with historic diagnosis of MDD w/SI. She will likely benefit from optimization of medication and establishing greater outpatient supports this admission.     Given that she currently has SI, patient warrants inpatient psychiatric hospitalization to maintain her safety.      Psychiatric Plan by Diagnosis      Today's changes:  - Increase Lithium to 600mg CR as a treatment for Bipolar Depression on 07/07 (Lithium started on 07/06)    # MDD  # Unspecified anxiety disorder   1. Medications:  - PTA latuda 40mg daily was continued  - Start Lithium 300mg CR as a treatment for Bipolar Depression on 07/05.  - Increase Lithium to 600mg CR as a treatment for Bipolar Depression on 07/07  (Lithium started on 07/06)     2. Pertinent Labs/Monitoring:   - ordered updated hemoglobin A1c since previous was elevated, new one came back at 5.8     3. Additional Plans:  - Patient will be treated in therapeutic milieu with appropriate individual and group therapies as described        Psychiatric Hospital Course:    Naa Paredes was initially admitted to [facility] on a 72 hour hold, but has since accepted a voluntary hospitalisation.    Medications:  ? PTA latuda 40mg was continued.  ? Lithium 300mg CR was started as a Treatment for Bipolar Depression on 07/05.  ? Lithium increased to 600mg CR as a treatment for Bipolar Depression on 07/07 (Lithium started on 07/06)     The risks, benefits, alternatives, and side effects were discussed and understood by the patient.     Medical Assessment and Plan     Medical diagnoses to be addressed this admission:    # Hyperlipidemia found on admission labs  Repeat Hemoglobin A1c ordered (3 months ago was elevated) : 5.8    Medical course: Patient was physically examined by the ED prior to being transferred to the unit and was found to be medically stable and appropriate for admission.     Consults:  Medicine consult for hyperlipidemia on 7/3: No inpatient treatment needed, patient would benefit from lifestyle modifications and outpatient follow-up.     Checklist     Legal Status: Voluntary     Safety Assessment:   Behavioral Orders   Procedures     Code 1 - Restrict to Unit     Routine Programming     As clinically indicated     Status 15     Every 15 minutes.     Suicide precautions     Patients on Suicide Precautions should have a Combination Diet ordered that includes a Diet selection(s) AND a Behavioral Tray selection for Safe Tray - with utensils, or Safe Tray - NO utensils         Risk Assessment:  Risk for harm is moderate-high.  Risk factors: SI, trauma, past behaviors and financial stressors  Protective factors: family and engaged in treatment     SIO:  None    Disposition: TBD. Disposition pending clinical stabilization, medication optimization and development of an appropriate discharge plan.     Attestations     This patient was seen and discussed with my attending physician.  Mag Johnston, MS3  [facility] [employer]    Attestation:  I was present with the medical student who participated in the service and in the documentation of the note. I have verified the history and personally performed the physical exam and medical decision making. I agree with the assessment and plan of care as documented in the note.    Ramez Dagher, MD  PGY-1 Psychiatry Resident

## 2023-07-07 PROCEDURE — G0177 OPPS/PHP; TRAIN & EDUC SERV: HCPCS

## 2023-07-07 PROCEDURE — 99232 SBSQ HOSP IP/OBS MODERATE 35: CPT | Mod: GC | Performed by: PSYCHIATRY & NEUROLOGY

## 2023-07-07 PROCEDURE — 250N000013 HC RX MED GY IP 250 OP 250 PS 637

## 2023-07-07 PROCEDURE — 124N000002 HC R&B MH UMMC

## 2023-07-07 RX ORDER — LITHIUM CARBONATE 450 MG
900 TABLET, EXTENDED RELEASE ORAL AT BEDTIME
Status: DISCONTINUED | OUTPATIENT
Start: 2023-07-07 | End: 2023-07-13 | Stop reason: HOSPADM

## 2023-07-07 RX ADMIN — HYDROXYZINE HYDROCHLORIDE 25 MG: 25 TABLET, FILM COATED ORAL at 14:56

## 2023-07-07 RX ADMIN — LITHIUM CARBONATE 900 MG: 450 TABLET, EXTENDED RELEASE ORAL at 19:06

## 2023-07-07 RX ADMIN — LURASIDONE HYDROCHLORIDE 40 MG: 40 TABLET, COATED ORAL at 18:07

## 2023-07-07 RX ADMIN — HYDROXYZINE HYDROCHLORIDE 25 MG: 25 TABLET, FILM COATED ORAL at 09:41

## 2023-07-07 ASSESSMENT — ACTIVITIES OF DAILY LIVING (ADL)
ADLS_ACUITY_SCORE: 31
ADLS_ACUITY_SCORE: 31
DRESS: INDEPENDENT
ADLS_ACUITY_SCORE: 31
HYGIENE/GROOMING: INDEPENDENT
ADLS_ACUITY_SCORE: 31
ORAL_HYGIENE: INDEPENDENT
ADLS_ACUITY_SCORE: 31
LAUNDRY: WITH SUPERVISION
ADLS_ACUITY_SCORE: 31
ADLS_ACUITY_SCORE: 31

## 2023-07-07 NOTE — PROGRESS NOTES
"  ----------------------------------------------------------------------------------------------------------  [facility]  Psychiatry Progress Note  Hospital Day #5     Interim History:     The patient's care was discussed with the treatment team and chart notes were reviewed.    Vitals: VSS  Sleep: 7 hours (07/05/23 0600)  Scheduled medications: Took all scheduled medications as prescribed   Psychiatric PRN medications: Hydroxyzine (25 mg) 0942    Staff Report:   No acute events or safety concerns overnight. She took PRN hydroxyzine for depression and anxiety last night. Please see staff notes for details.      Subjective:     Patient Interview:  Naa Paredes was interviewed in the conference room today with an . She notes that she is feeling a lot worse today compared to yesterday. Naa states that she slept okay, but when she wakes up she doesn't feel a lot of energy and wants to die. She notes that she feels like nothing can help her. Naa states that she feels like she took hydroxyzine a lot yesterday. She notes that she worries about staying in the hospital because she has no income, but still has the same expenses. Naa states that she feels like she does not have energy. She notes that she doesn't know what she is worrying about when she has anxiety. Naa states that she sometimes worries about whether she can overcome the obstacle in front of her or if she will fail and die. She notes that she does not know how she would hurt herself, but the thought is there. Naa states that she does not have much suffering. She notes that there is a lot of tension in her head. Naa states that she is worried that increasing the dose of Lithium will make her worse and is worried her \"whole body will collapse\" over the weekend. The , who knows her outside of this context, notes that Naa use to be a  for a health " "supplement, \"Geuness\", with possible caffeine. She notes that she stopped taking the supplement prior to her hospitalization. Naa states that she would like us to talk with her  because she is worried about the cost of her hospitalization.     Phone call with daughters, 07/05:  Daughter Rosalva suggested to reach out to other daughters Carline or Genesis who had more information, but noted that during the past two months her mother has been getting worse.    Other daughter mentioned that her mom's low mood started getting worse mid-April, as she wakes up anxious and scared to be alone. She would wake her up, and would self isolate more and more, only [place] for gambling with her ex . She says that the patient has been overwhelmed lately with the presence of her grandson at home as well as her mother's illness and the need to care for both, which could have contibuted to her current state. 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 She remembers her mother was hospitalized 8 years ago for depression but doesn't remember any details, and only knows that a regimen of Latuda was started lately due to gene testing and that the dose was increased some days ago.    Phone call with daughter Genesis, 07/07:  Inquiry about the Geuness supplement. She says that her mother has been taking it for a few years now, probably 8 years ago. She states she consumes it frequently but wouldn't be able to specify the amount or frequency.    Phone call with outpatient psychiatrist, Dr Mena, 07/05:  Dr Mena reviewed the patient's charts and note that she had already told her that she was diagnosed with Bipolar Disorder during her hospitalization 8 years ago, and mentions that she had some manic episodes, but doesn't elaborate any further. She states she doesn't have the " names of the patient's prior treatments, but notes that she started her on Aripiprazole 10 then 15mg, which was then switched to Lurasidone because the patient did not tolerate aripiprazole and had a positive gene screening that she recommended for Lurasidone.    ROS:  Patient has no bothersome physical symptoms  Patient denies acute concerns     Objective:     Vitals:  /76 (BP Location: Right arm, Patient Position: Sitting)   Pulse 95   Temp 98.1  F (36.7  C) (Oral)   Resp 16   Wt 68.2 kg (150 lb 6.4 oz)   LMP 08/20/2015   SpO2 95%   BMI 27.51 kg/m      Allergies:  Allergies   Allergen Reactions     No Clinical Screening - See Comments      PN: LW CM1: Contrast Adverse Reaction - None Reaction :  PN: LW FI1: nka       Current Medications:  Scheduled:  Current Facility-Administered Medications   Medication Dose Route Frequency     lithium ER  600 mg Oral At Bedtime     lurasidone  40 mg Oral Daily       PRN:  Current Facility-Administered Medications   Medication Dose Route Frequency     acetaminophen  650 mg Oral Q4H PRN     alum & mag hydroxide-simethicone  30 mL Oral Q4H PRN     hydrOXYzine  25 mg Oral Q4H PRN     melatonin  3 mg Oral At Bedtime PRN     OLANZapine  10 mg Oral TID PRN    Or     OLANZapine  10 mg Intramuscular TID PRN     polyethylene glycol  17 g Oral Daily PRN       Labs and Imaging:  New results:   No results found for this or any previous visit (from the past 24 hour(s)).    Data this admission:  - CBC: elevated hemoglobin and hematocrit   - CMP unremarkable  - TSH normal  - Hgb A1c elevated at 5.8  - Lipids elevated LDL, Non HDL, & triglycerides      Mental Status Exam:     Oriented to:  Grossly Oriented  General:  Awake  Appearance:  appears stated age and appropriate weight  Behavior/Attitude:  calm and cooperative  Eye Contact:  appropriate  Psychomotor: retarded and slowed no catatonia present  Speech:  soft volume/tone, paucity and slowed, not using full sentences  Language:  "Not Fluent in English - Cantonese  needed.  Mood:  \"worse\"  Affect:  congruent with mood and sad  Thought Process:  coherent and impoverished  Thought Content:  suicidal ideation with plan (without intent); No apparent delusions  Associations:  intact  Insight:  good due to her identification of depressive symptoms.  Judgment:  partial due to the cognitive distortions related to her severe depression and her willingness to ask for treatment.  Impulse control: fair  Attention Span:  inattentive  Concentration:  grossly intact  Recent and Remote Memory:  not formally assessed  Fund of Knowledge:  average  Muscle Strength and Tone: normal  Gait and Station: Normal     Psychiatric Assessment     Naa Paredes is a 57 year old female previously diagnosed with MDD, unspecified anxiety disorder and conversion disorder admitted on a 72 hr hold 07/02/2023 due to concern for SI and report to daughter she would cut her wrist in the context of psychosocial stressors including chronic mental health issues and financial stress and no benefit from medication regimen. Most recent psychiatric hospitalization was in 2015 for similar presentation. Significant symptoms on admission include avolition, irritable/depressed mood, sleep disturbance, depersonalization, and suicidal ideation. The MSE on admission was pertinent for depressed mood with SI, restricted affect, delayed, quiet speech, poor eye contact. Biological contributions to mental health presentation include previous diagnoses, possible medication nonadherence and hyperlipidemia per admission labs. Psychological contributions to mental health presentation include concern for untreated trauma, partial insight, and unclear coping skills. Social factors contributing to mental health presentation include complicated family dynamics and current significant financial stressors. Protective factors include family support, willing to try medications, engaged in outpt mental " health treatment.       In summary, the patient's reported symptoms of depression in the context of recent significant financial stressors, lack of benefit from medication regimen, and complicated family dynamics are consistent with historic diagnosis of MDD w/SI. She will likely benefit from optimization of medication and establishing greater outpatient supports this admission.     Given that she currently has SI, patient warrants inpatient psychiatric hospitalization to maintain her safety.      Psychiatric Plan by Diagnosis      Today's changes:  - Increase Lithium to 900mg CR as a treatment for Bipolar Depression on 07/07 (Lithium started on 07/06)    # MDD  # Unspecified anxiety disorder   1. Medications:  - PTA latuda 40mg daily was continued  - Start Lithium 300mg CR as a treatment for Bipolar Depression on 07/05.  - Increase Lithium to 600mg CR as a treatment for Bipolar Depression on 07/06  - Increase Lithium to 900mg CR as a treatment for Bipolar Depression on 07/07    2. Pertinent Labs/Monitoring:   - ordered updated hemoglobin A1c since previous was elevated, new one came back at 5.8     3. Additional Plans:  - Patient will be treated in therapeutic milieu with appropriate individual and group therapies as described        Psychiatric Hospital Course:    Naa aPredes was initially admitted to [facility] on a 72 hour hold, but has since accepted a voluntary hospitalisation.    Medications:  ? PTA latuda 40mg was continued.  ? Lithium 300mg CR was started as a Treatment for Bipolar Depression on 07/05.  ? Lithium increased to 600mg CR as a treatment for Bipolar Depression on 07/06.  ?  Lithium increased to 900mg CR as a treatment for Bipolar Depression on 07/07.     The risks, benefits, alternatives, and side effects were discussed and understood by the patient.     Medical Assessment and Plan     Medical diagnoses to be addressed this admission:    # Hyperlipidemia found on admission labs  Repeat  Hemoglobin A1c ordered (3 months ago was elevated) : 5.8    Medical course: Patient was physically examined by the ED prior to being transferred to the unit and was found to be medically stable and appropriate for admission.     Consults:  Medicine consult for hyperlipidemia on 7/3: No inpatient treatment needed, patient would benefit from lifestyle modifications and outpatient follow-up.     Checklist     Legal Status: Voluntary     Safety Assessment:   Behavioral Orders   Procedures     Code 1 - Restrict to Unit     Routine Programming     As clinically indicated     Status 15     Every 15 minutes.     Suicide precautions     Patients on Suicide Precautions should have a Combination Diet ordered that includes a Diet selection(s) AND a Behavioral Tray selection for Safe Tray - with utensils, or Safe Tray - NO utensils         Risk Assessment:  Risk for harm is moderate-high.  Risk factors: SI, trauma, past behaviors and financial stressors  Protective factors: family and engaged in treatment     SIO: None    Disposition: TBD. Disposition pending clinical stabilization, medication optimization and development of an appropriate discharge plan.     Attestations     This patient was seen and discussed with my attending physician.  Mag Johnston, MS3  [facility] [employer]    Attestation:  I was present with the medical student who participated in the service and in the documentation of the note. I have verified the history and personally performed the physical exam and medical decision making. I agree with the assessment and plan of care as documented in the note.    Ramez Dagher, MD  PGY-1 Psychiatry Resident

## 2023-07-07 NOTE — CARE PLAN
07/07/23 1229   Group Therapy Session   Group Attendance attended group session   Time Session Began 1115   Time Session Ended 1200   Total Time (minutes) 45   Total # Attendees 3   Group Type community;recreation;task skill   Group Topic Covered balanced lifestyle;cognitive activities;leisure exploration/use of leisure time;self-care activities;structured socialization   Group Session Detail OT Clinic: open group activities to promote self-expression and leisure exploration while working to demonstrate cognitive skills   Patient Participation Detail Pt participated in open OT group to explore leisure and recreational activities. Pt worked on spot-the-differences picture puzzles with a fellow peer, expressing interest in the activity at a similar time as the peer. Pt worked collaboratively with peer, finding answers to puzzles, and assisting with keeping track of answers by writing them down. Pt was following guidance and direction from the peer, working quietly and was soft-spoken.

## 2023-07-07 NOTE — PLAN OF CARE
Goal Outcome Evaluation:    Plan of Care Reviewed With: patient          Problem: Adult Behavioral Health Plan of Care  Goal: Plan of Care Review  7/6/2023 1959 by Jazzmine Betts RN  Outcome: Progressing  Flowsheets (Taken 7/6/2023 1900)  Patient Agreement with Plan of Care: agrees          Behavioral  Pt up and visible in the unit. Intermittently pacing on hallway. Pt has a good intake of foods and fluids. Compliant with medications. Hygiene is appropriate. Pt is isolative with no interactions with peers or staff but pleasant and cooperative upon approach; speech is soft with language barrier but able to make needs known. Endorsed some depression and anxiety. PRN Hydroxyzine was utilized. Pt denied SI, SIB, HI, and hallucinations; affect is flat; mood is anxious and depressed but pleasant upon approach.      Medical     No complaints of physical pain/discomfort this shift. Vital signs stable. Blood pressure (!) 141/89, pulse 87, temperature 98.7  F (37.1  C), temperature source Oral, resp. rate 16, weight 68.2 kg (150 lb 6.4 oz), last menstrual period 08/20/2015, SpO2 100 %, not currently breastfeeding.       PRNS:  - Hydroxyzine   - Melatonin      Plan  - Dietitian consult for more options such as fish, chicken, wild rice soup etc  - OK to have outside food for cultural purpose

## 2023-07-07 NOTE — PLAN OF CARE
Patient appears sleeping resting in bed during No behavioral issues or any safety concerns currently. Will continue to monitor the patient and provide therapeutic intervention as needed. Will continue with current plan of care. Notify MD with any concerns.   Problem: Sleep Disturbance  Goal: Adequate Sleep/Rest  Outcome: Progressing   The patient had 7 total hours of sleep this shift rounds.

## 2023-07-07 NOTE — PLAN OF CARE
Problem: Adult Behavioral Health Plan of Care  Goal: Plan of Care Review  Outcome: Progressing  Flowsheets (Taken 7/7/2023 1705)  Patient Agreement with Plan of Care: agrees   Goal Outcome Evaluation:    Plan of Care Reviewed With: patient          Pt was calm and quiet most of the shift. She was guarded and withdrawn. She was isolative to her room most of the shift. She came out some few times and watched TV with peers but had minimal interaction with peers or staff. Encouraged to interact with peers but kept to herself while out in the lounge. She was observed some few times pacing hallway. Her speech was soft and slow. She was able to make her needs known. Vitals stable. She was out for dinner and snacks and had good food and fluids intake. She denied pain all shift. Endorsed anxiety 5/10 but declined any intervention. Denied depression. Endorsed SI but has no plan while at the hospital. Denied HI/AH/VH. Contracted for safety. She was cooperative and med compliant. No prn given this shift. No other concern or outburst behavior noted at this time. No safety concern noted at this time. Will continue to monitor and will assist if need arise.

## 2023-07-07 NOTE — PROGRESS NOTES
CLINICAL NUTRITION SERVICES - BRIEF NOTE     Nutrition Prescription  Recommendations already ordered by Registered Dietitian (RD):  -Ensure clear (rotate flavors) once daily with breakfast  -Ensure (chocolate) once daily with dinner  -Write-ins: chicken and rice     Future/Additional Recommendations:  RD will follow up to assess tolerance to the above.      REASON FOR ASSESSMENT  Naa Paredes is a/an 57 year old female assessed by the dietitian for Patient/Family Request    EVALUATION OF THE PROGRESS TOWARD GOALS   Diet: Regular      NEW FINDINGS   While RD was on the unit yesterday, spoke with patient provider and RN about how patient is desiring of more culturally appropriate food options. Provider planned to place order allowing family to bring outside food, and placed RD consult per pt request. RN noted that patient would like food such as chicken, fish, wild rice soup. RD spoke with  who confirmed they are unable to accommodate fish write-ins and do not have wild rice soup.    RD spoke with patient on the unit, who expressed interest in culturally available foods. RD educated that we unfortunately do not carry much culturally appropriate food, but RD is able to offer write-ins of rice and chicken and we can trial a protein supplement. Patient was disappointed but amenable, RD encouraged her to ask visitors to bring in food for her.     Patient currently weight stable.   Wt Readings from Last 15 Encounters:   07/06/23 68.2 kg (150 lb 6.4 oz)   07/02/23 68 kg (150 lb)   06/29/23 67.8 kg (149 lb 8 oz)   06/22/23 67.1 kg (148 lb)   04/26/23 68.9 kg (151 lb 14.4 oz)   03/21/23 68.8 kg (151 lb 9.6 oz)   03/16/23 69.3 kg (152 lb 12.8 oz)   03/01/23 69.9 kg (154 lb)   02/15/23 70 kg (154 lb 4.8 oz)   02/07/23 67.6 kg (149 lb)   10/26/22 69.2 kg (152 lb 9.6 oz)   09/21/22 67.6 kg (149 lb)   08/04/22 72.6 kg (160 lb)   07/07/22 68.9 kg (152 lb)   02/03/22 72.6 kg (160 lb)     Edita Box, MPH, RD,  ARPITA  Pediatric & Adult Behavioral Health Dietitian   Pager: [phone]  Weekend/holiday pager: [phone]

## 2023-07-07 NOTE — PLAN OF CARE
Assessment/Intervention/Current Symtoms and Care Coordination  Pt is currently being engaged in the therapeutic milieu.  Stabilization is ongoing.  Met with pt. She reported that she was anxious about her insurance bills. Writer informed her that she has medical insurance.         Discharge Plan or Goal  Return home when stable   Resume psychiatry/therapy     Barriers to Discharge   Pt is currently undergoing stabilization     Referral Status  Primary Outpatient Psychiatrist: Nanci Mena MD at [facility]  Primary Physician: Sher Duran  Therapist: Luiza David PsyD, [facility]        Legal Status     72 hour hold

## 2023-07-07 NOTE — PLAN OF CARE
"  Problem: Adult Behavioral Health Plan of Care  Goal: Plan of Care Review  Outcome: Progressing  Goal: Patient-Specific Goal (Individualization)  Description: You can add care plan individualizations to a care plan. Examples of Individualization might be:  \"Parent requests to be called daily at 9am for status\", \"I have a hard time hearing out of my right ear\", or \"Do not touch me to wake me up as it startles me\".  Outcome: Progressing  Goal: Individualized Daily Interaction Plan (IDIP)  Outcome: Progressing  Goal: Adheres to Safety Considerations for Self and Others  Outcome: Progressing  Goal: Absence of New-Onset Illness or Injury  Outcome: Progressing  Goal: Optimized Coping Skills in Response to Life Stressors  Outcome: Progressing  Goal: Develops/Participates in Therapeutic [place] to Support Successful Transition  Outcome: Progressing     Problem: Depression  Goal: Improved Mood  Outcome: Progressing     Problem: Suicidal Behavior  Goal: Suicidal Behavior is Absent or Managed  Outcome: Progressing     Problem: Sleep Disturbance  Goal: Adequate Sleep/Rest  Outcome: Progressing   Goal Outcome Evaluation:  Patient has been visible in the milieu.Naa presents with a flat & blunted affect.Mood is calm.Sociable with select peers.Patient quiet.Patient went to groups.Pt  biked for sometime. was here and brought food for patient.Patient denies all MH issues.  No behavior escalation or safety concerns.Intake adequate & hygiene is appropriate.VSS.  Temp: 98.1  F (36.7  C) Temp src: Oral BP: 110/76 Pulse: 95     SpO2: 95 % O2 Device: None (Room air)                          "

## 2023-07-08 PROCEDURE — 124N000002 HC R&B MH UMMC

## 2023-07-08 PROCEDURE — 250N000013 HC RX MED GY IP 250 OP 250 PS 637

## 2023-07-08 PROCEDURE — G0177 OPPS/PHP; TRAIN & EDUC SERV: HCPCS

## 2023-07-08 RX ADMIN — Medication 3 MG: at 20:55

## 2023-07-08 RX ADMIN — HYDROXYZINE HYDROCHLORIDE 25 MG: 25 TABLET, FILM COATED ORAL at 16:52

## 2023-07-08 RX ADMIN — LURASIDONE HYDROCHLORIDE 40 MG: 40 TABLET, COATED ORAL at 16:51

## 2023-07-08 RX ADMIN — HYDROXYZINE HYDROCHLORIDE 25 MG: 25 TABLET, FILM COATED ORAL at 10:43

## 2023-07-08 RX ADMIN — Medication 3 MG: at 03:04

## 2023-07-08 RX ADMIN — LITHIUM CARBONATE 900 MG: 450 TABLET, EXTENDED RELEASE ORAL at 21:17

## 2023-07-08 ASSESSMENT — ACTIVITIES OF DAILY LIVING (ADL)
HYGIENE/GROOMING: INDEPENDENT
ADLS_ACUITY_SCORE: 31
DRESS: INDEPENDENT
ADLS_ACUITY_SCORE: 31
ORAL_HYGIENE: INDEPENDENT
ADLS_ACUITY_SCORE: 31
ADLS_ACUITY_SCORE: 31
LAUNDRY: WITH SUPERVISION
ADLS_ACUITY_SCORE: 31

## 2023-07-08 NOTE — PLAN OF CARE
Problem: Adult Behavioral Health Plan of Care  Goal: Plan of Care Review  Outcome: Progressing  Flowsheets (Taken 7/8/2023 1710)  Patient Agreement with Plan of Care: agrees   Goal Outcome Evaluation:    Plan of Care Reviewed With: patient          Pt was out in the lounge most of the shift. She was calm, quiet, and guarded. Her speech was soft and slow. Affect flat and blunted. She socialized and engaged with selected peer. She kept to herself most of the shift. She was observed pacing [address] way with selected peer, her former roommate. She denied pain. Endorsed anxiety and depression 5/10 and was given prn Atarax 25 mg which she said was helpful. She endorsed SI but said she has no plan while in the hospital. Denied HI/AH/VH. Did not contract for safety stating that she feels safe only when she is at home. Pt was reassured her safety here at the hospital. She was out for dinner and snack and had good food and fluids intake. She attended OT evening group and said it went well. No other concern or outburst behavior noted at this time. Will continue to monitor and will assist if need arise.

## 2023-07-08 NOTE — PLAN OF CARE
Patient appears to have slept for 5.25 hours. Patient calm, no behavioral issues or safety concerns at this time. Will continue to  monitor the patient and provide therapeutic intervention as needed. Will continue with current plan of care. Notify MD with any concerns.  Problem: Sleep Disturbance  Goal: Adequate Sleep/Rest  Outcome: Progressing

## 2023-07-08 NOTE — PLAN OF CARE
"  Problem: Adult Behavioral Health Plan of Care  Goal: Plan of Care Review  Outcome: Progressing  Goal: Patient-Specific Goal (Individualization)  Description: You can add care plan individualizations to a care plan. Examples of Individualization might be:  \"Parent requests to be called daily at 9am for status\", \"I have a hard time hearing out of my right ear\", or \"Do not touch me to wake me up as it startles me\".  Outcome: Progressing  Goal: Individualized Daily Interaction Plan (IDIP)  Outcome: Progressing  Goal: Adheres to Safety Considerations for Self and Others  Outcome: Progressing  Goal: Absence of New-Onset Illness or Injury  Outcome: Progressing  Goal: Optimized Coping Skills in Response to Life Stressors  Outcome: Progressing  Goal: Develops/Participates in Therapeutic [place] to Support Successful Transition  Outcome: Progressing  Intervention: Foster Therapeutic [place]  Recent Flowsheet Documentation  Taken 7/8/2023 1100 by Muna Patterson RN  Trust Relationship/Rapport:   care explained   choices provided   emotional support provided   empathic listening provided   questions answered   questions encouraged   reassurance provided   thoughts/feelings acknowledged     Problem: Depression  Goal: Improved Mood  Outcome: Progressing  Intervention: Monitor and Manage Depressive Symptoms  Recent Flowsheet Documentation  Taken 7/8/2023 1100 by Muna Patterson RN  Family/Support System Care:   involvement promoted   presence promoted   self-care encouraged   support provided     Problem: Suicidal Behavior  Goal: Suicidal Behavior is Absent or Managed  Outcome: Progressing     Problem: Sleep Disturbance  Goal: Adequate Sleep/Rest  Outcome: Progressing   Goal Outcome Evaluation:  Patient has been visible in the milieu.Compliant with medication,PRN Atarax given x 1.Patient rated her anxiety 3-4/10.Daughter came to visit.Denies pain,denies depression.Paced halls,likes to keep to herself,minimal social " contact.denies all MH issues.Eats and drinks okay.VSS.Temp: 97.6  F (36.4  C) Temp src: Temporal BP: 124/85 Pulse: 73   Resp: 16 SpO2: 97 % O2 Device: None (Room air)

## 2023-07-08 NOTE — CARE PLAN
"   07/08/23 1435   Group Therapy Session   Group Attendance attended group session   Time Session Began 1315   Time Session Ended 1400   Total Time (minutes) 45   Total # Attendees 6   Group Type community;recreation;task skill   Group Topic Covered leisure exploration/use of leisure time;structured socialization;balanced lifestyle   Group Session Detail OT Leisure Group: Group activities to exercise cognitive skills while exploring leisure and socializing opportunities - \"Skittles\" - a game where players wind a top and spin it to knock down pieces for points   Patient Participation Detail Pt participated in a group activity playing Skittles. Pt reported \"I don't know what's going on\" as they entered the group room. Pt initially had difficulty activating the top but experienced successful turns after a few rounds. Pt appeared engaged and focused during the activity. Pt initiated their turn and appeared to look forward to playing, wanting to play one last turn after writer announced the end of the game.        "

## 2023-07-09 PROCEDURE — 90853 GROUP PSYCHOTHERAPY: CPT

## 2023-07-09 PROCEDURE — 250N000013 HC RX MED GY IP 250 OP 250 PS 637

## 2023-07-09 PROCEDURE — 124N000002 HC R&B MH UMMC

## 2023-07-09 RX ADMIN — LITHIUM CARBONATE 900 MG: 450 TABLET, EXTENDED RELEASE ORAL at 21:06

## 2023-07-09 RX ADMIN — LURASIDONE HYDROCHLORIDE 40 MG: 40 TABLET, COATED ORAL at 18:30

## 2023-07-09 RX ADMIN — Medication 3 MG: at 21:06

## 2023-07-09 ASSESSMENT — ACTIVITIES OF DAILY LIVING (ADL)
ADLS_ACUITY_SCORE: 31
DRESS: INDEPENDENT
ADLS_ACUITY_SCORE: 31
LAUNDRY: WITH SUPERVISION
ADLS_ACUITY_SCORE: 31
ORAL_HYGIENE: INDEPENDENT
ADLS_ACUITY_SCORE: 31
HYGIENE/GROOMING: INDEPENDENT

## 2023-07-09 NOTE — PROGRESS NOTES
07/08/23 2142   Group Therapy Session   Group Attendance attended group session   Time Session Began 2000   Time Session Ended 2100   Total Time (minutes) 45   Total # Attendees 10   Group Type recreation   Group Session Detail Education provided and group discussion on the benefits of healthy leisure with hands on [place] category activity for concentration, follow through, cognitive processing, tracking, lateral thinking, coping, mood stabilization, reality-based activity, healthy leisure, socialization and an opportunity to experience success.   Patient Participation Detail Pt was pleasant and engaged in the group.  She was social with a female peer who frequently assisted her with understanding the activity.  Pt needed extra processing time with each turn as she often struggled to generate a response during the activity.  Unclear to this writer if there are any language barriers that may be contributing to her dificulty.  Pt was able to graciously accept assistance as needed from staff and peers.

## 2023-07-09 NOTE — PLAN OF CARE
Patient appears to have slept for 7 hours. Patient calm, no behavioral issues or safety concerns at this time. Will continue to  monitor the patient and provide therapeutic intervention as needed. Will continue with current plan of care. Notify MD with any concerns.  Problem: Sleep Disturbance  Goal: Adequate Sleep/Rest  Outcome: Progressing

## 2023-07-09 NOTE — PLAN OF CARE
"  Problem: Adult Behavioral Health Plan of Care  Goal: Plan of Care Review  Outcome: Progressing  Goal: Patient-Specific Goal (Individualization)  Description: You can add care plan individualizations to a care plan. Examples of Individualization might be:  \"Parent requests to be called daily at 9am for status\", \"I have a hard time hearing out of my right ear\", or \"Do not touch me to wake me up as it startles me\".  Outcome: Progressing  Goal: Individualized Daily Interaction Plan (IDIP)  Outcome: Progressing  Goal: Adheres to Safety Considerations for Self and Others  Outcome: Progressing  Goal: Absence of New-Onset Illness or Injury  Outcome: Progressing  Goal: Optimized Coping Skills in Response to Life Stressors  Outcome: Progressing  Goal: Develops/Participates in Therapeutic [place] to Support Successful Transition  Outcome: Progressing  Intervention: Foster Therapeutic [place]  Recent Flowsheet Documentation  Taken 7/9/2023 1000 by Muna Patterson RN  Trust Relationship/Rapport:   care explained   choices provided   emotional support provided   empathic listening provided   questions answered   questions encouraged   reassurance provided   thoughts/feelings acknowledged     Problem: Depression  Goal: Improved Mood  Outcome: Progressing  Intervention: Monitor and Manage Depressive Symptoms  Recent Flowsheet Documentation  Taken 7/9/2023 1000 by Muna Patterson RN  Family/Support System Care:   self-care encouraged   support provided     Problem: Suicidal Behavior  Goal: Suicidal Behavior is Absent or Managed  Outcome: Progressing     Problem: Sleep Disturbance  Goal: Adequate Sleep/Rest  Outcome: Progressing     Problem: Adult Behavioral Health Plan of Care  Goal: Develops/Participates in Therapeutic [place] to Support Successful Transition  Intervention: Foster Therapeutic [place]  Recent Flowsheet Documentation  Taken 7/9/2023 1000 by Muna Patterson RN  Trust Relationship/Rapport:   care explained   choices " provided   emotional support provided   empathic listening provided   questions answered   questions encouraged   reassurance provided   thoughts/feelings acknowledged     Problem: Depression  Goal: Improved Mood  Intervention: Monitor and Manage Depressive Symptoms  Recent Flowsheet Documentation  Taken 7/9/2023 1000 by Muna Patterson RN  Family/Support System Care:   self-care encouraged   support provided   Goal Outcome Evaluation:  Patient has been visible in the milieu.Patient observed pacing halls.Sociable with selective peers.Soft spoken.Independent with ADL's.NO escalation of behaviors and no safety concerns.Adequate intake and hygiene is appropriate.No scheduled medication this shift and no prn was given. came to visit and brought drink that both sat in the dining room and shared.Patient denies all MH issues.  Temp: 98.3  F (36.8  C) Temp src: Oral BP: 116/75 Pulse: 75   Resp: 16 SpO2: 98 % O2 Device: None (Room air)

## 2023-07-10 PROCEDURE — 124N000002 HC R&B MH UMMC

## 2023-07-10 PROCEDURE — 250N000013 HC RX MED GY IP 250 OP 250 PS 637

## 2023-07-10 PROCEDURE — 99233 SBSQ HOSP IP/OBS HIGH 50: CPT | Mod: GC | Performed by: STUDENT IN AN ORGANIZED HEALTH CARE EDUCATION/TRAINING PROGRAM

## 2023-07-10 RX ADMIN — Medication 3 MG: at 22:19

## 2023-07-10 RX ADMIN — LURASIDONE HYDROCHLORIDE 40 MG: 40 TABLET, COATED ORAL at 17:43

## 2023-07-10 RX ADMIN — LITHIUM CARBONATE 900 MG: 450 TABLET, EXTENDED RELEASE ORAL at 20:08

## 2023-07-10 ASSESSMENT — ACTIVITIES OF DAILY LIVING (ADL)
ADLS_ACUITY_SCORE: 31
LAUNDRY: WITH SUPERVISION
ADLS_ACUITY_SCORE: 31
DRESS: INDEPENDENT
ADLS_ACUITY_SCORE: 31
ORAL_HYGIENE: INDEPENDENT
ADLS_ACUITY_SCORE: 31
HYGIENE/GROOMING: INDEPENDENT

## 2023-07-10 NOTE — CARE PLAN
07/10/23 1602   Group Therapy Session   Group Attendance attended group session   Time Session Began 1015   Time Session Ended 1200   Total Time (minutes) 40   Total # Attendees 5-7   Group Type life skill;psychoeducation;recreation   Group Topic Covered balanced lifestyle;coping skills/lifestyle management;emotions/expression;leisure exploration/use of leisure time;self-care activities   Group Session Detail Group Topic: Journaling - group activity to educate the benefits, as well as to promote processing and self-reflection through journaling // OT Clinic: open group activities to promote self-expression and leisure exploration while working to demonstrate cognitive skills.   Patient Participation Detail Pt participated in a group activity focused on journaling. Pt reported to writer that they had a difficult time doing the activity, due to possible language barriers; writer asked if pt would like to draw in a journal instead; pt declined and left group after writer encouraged pt to return for OT Clinic in the next group.    Pt returned to group and worked independently on origami. Pt appeared focused and was able to complete an origami project before leaving group (no charge).

## 2023-07-10 NOTE — PROGRESS NOTES
"  ----------------------------------------------------------------------------------------------------------  [facility]  Psychiatry Progress Note  Hospital Day #8     Interim History:     The patient's care was discussed with the treatment team and chart notes were reviewed.    Vitals: VSS  Sleep: 6.75 hours (07/10/23 0600)  Scheduled medications: Took all scheduled medications as prescribed  Psychiatric PRN medications: None    Staff Report:   No acute events or safety concerns overnight. Please see staff notes for details.     Subjective:     Patient Interview:  Naa Paredes was interviewed in the conference room today with a phone . She notes that the last 2 days she has been feeling better. Naa states that she has gotten a new medicine, so she cannot fall asleep. She notes that in the morning her mood is \"not so good\". Naa states that last night she was feeling better because she didn't take her anxiety medication (hydroxyzine). She notes that she has depression in addition to feeling anxious. Naa states that her hormones got unbalanced and she has felt very old in the past 2 years. She notes that she feels like she is having a stroke because she is losing her intelligence and has difficulty maintaining her attention. Naa states that she had better sleep last night which increased her energy level today. She notes that she could not fall asleep Friday. Naa states that she is afraid that she is going crazy because she doesn't know how long she will be in the hospital, but has not had SI since Friday. She notes that she would like to try a medication to help her with hormones related to menopause. Naa states that she pulls out her white hair and now has less hair. She notes that she is also slow at getting in and out of the car. Naa expressed concern for a stroke because she feels numbness on her arms, but it doesn't occur " "often, mostly when she sleeps. She notes that the numbness in her left thumb and finger has been occurring for many years and the numbness in her right arm has been ongoing for a month. Naa states that she really doesn't want to keep taking the \"big white pills\" (Hydroxyzine?) because she feels worse after taking them.      Phone call with daughters, 07/05:  Daughter Rosalva suggested to reach out to other daughters Carline or Genesis who had more information, but noted that during the past two months her mother has been getting worse.    Other daughter mentioned that her mom's low mood started getting worse mid-April, as she wakes up anxious and scared to be alone. She would wake her up, and would self isolate more and more, only leaving the house to the casino twice a week for gambling with her ex . She says that the patient has been overwhelmed lately with the presence of her grandson at home as well as her mother's illness and the need to care for both, which could have contibuted to her current state. 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 She remembers her mother was hospitalized 8 years ago for depression but doesn't remember any details, and only knows that a regimen of Latuda was started lately due to gene testing and that the dose was increased some days ago.    Phone call with daughter Genesis, 07/07:  Inquiry about the Geuness supplement. She says that her mother has been taking it for a few years now, probably 8 years ago. She states she consumes it frequently but wouldn't be able to specify the amount or frequency.    Phone call with outpatient psychiatrist, Dr Mena, 07/05:  Dr Mena reviewed the patient's charts and note that she had already told her that she was diagnosed with Bipolar Disorder during her hospitalization 8 years ago, and mentions that she had some manic " episodes, but doesn't elaborate any further. She states she doesn't have the names of the patient's prior treatments, but notes that she started her on Aripiprazole 10 then 15mg, which was then switched to Lurasidone because the patient did not tolerate aripiprazole and had a positive gene screening that she recommended for Lurasidone.    ROS:  Patient has  some longstanding numbness in left thumb/index finger and right arm  Patient denies acute concerns     Objective:     Vitals:  /85 (BP Location: Left arm, Patient Position: Sitting, Cuff Size: Adult Regular)   Pulse 88   Temp 98  F (36.7  C) (Oral)   Resp 16   Wt 68.4 kg (150 lb 11.2 oz)   LMP 08/20/2015   SpO2 97%   BMI 27.56 kg/m      Allergies:  Allergies   Allergen Reactions    No Clinical Screening - See Comments      PN: LW CM1: Contrast Adverse Reaction - None Reaction :  PN: LW FI1: nka       Current Medications:  Scheduled:  Current Facility-Administered Medications   Medication Dose Route Frequency    lithium ER  900 mg Oral At Bedtime    lurasidone  40 mg Oral Daily       PRN:  Current Facility-Administered Medications   Medication Dose Route Frequency    acetaminophen  650 mg Oral Q4H PRN    alum & mag hydroxide-simethicone  30 mL Oral Q4H PRN    hydrOXYzine  25 mg Oral Q4H PRN    melatonin  3 mg Oral At Bedtime PRN    OLANZapine  10 mg Oral TID PRN    Or    OLANZapine  10 mg Intramuscular TID PRN    polyethylene glycol  17 g Oral Daily PRN       Labs and Imaging:  New results:   No results found for this or any previous visit (from the past 24 hour(s)).    Data this admission:  - CBC: elevated hemoglobin and hematocrit   - CMP unremarkable  - TSH normal  - Hgb A1c elevated at 5.8  - Lipids elevated LDL, Non HDL, & triglycerides      Mental Status Exam:     Oriented to:  Grossly Oriented  General:  Awake  Appearance:  appears stated age and appropriate weight  Behavior/Attitude:  calm and cooperative  Eye Contact:   "appropriate  Psychomotor: retarded and slowed no catatonia present  Speech:  soft volume/tone and paucity  Language: Not Fluent in English - Cantonese  needed.  Mood:  \"Not so good\"  Affect:  congruent with mood and sad  Thought Process:  coherent and impoverished  Thought Content:  suicidal ideation with plan (without intent); No apparent delusions  Associations:  intact  Insight:  good due to her identification of depressive symptoms.  Judgment:  partial due to the cognitive distortions related to her severe depression and her willingness to ask for treatment.  Impulse control: fair  Attention Span:  inattentive  Concentration:  grossly intact  Recent and Remote Memory:  not formally assessed  Fund of Knowledge:  average  Muscle Strength and Tone: normal  Gait and Station: Normal     Psychiatric Assessment     Naa Paredes is a 57 year old female previously diagnosed with MDD, unspecified anxiety disorder and conversion disorder admitted on a 72 hr hold 07/02/2023 due to concern for SI and report to daughter she would cut her wrist in the context of psychosocial stressors including chronic mental health issues and financial stress and no benefit from medication regimen. Most recent psychiatric hospitalization was in 2015 for similar presentation. Significant symptoms on admission include avolition, irritable/depressed mood, sleep disturbance, depersonalization, and suicidal ideation. The MSE on admission was pertinent for depressed mood with SI, restricted affect, delayed, quiet speech, poor eye contact. Biological contributions to mental health presentation include previous diagnoses, possible medication nonadherence and hyperlipidemia per admission labs. Psychological contributions to mental health presentation include concern for untreated trauma, partial insight, and unclear coping skills. Social factors contributing to mental health presentation include complicated family dynamics and current " significant financial stressors. Protective factors include family support, willing to try medications, engaged in outpt mental health treatment.       In summary, the patient's reported symptoms of depression in the context of recent significant financial stressors, lack of benefit from medication regimen, and complicated family dynamics are consistent with historic diagnosis of MDD w/SI. She will likely benefit from optimization of medication and establishing greater outpatient supports this admission.     Given that she currently has SI, patient warrants inpatient psychiatric hospitalization to maintain her safety.      Psychiatric Plan by Diagnosis      Today's changes:  - Continue Lithium at 900mg CR   - Will check lithium level on Wednesday (7/12)  - Given patient has never had a full work up will get one on this admission to rule out any other possible etiologies for her symptoms; MRI scheduled for Tuesday 07/11    # MDD  # Unspecified anxiety disorder   1. Medications:  - PTA latuda 40mg daily was continued  - Start Lithium 300mg CR as a treatment for Bipolar Depression on 07/05.  - Increase Lithium to 600mg CR as a treatment for Bipolar Depression on 07/06  - Increase Lithium to 900mg CR as a treatment for Bipolar Depression on 07/07    2. Pertinent Labs/Monitoring:   - Ordered updated hemoglobin A1c since previous was elevated, new one came back at 5.8     3. Additional Plans:  - Patient will be treated in therapeutic milieu with appropriate individual and group therapies as described        Psychiatric Hospital Course:    Naa Paredes was initially admitted to [facility] on a 72 hour hold, but has since accepted a voluntary hospitalisation.  Medications:  PTA latuda 40mg was continued.  Lithium 300mg CR was started as a Treatment for Bipolar Depression on 07/05.  Lithium increased to 600mg CR as a treatment for Bipolar Depression on 07/06.   Lithium increased to 900mg CR as a treatment for Bipolar  Depression on 07/07.     The risks, benefits, alternatives, and side effects were discussed and understood by the patient.     Medical Assessment and Plan     Medical diagnoses to be addressed this admission:    # Hyperlipidemia found on admission labs  Repeat Hemoglobin A1c ordered (3 months ago was elevated) : 5.8    Medical course: Patient was physically examined by the ED prior to being transferred to the unit and was found to be medically stable and appropriate for admission.     Consults:  Medicine consult for hyperlipidemia on 7/3: No inpatient treatment needed, patient would benefit from lifestyle modifications and outpatient follow-up.     Checklist     Legal Status: Voluntary     Safety Assessment:   Behavioral Orders   Procedures    Code 1 - Restrict to Unit    Routine Programming     As clinically indicated    Status 15     Every 15 minutes.    Suicide precautions     Patients on Suicide Precautions should have a Combination Diet ordered that includes a Diet selection(s) AND a Behavioral Tray selection for Safe Tray - with utensils, or Safe Tray - NO utensils         Risk Assessment:  Risk for harm is moderate-high.  Risk factors: SI, trauma, past behaviors and financial stressors  Protective factors: family and engaged in treatment     SIO: None    Disposition: TBD. Disposition pending clinical stabilization, medication optimization and development of an appropriate discharge plan.     Attestations     This patient was seen and discussed with my attending physician.  Mag Johnston, MS3  [facility] [employer]    Attestation:  I was present with the medical student who participated in the service and in the documentation of the note. I have verified the history and personally performed the physical exam and medical decision making. I agree with the assessment and plan of care as documented in the note.    Ramez Dagher, MD  PGY-1 Psychiatry Resident    This patient has been seen and evaluated by me,  Tami Cavazos DO.  I have discussed this patient with the team including the resident and medical student(s) and I agree with the findings and plan in this note.  Dr. Tami Cavazos DO, COLLEEN

## 2023-07-10 NOTE — PLAN OF CARE
Problem: Adult Behavioral Health Plan of Care  Goal: Plan of Care Review  Outcome: Progressing  Flowsheets (Taken 7/10/2023 1449)  Plan of Care Reviewed With: patient  Overall Patient Progress: no change  Patient Agreement with Plan of Care: agrees     Problem: Depression  Goal: Improved Mood  Outcome: Progressing  Intervention: Monitor and Manage Depressive Symptoms  Recent Flowsheet Documentation  Taken 7/10/2023 1112 by Muna Patterson RN  Family/Support System Care:   self-care encouraged   support provided     Problem: Adult Behavioral Health Plan of Care  Goal: Plan of Care Review  Outcome: Progressing  Flowsheets (Taken 7/10/2023 1449)  Plan of Care Reviewed With: patient  Overall Patient Progress: no change  Patient Agreement with Plan of Care: agrees  Goal: Develops/Participates in Therapeutic [place] to Support Successful Transition  Intervention: Foster Therapeutic [place]  Recent Flowsheet Documentation  Taken 7/10/2023 1112 by Muna Patterson RN  Trust Relationship/Rapport:   care explained   choices provided   emotional support provided   empathic listening provided   questions answered   questions encouraged   reassurance provided   thoughts/feelings acknowledged     Problem: Depression  Goal: Improved Mood  Intervention: Monitor and Manage Depressive Symptoms  Recent Flowsheet Documentation  Taken 7/10/2023 1112 by Muna Patterson RN  Family/Support System Care:   self-care encouraged   support provided   Goal Outcome Evaluation:      Plan of Care Reviewed With: patient    Overall Patient Progress: no change  Patient alert and oriented.Patient has been visible in the milieu.Patient seeks staff and asks repeated questions.Endorsed some anxiety,declined medication will encourage patient to verbalize her needs.Adequate intake,hygiene is appropriate.Denies SI/SIB/AH & VH.NO PRN given.  Temp: 98  F (36.7  C) Temp src: Oral BP: 128/85 Pulse: 88   Resp: 16 SpO2: 97 % O2 Device: None (Room air)

## 2023-07-10 NOTE — CARE PLAN
07/09/23 2121   Group Therapy Session   Group Attendance attended group session   Time Session Began 2000   Time Session Ended 2100   Total Time (minutes) 60   Total # Attendees 7   Group Type psychotherapeutic   Group Topic Covered coping skills/lifestyle management;emotions/expression;self-care activities   Group Session Detail Group members discussed/completed worksheets on stress exploration. Worksheet included listing common annoyances or strains on life, healthy coping strategies, and protective factors.   Patient Response/Contribution listened actively;cooperative with task   Patient Participation Detail Patient presented to group was pleasant and engaged. Patient checked in feeling depressed.

## 2023-07-10 NOTE — PLAN OF CARE
Problem: Adult Behavioral Health Plan of Care  Goal: Plan of Care Review  Outcome: Progressing  Flowsheets (Taken 7/9/2023 1715)  Patient Agreement with Plan of Care: agrees   Goal Outcome Evaluation:    Plan of Care Reviewed With: patient          Pt was out in the lounge most of the shift. She was calm, quiet, and guarded. Her speech was soft and slow. Affect flat and blunted. She socialized and engaged with selected peer. She kept to herself most of the shift. She was observed pacing ver[address] way with selected peer' She denied pain and all psych symptoms. She contract for safety. She was cooperative and med compliant. Prn melatonin 3 mg was given at bed time. She was out for dinner and snack and had good food and fluids intake. She attended OT evening group and said it went well. Vitals was stable. No other concern or outburst behavior noted at this time. Will continue to monitor and will assist if need arise

## 2023-07-10 NOTE — DISCHARGE INSTRUCTIONS
Behavioral Discharge Planning and Instructions    Summary:   You were admitted to [facility] on 7/2/23 with worsening depressive symptoms, suicidal ideation under the care of Albina Robles. You met with the Psychiatry team daily for ongoing psychiatric assessment and medication management.  You had opportunities to participate in therapeutic groups on the unit.   At this time you report your mood is stabilizing and you report you are not having thoughts or intent to harm yourself or others. You will be discharged home and will resume care with your outpatient providers.    Disposition:  Home with family    Main Diagnosis:   Major Depressive Disorder    Health Care Follow Up:  Therapy Appointment: (video appointment) Shekhar Elmore - Tuesday July 18 at 2:00pm  [phone]      Primary Care In-person appointment: Wednesday, July 26, 2023 at 1:45pm  Provider: Sher Duran MD  [place] [address]  Phone: [phone]      Psychiatry Med Management In-person appointment: Tuesday, August 1, 2023 at 8:30am  Provider: Nanci Mena MD  Location: [place] [address], [place] [address]  Ph: [phone]  Fax: [phone]        Major Treatments, Procedures and Findings:   Medications were  managed throughout your stay. An internal medicine consult was completed during your stay. You had the opportunity to participate in treatment programming while on the unit including occupational therapy, mental health support and education and spiritual services.     Symptoms to Report:   Please report if you are experiencing increased aggression and/or confusion, problematic loss of sleep, worsening mood, or thoughts of suicide to your treatment team or notify your primary provider.   IF THE SYMPTOMS YOU ARE EXPERIENCING ARE A MEDICAL EMERGENCY, CALL 911 IMMEDIATELY    Lifestyle Adjustment:   1. Adjust your lifestyle to get  enough sleep, relaxation, exercise and good nutrition.  Continue to develop healthy coping skills to decrease stress and promote a healthy and sober lifestyle.  2. Abstain from all substances of abuse.  3. Take medications as prescribed.  Please work with your doctor to discuss any concerns you have with your medications or side effects you may be experiencing.  4. Follow up with appointments as scheduled.      Resources:   [facility] Crisis:  Urgent help (24/7) Call the Crisis Response Unit at [phone].      The Crisis Response Unit is available to all people in the community. Phones are answered by caring staff who know a lot about mental health, housing, shelters, food, employment and transportation.  The Crisis Response Unit is considered a voluntary service. This means you choose if you want help from us.     The Crisis Response Unit can also help with things like:  Mental health crisis assessments  Access to urgent psychiatry and therapy  Family education and support  Crisis phone support  Referrals to community resources    General Medication Instructions:   See your medication sheet(s) for instructions.   Take all medicines as directed.  Make no changes unless your doctor suggests them.   Go to all your doctor visits.  Be sure to have all your required lab tests. This way, your medicines can be refilled on time.  Do not use any drugs not prescribed by your doctor.    Advance Directives:   Scanned document on file with [employer]? No scanned doc  Is document scanned? Pt states no documents  Honoring Choices Your Rights Handout: Informed and given  Was more information offered? Materials given    The Treatment team has appreciated the opportunity to work with you. If you have any questions or concerns about your recent admission, you can contact the unit which can receive your call 24 hours a day, 7 days a week. They will be able to get in touch with a Provider if needed. The unit number is [phone]

## 2023-07-10 NOTE — PLAN OF CARE
Problem: Sleep Disturbance  Goal: Adequate Sleep/Rest  Outcome: Progressing   Patient appears to have slept for 7 hours. Patient calm, no behavioral issues or safety concerns at this time. Will continue to  monitor the patient and provide therapeutic intervention as needed. Will continue with current plan of care. Notify MD with any concerns.

## 2023-07-11 ENCOUNTER — APPOINTMENT (OUTPATIENT)
Dept: MRI IMAGING | Facility: CLINIC | Age: 58
End: 2023-07-11
Attending: STUDENT IN AN ORGANIZED HEALTH CARE EDUCATION/TRAINING PROGRAM
Payer: COMMERCIAL

## 2023-07-11 PROCEDURE — G0177 OPPS/PHP; TRAIN & EDUC SERV: HCPCS

## 2023-07-11 PROCEDURE — 70551 MRI BRAIN STEM W/O DYE: CPT | Mod: 26 | Performed by: RADIOLOGY

## 2023-07-11 PROCEDURE — 70551 MRI BRAIN STEM W/O DYE: CPT

## 2023-07-11 PROCEDURE — 250N000013 HC RX MED GY IP 250 OP 250 PS 637

## 2023-07-11 PROCEDURE — 36415 COLL VENOUS BLD VENIPUNCTURE: CPT | Performed by: STUDENT IN AN ORGANIZED HEALTH CARE EDUCATION/TRAINING PROGRAM

## 2023-07-11 PROCEDURE — 86038 ANTINUCLEAR ANTIBODIES: CPT | Performed by: STUDENT IN AN ORGANIZED HEALTH CARE EDUCATION/TRAINING PROGRAM

## 2023-07-11 PROCEDURE — H2032 ACTIVITY THERAPY, PER 15 MIN: HCPCS

## 2023-07-11 PROCEDURE — 99232 SBSQ HOSP IP/OBS MODERATE 35: CPT | Performed by: STUDENT IN AN ORGANIZED HEALTH CARE EDUCATION/TRAINING PROGRAM

## 2023-07-11 PROCEDURE — 124N000002 HC R&B MH UMMC

## 2023-07-11 RX ADMIN — HYDROXYZINE HYDROCHLORIDE 25 MG: 25 TABLET, FILM COATED ORAL at 16:47

## 2023-07-11 RX ADMIN — Medication 3 MG: at 20:57

## 2023-07-11 RX ADMIN — LITHIUM CARBONATE 900 MG: 450 TABLET, EXTENDED RELEASE ORAL at 20:56

## 2023-07-11 RX ADMIN — LURASIDONE HYDROCHLORIDE 40 MG: 40 TABLET, COATED ORAL at 18:29

## 2023-07-11 ASSESSMENT — ACTIVITIES OF DAILY LIVING (ADL)
HYGIENE/GROOMING: INDEPENDENT
ADLS_ACUITY_SCORE: 31
ORAL_HYGIENE: INDEPENDENT
ADLS_ACUITY_SCORE: 31
DRESS: INDEPENDENT
ADLS_ACUITY_SCORE: 31
LAUNDRY: WITH SUPERVISION

## 2023-07-11 NOTE — PLAN OF CARE
Patient appears to have slept for 7 hours. No complains of pain and discomfort. Patient calm, no behavioral issues or safety concerns at this time. Will continue to  monitor the patient and provide therapeutic intervention as needed. Will continue with current plan of care. Notify MD with any concerns.      Problem: Sleep Disturbance  Goal: Adequate Sleep/Rest  7/11/2023 0637 by Julieta Daly, RN  Outcome: Progressing

## 2023-07-11 NOTE — PROGRESS NOTES
"  ----------------------------------------------------------------------------------------------------------  [facility]  Psychiatry Progress Note  Hospital Day #9     Interim History:     The patient's care was discussed with the treatment team and chart notes were reviewed.    Vitals: VSS  Sleep: 7 hours (07/11/23 0600)  Scheduled medications: Took all scheduled medications as prescribed  Psychiatric PRN medications: None    Staff Report:   No acute events or safety concerns overnight. Please see staff notes for details.     Subjective:     Patient Interview:  Naa Paredes was interviewed in the conference room today with a phone . She states that she is doing \"pretty good\". Naa notes that before she had SI and anxiety, but starting yesterday she no longer has those thoughts. She states that she doesn't see a big difference in her living, but the people surrounding her don't think she has depression anymore, specifically her friend on the unit and her . Her  told her that she seems to be getting better. Naa states that she didn't take the medication for her anxiety because she no longer is feeling anxious, however, she did take her prescribed medication. She asks when she will be able to leave the hospital. Naa states that she is still having memory problems and it is hard for her to answer questions.    Phone call with daughters, 07/05:  Daughter Rosalva suggested to reach out to other daughters Carline or Genesis who had more information, but noted that during the past two months her mother has been getting worse.    Other daughter mentioned that her mom's low mood started getting worse mid-April, as she wakes up anxious and scared to be alone. She would wake her up, and would self isolate more and more, only leaving the [place] to the [place] twice a week for gambling with her ex . She says that the patient has been overwhelmed " lately with the presence of her grandson at home as well as her mother's illness and the need to care for both, which could have contibuted to her current state. 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 She remembers her mother was hospitalized 8 years ago for depression but doesn't remember any details, and only knows that a regimen of Latuda was started lately due to gene testing and that the dose was increased some days ago.    Phone call with daughter Genesis, 07/07:  Inquiry about the Geuness supplement. She says that her mother has been taking it for a few years now, probably 8 years ago. She states she consumes it frequently but wouldn't be able to specify the amount or frequency.    Phone call with outpatient psychiatrist, Dr Mena, 07/05:  Dr Mena reviewed the patient's charts and note that she had already told her that she was diagnosed with Bipolar Disorder during her hospitalization 8 years ago, and mentions that she had some manic episodes, but doesn't elaborate any further. She states she doesn't have the names of the patient's prior treatments, but notes that she started her on Aripiprazole 10 then 15mg, which was then switched to Lurasidone because the patient did not tolerate aripiprazole and had a positive gene screening that she recommended for Lurasidone.    ROS:  Patient has no bothersome physical symptoms  Patient denies acute concerns     Objective:     Vitals:  /77 (Patient Position: Sitting, Cuff Size: Adult Regular)   Pulse 72   Temp 97.7  F (36.5  C) (Oral)   Resp 16   Wt 68.5 kg (151 lb)   LMP 08/20/2015   SpO2 97%   BMI 27.62 kg/m      Allergies:  Allergies   Allergen Reactions     No Clinical Screening - See Comments      PN: LW CM1: Contrast Adverse Reaction - None Reaction :  PN: LW FI1: nka       Current Medications:  Scheduled:  Current  "Facility-Administered Medications   Medication Dose Route Frequency     lithium ER  900 mg Oral At Bedtime     lurasidone  40 mg Oral Daily       PRN:  Current Facility-Administered Medications   Medication Dose Route Frequency     acetaminophen  650 mg Oral Q4H PRN     alum & mag hydroxide-simethicone  30 mL Oral Q4H PRN     hydrOXYzine  25 mg Oral Q4H PRN     melatonin  3 mg Oral At Bedtime PRN     OLANZapine  10 mg Oral TID PRN    Or     OLANZapine  10 mg Intramuscular TID PRN     polyethylene glycol  17 g Oral Daily PRN       Labs and Imaging:  New results:   No results found for this or any previous visit (from the past 24 hour(s)).    Data this admission:  - CBC: elevated hemoglobin and hematocrit   - CMP unremarkable  - TSH normal  - Hgb A1c elevated at 5.8  - Lipids elevated LDL, Non HDL, & triglycerides      Mental Status Exam:     Oriented to:  Grossly Oriented  General:  Awake  Appearance:  appears stated age and appropriate weight  Behavior/Attitude:  calm and cooperative  Eye Contact:  appropriate  Psychomotor: retarded and slowed no catatonia present - Improved today   Speech:  appropriate volume/tone, paucity and soft at times - improved today   Language: Not Fluent in English - Cantonese  needed.  Mood:  \"pretty good\"  Affect:  Improved and congruent with mood  Thought Process:  coherent and impoverished  Thought Content:  suicidal ideation with plan (without intent); No apparent delusions  Associations:  intact  Insight:  good due to her identification of depressive symptoms.  Judgment:  partial due to the cognitive distortions related to her severe depression and her willingness to ask for treatment.  Impulse control: fair  Attention Span:  adequate for conversation  Concentration:  grossly intact  Recent and Remote Memory:  not formally assessed  Fund of Knowledge:  average  Muscle Strength and Tone: normal  Gait and Station: Normal     Psychiatric Assessment     Naa Paredes is a 57 " year old female previously diagnosed with MDD, unspecified anxiety disorder and conversion disorder admitted on a 72 hr hold 07/02/2023 due to concern for SI and report to daughter she would cut her wrist in the context of psychosocial stressors including chronic mental health issues and financial stress and no benefit from medication regimen. Most recent psychiatric hospitalization was in 2015 for similar presentation. Significant symptoms on admission include avolition, irritable/depressed mood, sleep disturbance, depersonalization, and suicidal ideation. The MSE on admission was pertinent for depressed mood with SI, restricted affect, delayed, quiet speech, poor eye contact. Biological contributions to mental health presentation include previous diagnoses, possible medication nonadherence and hyperlipidemia per admission labs. Psychological contributions to mental health presentation include concern for untreated trauma, partial insight, and unclear coping skills. Social factors contributing to mental health presentation include complicated family dynamics and current significant financial stressors. Protective factors include family support, willing to try medications, engaged in outpt mental health treatment.       In summary, the patient's reported symptoms of depression in the context of recent significant financial stressors, lack of benefit from medication regimen, and complicated family dynamics are consistent with historic diagnosis of MDD w/SI. She will likely benefit from optimization of medication and establishing greater outpatient supports this admission.     Given that she currently has SI, patient warrants inpatient psychiatric hospitalization to maintain her safety.      Psychiatric Plan by Diagnosis      Today's changes:  - Continue Lithium at 900mg CR   - Will check lithium level on Wednesday (7/12)  - Discussed which medications are everyday vs. PRN with patient     # MDD  # Unspecified anxiety  disorder   1. Medications:  - PTA latuda 40mg daily was continued  - Start Lithium 300mg CR as a treatment for Bipolar Depression on 07/05.  - Increase Lithium to 600mg CR as a treatment for Bipolar Depression on 07/06  - Increase Lithium to 900mg CR as a treatment for Bipolar Depression on 07/07    2. Pertinent Labs/Monitoring:   - Ordered updated hemoglobin A1c since previous was elevated, new one came back at 5.8  - MRI Head (7/11) - Impression: 1. No acute intracranial pathology. 2. Mild leukoaraiosis     3. Additional Plans:  - Patient will be treated in therapeutic milieu with appropriate individual and group therapies as described        Psychiatric Hospital Course:    Naa Paredes was initially admitted to [facility] on a 72 hour hold, but has since accepted a voluntary hospitalisation.    Medications:  ? PTA latuda 40mg was continued.  ? Lithium 300mg CR was started as a Treatment for Bipolar Depression on 07/05.  ? Lithium increased to 600mg CR as a treatment for Bipolar Depression on 07/06.  ?  Lithium increased to 900mg CR as a treatment for Bipolar Depression on 07/07.     The risks, benefits, alternatives, and side effects were discussed and understood by the patient.     Medical Assessment and Plan     Medical diagnoses to be addressed this admission:    # Hyperlipidemia found on admission labs  Repeat Hemoglobin A1c ordered (3 months ago was elevated) : 5.8    Medical course: Patient was physically examined by the ED prior to being transferred to the unit and was found to be medically stable and appropriate for admission.     Consults:  Medicine consult for hyperlipidemia on 7/3: No inpatient treatment needed, patient would benefit from lifestyle modifications and outpatient follow-up.     Checklist     Legal Status: Voluntary     Safety Assessment:   Behavioral Orders   Procedures     Code 1 - Restrict to Unit     Code 2     Routine Programming     As clinically indicated     Status 15     Every 15  minutes.     Suicide precautions     Patients on Suicide Precautions should have a Combination Diet ordered that includes a Diet selection(s) AND a Behavioral Tray selection for Safe Tray - with utensils, or Safe Tray - NO utensils         Risk Assessment:  Risk for harm is moderate-high.  Risk factors: SI, trauma, past behaviors and financial stressors  Protective factors: family and engaged in treatment     SIO: None    Disposition: TBD. Disposition pending clinical stabilization, medication optimization and development of an appropriate discharge plan.     Attestations     This patient was seen and discussed with my attending physician.  Mag Johnston, MS3  [facility] [employer]    This patient has been seen and evaluated by me, Tami Cavazos DO.  I have discussed this patient with the team including the resident and medical student(s) and I agree with the findings and plan in this note.  Dr. Tami Cavazos DO, COLLEEN

## 2023-07-11 NOTE — CARE PLAN
"[facility] Occupational Therapy Group Intervention Note     07/11/23 1146   Group Therapy Session   Group Attendance attended group session   Time Session Began 1000   Time Session Ended 1045   Total Time (minutes) 30 (no charge)   Total # Attendees 6   Group Type task skill;psychoeducation   Group Topic Covered relapse prevention;coping skills/lifestyle management;structured socialization   Group Session Detail Mental health management group focused on goal setting for hopefulness and self-development related to meaningful occupations. Education was provided on SMART goals for increased follow-through and success.   Patient Response/Contribution discussed personal experience with topic;cooperative with task;listened actively   Patient Participation Detail Reserved demeanor, congruent affect. Reportedly feels \"fine\" today. Expressed goal to talk with doctors today; did not elaborate nor complete worksheet likely d/t language barrier. Declined assistance and excused self early w/o return.      Nishi Herrera OT on 7/11/2023 at 11:47 AM    "

## 2023-07-11 NOTE — PROGRESS NOTES
07/11/23 1409   Group Therapy Session   Group Attendance attended group session   Time Session Began 1125   Time Session Ended 1210   Total Time (minutes) 10   Total # Attendees 7   Group Type recreation   Group Session Detail Education provided on the benefits of healthy leisure with hands on Bocce Ball activity for large motor exercise/movement, coordination, formulating strategy, coping, healthy leisure, mood stabilization, grounding, reality-based activity, expanding social skills, frustration tolerance, motivation and an opportunity to experience success.   Patient Participation Detail Pt joined group late and left group early.  She was quiet, but did participate in an active manner.  Pt played just a couple of rounds and left the group again, despite having had some success.  No charge.

## 2023-07-11 NOTE — PLAN OF CARE
"  Pt is visible in the milieu. Comes out for meals, she is eating and hydrating well. Pleasant and cooperative with staff, pt did pace a little for a short while, then she joined all  groups this shift. Pt continues to speak softly. Mood is calm and she is quiet and she appears guarded.     Problem: Adult Behavioral Health Plan of Care  Goal: Patient-Specific Goal (Individualization)  Description: You can add care plan individualizations to a care plan. Examples of Individualization might be:  \"Parent requests to be called daily at 9am for status\", \"I have a hard time hearing out of my right ear\", or \"Do not touch me to wake me up as it startles me\".  Outcome: Progressing     Problem: Depression  Goal: Improved Mood  Outcome: Progressing  Intervention: Monitor and Manage Depressive Symptoms  Recent Flowsheet Documentation  Taken 7/11/2023 1500 by Nayeli Roberts, RN  Family/Support System Care:   self-care encouraged   support provided     Problem: Suicidal Behavior  Goal: Suicidal Behavior is Absent or Managed  Outcome: Progressing   Goal Outcome Evaluation:    Plan of Care Reviewed With: patient                   "

## 2023-07-11 NOTE — PLAN OF CARE
Problem: Adult Behavioral Health Plan of Care  Goal: Plan of Care Review  Outcome: Progressing  Flowsheets (Taken 7/10/2023 2363)  Patient Agreement with Plan of Care: agrees   Goal Outcome Evaluation:    Plan of Care Reviewed With: patient          Pt was out in the lounge most of the shift. She was calm, quiet, and guarded. Her speech was soft and slow. Affect flat and blunted. She socialized and engaged with selected peer. She kept to herself most of the shift. She was observed pacing ver[address] way with selected peer. She denied pain and all psych symptoms. She contract for safety. She was cooperative and med compliant. She was out for dinner and snack and had good food and fluids intake. She attended OT evening group and said it went well. Vitals was stable.  visited this evening and she said the visit went well. No other concern or outburst behavior noted at this time. Will continue to monitor and will assist if need arise

## 2023-07-11 NOTE — PLAN OF CARE
Assessment/Intervention/Current Symtoms and Care Coordination  The patient's care was discussed with the treatment team and chart notes were reviewed.   Patient met with team.  She reports she is feeling a little better today.  Patient is in  milleu- social with select peers.  Patient has been attending some groups.  Asking when she can go home.    Discharge Plan or Goal  Patient will return home when stable.  Patient will resume care with :  Outpatient Psychiatrist: Nanci Mena MD at [facility]  Therapist: Luiza David PsyD, [facility]    Barriers to Discharge   Ongoing symptoms- need for further stabilization.   Medication mgmt per MD's  First episode work up initiated    Referral Status  None today    Legal Status     Voluntary

## 2023-07-11 NOTE — CARE PLAN
[facility] Occupational Therapy Group Intervention Note     07/11/23 1449   Group Therapy Session   Group Attendance attended group session   Time Session Began 1315   Time Session Ended 1400   Total Time (minutes) 45   Total # Attendees 11   Group Type task skill   Group Topic Covered coping skills/lifestyle management;relapse prevention   Group Session Detail Clinic - coping skill exploration, creative expression within personally meaningful activities, and observation of social, cognitive, and kinesthetic performance skills   Patient Response/Contribution cooperative with task;listened actively;did not discuss personal experience   Patient Participation Detail Congruent affect. Followed the lead of peer to color a fabric bag. Dependent to gather materials and organize work space. IND to sequence task (after modeling), and reach task completion.

## 2023-07-12 LAB
ANA PAT SER IF-IMP: ABNORMAL
ANA SER QL IF: ABNORMAL
ANA TITR SER IF: ABNORMAL {TITER}
B BURGDOR IGG+IGM SER QL: 0.15
DEPRECATED CALCIDIOL+CALCIFEROL SERPL-MC: 78 UG/L (ref 20–75)
FOLATE SERPL-MCNC: 28.6 NG/ML (ref 4.6–34.8)
LITHIUM SERPL-SCNC: 1 MMOL/L (ref 0.6–1.2)
VIT B12 SERPL-MCNC: 1121 PG/ML (ref 232–1245)

## 2023-07-12 PROCEDURE — 86618 LYME DISEASE ANTIBODY: CPT

## 2023-07-12 PROCEDURE — 82607 VITAMIN B-12: CPT

## 2023-07-12 PROCEDURE — 82306 VITAMIN D 25 HYDROXY: CPT

## 2023-07-12 PROCEDURE — 82746 ASSAY OF FOLIC ACID SERUM: CPT

## 2023-07-12 PROCEDURE — H2032 ACTIVITY THERAPY, PER 15 MIN: HCPCS

## 2023-07-12 PROCEDURE — 250N000013 HC RX MED GY IP 250 OP 250 PS 637

## 2023-07-12 PROCEDURE — 124N000002 HC R&B MH UMMC

## 2023-07-12 PROCEDURE — 99232 SBSQ HOSP IP/OBS MODERATE 35: CPT | Mod: GC | Performed by: PSYCHIATRY & NEUROLOGY

## 2023-07-12 PROCEDURE — 36415 COLL VENOUS BLD VENIPUNCTURE: CPT

## 2023-07-12 PROCEDURE — 80178 ASSAY OF LITHIUM: CPT

## 2023-07-12 RX ORDER — LITHIUM CARBONATE 450 MG
900 TABLET, EXTENDED RELEASE ORAL AT BEDTIME
Qty: 60 TABLET | Refills: 0 | Status: SHIPPED | OUTPATIENT
Start: 2023-07-12 | End: 2023-08-11

## 2023-07-12 RX ORDER — LANOLIN ALCOHOL/MO/W.PET/CERES
3 CREAM (GRAM) TOPICAL AT BEDTIME
Qty: 30 TABLET | Refills: 0 | Status: SHIPPED | OUTPATIENT
Start: 2023-07-12 | End: 2023-08-11

## 2023-07-12 RX ORDER — HYDROXYZINE HYDROCHLORIDE 25 MG/1
25 TABLET, FILM COATED ORAL EVERY 4 HOURS PRN
Qty: 60 TABLET | Refills: 0 | Status: SHIPPED | OUTPATIENT
Start: 2023-07-12 | End: 2023-08-11

## 2023-07-12 RX ADMIN — LITHIUM CARBONATE 900 MG: 450 TABLET, EXTENDED RELEASE ORAL at 21:01

## 2023-07-12 RX ADMIN — Medication 3 MG: at 21:01

## 2023-07-12 RX ADMIN — LURASIDONE HYDROCHLORIDE 40 MG: 40 TABLET, COATED ORAL at 18:03

## 2023-07-12 ASSESSMENT — ACTIVITIES OF DAILY LIVING (ADL)
ADLS_ACUITY_SCORE: 31
DRESS: INDEPENDENT
HYGIENE/GROOMING: INDEPENDENT
ADLS_ACUITY_SCORE: 31
ORAL_HYGIENE: INDEPENDENT
ADLS_ACUITY_SCORE: 31
LAUNDRY: WITH SUPERVISION
ADLS_ACUITY_SCORE: 31

## 2023-07-12 NOTE — CARE PLAN
"[facility] Occupational Therapy Group Intervention Note     07/12/23 1450   Group Therapy Session   Group Attendance attended group session   Time Session Began 1315   Time Session Ended 1415   Total Time (minutes) 60   Group Type task skill   Group Topic Covered coping skills/lifestyle management;relapse prevention   Group Session Detail Clinic - coping skill exploration, creative expression within personally meaningful activities, and observation of social, cognitive, and kinesthetic performance skills   Patient Response/Contribution cooperative with task;listened actively;organized   Patient Participation Detail Somewhat restricted affect however brightens within interaction. IND to gather materials, organize work space, sequence task, and reach task completion. Finished decorating a fabric bag and included inspiring words of \"happy\" and \"smile\" on it. Demonstrated fair social engagement upon approach.      Nishi Herrera OT on 7/12/2023 at 2:51 PM    "

## 2023-07-12 NOTE — PROGRESS NOTES
"  ----------------------------------------------------------------------------------------------------------  [facility]  Psychiatry Progress Note  Hospital Day #10     Interim History:     The patient's care was discussed with the treatment team and chart notes were reviewed.    Vitals: VSS  Sleep: 6.75 hours (07/12/23 0600)  Scheduled medications: Took all scheduled medications as prescribed  Psychiatric PRN medications: Hydroxyzine 25 mg @ 1647    Staff Report:   No acute events or safety concerns overnight. Please see staff notes for details.     Subjective:     Patient Interview:  Naa Paredes was interviewed in the conference room today with an . She notes that today she feels \"pretty good\". Naa states that her mood is about the same as yesterday. She notes that yesterday she had one medicine for her anxiety, which calmed her down. Naa states that she asked for the anxiety medication because when she lays down in bed she has a lot of thoughts in her mind. She notes that she feels like she is useless and sometimes she is scared. Naa states that she has not had any SI recently and does not feel as overwhelmed. She notes that her mood has improved, but notes that she has only been getting 5-6 hours per night. Naa states that when she wakes up she feels heavy and as though she doesn't have energy, however, this feeling has improved slightly. She notes her sleep is not very good, but improved. Naa states that her family visited yesterday and she notes that she was able to smile. She notes that her daughter, Carline, has comforted her when she was depressed in the past and continues to come visit her everyday. Naa states she has felt the hospital was unnatural and uncomfortable. She notes that she doesn't like the hospital environment because of the food and she feels isolated here because she does not speak English. Lastly, she notes " that she becomes short of breath occasionally throughout the day. Naa states that she is not sure if she used an inhaler previously. She notes that it is new since having her current depression.     Phone call with daughters, 07/05:  Daughter Rosalva suggested to reach out to other daughters Carline or Genesis who had more information, but noted that during the past two months her mother has been getting worse.    Other daughter Carline mentioned that her mom's low mood started getting worse mid-April, as she wakes up anxious and scared to be alone. She would wake her up, and would self isolate more and more, only leaving the [place] to the casino twice a week for gambling with her ex . She says that the patient has been overwhelmed lately with the presence of her grandson at home as well as her mother's illness and the need to care for both, which could have contibuted to her current state. 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 She remembers her mother was hospitalized 8 years ago for depression but doesn't remember any details, and only knows that a regimen of Latuda was started lately due to gene testing and that the dose was increased some days ago.    Phone call with daughter Genesis, 07/07:  Inquiry about the Geuness supplement. She says that her mother has been taking it for a few years now, probably 8 years ago. She states she consumes it frequently but wouldn't be able to specify the amount or frequency.    Phone call with daughter Carline, 07/12:  She states that her mom has improved a lot since last week, and that she was smiling when she visited her on Tuesday. She mentions that the family views that she is ready for discharge and that they could be picking her up tomorrow.    Phone call with outpatient psychiatrist, Dr Mena, 07/05:  Dr Mena reviewed the patient's charts and note that  she had already told her that she was diagnosed with Bipolar Disorder during her hospitalization 8 years ago, and mentions that she had some manic episodes, but doesn't elaborate any further. She states she doesn't have the names of the patient's prior treatments, but notes that she started her on Aripiprazole 10 then 15mg, which was then switched to Lurasidone because the patient did not tolerate aripiprazole and had a positive gene screening that she recommended for Lurasidone.    ROS:  Patient has SOB  Patient denies acute concerns     Objective:     Vitals:  /78 (BP Location: Left arm, Patient Position: Sitting, Cuff Size: Adult Regular)   Pulse 72   Temp 97.9  F (36.6  C) (Oral)   Resp 18   Wt 68.5 kg (151 lb)   LMP 08/20/2015   SpO2 94%   BMI 27.62 kg/m      Allergies:  Allergies   Allergen Reactions     No Clinical Screening - See Comments      PN: LW CM1: Contrast Adverse Reaction - None Reaction :  PN: LW FI1: nka       Current Medications:  Scheduled:  Current Facility-Administered Medications   Medication Dose Route Frequency     lithium ER  900 mg Oral At Bedtime     lurasidone  40 mg Oral Daily       PRN:  Current Facility-Administered Medications   Medication Dose Route Frequency     acetaminophen  650 mg Oral Q4H PRN     alum & mag hydroxide-simethicone  30 mL Oral Q4H PRN     hydrOXYzine  25 mg Oral Q4H PRN     melatonin  3 mg Oral At Bedtime PRN     OLANZapine  10 mg Oral TID PRN    Or     OLANZapine  10 mg Intramuscular TID PRN     polyethylene glycol  17 g Oral Daily PRN       Labs and Imaging:  New results:   Recent Results (from the past 24 hour(s))   Vitamin B12    Collection Time: 07/12/23  7:24 AM   Result Value Ref Range    Vitamin B12 1,121 232 - 1,245 pg/mL   Vitamin D Deficiency    Collection Time: 07/12/23  7:24 AM   Result Value Ref Range    Vitamin D, Total (25-Hydroxy) 78 (H) 20 - 75 ug/L   Lithium level    Collection Time: 07/12/23  7:24 AM   Result Value Ref Range     "Lithium 1.00 0.60 - 1.20 mmol/L   Lyme Disease Total Abs Bld with Reflex to Confirm CLIA    Collection Time: 07/12/23  7:24 AM   Result Value Ref Range    Lyme Disease Antibodies Total 0.15 <0.90   Folate    Collection Time: 07/12/23  7:24 AM   Result Value Ref Range    Folic Acid 28.6 4.6 - 34.8 ng/mL     Data this admission:  - CBC: elevated hemoglobin and hematocrit   - CMP unremarkable  - TSH normal  - Hgb A1c elevated at 5.8  - Lipids elevated LDL, Non HDL, & triglycerides      Mental Status Exam:     Oriented to:  Grossly Oriented  General:  Awake  Appearance:  appears stated age and appropriate weight  Behavior/Attitude:  calm and cooperative  Eye Contact:  appropriate  Psychomotor: less retarded and slowed no catatonia present - Improved today   Speech:  appropriate volume/tone, paucity and soft at times - Improved today   Language: Not Fluent in English - Cantonese  needed.  Mood:  \"pretty good\"  Affect:  Improved and congruent with mood  Thought Process:  coherent  Thought Content:  No SI/HI/AH/VH; No apparent delusions   Associations:  intact  Insight:  good due to her identification of depressive symptoms.  Judgment:  partial due to the cognitive distortions related to her severe depression and her willingness to ask for treatment.  Impulse control: fair  Attention Span:  adequate for conversation  Concentration:  grossly intact  Recent and Remote Memory:  not formally assessed  Fund of Knowledge:  average  Muscle Strength and Tone: normal  Gait and Station: Normal     Psychiatric Assessment     Naa Paredes is a 57 year old female previously diagnosed with MDD, unspecified anxiety disorder and conversion disorder admitted on a 72 hr hold 07/02/2023 due to concern for SI and report to daughter she would cut her wrist in the context of psychosocial stressors including chronic mental health issues and financial stress and no benefit from medication regimen. Most recent psychiatric " hospitalization was in 2015 for similar presentation. Significant symptoms on admission include avolition, irritable/depressed mood, sleep disturbance, depersonalization, and suicidal ideation. The MSE on admission was pertinent for depressed mood with SI, restricted affect, delayed, quiet speech, poor eye contact. Biological contributions to mental health presentation include previous diagnoses, possible medication nonadherence and hyperlipidemia per admission labs. Psychological contributions to mental health presentation include concern for untreated trauma, partial insight, and unclear coping skills. Social factors contributing to mental health presentation include complicated family dynamics and current significant financial stressors. Protective factors include family support, willing to try medications, engaged in outpt mental health treatment.       In summary, the patient's reported symptoms of depression in the context of recent significant financial stressors, lack of benefit from medication regimen, and complicated family dynamics are consistent with historic diagnosis of MDD w/SI. She will likely benefit from optimization of medication and establishing greater outpatient supports this admission.     Given that she currently has SI, patient warrants inpatient psychiatric hospitalization to maintain her safety.      Psychiatric Plan by Diagnosis      Today's changes:  - Lithium level 1.0 today   - Labs today - Vit D mildly elevated, anti nuclear IgG borderline positive - follow up with PCP  - Plan to discharge home tomorrow   - Will continue her on melatonin 3 mg at bedtime for discharge  - Follow up with PCP regarding SOB     #Bipolar II  Disorder  1. Medications:  - PTA latuda 40mg daily was continued  - Start Lithium 300mg CR as a treatment for Bipolar Depression on 07/05.  - Increase Lithium to 600mg CR as a treatment for Bipolar Depression on 07/06  - Increase Lithium to 900mg CR as a treatment for  Bipolar Depression on 07/07    2. Pertinent Labs/Monitoring:   - Lithium Level (7/12): 1.0   - Ordered updated hemoglobin A1c since previous was elevated, new one came back at 5.8  - TERRANCE: borderline positive - Will need outpatient follow-up.  - Vitamin D, B12, folates: Normal levels.  - MRI Head (7/11) - Impression:   1. No acute intracranial pathology.   2. Mild leukoaraiosis     3. Additional Plans:  - Patient will be treated in therapeutic milieu with appropriate individual and group therapies as described        Psychiatric Hospital Course:    Naa Paredes was initially admitted to [facility] on a 72 hour hold, but has since accepted a voluntary hospitalisation.    Medications:  ? PTA latuda 40mg was continued.  ? Lithium 300mg CR was started as a Treatment for Bipolar Depression on 07/05.  ? Lithium increased to 600mg CR as a treatment for Bipolar Depression on 07/06.  ? Lithium increased to 900mg CR as a treatment for Bipolar Depression on 07/07.     The risks, benefits, alternatives, and side effects were discussed and understood by the patient.     Medical Assessment and Plan     Medical diagnoses to be addressed this admission:    # Hyperlipidemia found on admission labs  Repeat Hemoglobin A1c ordered (3 months ago was elevated) : 5.8    Medical course: Patient was physically examined by the ED prior to being transferred to the unit and was found to be medically stable and appropriate for admission.     Consults:  Medicine consult for hyperlipidemia on 7/3: No inpatient treatment needed, patient would benefit from lifestyle modifications and outpatient follow-up.     Checklist     Legal Status: Voluntary     Safety Assessment:   Behavioral Orders   Procedures     Code 1 - Restrict to Unit     Code 2     Routine Programming     As clinically indicated     Status 15     Every 15 minutes.     Suicide precautions     Patients on Suicide Precautions should have a Combination Diet ordered that includes a Diet  selection(s) AND a Behavioral Tray selection for Safe Tray - with utensils, or Safe Tray - NO utensils       Risk Assessment:  Risk for harm is moderate-high.  Risk factors: SI, trauma, past behaviors and financial stressors  Protective factors: family and engaged in treatment     SIO: None    Disposition: TBD. Disposition pending clinical stabilization, medication optimization and development of an appropriate discharge plan.     Attestations     This patient was seen and discussed with my attending physician.  Mag Johnston, MS3  [employer]    Attestation:  I was present with the medical student who participated in the service and in the documentation of the note. I have verified the history and personally performed the physical exam and medical decision making. I agree with the assessment and plan of care as documented in the note.     Ramez Dagher, MD  PGY-1 Psychiatry Resident

## 2023-07-12 NOTE — PLAN OF CARE
Care Coordinator scheduled the following appointment(s):    Psychiatry Med Management In-person appointment: Tuesday, August 1, 2023 @ 8:30am  Provider: Nanci Mena MD  Location: [place] [address], [address], [place] [address]  Ph: [phone]  Fax: [phone]  [facility]UC TO FAX AVS to above appointment, please    Primary Care In-person appointment: Wednesday, July 26, 2023 @ 1:45pm  Provider: Sher Duran MD  Location: [address] [address]  Phone: [phone]  Fax: [phone]    CC updated CTC and AVS    Antonina Tejada  Care Coordinator  [email or web address]  [phone]

## 2023-07-12 NOTE — PLAN OF CARE
Problem: Sleep Disturbance  Goal: Adequate Sleep/Rest  Outcome: Progressing   Patient appears sleeping , resting in bed during rounds. Patient no complains of pain and discomfort. Patient appears calm, no behavioral issues or any safety concerns @ this time. Will continue to  monitor the patient and provide therapeutic intervention as needed. Will continue with current plan of care. Notify MD with any concerns. The patient had 6.75 total hours of sleep this shift.

## 2023-07-12 NOTE — PROGRESS NOTES
Pt joined late in dance/movement therapy (D/MT) using movements of opening and closing, exploring movement qualities based on imagery that supported shifts in mood and thought processes.  Physical stretching supported release of tension but pt also explored the use of breathing exercises to help regulate mood.  Pt was an engaged but quiet participant.  She followed directives and did share verbally when prompted.       07/12/23 1015   Expressive Therapy   Therapy Type dance/movement   Minutes of Treatment 30

## 2023-07-12 NOTE — PLAN OF CARE
Problem: Adult Behavioral Health Plan of Care  Goal: Plan of Care Review  Outcome: Progressing  Flowsheets (Taken 7/11/2023 5426)  Patient Agreement with Plan of Care: agrees   Goal Outcome Evaluation:    Plan of Care Reviewed With: patient          Pt was out in the lounge most of the shift. Affect flat and blunted but pleasant on approach. She was calm, quiet, withdrawn, and guarded. She had minimal interaction with peers or staff. Kept to herself while out in the lounge. She was observed pacing hallway with selected peers. Vitals stable. She denied pain. Rated anxiety 3/10 and was given prn Hydroxyzine 25 mg which was helpful. She denied D/SI/HI/AH/VH. Contracted for safety. She was cooperative and med compliant. Prn melatonin 3 mg was given at bed time for sleep per pt request. She was out for dinner and snacks and had good food and fluids intake. Her mom, daughter, and  visited this evening and she said the visit went well. She attended OT evening group and said it went well. No other concern or outburst behavior noted at this time. Will continue to monitor and will assist if need arise.

## 2023-07-12 NOTE — PLAN OF CARE
"Pt is visible in the milieu. Sits out by the [facility] area. Pt presents with a brighter affect today , she smiles when greeted or smiles during interactions with her. Still endorsing anxiety and depression both at 3/10, denies hallucinations , denies SI. Her goal is to go home and she is happy that she is discharging this week, she said. She is looking forward to this as she will clean up her home. When asked about medication side effect, she reports none. She still is apprehensive about medications and pt was given education to the benefits of staying on her medications when she discharges. Pt was able to join groups this shift.     Problem: Adult Behavioral Health Plan of Care  Goal: Patient-Specific Goal (Individualization)  Description: You can add care plan individualizations to a care plan. Examples of Individualization might be:  \"Parent requests to be called daily at 9am for status\", \"I have a hard time hearing out of my right ear\", or \"Do not touch me to wake me up as it startles me\".  Outcome: Progressing  Goal: Adheres to Safety Considerations for Self and Others  Outcome: Progressing  Intervention: Develop and Maintain Individualized Safety Plan  Recent Flowsheet Documentation  Taken 7/12/2023 1130 by Nayeli Roberts, RN  Safety Measures: safety rounds completed  Goal: Absence of New-Onset Illness or Injury  Outcome: Progressing  Intervention: Identify and Manage Fall Risk  Recent Flowsheet Documentation  Taken 7/12/2023 1130 by Nayeli Roberts, RN  Safety Measures: safety rounds completed  Goal: Optimized Coping Skills in Response to Life Stressors  Outcome: Progressing     Problem: Depression  Goal: Improved Mood  Outcome: Progressing  Intervention: Monitor and Manage Depressive Symptoms  Recent Flowsheet Documentation  Taken 7/12/2023 1130 by Nayeli Roberts, RN  Family/Support System Care:    self-care encouraged    support provided     Problem: Suicidal Behavior  Goal: " Suicidal Behavior is Absent or Managed  Outcome: Progressing   Goal Outcome Evaluation:    Plan of Care Reviewed With: patient

## 2023-07-12 NOTE — PLAN OF CARE
Assessment/Intervention/Current Symtoms and Care Coordination  The patient's care was discussed with the treatment team and chart notes were reviewed.   Patient met with team with Cantonese interpretor present. .  She reports she is feeling a little better - smiling at times though   affect remains flat.   Patient has been attending some groups.  Family visited last evening and patient reports this went well.     Discharge Plan or Goal  Patient will return home when stable.  Patient will resume care with :  Outpatient Psychiatrist: Nanci Mena MD at [facility]  Therapist: Luiza David PsyD, [facility]     Barriers to Discharge   Ongoing symptoms- need for further stabilization.   Medication mgmt per MD's  First episode work up initiated     Referral Status  Patient will be scheduled for f/u with providers today     Legal Status     Voluntary

## 2023-07-12 NOTE — DISCHARGE SUMMARY
"                                                                                                                                                                                           ----------------------------------------------------------------------------------------------------------  [facility]   Psychiatric Discharge Summary      Naa Paredes MRN# [id number]   Age: 57 year old YOB: 1965     Date of Admission:  7/2/2023  Date of Discharge:  7/13/2023  Admitting Physician:  Tami Cavazos DO  Discharge Physician:  Tami Cavazos DO    This document serves as a transfer of care to Naa Paredes's outpatient providers.     Events Leading to Hospitalization:     Pt reports that she has been in and out of the ED multiple times recently. Her daughter asked her to return to the hospital. When suicide attempt is mentioned regarding her daughter and presentation, she states she was just kidding but has been having acute worsening of depression for the past 1-2 months in the setting of 4 months more moderate depression. Compared to her previous depression, this bout feels longer, and she didn't want to go to the hospital. Before she had loss of sleep and lost a lot of jobs. Symptoms include every day she doesn't know what to do, feels like she is \"insane\". She will take medication but only sleep a few hours at night, some during the day, feels like \"I don't want to get up out of bed, don't want to do anything.\" It's difficult for her to put meals together/eat in general. Endorses SI for the last 2 months everyday. \"It's not easy to think of ways to do it\" therefore denies specific plans. Reiterates she is not doing well.  Also endorses anxiety about a lot of things in the past 4 months. \"I feel scared every day in the last 2 months. I feel like I'm working a night shift and it's the next day\", like she is not in her own body.      Regarding what's been " "happening in her life during the past few months- \"a lot things going on.\" Describes multiple financial stressors including unexpected expenses to fix her car and loaned her friend a lot of money and they haven't paid her back yet. She is also annoyed/overwhelmed by the state of her apartment being very messy. She endorses frequent irritation. 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 She reports not receiving sufficient support from family but does not specify.     Regarding manic symptoms, reports that when she was younger she could go a day without sleep and feel fine the next day, but not multiple days in a row and is no longer able to do this.    Reports she was just recently started on latuda and reports that \"once she is awake she is unable to sleep again\" on it and that she \"wakes up and is annoyed/upset\". Was on 40 mg for 3-4 days. Feels every medication is the same and that they haven't done anything.    See H&P by Tami Cavazos DO on 7/2/23 for additional details.      Diagnoses:   Primary Psychiatric Diagnosis  Bipolar II Disorder - Current depressive episode    Secondary Psychiatric Diagnoses  Panic Disorder    Psychiatric Assessment:   Naa Paredes is a 57 year old female previously diagnosed with MDD, unspecified anxiety disorder and conversion disorder admitted voluntarily 07/02/2023 due to concern for SI and reported to her daughter she would cut her wrist in the context of psychosocial stressors including chronic mental health issues and financial stress and no benefit from medication regimen. Most recent psychiatric hospitalization was in 2015 for similar presentation. Significant symptoms on admission included avolition, irritable/depressed mood, sleep disturbance, depersonalization, and suicidal ideation. The MSE on admission was pertinent for depressed mood with SI, restricted affect, delayed, quiet speech, poor eye contact. " Biological contributions to mental health presentation included previous diagnoses, possible medication nonadherence and hyperlipidemia per admission labs. Psychological contributions to mental health presentation included concern for untreated trauma, partial insight, and unclear coping skills. Social factors contributing to mental health presentation included complicated family dynamics and current significant financial stressors. Protective factors included family support, willing to try medications, engaged in outpt mental health treatment.       During this hospitalization, collateral information obtained from outpatient psychiatrist and patient's family indicated clear history of hypomanic episodes including impulsivity, decreased sleep, irritability, and increased energy and decreased need for sleep consistent with a diagnosis of Bipolar disorder type II.      Psychiatric Hospital Course:     Naa Paredes was admitted to [facility] as a voluntary patient. The patient was placed under status 15 (15 minute checks) to ensure patient safety.  Medication Trials and Changes: risks, benefits, alternatives, and side effects were discussed and understood by the patient.  During her hospital stay, the patient's history was reviewed and collateral information was collected from the outpatient provider and the family. The history was positive for a probable self-resolving hypomanic episode around 2017 that lasted for several months, as well as a depressive episode around 2015. She also had an MRI that came back negative for any cause of her psychiatric symptoms.  PTA Latuda 40 mg was continued   Lithium was started as treatment for Bipolar disorder type II and titrated to 900mg at bedtime.  Lithium level was 1.0 and patient's symptoms of SI and depression decreased by time of discharge.   Level of medication adherence: good   Behaviors: The patient was safe and appropriate and did not require chemical/physical  "restraints during admission. She was cooperative with cares, had good group attendance, and was visible in the milieu.  Change in psychiatric symptoms: Over the course of this hospitalization the patient's symptoms of SI and depression  improved.  Collateral information was obtained from her daughters, Genesis Crowley, and Rosalva Paredes, and Dr. Mena and were notable for low mood that began to worsen in mid-April, gambling history, information on Geun[employer] supplement, and medication information/past diagnoses .   Naa was released to home. At the time of discharge she was determined to not be a danger to herself or others.     Risk Assessment:      Today Naa Paredes denies suicidal ideation or self-harm thoughts. Patient has notable risk factors for self-harm, including single status and anxiety. However, risk is mitigated by commitment to family, sobriety, and ability to volunteer a safety plan. Therefore, based on all available evidence including the factors cited above, she does not appear to be at imminent risk for self-harm, does not meet criteria for a 72-hr hold, and therefore remains appropriate for ongoing outpatient level of care. Additional steps taken to minimize risk include: medication optimization, close psychiatric follow up and provision of crisis resources. Voluntary referral for outpatient psychiatry and therapy was offered, she accepted this offer.     Psychiatric Examination:     Mental Status Exam:  Oriented to:  Grossly Oriented  General:  Awake and Alert  Appearance:  appears stated age, Grooming is adequate and Dressed in hospital scrubs  Behavior/Attitude:  calm and cooperative  Eye Contact:  downcast  Psychomotor:  Less slowed  no catatonia present  Speech:  soft volume/tone and paucity at times   Language: Fluent in English with appropriate syntax and vocabulary.  Mood:  \"nervous\"  Affect:  appropriate and congruent with mood  Thought Process:  linear and coherent  Thought Content:  No " SI/HI/AH/VH; No apparent delusions  Associations:  intact  Insight:  good due to her identification of depressive symptoms.  Judgment:  partial due to the cognitive distortions related to her severe depression and her willingness to ask for treatment  Impulse control: fair  Attention Span:  adequate for conversation  Concentration:  grossly intact  Recent and Remote Memory:  not formally assessed  Fund of Knowledge:  average  Muscle Strength and Tone: normal  Gait and Station: Normal     Medical Hospital Course:   Naa Paredes was medically cleared by the ED prior to admission to the unit. PTA medications were continued on admission.     Medical Diagnoses addressed:  # Hyperlipidemia found on admission labs  Repeat Hemoglobin A1c ordered (3 months ago was elevated) : 5.8    Consults: Medicine consult for hyperlipidemia on 7/3: No inpatient treatment needed, patient would benefit from lifestyle modifications and outpatient follow-up.    Labs were notable for the following:  - CBC: elevated hemoglobin and hematocrit   - CMP unremarkable  - TSH normal  - Hgb A1c elevated at 5.8  - Lipids elevated LDL, Non HDL, & triglycerides   - Vitamin B12 unremarkable  - Vitamin D mildly elevated  - Lithium 1.00 - within range  - Lyme Disease Total Abs unremarkable   - Folate - unremarkable   - Anti nuclear IgG - borderline positive - Will need outpatient follow-up     Discharge Medications:     Current Discharge Medication List        START taking these medications    Details   hydrOXYzine (ATARAX) 25 MG tablet Take 1 tablet (25 mg) by mouth every 4 hours as needed for anxiety  Qty: 60 tablet, Refills: 0    Associated Diagnoses: Severe depressed bipolar I disorder without psychotic features (H)      lithium (ESKALITH CR/LITHOBID) 450 MG CR tablet Take 2 tablets (900 mg) by mouth At Bedtime for 30 days  Qty: 60 tablet, Refills: 0    Associated Diagnoses: Severe depressed bipolar I disorder without psychotic features (H)       melatonin 3 MG tablet Take 1 tablet (3 mg) by mouth At Bedtime for 30 days  Qty: 30 tablet, Refills: 0    Associated Diagnoses: Severe depressed bipolar I disorder without psychotic features (H)           CONTINUE these medications which have NOT CHANGED    Details   lurasidone (LATUDA) 40 MG TABS tablet Take 1 tablet (40 mg) by mouth daily with food  Qty: 30 tablet, Refills: 0    Associated Diagnoses: Mild episode of recurrent major depressive disorder (H)              Discharge Plan:   1. Medications as above    2. Psychiatry Med Management In-person appointment: Tuesday, August 1, 2023 @ 8:30am  Provider: Nanci Mena MD  Location: [address], [address], [place] [address]  Ph: [phone]  Fax: [phone]  HUC TO FAX AVS to above appointment, please     3. Primary Care In-person appointment: Wednesday, July 26, 2023 @ 1:45pm  Provider: Sher Duran MD  Location: [address] [address]  Phone: [phone]  Fax: [phone]     Attestations:     This patient was seen and discussed with my attending physician.  Mag Johnston, MS3  [facility] [employer]    Resident Attestation:   I was present with the medical student who participated in the service and in the documentation of the note. I have verified the history and personally performed the physical exam, mental status exam, and medical decision making. I agree with the assessment and plan of care as documented in the note.    Ramez Dagher, MD  Psychiatry Resident Physician    This patient has been seen and evaluated by me, Tami Cavazos DO.  I have discussed this patient with the team including the resident and medical student(s) and I agree with the findings and plan in this note.  Dr. Tami Cavazos DO, COLLEEN            Appendix A: All Labs This Admission:     Results for orders placed or performed during the hospital encounter of 07/02/23   MR Brain w/o Contrast      Status: None    Narrative    Brain MRI without contrast    History: History of recurrent depression.    Comparison: 12/10/2008 head CT    Technique: Sagittal T1-weighted, axial diffusion, FLAIR, T2-weighted,  and susceptibility images of the brain were obtained without  intravenous contrast.    Findings:   There is no evidence of intra or extra-axial mass on these noncontrast  images. Scattered T2 hyperintense foci within the bilateral frontal  white matter, most likely chronic small cell ischemic disease. There  is no evidence of intracranial hemorrhage. Axial diffusion-weighted  images are unremarkable. The gray-white matter differentiation appears  within normal limits. The ventricles are normal in size for age.    The major intracranial vascular flow-voids are present. The visualized  portions of the paranasal sinuses and mastoid air cells are  unremarkable.      Impression    Impression:  1. No acute intracranial pathology.  2. Mild leukoaraiosis.    I have personally reviewed the examination and initial interpretation  and I agree with the findings.    DAVIAN DONOVAN MD         SYSTEM ID:  [id number]   Hemoglobin A1c     Status: Abnormal   Result Value Ref Range    Hemoglobin A1C 5.8 (H) <5.7 %   Anti Nuclear Zoe IgG by IFA with Reflex     Status: Abnormal   Result Value Ref Range    TERRANCE interpretation Borderline Positive (A) Negative    TERRANCE pattern 1 Homogeneous     TERRANCE titer 1 1:80    Vitamin B12     Status: Normal   Result Value Ref Range    Vitamin B12 1,121 232 - 1,245 pg/mL   Vitamin D Deficiency     Status: Abnormal   Result Value Ref Range    Vitamin D, Total (25-Hydroxy) 78 (H) 20 - 75 ug/L    Narrative    Season, race, dietary intake, and treatment affect the concentration of 25-hydroxy-Vitamin D. Values may decrease during winter months and increase during summer months. Values 20-29 ug/L may indicate Vitamin D insufficiency and values <20 ug/L may indicate Vitamin D deficiency.    Vitamin D  determination is routinely performed by an immunoassay specific for 25 hydroxyvitamin D3.  If an individual is on vitamin D2(ergocalciferol) supplementation, please specify 25 OH vitamin D2 and D3 level determination by LCMSMS test VITD23.     Lithium level     Status: Normal   Result Value Ref Range    Lithium 1.00 0.60 - 1.20 mmol/L   Lyme Disease Total Abs Bld with Reflex to Confirm CLIA     Status: Normal   Result Value Ref Range    Lyme Disease Antibodies Total 0.15 <0.90   Folate     Status: Normal   Result Value Ref Range    Folic Acid 28.6 4.6 - 34.8 ng/mL

## 2023-07-12 NOTE — PROGRESS NOTES
07/11/23 2100   Group Therapy Session   Time Session Began 2000   Time Session Ended 2100   Total Time (minutes) 45   Total # Attendees 10   Group Type expressive therapy   Group Topic Covered cognitive activities;structured socialization;leisure exploration/use of leisure time   Group Session Detail Music Bingo: 2022   Patient Response/Contribution cooperative with task   Patient Participation Detail Participated in Music Therapy intervention of Music Bingo today.  Goals of session were focusing, memory recall, positive distraction, emotional containment and social cohesion.  Pt response was engaged and cooperative, but with less than full participation d/t language barrier.  Pleasant affect.

## 2023-07-13 VITALS
TEMPERATURE: 97.4 F | HEART RATE: 77 BPM | WEIGHT: 151 LBS | RESPIRATION RATE: 18 BRPM | DIASTOLIC BLOOD PRESSURE: 82 MMHG | BODY MASS INDEX: 27.62 KG/M2 | SYSTOLIC BLOOD PRESSURE: 139 MMHG | OXYGEN SATURATION: 98 %

## 2023-07-13 PROCEDURE — G0177 OPPS/PHP; TRAIN & EDUC SERV: HCPCS

## 2023-07-13 PROCEDURE — 99238 HOSP IP/OBS DSCHRG MGMT 30/<: CPT | Mod: GC | Performed by: STUDENT IN AN ORGANIZED HEALTH CARE EDUCATION/TRAINING PROGRAM

## 2023-07-13 PROCEDURE — 250N000013 HC RX MED GY IP 250 OP 250 PS 637

## 2023-07-13 RX ADMIN — HYDROXYZINE HYDROCHLORIDE 25 MG: 25 TABLET, FILM COATED ORAL at 12:00

## 2023-07-13 ASSESSMENT — ACTIVITIES OF DAILY LIVING (ADL)
ADLS_ACUITY_SCORE: 31

## 2023-07-13 NOTE — PLAN OF CARE
Assessment/Intervention/Current Symtoms and Care Coordination  The patient's care was discussed with the treatment team and chart notes were reviewed.   Patient met with team with [employer] interpretor via telecom. .    Patient reports poor sleep last night. Mood may be s/w worse today- possibly attributed to lack of sleep.   .   Patient has been attending some groups.  Patient continues to feel she is stable to discharge today. MD spoke with patients daughter and family has noticed improvement and feel comfortable with her coming home.    Writer spoke with patient's therapist Shekhar Elmore. He requested discharge summary. He will see patient next week.      Discharge Plan or Goal  Patient will return home when stable.  Patient will resume care with :  Outpatient Psychiatrist: Nanci Mena MD at [facility]  Therapist: Shekhar Elmore     Barriers to Discharge   None- discharge today    Referral Status  None today     Legal Status     Voluntary

## 2023-07-13 NOTE — PLAN OF CARE
G  Problem: Sleep Disturbance  Goal: Adequate Sleep/Rest  Outcome: Progressing    Patient appears sleeping , resting in bed during rounds. Patient no complains of pain and discomfort. Patient appears calm, no behavioral issues or any safety concerns @ this time. Will continue to  monitor the patient and provide therapeutic intervention as needed. Will continue with current plan of care. Notify MD with any concerns. The patient had 6.50 total hours of sleep this shift.

## 2023-07-13 NOTE — PLAN OF CARE
Problem: Adult Behavioral Health Plan of Care  Goal: Plan of Care Review  Outcome: Progressing  Flowsheets (Taken 7/12/2023 1722)  Patient Agreement with Plan of Care: agrees   Goal Outcome Evaluation:    Plan of Care Reviewed With: patient          Pt present with a calm, quiet, withdrawn, and guarded appearance. She was out in the lounge most of the shift. Affect flat and blunted but pleasant on approach. She had minimal interaction with peers or staff. Kept to herself while out in the lounge. She was observed pacing hallway with selected peers. Vitals stable. She denied pain and all psych symptoms. Contracted for safety. She was cooperative and med compliant. Prn melatonin 3 mg was given at bed time for sleep per pt request. She was out for dinner and snacks and had good food and fluids intake. She attended OT evening group and said it went well. Tentative discharge day is tomorrow 7/13/23. Pt will be discharging home to family. Pt is exciting discharging tomorrow. No other concern or outburst behavior noted at this time. Will continue to monitor and will assist if need arise.

## 2023-07-13 NOTE — PROGRESS NOTES
07/12/23 2100   Group Therapy Session   Group Attendance attended group session   Time Session Began 2000   Time Session Ended 2055   Total Time (minutes) 45   Total # Attendees 6   Group Type recreation   Group Topic Covered relaxation techniques   Group Session Detail stress reduction   Patient Response/Contribution cooperative with task   Patient Participation Detail Pt actively participated in a structured Therapeutic Recreation group with a focus on leisure participation, socializing, and exercise. Pt participated in the guided exercise for the full duration of the group. Pt followed along, engaged in the guided chair exercise routine.  Pt was encouraged to use positive imagery with the deep breathing and stretching to foster relaxation, improves focus, and reduce stress.

## 2023-07-13 NOTE — PLAN OF CARE
"  Problem: Adult Behavioral Health Plan of Care  Goal: Plan of Care Review  Outcome: Adequate for Care Transition  Flowsheets (Taken 7/13/2023 1402)  Plan of Care Reviewed With: patient  Overall Patient Progress: improving  Goal: Patient-Specific Goal (Individualization)  Description: You can add care plan individualizations to a care plan. Examples of Individualization might be:  \"Parent requests to be called daily at 9am for status\", \"I have a hard time hearing out of my right ear\", or \"Do not touch me to wake me up as it startles me\".  Outcome: Adequate for Care Transition  Goal: Individualized Daily Interaction Plan (IDIP)  Outcome: Adequate for Care Transition  Goal: Adheres to Safety Considerations for Self and Others  Outcome: Adequate for Care Transition  Goal: Absence of New-Onset Illness or Injury  Outcome: Adequate for Care Transition  Goal: Optimized Coping Skills in Response to Life Stressors  Outcome: Adequate for Care Transition  Goal: Develops/Participates in Therapeutic Banks to Support Successful Transition  Outcome: Adequate for Care Transition  Intervention: Foster Therapeutic Banks  Recent Flowsheet Documentation  Taken 7/13/2023 1014 by Muna Patterson RN  Trust Relationship/Rapport:   empathic listening provided   reassurance provided   thoughts/feelings acknowledged   choices provided     Problem: Depression  Goal: Improved Mood  Outcome: Adequate for Care Transition  Intervention: Monitor and Manage Depressive Symptoms  Recent Flowsheet Documentation  Taken 7/13/2023 1014 by Muna Patterson RN  Family/Support System Care:   self-care encouraged   support provided     Problem: Suicidal Behavior  Goal: Suicidal Behavior is Absent or Managed  Outcome: Adequate for Care Transition     Problem: Sleep Disturbance  Goal: Adequate Sleep/Rest  Outcome: Adequate for Care Transition     Problem: Adult Behavioral Health Plan of Care  Goal: Plan of Care Review  Outcome: Adequate for Care " Transition  Flowsheets (Taken 7/13/2023 1402)  Plan of Care Reviewed With: patient  Overall Patient Progress: improving  Goal: Develops/Participates in Therapeutic Burns to Support Successful Transition  Intervention: Foster Therapeutic Burns  Recent Flowsheet Documentation  Taken 7/13/2023 1014 by Muna Patterson RN  Trust Relationship/Rapport:   empathic listening provided   reassurance provided   thoughts/feelings acknowledged   choices provided     Problem: Depression  Goal: Improved Mood  Intervention: Monitor and Manage Depressive Symptoms  Recent Flowsheet Documentation  Taken 7/13/2023 1014 by Muna Patterson RN  Family/Support System Care:   self-care encouraged   support provided   Goal Outcome Evaluation:      Plan of Care Reviewed With: patient    Overall Patient Progress: improving  Patient  was alert and oriented x 4.Ate her lunch with peers.  Patient went over her discharge papers and medication.All her belongings checked and patient left for home @ 1300.   came to pick patient in a [employer].  Temp: 97.4  F (36.3  C) Temp src: Oral BP: 139/82 Pulse: 77   Resp: 18 SpO2: 98 % O2 Device: None (Room air)

## 2023-07-14 ENCOUNTER — PATIENT OUTREACH (OUTPATIENT)
Dept: CARE COORDINATION | Facility: CLINIC | Age: 58
End: 2023-07-14
Payer: MEDICAID

## 2023-07-14 NOTE — PROGRESS NOTES
"Clinic Care Coordination Contact  [facility]: Post-Discharge Note  SITUATION                                                      Admission:    Admission Date: 07/02/23   Reason for Admission: worsening depressive symptoms, suicidal ideation  Discharge:   Discharge Date: 07/04/23  Discharge Diagnosis: worsening depressive symptoms, suicidal ideation    BACKGROUND                                                      Pt reports that she has been in and out of the ED multiple times recently. Her daughter asked her to return to the hospital. When suicide attempt is mentioned regarding her daughter and presentation, she states she was just kidding but has been having acute worsening of depression for the past 1-2 months in the setting of 4 months more moderate depression. Compared to her previous depression, this bout feels longer, and she didn't want to go to the hospital. Before she had loss of sleep and lost a lot of jobs. Symptoms include every day she doesn't know what to do, feels like she is \"insane\". She will take medication but only sleep a few hours at night, some during the day, feels like \"I don't want to get up out of bed, don't want to do anything.\" It's difficult for her to put meals together/eat in general. Endorses SI for the last 2 months everyday. \"It's not easy to think of ways to do it\" therefore denies specific plans. Reiterates she is not doing well.  Also endorses anxiety about a lot of things in the past 4 months. \"I feel scared every day in the last 2 months. I feel like I'm working a night shift and it's the next day\", like she is not in her own body.      Regarding what's been happening in her life during the past few months- \"a lot things going on.\" Describes multiple financial stressors including unexpected expenses to fix her car and loaned her friend a lot of money and they haven't paid her back yet. She is also annoyed/overwhelmed by the state of her apartment being very messy. She " "endorses frequent irritation. 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 She reports not receiving sufficient support from family but does not specify.     Regarding manic symptoms, reports that when she was younger she could go a day without sleep and feel fine the next day, but not multiple days in a row and is no longer able to do this.     Reports she was just recently started on latuda and reports that \"once she is awake she is unable to sleep again\" on it and that she \"wakes up and is annoyed/upset\". Was on 40 mg for 3-4 days. Feels every medication is the same and that they haven't done anything.     See H&P by Tami Cavazos DO on 7/2/23 for additional details.        ASSESSMENT           Discharge Assessment  How are you doing now that you are home?: Patient shares that she is doing the same. Medications are going well, no side effects. Shares that she would like to learn how to cook, specifically chinese food. Writer did ask if she liked to youtube chinese cooking, or I let her know I could send class information. Patient states she is no longer interested. Patient does confirm that she feels safe at home. Patient declines MTM referral at this time as she doesn't feel like this is necessary. Declines needing anything additional at this time.  How are your symptoms? (Red Flag symptoms escalate to triage hotline per guidelines): Unchanged  Do you feel your condition is stable enough to be safe at home until your provider visit?: Yes  Does the patient have their discharge instructions? : Yes  Does the patient have questions regarding their discharge instructions? : No  Were you started on any new medications or were there changes to any of your previous medications? : Yes  Does the patient have all of their medications?: Yes  Do you have questions regarding any of your medications? : No  Do you have all of your needed medical supplies or " equipment (DME)?  (i.e. oxygen tank, CPAP, cane, etc.): Yes  Discharge follow-up appointment scheduled within 14 calendar days? : Yes  Discharge Follow Up Appointment Date: 07/18/23  Discharge Follow Up Appointment Scheduled with?:  (Therapy)    Post-op ([facility] Only)  If the patient had a surgery or procedure, do they have any questions for a nurse?: No    Post-op (Clinicians Only)  Did the patient have surgery or a procedure: No  Fever: No  Chills: No        PLAN                                                      Outpatient Plan:  1. Medications as above     2. Psychiatry Med Management In-person appointment: Tuesday, August 1, 2023 @ 8:30am  Provider: Nanci Mena MD  Location: [facility] [place] [address], [address], [place] [address]  Ph: [phone]  Fax: [phone]  HUC TO FAX AVS to above appointment, please     3. Primary Care In-person appointment: Wednesday, July 26, 2023 @ 1:45pm  Provider: Sher Duran MD  Location: [place] [address]  Phone: [phone]  Fax: [phone]    Future Appointments   Date Time Provider Department Center   7/18/2023  3:30 PM Sher Duran MD [facility]         For any urgent concerns, please contact our 24 hour nurse triage line: [phone] ([phone])         KELLE Stewart

## 2023-07-18 ENCOUNTER — OFFICE VISIT (OUTPATIENT)
Dept: FAMILY MEDICINE | Facility: CLINIC | Age: 58
End: 2023-07-18
Payer: COMMERCIAL

## 2023-07-18 VITALS
HEIGHT: 64 IN | WEIGHT: 153 LBS | DIASTOLIC BLOOD PRESSURE: 86 MMHG | TEMPERATURE: 97.8 F | RESPIRATION RATE: 16 BRPM | OXYGEN SATURATION: 95 % | HEART RATE: 81 BPM | BODY MASS INDEX: 26.12 KG/M2 | SYSTOLIC BLOOD PRESSURE: 122 MMHG

## 2023-07-18 DIAGNOSIS — R87.810 CERVICAL HIGH RISK HPV (HUMAN PAPILLOMAVIRUS) TEST POSITIVE: ICD-10-CM

## 2023-07-18 DIAGNOSIS — F31.4 SEVERE DEPRESSED BIPOLAR I DISORDER WITHOUT PSYCHOTIC FEATURES (H): Primary | Chronic | ICD-10-CM

## 2023-07-18 PROBLEM — Z01.818 PREOP GENERAL PHYSICAL EXAM: Status: RESOLVED | Noted: 2023-03-16 | Resolved: 2023-07-18

## 2023-07-18 PROCEDURE — 99213 OFFICE O/P EST LOW 20 MIN: CPT | Performed by: FAMILY MEDICINE

## 2023-07-18 NOTE — PROGRESS NOTES
Assessment & Plan   Problem List Items Addressed This Visit     Severe depressed bipolar I disorder without psychotic features (H) - Primary (Chronic)     Diagnosed with bipolar, lithium has been started, last level therapeutic, with treatment continued.  Although she reports that she is not feeling any different,  notes that she smiles.  She also laughs during our interview.  Otherwise, affect is flat and voice microphonia.  She has an appointment with her psychiatrist in 2 weeks, has already seen her psychotherapist         Cervical high risk HPV (human papillomavirus) test positive     She is due for a Pap and physical this fall.  Deferred to improvement in her mental health, tentatively scheduled for the future             Review of prior external note(s) from - Hospitalization       MED REC REQUIRED  Post Medication Reconciliation Status: discharge medications reconciled, continue medications without change    She complains of anxiety, for which hydroxyzine has been prescribed.  She reports that she has yet to try it.  I recommend she do so  Sher Duran MD  [facility]   Naa is a 57 year old, presenting for the following health issues:  Hospital F/U        7/18/2023     2:58 PM   Additional Questions   Roomed by Kimberly YIN         7/14/2023     2:43 PM   Post Discharge Outreach   Admission Date 7/2/2023   Reason for Admission worsening depressive symptoms, suicidal ideation   Discharge Date 7/4/2023   Discharge Diagnosis worsening depressive symptoms, suicidal ideation   How are you doing now that you are home? Patient shares that she is doing the same. Medications are going well, no side effects. Shares that she would like to learn how to cook, specifically chinese food. Writer did ask if she liked to youtube chinese cooking, or I let her know I could send class information. Patient states she is no longer interested. Patient does confirm that she  feels safe at home. Patient declines MTM referral at this time as she doesn't feel like this is necessary. Declines needing anything additional at this time.   How are your symptoms? (Red Flag symptoms escalate to triage hotline per guidelines) Unchanged   Do you feel your condition is stable enough to be safe at home until your provider visit? Yes   Does the patient have their discharge instructions?  Yes   Does the patient have questions regarding their discharge instructions?  No   Were you started on any new medications or were there changes to any of your previous medications?  Yes   Does the patient have all of their medications? Yes   Do you have questions regarding any of your medications?  No   Do you have all of your needed medical supplies or equipment (DME)?  (i.e. oxygen tank, CPAP, cane, etc.) Yes   Discharge follow-up appointment scheduled within 14 calendar days?  Yes   Discharge Follow Up Appointment Date 7/18/2023     Hospital Follow-up Visit:    Hospital/Nursing Home/IP Rehab Facility: [facility]  Date of Admission: 07/02/2023  Date of Discharge: 07/13/2023  Reason(s) for Admission: Severe depressed Bipolar     Was your hospitalization related to COVID-19? No   Problems taking medications regularly:  None  Medication changes since discharge: None  Problems adhering to non-medication therapy:  None    Summary of hospitalization:  [facility] discharge summary reviewed  Diagnostic Tests/Treatments reviewed.  Follow up needed: none  Other Healthcare Providers Involved in Patient s Care:         Her psychiatry clinic  Update since discharge: fluctuating course.         Plan of care communicated with patient and family           Patient wants to let provider know that she fell on her treadmill three days ago. Today she is not having that much pain from it.      She  reports that she stumbled on her treadmill and contused her right chest wall  "anterior axillary line a few days ago with resultant pain which has now resolved.       Review of Systems   {No cough no intolerance no bruise      Objective    /86 (BP Location: Right arm, Patient Position: Sitting, Cuff Size: Adult Regular)   Pulse 81   Temp 97.8  F (36.6  C) (Oral)   Resp 16   Ht 1.626 m (5' 4\")   Wt 69.4 kg (153 lb)   LMP 08/20/2015   SpO2 95%   BMI 26.26 kg/m    Body mass index is 26.26 kg/m .  Physical Exam   affect, voice as described  Chest wall nontender to palpation of ribs compression            Sher Duran MD                "

## 2023-07-18 NOTE — PATIENT INSTRUCTIONS
2. Psychiatry Med Management In-person appointment: Tuesday, August 1, 2023 @ 8:30am  Provider: Nanci Mena MD  Location: [facility] [address], [address], [place] [address]  Ph: [phone]  Fax: [phone]  HUC TO FAX AVS to above appointment, please

## 2023-07-19 NOTE — ASSESSMENT & PLAN NOTE
Diagnosed with bipolar, lithium has been started, last level therapeutic, with treatment continued.  Although she reports that she is not feeling any different,  notes that she smiles.  She also laughs during our interview.  Otherwise, affect is flat and voice microphonia.  She has an appointment with her psychiatrist in 2 weeks, has already seen her psychotherapist

## 2023-07-19 NOTE — ASSESSMENT & PLAN NOTE
She is due for a Pap and physical this fall.  Deferred to improvement in her mental health, tentatively scheduled for the future

## 2023-08-23 PROBLEM — M65.4 DE QUERVAIN'S DISEASE (TENOSYNOVITIS): Status: RESOLVED | Noted: 2023-02-15 | Resolved: 2023-08-23

## 2023-08-23 PROBLEM — M25.531 RIGHT WRIST PAIN: Status: RESOLVED | Noted: 2023-03-31 | Resolved: 2023-08-23

## 2023-08-23 NOTE — PROGRESS NOTES
Discharge Summary - Hand Therapy    Patient did not return to therapy.  Assume all goals were met to patient's satisfaction. D/C from hand therapy.

## 2023-09-06 ENCOUNTER — PATIENT OUTREACH (OUTPATIENT)
Dept: FAMILY MEDICINE | Facility: CLINIC | Age: 58
End: 2023-09-06
Payer: MEDICAID

## 2023-09-06 NOTE — LETTER
September 6, 2023      Naa Paredes  [id number]   [place] [address]        Dear ,    This letter is to remind you that you are due for your follow-up Pap smear and Human Papillomavirus (HPV) test.    Please call [phone] to schedule your appointment at your earliest convenience.    If you have completed the appointment outside of the [facility] system, please have the records forwarded to our office. We will update your chart for your provider to review before your next annual wellness visit.     Thank you for choosing [facility]!      Sincerely,    Your [facility] Care Team

## 2023-09-21 ENCOUNTER — TRANSCRIBE ORDERS (OUTPATIENT)
Dept: GASTROENTEROLOGY | Facility: CLINIC | Age: 58
End: 2023-09-21
Payer: MEDICAID

## 2023-09-21 ENCOUNTER — TELEPHONE (OUTPATIENT)
Dept: GASTROENTEROLOGY | Facility: CLINIC | Age: 58
End: 2023-09-21
Payer: MEDICAID

## 2023-09-21 DIAGNOSIS — K62.1 RECTAL POLYP: Primary | ICD-10-CM

## 2023-09-21 NOTE — TELEPHONE ENCOUNTER
REMINDER: We do not accept [employer] insurance.      Procedure Scheduled: FLEXIBLE SIGMOIDOSCOPY [Flex Sig]  Procedure Date: 10/24/2023  [facility] [place] [address]  Endoscopist: DEMETRIA  Ordering Provider: DEMETRIA  Scheduled by (per the direction of): [facility]

## 2023-09-29 ENCOUNTER — PATIENT OUTREACH (OUTPATIENT)
Dept: FAMILY MEDICINE | Facility: CLINIC | Age: 58
End: 2023-09-29
Payer: COMMERCIAL

## 2023-09-29 DIAGNOSIS — R87.810 CERVICAL HIGH RISK HPV (HUMAN PAPILLOMAVIRUS) TEST POSITIVE: Primary | ICD-10-CM

## 2023-09-29 NOTE — LETTER
September 29, 2023      Naa Paredes  [id number]   [place] [address]        Dear ,    This letter is to remind you that you are due for your follow-up Pap smear and Human Papillomavirus (HPV) test.    Please call [phone] to schedule your appointment at your earliest convenience.    If you have completed the appointment outside of the [facility] system, please have the records forwarded to our office. We will update your chart for your provider to review before your next annual wellness visit.     Thank you for choosing [facility]!      Sincerely,    Your [facility] Care Team

## 2023-09-29 NOTE — LETTER
November 30, 2023      Naa Paredes  [id number]   [place] [address]        Dear ,    This letter is to remind you that you are due for your follow-up Pap smear and Human Papillomavirus (HPV) test.    Please call [phone] to schedule your appointment at your earliest convenience.    If you have completed the appointment outside of the [facility] system, please have the records forwarded to our office. We will update your chart for your provider to review before your next annual wellness visit.     Thank you for choosing [facility]!      Sincerely,    Your [facility] Care Team

## 2023-10-18 ENCOUNTER — ANCILLARY PROCEDURE (OUTPATIENT)
Dept: GENERAL RADIOLOGY | Facility: CLINIC | Age: 58
End: 2023-10-18
Attending: FAMILY MEDICINE
Payer: COMMERCIAL

## 2023-10-18 ENCOUNTER — OFFICE VISIT (OUTPATIENT)
Dept: FAMILY MEDICINE | Facility: CLINIC | Age: 58
End: 2023-10-18
Payer: COMMERCIAL

## 2023-10-18 VITALS
DIASTOLIC BLOOD PRESSURE: 84 MMHG | SYSTOLIC BLOOD PRESSURE: 126 MMHG | HEART RATE: 91 BPM | WEIGHT: 158.4 LBS | HEIGHT: 63 IN | TEMPERATURE: 98.2 F | BODY MASS INDEX: 28.07 KG/M2 | RESPIRATION RATE: 12 BRPM | OXYGEN SATURATION: 95 %

## 2023-10-18 DIAGNOSIS — F31.4 SEVERE DEPRESSED BIPOLAR I DISORDER WITHOUT PSYCHOTIC FEATURES (H): Chronic | ICD-10-CM

## 2023-10-18 DIAGNOSIS — Z12.4 CERVICAL CANCER SCREENING: ICD-10-CM

## 2023-10-18 DIAGNOSIS — Z23 ENCOUNTER FOR IMMUNIZATION: ICD-10-CM

## 2023-10-18 DIAGNOSIS — M17.0 PRIMARY OSTEOARTHRITIS OF BOTH KNEES: ICD-10-CM

## 2023-10-18 DIAGNOSIS — L91.8 SKIN TAG: ICD-10-CM

## 2023-10-18 DIAGNOSIS — Z00.00 ENCOUNTER FOR PREVENTATIVE ADULT HEALTH CARE EXAMINATION: Primary | ICD-10-CM

## 2023-10-18 DIAGNOSIS — Z12.31 VISIT FOR SCREENING MAMMOGRAM: ICD-10-CM

## 2023-10-18 DIAGNOSIS — R73.03 PREDIABETES: ICD-10-CM

## 2023-10-18 PROCEDURE — 90682 RIV4 VACC RECOMBINANT DNA IM: CPT | Performed by: FAMILY MEDICINE

## 2023-10-18 PROCEDURE — 99396 PREV VISIT EST AGE 40-64: CPT | Mod: 25 | Performed by: FAMILY MEDICINE

## 2023-10-18 PROCEDURE — 73562 X-RAY EXAM OF KNEE 3: CPT | Mod: TC | Performed by: RADIOLOGY

## 2023-10-18 PROCEDURE — 90472 IMMUNIZATION ADMIN EACH ADD: CPT | Performed by: FAMILY MEDICINE

## 2023-10-18 PROCEDURE — 90746 HEPB VACCINE 3 DOSE ADULT IM: CPT | Performed by: FAMILY MEDICINE

## 2023-10-18 PROCEDURE — 99213 OFFICE O/P EST LOW 20 MIN: CPT | Mod: 25 | Performed by: FAMILY MEDICINE

## 2023-10-18 PROCEDURE — 90480 ADMN SARSCOV2 VAC 1/ONLY CMP: CPT | Performed by: FAMILY MEDICINE

## 2023-10-18 PROCEDURE — 17110 DESTRUCTION B9 LES UP TO 14: CPT | Performed by: FAMILY MEDICINE

## 2023-10-18 PROCEDURE — 91320 SARSCV2 VAC 30MCG TRS-SUC IM: CPT | Performed by: FAMILY MEDICINE

## 2023-10-18 PROCEDURE — 90471 IMMUNIZATION ADMIN: CPT | Performed by: FAMILY MEDICINE

## 2023-10-18 RX ORDER — LURASIDONE HYDROCHLORIDE 40 MG/1
40 TABLET, FILM COATED ORAL
Qty: 30 TABLET | Refills: 0 | Status: CANCELLED | OUTPATIENT
Start: 2023-10-18

## 2023-10-18 ASSESSMENT — PATIENT HEALTH QUESTIONNAIRE - PHQ9: SUM OF ALL RESPONSES TO PHQ QUESTIONS 1-9: 1

## 2023-10-18 NOTE — PROGRESS NOTES
"   SUBJECTIVE:   CC: Naa is an 57 year old who presents for preventive health visit.       10/18/2023     2:19 PM   Additional Questions   Roomed by shravan YIN      Social History     Tobacco Use    Smoking status: Never    Smokeless tobacco: Never   Substance Use Topics    Alcohol use: Yes     Comment: occ wine             8/13/2018     9:58 AM   Alcohol Use   Prescreen: >3 drinks/day or >7 drinks/week? No     Reviewed orders with patient.  Reviewed health maintenance and updated orders accordingly - Yes      Breast Cancer Screening:        3/16/2023     2:18 PM   Breast CA Risk Assessment (FHS-7)   Do you have a family history of breast, colon, or ovarian cancer? No / Unknown           Pertinent mammograms are reviewed under the imaging tab.    History of abnormal Pap smear:       Latest Ref Rng & Units 9/21/2022     2:32 PM 11/17/2016    11:45 AM 11/17/2016    12:00 AM   PAP / HPV   PAP  Negative for Intraepithelial Lesion or Malignancy (NILM)      PAP (Historical)    NIL    HPV 16 DNA Negative Negative  Negative     HPV 18 DNA Negative Negative  Negative     Other HR HPV Negative Positive  Negative       Reviewed and updated as needed this visit by clinical staff   Tobacco   Meds              Reviewed and updated as needed this visit by Provider                     Review of Systems    She notes periodic left back pain that radiates into her thigh as well as discomfort in her knees left greater than right.  Otherwise she reports she is feeling well.  A complete review of systems is negative  OBJECTIVE:   /84 (BP Location: Right arm, Patient Position: Sitting, Cuff Size: Adult Large)   Pulse 91   Temp 98.2  F (36.8  C) (Oral)   Resp 12   Ht 1.6 m (5' 3\")   Wt 71.8 kg (158 lb 6.4 oz)   LMP 08/20/2015   SpO2 95%   BMI 28.06 kg/m    Physical Exam  Constitutional:       Appearance: Normal appearance.   HENT:      Head: Atraumatic.      Right Ear: Tympanic membrane normal.      " Left Ear: Tympanic membrane normal.      Nose: Nose normal.      Mouth/Throat:      Mouth: Mucous membranes are moist.   Eyes:      Pupils: Pupils are equal, round, and reactive to light.   Cardiovascular:      Rate and Rhythm: Normal rate and regular rhythm.      Heart sounds: Normal heart sounds.   Pulmonary:      Effort: Pulmonary effort is normal.      Breath sounds: Normal breath sounds.   Abdominal:      General: Bowel sounds are normal.      Palpations: Abdomen is soft.   Musculoskeletal:      Cervical back: Neck supple.   Skin:     General: Skin is warm and dry.      Comments: As described     Neurological:      General: No focal deficit present.      Mental Status: She is alert.   Psychiatric:         Mood and Affect: Mood normal.               ASSESSMENT/PLAN:     Problem List Items Addressed This Visit       Severe depressed bipolar I disorder without psychotic features (H) (Chronic)     Psychiatric care.  Records requested.  She reports she is feeling well.  Microphonia remains         Cervical cancer screening     Deferred to a later date         Encounter for immunization     Offered         Relevant Orders    HEPATITIS B, ADULT 20+ (ENGERIX-B/RECOMBIVAX HB) (Completed)    COVID-19 12+ (2023-24) (PFIZER) (Completed)    INFLUENZA VACCINE 18-64Y (FLUBLOK) (Completed)    Encounter for preventative adult health care examination - Primary    Prediabetes     Hemoglobin A1C   Date Value Ref Range Status   07/03/2023 5.8 (H) <5.7 % Final     Comment:     Normal <5.7%   Prediabetes 5.7-6.4%    Diabetes 6.5% or higher     Note: Adopted from ADA consensus guidelines.   Monitor yearly           Primary osteoarthritis of both knees     Patient reports pain in knees details unclear left greater than right exam shows no tenderness no effusion no instability x-rays show mild unilateral joint space loss.  Recommend diclofenac gel         Relevant Medications    diclofenac (VOLTAREN) 1 % topical gel    Other Relevant  "Orders    XR Knee Right 3 Views (Completed)    XR Knee Left 3 Views (Completed)    Skin tag     Numerous small skin tags about her neck as well as a few cherry hemangioma Adah think it caught in her necklaces.  She is interested in therapy.  Discussed.  Cryotherapy applied less than 14 lesions         Relevant Medications    diclofenac (VOLTAREN) 1 % topical gel    Other Relevant Orders    DESTRUCT BENIGN LESION, UP TO 14 (Completed)    Visit for screening mammogram     Offered discussed accepted         Relevant Orders    MA SCREENING DIGITAL BILAT - Future  (s+30)       Patient has been advised of split billing requirements and indicates understanding: Yes      COUNSELING:  Reviewed preventive health counseling, as reflected in patient instructions      BMI:   Estimated body mass index is 28.06 kg/m  as calculated from the following:    Height as of this encounter: 1.6 m (5' 3\").    Weight as of this encounter: 71.8 kg (158 lb 6.4 oz).   Weight management plan: maintain      She reports that she has never smoked. She has never used smokeless tobacco.      Sher Duran MD  [facility]  "

## 2023-10-19 NOTE — ASSESSMENT & PLAN NOTE
Patient reports pain in knees details unclear left greater than right exam shows no tenderness no effusion no instability x-rays show mild unilateral joint space loss.  Recommend diclofenac gel

## 2023-10-19 NOTE — ASSESSMENT & PLAN NOTE
Numerous small skin tags about her neck as well as a few cherry hemangioma Adah think it caught in her necklaces.  She is interested in therapy.  Discussed.  Cryotherapy applied less than 14 lesions

## 2023-10-19 NOTE — ASSESSMENT & PLAN NOTE
Hemoglobin A1C   Date Value Ref Range Status   07/03/2023 5.8 (H) <5.7 % Final     Comment:     Normal <5.7%   Prediabetes 5.7-6.4%    Diabetes 6.5% or higher     Note: Adopted from ADA consensus guidelines.   Monitor yearly

## 2023-10-24 ENCOUNTER — HOSPITAL ENCOUNTER (OUTPATIENT)
Facility: CLINIC | Age: 58
Discharge: HOME | End: 2023-10-24
Attending: COLON & RECTAL SURGERY | Admitting: COLON & RECTAL SURGERY
Payer: COMMERCIAL

## 2023-10-24 VITALS
SYSTOLIC BLOOD PRESSURE: 137 MMHG | HEART RATE: 65 BPM | OXYGEN SATURATION: 95 % | RESPIRATION RATE: 21 BRPM | TEMPERATURE: 97.5 F | DIASTOLIC BLOOD PRESSURE: 85 MMHG

## 2023-10-24 LAB — FLEXIBLE SIGMOIDOSCOPY: NORMAL

## 2023-10-24 PROCEDURE — 45330 DIAGNOSTIC SIGMOIDOSCOPY: CPT | Performed by: COLON & RECTAL SURGERY

## 2023-10-24 PROCEDURE — G0104 CA SCREEN;FLEXI SIGMOIDSCOPE: HCPCS | Performed by: COLON & RECTAL SURGERY

## 2023-10-24 RX ORDER — NALOXONE HYDROCHLORIDE 0.4 MG/ML
0.2 INJECTION, SOLUTION INTRAMUSCULAR; INTRAVENOUS; SUBCUTANEOUS
Status: DISCONTINUED | OUTPATIENT
Start: 2023-10-24 | End: 2023-10-24 | Stop reason: HOSPADM

## 2023-10-24 RX ORDER — ONDANSETRON 2 MG/ML
4 INJECTION INTRAMUSCULAR; INTRAVENOUS
Status: DISCONTINUED | OUTPATIENT
Start: 2023-10-24 | End: 2023-10-24 | Stop reason: HOSPADM

## 2023-10-24 RX ORDER — ATROPINE SULFATE 0.1 MG/ML
1 INJECTION INTRAVENOUS
Status: DISCONTINUED | OUTPATIENT
Start: 2023-10-24 | End: 2023-10-24 | Stop reason: HOSPADM

## 2023-10-24 RX ORDER — NALOXONE HYDROCHLORIDE 0.4 MG/ML
0.4 INJECTION, SOLUTION INTRAMUSCULAR; INTRAVENOUS; SUBCUTANEOUS
Status: DISCONTINUED | OUTPATIENT
Start: 2023-10-24 | End: 2023-10-24 | Stop reason: HOSPADM

## 2023-10-24 RX ORDER — ONDANSETRON 2 MG/ML
4 INJECTION INTRAMUSCULAR; INTRAVENOUS EVERY 6 HOURS PRN
Status: DISCONTINUED | OUTPATIENT
Start: 2023-10-24 | End: 2023-10-24 | Stop reason: HOSPADM

## 2023-10-24 RX ORDER — EPINEPHRINE 1 MG/ML
0.1 INJECTION, SOLUTION INTRAMUSCULAR; SUBCUTANEOUS
Status: DISCONTINUED | OUTPATIENT
Start: 2023-10-24 | End: 2023-10-24 | Stop reason: HOSPADM

## 2023-10-24 RX ORDER — FLUMAZENIL 0.1 MG/ML
0.2 INJECTION, SOLUTION INTRAVENOUS
Status: DISCONTINUED | OUTPATIENT
Start: 2023-10-24 | End: 2023-10-24 | Stop reason: HOSPADM

## 2023-10-24 RX ORDER — LIDOCAINE 40 MG/G
CREAM TOPICAL
Status: DISCONTINUED | OUTPATIENT
Start: 2023-10-24 | End: 2023-10-24 | Stop reason: HOSPADM

## 2023-10-24 RX ORDER — ONDANSETRON 4 MG/1
4 TABLET, ORALLY DISINTEGRATING ORAL EVERY 6 HOURS PRN
Status: DISCONTINUED | OUTPATIENT
Start: 2023-10-24 | End: 2023-10-24 | Stop reason: HOSPADM

## 2023-10-24 RX ORDER — DIPHENHYDRAMINE HYDROCHLORIDE 50 MG/ML
25-50 INJECTION INTRAMUSCULAR; INTRAVENOUS
Status: DISCONTINUED | OUTPATIENT
Start: 2023-10-24 | End: 2023-10-24 | Stop reason: HOSPADM

## 2023-10-24 RX ORDER — FENTANYL CITRATE 50 UG/ML
50-100 INJECTION, SOLUTION INTRAMUSCULAR; INTRAVENOUS EVERY 5 MIN PRN
Status: DISCONTINUED | OUTPATIENT
Start: 2023-10-24 | End: 2023-10-24 | Stop reason: HOSPADM

## 2023-10-24 RX ORDER — PROCHLORPERAZINE MALEATE 10 MG
10 TABLET ORAL EVERY 6 HOURS PRN
Status: DISCONTINUED | OUTPATIENT
Start: 2023-10-24 | End: 2023-10-24 | Stop reason: HOSPADM

## 2023-10-24 RX ORDER — SIMETHICONE 40MG/0.6ML
133 SUSPENSION, DROPS(FINAL DOSAGE FORM)(ML) ORAL
Status: DISCONTINUED | OUTPATIENT
Start: 2023-10-24 | End: 2023-10-24 | Stop reason: HOSPADM

## 2023-10-24 NOTE — H&P
Pre-Endoscopy History and Physical     Naa Paredes MRN# [id number]   YOB: 1965 Age: 57 year old     Date of Procedure: 10/24/2023  Primary care provider: Sher Duran  Type of Endoscopy: sigmoidoscopy  Reason for Procedure: surveillance  Type of Anesthesia Anticipated: Moderate Sedation    HPI:    Naa is a 57 year old female who will be undergoing the above procedure.      A history and physical has been performed. The patient's medications and allergies have been reviewed. The risks and benefits of the procedure and the sedation options and risks were discussed with the patient.  All questions were answered and informed consent was obtained.      She denies a personal or family history of anesthesia complications or bleeding disorders.     Allergies   Allergen Reactions    No Clinical Screening - See Comments      PN: LW CM1: Contrast Adverse Reaction - None Reaction :  PN: LW FI1: nka        Prior to Admission Medications   Prescriptions Last Dose Informant Patient Reported? Taking?   diclofenac (VOLTAREN) 1 % topical gel   No No   Sig: Apply 4 g topically 4 times daily To knees as needed   lithium (ESKALITH CR/LITHOBID) 450 MG CR tablet   No No   Sig: Take 2 tablets (900 mg) by mouth At Bedtime for 30 days   lurasidone (LATUDA) 40 MG TABS tablet  Self, Daughter No No   Sig: Take 1 tablet (40 mg) by mouth daily with food      Facility-Administered Medications: None       Patient Active Problem List   Diagnosis    H/O: alcohol abuse    CARDIOVASCULAR SCREENING; LDL GOAL LESS THAN 160    Nickel allergy    Anxiety    Colon adenoma    Seasonal allergic rhinitis    Conversion disorder with mixed symptoms    Migraine with status migrainosus, not intractable, unspecified migraine type    Trichotillomania    Dysesthesia    Plantar fasciitis    Encounter for immunization    Cervical high risk HPV (human papillomavirus) test positive    Screen for colon cancer    Abscess of Bartholin's gland     Disorder of liver    Carpal tunnel syndrome of left wrist    Mallet finger of right hand    Acute cough    Prediabetes    Thrombocytopenia (H24)    Suicidal ideation    Severe depressed bipolar I disorder without psychotic features (H)    Encounter for preventative adult health care examination    Primary osteoarthritis of both knees    Cervical cancer screening    Visit for screening mammogram    Skin tag        Past Medical History:   Diagnosis Date    Ankle sprain and strain 05/02/2013    Anxiety 03/26/2015    Black stools 11/25/2015    CARDIOVASCULAR SCREENING; LDL GOAL LESS THAN 160 10/31/2010    Cervicalgia 12/07/2011    Closed dislocation of finger of right hand, subsequent encounter 02/06/2018    Colon adenoma 04/23/2015    Patient denies it. Renee Tiwari RN, 6/9/15.    Conversion disorder with mixed symptoms 06/14/2016    Depression with suicidal ideation 06/19/2015    Depressive disorder     Dysesthesia 09/21/2022    Elevated blood pressure reading without diagnosis of hypertension 11/25/2015    Elevated LFT's 06/04/2010    Fatigue due to depression 11/24/2015    H/O: alcohol abuse 06/04/2010    IBS (irritable bowel syndrome)     Knee pain 10/19/2010    Low back pain 10/19/2010    Lumbago 12/07/2011    Major depression in complete remission (H24) 08/13/2018    Major depressive disorder, recurrent, severe without psychotic features (H) 05/13/2016    Major depressive disorder, single episode, in partial remission (H24) 11/24/2015    Mallet finger of right hand 02/15/2023    Migraine     Migraine with status migrainosus, not intractable, unspecified migraine type 07/14/2016    Mild episode of recurrent major depressive disorder (H24) 06/22/2023    Psychiatry Dr Mena [facility]    Neck pain 10/19/2010    Nephrolithiasis     calcium oxalate    Pain in thoracic spine 01/02/2013    Perimenopause 03/26/2015    Plantar fasciitis 09/21/2022    Prediabetes 03/17/2023    Seasonal allergic rhinitis  "10/20/2015    Severe depressed bipolar I disorder without psychotic features (H)     First manic episode 2017    Thoracic outlet syndrome 07/20/2017    Thrombocytopenia (H24) 03/17/2023    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    Vitamin D deficiency disease 06/08/2010        Past Surgical History:   Procedure Laterality Date    CARPAL TUNNEL RELEASE RT/LT Left     COLONOSCOPY N/A 06/09/2015    Procedure: COLONOSCOPY;  Surgeon: Steve Choi MD;  Location:  GI    COLONOSCOPY N/A 02/07/2023    Procedure: COLONOSCOPY (FV) with polypectomy using cold forceps;  Surgeon: Steve Choi MD;  Location:  GI    LITHOTRIPSY      TRANSANAL ENDOSCOPIC MICROSURGERY N/A 3/21/2023    Procedure: Transanal endoscopic microsurgical resection of rectal polyp, proctoscopy;  Surgeon: Hallie Quevedo MD;  Location:  OR       Social History     Tobacco Use    Smoking status: Never    Smokeless tobacco: Never   Substance Use Topics    Alcohol use: Yes     Comment: occ wine       Family History   Problem Relation Age of Onset    Heart Disease Mother     Colon Cancer Father     Cancer - colorectal Father         at age 65    Mental Illness Paternal Uncle     Breast Cancer No family hx of     Ovarian Cancer No family hx of        REVIEW OF SYSTEMS:     5 point ROS negative except as noted above in HPI, including Gen., Resp., CV, GI &  system review.      PHYSICAL EXAM:   /84   Pulse 65   Temp 97.5  F (36.4  C) (Temporal)   Resp 19   LMP 08/20/2015   SpO2 95%  Estimated body mass index is 28.06 kg/m  as calculated from the following:    Height as of 10/18/23: 1.6 m (5' 3\").    Weight as of 10/18/23: 71.8 kg (158 lb 6.4 oz).   GENERAL APPEARANCE: healthy and alert  MENTAL STATUS: alert  AIRWAY EXAM: Mallampatti Class I (visualization of the soft palate, fauces, uvula, anterior and posterior pillars)  RESP: lungs clear to auscultation - no rales, rhonchi or wheezes  CV: regular rates and rhythm      DIAGNOSTICS:    Not " indicated      IMPRESSION   ASA Class 2 - Mild systemic disease        PLAN:       Plan for sigmoidoscopy. We discussed the risks, benefits and alternatives and the patient wished to proceed.    The above has been forwarded to the consulting provider.      Signed Electronically by: Hallie Quevedo MD, MD  October 24, 2023

## 2023-10-27 NOTE — TELEPHONE ENCOUNTER
Patient due for Pap and HPV.    Reminder done today via telephone call via [facility]  ID # [id number]

## 2023-11-16 ENCOUNTER — DOCUMENTATION ONLY (OUTPATIENT)
Dept: FAMILY MEDICINE | Facility: CLINIC | Age: 58
End: 2023-11-16

## 2023-11-16 DIAGNOSIS — Z51.81 ENCOUNTER FOR THERAPEUTIC DRUG MONITORING: Primary | ICD-10-CM

## 2023-11-16 NOTE — PROGRESS NOTES
Pt has a lab only coming up and currently no labs. Pt is requesting a Lithium check. Please review and place future orders.   Questions or concerns, please have your care team contact pt directly.    Thank You  Kimberly ZULETA

## 2023-11-29 ENCOUNTER — LAB (OUTPATIENT)
Dept: LAB | Facility: CLINIC | Age: 58
End: 2023-11-29
Payer: COMMERCIAL

## 2023-11-29 DIAGNOSIS — Z51.81 ENCOUNTER FOR THERAPEUTIC DRUG MONITORING: ICD-10-CM

## 2023-11-29 LAB
ALBUMIN SERPL BCG-MCNC: 4.7 G/DL (ref 3.5–5.2)
ALBUMIN UR-MCNC: NEGATIVE MG/DL
ALP SERPL-CCNC: 57 U/L (ref 40–150)
ALT SERPL W P-5'-P-CCNC: 36 U/L (ref 0–50)
ANION GAP SERPL CALCULATED.3IONS-SCNC: 9 MMOL/L (ref 7–15)
APPEARANCE UR: CLEAR
AST SERPL W P-5'-P-CCNC: 28 U/L (ref 0–45)
BASOPHILS # BLD AUTO: 0 10E3/UL (ref 0–0.2)
BASOPHILS NFR BLD AUTO: 1 %
BILIRUB SERPL-MCNC: 0.4 MG/DL
BILIRUB UR QL STRIP: NEGATIVE
BUN SERPL-MCNC: 15.2 MG/DL (ref 6–20)
CALCIUM SERPL-MCNC: 10.1 MG/DL (ref 8.6–10)
CHLORIDE SERPL-SCNC: 106 MMOL/L (ref 98–107)
COLOR UR AUTO: YELLOW
CREAT SERPL-MCNC: 0.84 MG/DL (ref 0.51–0.95)
DEPRECATED HCO3 PLAS-SCNC: 27 MMOL/L (ref 22–29)
EGFRCR SERPLBLD CKD-EPI 2021: 80 ML/MIN/1.73M2
EOSINOPHIL # BLD AUTO: 0.2 10E3/UL (ref 0–0.7)
EOSINOPHIL NFR BLD AUTO: 4 %
ERYTHROCYTE [DISTWIDTH] IN BLOOD BY AUTOMATED COUNT: 11.4 % (ref 10–15)
GLUCOSE SERPL-MCNC: 114 MG/DL (ref 70–99)
GLUCOSE UR STRIP-MCNC: NEGATIVE MG/DL
HCT VFR BLD AUTO: 43.9 % (ref 35–47)
HGB BLD-MCNC: 15 G/DL (ref 11.7–15.7)
HGB UR QL STRIP: NEGATIVE
IMM GRANULOCYTES # BLD: 0 10E3/UL
IMM GRANULOCYTES NFR BLD: 0 %
KETONES UR STRIP-MCNC: NEGATIVE MG/DL
LEUKOCYTE ESTERASE UR QL STRIP: NEGATIVE
LITHIUM SERPL-SCNC: 0.78 MMOL/L (ref 0.6–1.2)
LYMPHOCYTES # BLD AUTO: 1.3 10E3/UL (ref 0.8–5.3)
LYMPHOCYTES NFR BLD AUTO: 24 %
MCH RBC QN AUTO: 31.5 PG (ref 26.5–33)
MCHC RBC AUTO-ENTMCNC: 34.2 G/DL (ref 31.5–36.5)
MCV RBC AUTO: 92 FL (ref 78–100)
MONOCYTES # BLD AUTO: 0.4 10E3/UL (ref 0–1.3)
MONOCYTES NFR BLD AUTO: 8 %
NEUTROPHILS # BLD AUTO: 3.5 10E3/UL (ref 1.6–8.3)
NEUTROPHILS NFR BLD AUTO: 63 %
NITRATE UR QL: NEGATIVE
PH UR STRIP: 7.5 [PH] (ref 5–7)
PLATELET # BLD AUTO: 172 10E3/UL (ref 150–450)
POTASSIUM SERPL-SCNC: 3.7 MMOL/L (ref 3.4–5.3)
PROT SERPL-MCNC: 7 G/DL (ref 6.4–8.3)
RBC # BLD AUTO: 4.76 10E6/UL (ref 3.8–5.2)
SODIUM SERPL-SCNC: 142 MMOL/L (ref 135–145)
SP GR UR STRIP: 1.02 (ref 1–1.03)
UROBILINOGEN UR STRIP-ACNC: 0.2 E.U./DL
WBC # BLD AUTO: 5.5 10E3/UL (ref 4–11)

## 2023-11-29 PROCEDURE — 85025 COMPLETE CBC W/AUTO DIFF WBC: CPT

## 2023-11-29 PROCEDURE — 36415 COLL VENOUS BLD VENIPUNCTURE: CPT

## 2023-11-29 PROCEDURE — 81003 URINALYSIS AUTO W/O SCOPE: CPT

## 2023-11-29 PROCEDURE — 80053 COMPREHEN METABOLIC PANEL: CPT

## 2023-11-29 PROCEDURE — 80178 ASSAY OF LITHIUM: CPT

## 2023-12-18 ENCOUNTER — PATIENT OUTREACH (OUTPATIENT)
Dept: GASTROENTEROLOGY | Facility: CLINIC | Age: 58
End: 2023-12-18
Payer: COMMERCIAL

## 2024-01-02 PROBLEM — R87.810 CERVICAL HIGH RISK HPV (HUMAN PAPILLOMAVIRUS) TEST POSITIVE: Status: ACTIVE | Noted: 2022-09-21

## 2024-01-02 NOTE — TELEPHONE ENCOUNTER
Dr. Duran,    Patient is lost to pap tracking follow-up. Attempts to contact pt have been made per reminder process and there has been no reply and/or no appt scheduled.      Mayra Mcdonald RN  Pap Tracking     9/21/22 NIL Pap, + HR HPV (neg 16/18). Plan cotest in 1 year.   9/27/2022 Pt notified by phone via [employer] .   9/29/2023 Reminder letter  10/27/2023 Reminder call -No answer and no voicemail set up. Will try again later.   10/30/2023 Reminder call -No answer and no voicemail set up.   11/30/2023 Reminder letter - If no appt scheduled by 12/30/23, then LTF  1/2/2024 Lost to follow-up for pap tracking

## 2024-01-09 ENCOUNTER — TELEPHONE (OUTPATIENT)
Dept: FAMILY MEDICINE | Facility: CLINIC | Age: 59
End: 2024-01-09
Payer: COMMERCIAL

## 2024-01-09 NOTE — TELEPHONE ENCOUNTER
Patient's spouse calling. No CTC on file. Pt is present, identified information, verbal OK to speak with spouse from patient.     Requesting lithium level:  Per chart review:   Lithium 0.78      Pt and spouse requesting lab to sent to [facility]:  Provider Nanci Mena  Sent to fax : [phone] fax number confirmed and read back to patient's spouse.       Faxed.    Jacquelyn LEUNG RN on 1/9/2024 at 3:06 PM

## 2024-01-11 ENCOUNTER — TELEPHONE (OUTPATIENT)
Dept: FAMILY MEDICINE | Facility: CLINIC | Age: 59
End: 2024-01-11
Payer: COMMERCIAL

## 2024-01-11 NOTE — LETTER
January 11, 2024      Naa Reggie  [address]   [place] [facility] [place] [address]      Dear Leonardo Jacome-    I care about your health and have reviewed your health plan. I have reviewed your medical conditions, medication list, and lab results and am making recommendations based on this review, to better manage your health.    You are in particular need of attention regarding:  -Breast Cancer Screening  -Cervical Cancer Screening    I am recommending that you:  {recommendations:-schedule a MAMMOGRAM which is due. We have mammogram available at our clinic; please call [phone].   Please disregard this reminder if you have had this exam elsewhere within the last year.  It would be helpful for us to have a copy of your mammogram report in our file so that we can best coordinate your care.  -schedule a PAP SMEAR EXAM which is due.  Please disregard this reminder if you have had this exam elsewhere within the last year.  It would be helpful for us to have a copy of your recent pap smear report in our file so that we can best coordinate your care.    Please call us at [phone] (or use [employer]) to address the above recommendations.     Thank you for trusting us with your health care.We look forward to supporting your healthcare needs in the future.    Sincerely,    Your [facility] Care Team            Sincerely,        Sher Duran MD

## 2024-01-11 NOTE — TELEPHONE ENCOUNTER
Patient Quality Outreach    Patient is due for the following:   Breast Cancer Screening - Mammogram  Cervical Cancer Screening - PAP Needed    Next Steps:   Schedule a office visit for Cervical Cancer Screening    Type of outreach:    Sent letter.      Questions for provider review:    None           Lina Koch MA

## 2024-02-26 ENCOUNTER — APPOINTMENT (OUTPATIENT)
Dept: GENERAL RADIOLOGY | Facility: CLINIC | Age: 59
End: 2024-02-26
Attending: EMERGENCY MEDICINE
Payer: MEDICAID

## 2024-02-26 PROCEDURE — 26720 TREAT FINGER FRACTURE EACH: CPT | Mod: RT,LT

## 2024-02-26 PROCEDURE — 99285 EMERGENCY DEPT VISIT HI MDM: CPT | Mod: 25

## 2024-02-26 PROCEDURE — 73110 X-RAY EXAM OF WRIST: CPT | Mod: RT

## 2024-02-27 ENCOUNTER — HOSPITAL ENCOUNTER (EMERGENCY)
Facility: CLINIC | Age: 59
Discharge: HOME | End: 2024-02-27
Attending: EMERGENCY MEDICINE | Admitting: EMERGENCY MEDICINE
Payer: MEDICAID

## 2024-02-27 ENCOUNTER — APPOINTMENT (OUTPATIENT)
Dept: GENERAL RADIOLOGY | Facility: CLINIC | Age: 59
End: 2024-02-27
Attending: EMERGENCY MEDICINE
Payer: MEDICAID

## 2024-02-27 VITALS
TEMPERATURE: 98.3 F | DIASTOLIC BLOOD PRESSURE: 71 MMHG | HEART RATE: 65 BPM | RESPIRATION RATE: 18 BRPM | SYSTOLIC BLOOD PRESSURE: 147 MMHG | OXYGEN SATURATION: 95 %

## 2024-02-27 DIAGNOSIS — S63.619A SPRAIN OF FINGER, UNSPECIFIED FINGER, INITIAL ENCOUNTER: ICD-10-CM

## 2024-02-27 DIAGNOSIS — S80.211A ABRASION OF RIGHT KNEE, INITIAL ENCOUNTER: ICD-10-CM

## 2024-02-27 DIAGNOSIS — Z23 NEED FOR DIPHTHERIA-TETANUS-PERTUSSIS (TDAP) VACCINE: ICD-10-CM

## 2024-02-27 DIAGNOSIS — S62.316A DISPLACED FRACTURE OF BASE OF FIFTH METACARPAL BONE, RIGHT HAND, INITIAL ENCOUNTER FOR CLOSED FRACTURE: ICD-10-CM

## 2024-02-27 DIAGNOSIS — S62.314A DISPLACED FRACTURE OF BASE OF FOURTH METACARPAL BONE, RIGHT HAND, INITIAL ENCOUNTER FOR CLOSED FRACTURE: ICD-10-CM

## 2024-02-27 PROCEDURE — 90471 IMMUNIZATION ADMIN: CPT | Performed by: EMERGENCY MEDICINE

## 2024-02-27 PROCEDURE — 250N000011 HC RX IP 250 OP 636: Performed by: EMERGENCY MEDICINE

## 2024-02-27 PROCEDURE — 90715 TDAP VACCINE 7 YRS/> IM: CPT | Performed by: EMERGENCY MEDICINE

## 2024-02-27 PROCEDURE — 73562 X-RAY EXAM OF KNEE 3: CPT | Mod: RT

## 2024-02-27 PROCEDURE — 73130 X-RAY EXAM OF HAND: CPT | Mod: LT

## 2024-02-27 RX ADMIN — CLOSTRIDIUM TETANI TOXOID ANTIGEN (FORMALDEHYDE INACTIVATED), CORYNEBACTERIUM DIPHTHERIAE TOXOID ANTIGEN (FORMALDEHYDE INACTIVATED), BORDETELLA PERTUSSIS TOXOID ANTIGEN (GLUTARALDEHYDE INACTIVATED), BORDETELLA PERTUSSIS FILAMENTOUS HEMAGGLUTININ ANTIGEN (FORMALDEHYDE INACTIVATED), BORDETELLA PERTUSSIS PERTACTIN ANTIGEN, AND BORDETELLA PERTUSSIS FIMBRIAE 2/3 ANTIGEN 0.5 ML: 5; 2; 2.5; 5; 3; 5 INJECTION, SUSPENSION INTRAMUSCULAR at 02:24

## 2024-02-27 ASSESSMENT — ACTIVITIES OF DAILY LIVING (ADL)
ADLS_ACUITY_SCORE: 33
ADLS_ACUITY_SCORE: 35

## 2024-02-27 NOTE — ED NOTES
Patient refused to take two rings off of her left hand. Patient advised that there is a moderate amount of swelling to those fingers and it may get worse.

## 2024-02-27 NOTE — Clinical Note
Naa Paredes was seen and treated in our emergency department on 2/26/2024.  She may return to work on 03/05/2024.       If you have any questions or concerns, please don't hesitate to call.      Dwayne Rose MD

## 2024-02-27 NOTE — ED TRIAGE NOTES
Pt missed step and fell landing on outstretched R wrist and knee.      Triage Assessment (Adult)       Row Name 02/26/24 2168          Triage Assessment    Airway WDL WDL        Respiratory WDL    Respiratory WDL WDL        Peripheral/Neurovascular WDL    Peripheral Neurovascular WDL WDL

## 2024-02-27 NOTE — ED PROVIDER NOTES
History     Chief Complaint:  Fall       The history is provided by the patient.      Naa Paredes is a 58 year old right-hand-dominant female who presents after a fall. The patient reports that she missed a step on some stairs, and she subsequently fell landing on her right knee and both hands. She denies hitting her head.  She injured the right fourth and fifth fingers and the left third and fourth fingers.  She denies any other injuries or concerns.    Independent Historian:    None - patient only    Review of External Notes:  I reviewed the patient's MIIC    Medications:    Lithium  Latuda    Past Medical History:    Anxiety  Bipolar I disorder  Cervicalgia  Closed dislocation of finger of right hand, subsequent encounter  Colon adenoma  Conversion disorder   Depression with suicidal ideation  Dysesthesia  Elevated LFT's  IBS   Lumbago  Mallet finger of right hand  Migraine  Nephrolithiasis  Perimenopause  Plantar fasciitis  Prediabetes  Thoracic outlet syndrome  Thrombocytopenia  Trichotillomania  Vitamin D deficiency disease    Past Surgical History:    Carpal tunnel release  Colonoscopy x 2  Lithotripsy  Sigmoidoscopy, flexible  Transanal endoscopic microsurgery    Physical Exam   Patient Vitals for the past 24 hrs:   BP Temp Temp src Pulse Resp SpO2   02/26/24 2348 (!) 147/71 98.3  F (36.8  C) Temporal 65 18 95 %        Physical Exam  Nursing note and vitals reviewed.  Constitutional:  Oriented to person, place, and time. Cooperative.   HENT:   Nose:    Nose normal.   Mouth/Throat:   Mucous membranes are normal.   Eyes:    Conjunctivae normal and EOM are normal.      Pupils are equal, round, and reactive to light.   Neck:    Trachea normal.   Cardiovascular:  Normal rate, regular rhythm, normal heart sounds and normal pulses. No murmur heard.  Pulmonary/Chest:  Effort normal and breath sounds normal.   Abdominal:   Soft. Normal appearance and bowel sounds are normal.      There is no tenderness.      There  is no rebound and no CVA tenderness.   Musculoskeletal:  Swelling and tenderness to palpation to the base of the right fourth and fifth fingers with ecchymosis present as well.  Some limited range of motion to those fingers as well secondary to pain.  There is some mild swelling and tenderness to palpation to the left third and fourth fingers as well with full range of motion of those digits.  Abrasion to the right knee, but the knee is otherwise nontender and full range of motion present in the right knee.  Extremities otherwise atraumatic x 4.   Lymphadenopathy:  No cervical adenopathy.   Neurological:   Alert and oriented to person, place, and time. Normal strength.      No cranial nerve deficit or sensory deficit. GCS eye subscore is 4. GCS verbal subscore is 5. GCS motor subscore is 6.   Skin:    Skin is intact. No rash noted.   Psychiatric:   Normal mood and affect.      Emergency Department Course     Imaging:  XR Hand Left 3 Views   Final Result   IMPRESSION: No visible fracture or dislocation.      XR Knee Right 3 Views   Final Result   IMPRESSION: A small subarticular lucent focus of the central patella was present on 10/18/2023 and may be a chronic osteochondral injury or degenerative subchondral cyst. No evidence for acute fracture, dislocation or joint effusion.      XR Wrist Right G/E 3 Views   Final Result   IMPRESSION: A mildly displaced, extra-articular fracture is seen at the base of the fifth proximal phalanx with mild ventral/radial displacement of the distal fracture fragment by 2 to 3 mm. A minimally displaced fracture also seen at the base of the    fourth proximal phalanx without definite intra-articular extension. Joint spaces appear intact.        Procedures       Emergency Department Course & Assessments:    Interventions:  Medications   Tdap (tetanus-diphtheria-acell pertussis) (ADACEL) injection 0.5 mL (0.5 mLs Intramuscular $Given 2/27/24 0224)        Assessments:  0039 I obtained  history and examined the patient as noted above.   0116 I rechecked the patient and explained findings.     Independent Interpretation (X-rays, CTs, rhythm strip):  I reviewed the patient's right wrist x-rays, and I agree with the reading of fractures to the base of the right fourth and fifth metacarpals.  I also reviewed the patient's left hand x-rays and right knee x-rays and agree with the negative reading for fractures on those.    Consultations/Discussion of Management or Tests:  None       Social Determinants of Health affecting care:  None     Disposition:  The patient was discharged.    Impression & Plan    CMS Diagnoses: None    Medical Decision Making:  This is a 58-year-old female who came in after she fell and injured both of her hands and her right knee.  She had x-rays obtained from triage, which showed the above fractures in the right hand.  We then also obtained x-rays of the left hand to rule out fractures to the left hand, and those x-rays are negative.  Her knee x-rays also were unremarkable.  We were able to remove the rings from the right hand, but the patient refused to let us remove the rings on her left hand.  She understands there is risk of increased swelling and damage to the finger to the point of even losing the finger.  She was provided an updated of her tetanus and pertussis status.  She declined any pain medicine.  She was subsequently placed in a short arm plaster ulnar gutter splint, which I checked for fit and comfort.  I will have her follow-up with [facility] as soon as possible.  She should return with any concerns or worsening symptoms.    Diagnosis:    ICD-10-CM    1. Displaced fracture of base of fifth metacarpal bone, right hand, initial encounter for closed fracture  S62.316A       2. Displaced fracture of base of fourth metacarpal bone, right hand, initial encounter for closed fracture  S62.314A       3. Abrasion of right knee, initial encounter  S80.211A        4. Sprain of finger of left hand, unspecified finger, initial encounter  S63.619A       5. Need for diphtheria-tetanus-pertussis (Tdap) vaccine  Z23            Discharge Medications:  Discharge Medication List as of 2/27/2024  2:18 AM        Scribe Disclosure:  Jose C LOPES, am serving as a scribe at 12:45 AM on 2/27/2024 to document services personally performed by Dwayne Rose MD, based on my observations and the provider's statements to me.  2/27/2024   Dwayne Rose MD Lashkowitz, Seth H, MD  02/27/24 0310

## 2024-03-13 ENCOUNTER — LAB (OUTPATIENT)
Dept: LAB | Facility: CLINIC | Age: 59
End: 2024-03-13
Payer: COMMERCIAL

## 2024-03-13 DIAGNOSIS — G47.00 INSOMNIA, UNSPECIFIED: ICD-10-CM

## 2024-03-13 DIAGNOSIS — F10.20 ALCOHOL DEPENDENCE (H): Primary | ICD-10-CM

## 2024-03-13 DIAGNOSIS — F41.1 GENERALIZED ANXIETY DISORDER: ICD-10-CM

## 2024-03-13 DIAGNOSIS — F31.9 BIPOLAR DISORDER, UNSPECIFIED (H): ICD-10-CM

## 2024-03-13 DIAGNOSIS — F10.20 ALCOHOL DEPENDENCE (H): ICD-10-CM

## 2024-03-13 PROCEDURE — 84443 ASSAY THYROID STIM HORMONE: CPT

## 2024-03-13 PROCEDURE — 36415 COLL VENOUS BLD VENIPUNCTURE: CPT

## 2024-03-13 PROCEDURE — 80178 ASSAY OF LITHIUM: CPT

## 2024-03-13 PROCEDURE — 80053 COMPREHEN METABOLIC PANEL: CPT

## 2024-03-14 LAB
ALBUMIN SERPL BCG-MCNC: 4.7 G/DL (ref 3.5–5.2)
ALP SERPL-CCNC: 62 U/L (ref 40–150)
ALT SERPL W P-5'-P-CCNC: 40 U/L (ref 0–50)
ANION GAP SERPL CALCULATED.3IONS-SCNC: 11 MMOL/L (ref 7–15)
AST SERPL W P-5'-P-CCNC: 35 U/L (ref 0–45)
BILIRUB SERPL-MCNC: 0.4 MG/DL
BUN SERPL-MCNC: 15.2 MG/DL (ref 8–23)
CALCIUM SERPL-MCNC: 10 MG/DL (ref 8.6–10)
CHLORIDE SERPL-SCNC: 105 MMOL/L (ref 98–107)
CREAT SERPL-MCNC: 0.85 MG/DL (ref 0.51–0.95)
DEPRECATED HCO3 PLAS-SCNC: 25 MMOL/L (ref 22–29)
EGFRCR SERPLBLD CKD-EPI 2021: 78 ML/MIN/1.73M2
GLUCOSE SERPL-MCNC: 91 MG/DL (ref 70–99)
LITHIUM SERPL-SCNC: 0.63 MMOL/L (ref 0.6–1.2)
POTASSIUM SERPL-SCNC: 4.1 MMOL/L (ref 3.4–5.3)
PROT SERPL-MCNC: 6.9 G/DL (ref 6.4–8.3)
SODIUM SERPL-SCNC: 141 MMOL/L (ref 135–145)
TSH SERPL DL<=0.005 MIU/L-ACNC: 3.32 UIU/ML (ref 0.3–4.2)

## 2024-03-18 ENCOUNTER — TELEPHONE (OUTPATIENT)
Dept: FAMILY MEDICINE | Facility: CLINIC | Age: 59
End: 2024-03-18
Payer: MEDICAID

## 2024-03-18 NOTE — LETTER
March 18, 2024      Naa Paredes  [id number]   [facility] [facility] [address]        Dear Leonardo Jacome-    I care about your health and have reviewed your health plan. I have reviewed your medical conditions, medication list, and lab results and am making recommendations based on this review, to better manage your health.    You are in particular need of attention regarding:  -Cervical Cancer Screening    I am recommending that you:  {recommendations:-schedule a PAP SMEAR EXAM which is due.  Please disregard this reminder if you have had this exam elsewhere within the last year.  It would be helpful for us to have a copy of your recent pap smear report in our file so that we can best coordinate your care.    Please call us at [phone] (or use [employer]) to address the above recommendations.     Thank you for trusting us with your health care.We look forward to supporting your healthcare needs in the future.    Sincerely,    Your [facility] Care Team

## 2024-03-18 NOTE — TELEPHONE ENCOUNTER
Patient Quality Outreach    Patient is due for the following:   Cervical Cancer Screening - PAP Needed    Next Steps:   Contacted patient to remind them to schedule their Cervical Cancer screening    Type of outreach:    Sent letter.      None           Kimberly Santos CMA

## 2024-04-12 ENCOUNTER — TELEPHONE (OUTPATIENT)
Dept: FAMILY MEDICINE | Facility: CLINIC | Age: 59
End: 2024-04-12
Payer: MEDICAID

## 2024-04-12 NOTE — TELEPHONE ENCOUNTER
Patient Quality Outreach    Patient is due for the following:       Topic Date Due    Pneumococcal Vaccine (1 of 2 - PCV) Never done    Hepatitis B Vaccine (2 of 3 - 19+ 3-dose series) 11/15/2023       Next Steps:   Patient has upcoming appointment, these items will be addressed at that time.    Type of outreach:    Chart review performed, no outreach needed.      Questions for provider review:    None           Lina Koch MA

## 2024-07-16 ENCOUNTER — ALLIED HEALTH/NURSE VISIT (OUTPATIENT)
Dept: FAMILY MEDICINE | Facility: CLINIC | Age: 59
End: 2024-07-16
Payer: COMMERCIAL

## 2024-07-16 DIAGNOSIS — Z23 NEED FOR HEPATITIS B VACCINATION: Primary | ICD-10-CM

## 2024-07-16 DIAGNOSIS — Z23 NEED FOR PNEUMOCOCCAL VACCINATION: ICD-10-CM

## 2024-07-16 PROCEDURE — 90472 IMMUNIZATION ADMIN EACH ADD: CPT

## 2024-07-16 PROCEDURE — 90746 HEPB VACCINE 3 DOSE ADULT IM: CPT

## 2024-07-16 PROCEDURE — 90677 PCV20 VACCINE IM: CPT

## 2024-07-16 PROCEDURE — 99207 PR NO CHARGE NURSE ONLY: CPT

## 2024-07-16 PROCEDURE — 90471 IMMUNIZATION ADMIN: CPT

## 2024-07-16 NOTE — PROGRESS NOTES
Prior to immunization administration, verified patients identity using patient s name and date of birth. Please see Immunization Activity for additional information.     Screening Questionnaire for Adult Immunization    Are you sick today?   No   Do you have allergies to medications, food, a vaccine component or latex?   No   Have you ever had a serious reaction after receiving a vaccination?   No   Do you have a long-term health problem with heart, lung, kidney, or metabolic disease (e.g., diabetes), asthma, a blood disorder, no spleen, complement component deficiency, a cochlear implant, or a spinal fluid leak?  Are you on long-term aspirin therapy?   No   Do you have cancer, leukemia, HIV/AIDS, or any other immune system problem?   No   Do you have a parent, brother, or sister with an immune system problem?   No   In the past 3 months, have you taken medications that affect  your immune system, such as prednisone, other steroids, or anticancer drugs; drugs for the treatment of rheumatoid arthritis, Crohn s disease, or psoriasis; or have you had radiation treatments?   No   Have you had a seizure, or a brain or other nervous system problem?   No   During the past year, have you received a transfusion of blood or blood    products, or been given immune (gamma) globulin or antiviral drug?   No   For women: Are you pregnant or is there a chance you could become       pregnant during the next month?   No   Have you received any vaccinations in the past 4 weeks?   No     Immunization questionnaire answers were all negative.    I have reviewed the following standing orders:   This patient is due and qualifies for the Hepatitis B vaccine.    Click here for Hepatitis B Standing Order    I have reviewed the vaccines inclusion and exclusion criteria; No concerns regarding eligibility.       This patient is due and qualifies for the Pneumococcal vaccine.    Click here for Pneumococcal (Adult) Standing Order    I have  reviewed the vaccines inclusion and exclusion criteria;No concerns regarding eligibility.     Patient instructed to remain in clinic for 15 minutes afterwards, and to report any adverse reactions.     Screening performed by Kimberly Santos CMA on 7/16/2024 at 3:43 PM.

## 2024-10-24 ENCOUNTER — HOSPITAL ENCOUNTER (EMERGENCY)
Facility: CLINIC | Age: 59
Discharge: HOME | End: 2024-10-24
Attending: EMERGENCY MEDICINE | Admitting: EMERGENCY MEDICINE

## 2024-10-24 VITALS
OXYGEN SATURATION: 94 % | HEART RATE: 84 BPM | DIASTOLIC BLOOD PRESSURE: 101 MMHG | BODY MASS INDEX: 26.57 KG/M2 | RESPIRATION RATE: 16 BRPM | SYSTOLIC BLOOD PRESSURE: 158 MMHG | TEMPERATURE: 98.1 F | WEIGHT: 150 LBS
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
## 2024-10-24 DIAGNOSIS — T07.XXXA MULTIPLE CONTUSIONS: ICD-10-CM

## 2024-10-24 PROCEDURE — 99282 EMERGENCY DEPT VISIT SF MDM: CPT

## 2024-10-24 ASSESSMENT — COLUMBIA-SUICIDE SEVERITY RATING SCALE - C-SSRS
2. HAVE YOU ACTUALLY HAD ANY THOUGHTS OF KILLING YOURSELF IN THE PAST MONTH?: NO
1. IN THE PAST MONTH, HAVE YOU WISHED YOU WERE DEAD OR WISHED YOU COULD GO TO SLEEP AND NOT WAKE UP?: NO
6. HAVE YOU EVER DONE ANYTHING, STARTED TO DO ANYTHING, OR PREPARED TO DO ANYTHING TO END YOUR LIFE?: NO

## 2024-10-24 ASSESSMENT — ACTIVITIES OF DAILY LIVING (ADL): ADLS_ACUITY_SCORE: 0

## 2024-10-25 NOTE — ED PROVIDER NOTES
Emergency Department Note      History of Present Illness     Chief Complaint   No chief complaint on file.    HPI A Cantonese  was used  Naa Paredes is a 58 year old female who presents to the ED for evaluation after a fall. 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 She landed on her buttocks and the back of her head. No loss of consciousness. She felt slightly dizzy after the fall and developed a bump on her head. No vomiting. She is planning on returning to work tomorrow.     Independent Historian   None    Review of External Notes       Past Medical History     Medical History and Problem List   Anxiety  Bipolar I disorder  Cervicalgia  Closed dislocation of finger of right hand, subsequent encounter  Colon adenoma  Conversion disorder   Depression with suicidal ideation  Dysesthesia  Elevated LFT's  IBS   Lumbago  Mallet finger of right hand  Migraine  Nephrolithiasis  Perimenopause  Plantar fasciitis  Prediabetes  Thoracic outlet syndrome  Thrombocytopenia  Trichotillomania  Vitamin D deficiency disease    Medications   Lithium  Latuda    Surgical History   Carpal tunnel release  Colonoscopy x 2  Lithotripsy  Sigmoidoscopy, flexible  Transanal endoscopic microsurgery    Physical Exam     Patient Vitals for the past 24 hrs:   BP Temp Temp src Pulse Resp SpO2 Weight   10/24/24 2328 (!) 158/101 -- -- 84 16 94 % --   10/24/24 2225 (!) 174/112 98.1  F (36.7  C) Temporal 84 18 99 % 68 kg (150 lb)     Physical Exam  Gen: well appearing, in no acute distress  Oral : Mucous membranes moist,   Nose: No rhinorhea  Ears: External near normal, without drainage  Eyes: periorbital tissues and sclera normal   Neck: supple, no abnormal swelling  Lungs: Clear bilaterally, no tachypnea or distress, speaks full sentences  CV: Regular rate, regular rhythm  Abd: soft, nontender, nondistended, no rebound/guarding  Ext: no lower extremity edema  Skin: warm, dry, well  perfused, no rashes/bruising/lesions on exposed skin  Neuro: alert, no gross motor or sensory deficits, Bends over touches toes and raises hands above her head  Psych: pleasant mood, normal affect    ED Course      Discussion of Management   None    ED Course   ED Course as of 10/25/24 0014   Thu Oct 24, 2024   2317 I obtained history and examined the patient as noted above.      Additional Documentation  None    Medical Decision Making / Diagnosis     BETTE Paredes is a 58 year old female slipped and fell at work.  She has good range of motion of all of her major joints, pelvis is stable and nontender, spine nontender she can bend over and touch her toes.  No significant hematoma of her scalp loss of consciousness vomiting necessitating need for intracranial imaging at this time.  Patient would like to go back to work tomorrow so declined the offer for work excuse.    Disposition   The patient was discharged.     Diagnosis     ICD-10-CM    1. Multiple contusions  T07.XXXA       2. Fall from standing, initial encounter  W19.XXXA            Discharge Medications   Discharge Medication List as of 10/24/2024 11:27 PM        Scribe Disclosure:  I, Enrico Tran, am serving as a scribe at 11:18 PM on 10/24/2024 to document services personally performed by Sean Erwin MD, based on my observations and the provider's statements to me.        Sean Erwin MD  10/25/24 0014

## 2024-10-25 NOTE — ED TRIAGE NOTES
Here for slip and fall at work about 15 minutes. Stated that the floor was wet during a cleaning when patient slip and fell down backward hitting her buttock and back of head. C/o upper back pain, head pain, buttock pain radiating down leg. Denies any LOC. ABCs intact.      Triage Assessment (Adult)       Row Name 10/24/24 9778          Triage Assessment    Airway WDL WDL        Respiratory WDL    Respiratory WDL WDL        Cardiac WDL    Cardiac WDL WDL

## 2025-02-05 NOTE — PROGRESS NOTES
"Discharge Note    Progress reporting period is from initial eval to Oct 6, 2017.       Please see information below for last relevant information on current status.  Patient seen for Rxs Used: 8 visits.  SUBJECTIVE  Subjective changes noted by patient:  Subjective: Overal minimal change in numbness in bilateral hands.  Less neck \"stiffness\".  Overall minimal functional deficits noted today.  .  Current pain level is Current Pain level: 2/10.     Previous pain level was  Initial Pain level: 8/10.   Changes in function:  Yes (See Goal flowsheet attached for changes in current functional level)  Adverse reaction to treatment or activity: None    OBJECTIVE  Changes noted in objective findings: Objective: CROM WNL     ASSESSMENT/PLAN  Diagnosis: Cx radiculopathy   DIAGP:  The encounter diagnosis was Cervical radiculopathy.  Updated problem list and treatment plan:   Decreased strength - HEP  STG/LTGs have been met or progress has been made towards goals:  Yes, please see goal flowsheet for most current information  Assessment of Progress: current status is unknown.  Last current status:     Self Management Plans:  HEP  I have re-evaluated this patient and find that the nature, scope, duration and intensity of the therapy is appropriate for the medical condition of the patient.  Naa continues to require the following intervention to meet STG and LTG's:  HEP.    Recommendations:  Discharge with current home program.  Patient to follow up with MD as needed.      No functional deficits noted.  Exhausted possible physical therapy treatments with no effect on remaining sx's.    Please refer to the daily flowsheet for treatment today, total treatment time and time spent performing 1:1 timed codes.  Subjective:    HPI                    Objective:    System    Physical Exam    General     ROS    Assessment/Plan:                " PAST SURGICAL HISTORY:  H/O arthroscopy of knee Left x 2. Unsure of years    H/O colonoscopy with polypectomy 2014    H/O lumbar discectomy with laminectomy    History of cervical discectomy     S/P laminectomy Cervical

## (undated) DEVICE — DRSG KERLIX FLUFFS X5

## (undated) DEVICE — DRAPE UNDER BUTTOCK 89415

## (undated) DEVICE — SU CLIP LAPRA TY XC200

## (undated) DEVICE — DRAPE LEGGINGS 8421

## (undated) DEVICE — PAD CHUX UNDERPAD 30X36" P3036C

## (undated) DEVICE — ENDO FORCEP ENDOJAW BIOPSY 2.8MMX230CM FB-220U

## (undated) DEVICE — SUCTION MANIFOLD NEPTUNE 2 SYS 4 PORT 0702-020-000

## (undated) DEVICE — GOWN IMPERVIOUS BREATHABLE SMART XLG 89045

## (undated) DEVICE — LINEN POUCH DBL 5427

## (undated) DEVICE — NDL COUNTER 40CT  31142311

## (undated) DEVICE — LINEN TOWEL PACK X10 5473

## (undated) DEVICE — TUBING SUCTION 12"X1/4" N612

## (undated) DEVICE — KIT ENDO TURNOVER/PROCEDURE W/CLEAN A SCOPE LINERS 103888

## (undated) DEVICE — SOL NACL 0.9% IRRIG 1000ML BOTTLE 2F7124

## (undated) DEVICE — DRSG COTTON BALL 6PK LCB62

## (undated) DEVICE — INFLATION DEVICE BIG 60 ENDO-AN6012

## (undated) DEVICE — Device

## (undated) DEVICE — SU WND CLOSURE VLOC 90 ABS 2-0 VIOLET 6" GS-22 VLOCM2105

## (undated) DEVICE — PREP POVIDONE IODINE SOLUTION 10% 4OZ BOTTLE 29906-004

## (undated) DEVICE — PREP SKIN SCRUB TRAY 4461A

## (undated) DEVICE — LINEN DRAPE 54X72" 5467

## (undated) DEVICE — NDL 25GA 1.5" 305127

## (undated) DEVICE — ESU GROUND PAD ADULT W/CORD E7507

## (undated) DEVICE — ESU CORD MONOPOLAR 10'  E0510

## (undated) DEVICE — SU VICRYL 3-0 SH-1 CR 8X18" J772D

## (undated) DEVICE — ESU HARMONIC SCALPEL SHEARS 36CMX5MM ACE36E

## (undated) DEVICE — PANTIES MESH LG/XLG 2PK 706M2

## (undated) DEVICE — CATH TRAY FOLEY SURESTEP 16FR W/URNE MTR STLK LATEX A303316A

## (undated) DEVICE — GLOVE BIOGEL PI ULTRATOUCH G SZ 7.0 42170

## (undated) DEVICE — EVAC SYSTEM CLEAR FLOW SC082500

## (undated) RX ORDER — CEFAZOLIN SODIUM/WATER 2 G/20 ML
SYRINGE (ML) INTRAVENOUS
Status: DISPENSED
Start: 2023-03-21

## (undated) RX ORDER — PROPOFOL 10 MG/ML
INJECTION, EMULSION INTRAVENOUS
Status: DISPENSED
Start: 2023-03-21

## (undated) RX ORDER — FENTANYL CITRATE 50 UG/ML
INJECTION, SOLUTION INTRAMUSCULAR; INTRAVENOUS
Status: DISPENSED
Start: 2023-02-07

## (undated) RX ORDER — NEOSTIGMINE METHYLSULFATE 1 MG/ML
VIAL (ML) INJECTION
Status: DISPENSED
Start: 2023-03-21

## (undated) RX ORDER — DEXAMETHASONE SODIUM PHOSPHATE 4 MG/ML
INJECTION, SOLUTION INTRA-ARTICULAR; INTRALESIONAL; INTRAMUSCULAR; INTRAVENOUS; SOFT TISSUE
Status: DISPENSED
Start: 2023-03-21

## (undated) RX ORDER — MINERAL OIL
OIL (ML) MISCELLANEOUS
Status: DISPENSED
Start: 2023-03-21

## (undated) RX ORDER — FENTANYL CITRATE-0.9 % NACL/PF 10 MCG/ML
PLASTIC BAG, INJECTION (ML) INTRAVENOUS
Status: DISPENSED
Start: 2023-03-21

## (undated) RX ORDER — BUPIVACAINE HYDROCHLORIDE AND EPINEPHRINE 2.5; 5 MG/ML; UG/ML
INJECTION, SOLUTION EPIDURAL; INFILTRATION; INTRACAUDAL; PERINEURAL
Status: DISPENSED
Start: 2023-03-21

## (undated) RX ORDER — GLYCOPYRROLATE 0.2 MG/ML
INJECTION INTRAMUSCULAR; INTRAVENOUS
Status: DISPENSED
Start: 2023-03-21

## (undated) RX ORDER — METRONIDAZOLE 500 MG/100ML
INJECTION, SOLUTION INTRAVENOUS
Status: DISPENSED
Start: 2023-03-21

## (undated) RX ORDER — DEXMEDETOMIDINE HYDROCHLORIDE 4 UG/ML
INJECTION, SOLUTION INTRAVENOUS
Status: DISPENSED
Start: 2023-03-21

## (undated) RX ORDER — FENTANYL CITRATE 50 UG/ML
INJECTION, SOLUTION INTRAMUSCULAR; INTRAVENOUS
Status: DISPENSED
Start: 2023-10-24

## (undated) RX ORDER — FENTANYL CITRATE 50 UG/ML
INJECTION, SOLUTION INTRAMUSCULAR; INTRAVENOUS
Status: DISPENSED
Start: 2023-03-21